# Patient Record
Sex: MALE | Race: BLACK OR AFRICAN AMERICAN | NOT HISPANIC OR LATINO | Employment: OTHER | URBAN - METROPOLITAN AREA
[De-identification: names, ages, dates, MRNs, and addresses within clinical notes are randomized per-mention and may not be internally consistent; named-entity substitution may affect disease eponyms.]

---

## 2023-03-13 LAB
ALBUMIN (G/DL) IN SER/PLAS: 4.1 G/DL (ref 3.4–5)
ANION GAP IN SER/PLAS: 10 MMOL/L (ref 10–20)
CALCIUM (MG/DL) IN SER/PLAS: 9.4 MG/DL (ref 8.6–10.6)
CARBON DIOXIDE, TOTAL (MMOL/L) IN SER/PLAS: 28 MMOL/L (ref 21–32)
CHLORIDE (MMOL/L) IN SER/PLAS: 105 MMOL/L (ref 98–107)
CREATININE (MG/DL) IN SER/PLAS: 1.59 MG/DL (ref 0.5–1.3)
ERYTHROCYTE DISTRIBUTION WIDTH (RATIO) BY AUTOMATED COUNT: 13.4 % (ref 11.5–14.5)
ERYTHROCYTE MEAN CORPUSCULAR HEMOGLOBIN CONCENTRATION (G/DL) BY AUTOMATED: 34 G/DL (ref 32–36)
ERYTHROCYTE MEAN CORPUSCULAR VOLUME (FL) BY AUTOMATED COUNT: 90 FL (ref 80–100)
ERYTHROCYTES (10*6/UL) IN BLOOD BY AUTOMATED COUNT: 4.73 X10E12/L (ref 4.5–5.9)
GFR MALE: 50 ML/MIN/1.73M2
GLUCOSE (MG/DL) IN SER/PLAS: 86 MG/DL (ref 74–99)
HEMATOCRIT (%) IN BLOOD BY AUTOMATED COUNT: 42.7 % (ref 41–52)
HEMOGLOBIN (G/DL) IN BLOOD: 14.5 G/DL (ref 13.5–17.5)
LEUKOCYTES (10*3/UL) IN BLOOD BY AUTOMATED COUNT: 5.6 X10E9/L (ref 4.4–11.3)
MAGNESIUM (MG/DL) IN SER/PLAS: 1.78 MG/DL (ref 1.6–2.4)
NRBC (PER 100 WBCS) BY AUTOMATED COUNT: 0 /100 WBC (ref 0–0)
PHOSPHATE (MG/DL) IN SER/PLAS: 2.4 MG/DL (ref 2.5–4.9)
PLATELETS (10*3/UL) IN BLOOD AUTOMATED COUNT: 144 X10E9/L (ref 150–450)
POTASSIUM (MMOL/L) IN SER/PLAS: 3.3 MMOL/L (ref 3.5–5.3)
SODIUM (MMOL/L) IN SER/PLAS: 140 MMOL/L (ref 136–145)
TACROLIMUS (NG/ML) IN BLOOD: 6.1 NG/ML (ref 2–15)
UREA NITROGEN (MG/DL) IN SER/PLAS: 18 MG/DL (ref 6–23)

## 2023-05-12 ENCOUNTER — APPOINTMENT (OUTPATIENT)
Dept: LAB | Facility: LAB | Age: 58
End: 2023-05-12
Payer: COMMERCIAL

## 2023-05-12 LAB
ALBUMIN (G/DL) IN SER/PLAS: 3.9 G/DL (ref 3.4–5)
ANION GAP IN SER/PLAS: 11 MMOL/L (ref 10–20)
CALCIUM (MG/DL) IN SER/PLAS: 9.3 MG/DL (ref 8.6–10.6)
CARBON DIOXIDE, TOTAL (MMOL/L) IN SER/PLAS: 28 MMOL/L (ref 21–32)
CHLORIDE (MMOL/L) IN SER/PLAS: 107 MMOL/L (ref 98–107)
CREATININE (MG/DL) IN SER/PLAS: <0.2 MG/DL (ref 0.5–1.3)
ERYTHROCYTE DISTRIBUTION WIDTH (RATIO) BY AUTOMATED COUNT: 13.7 % (ref 11.5–14.5)
ERYTHROCYTE MEAN CORPUSCULAR HEMOGLOBIN CONCENTRATION (G/DL) BY AUTOMATED: 33.3 G/DL (ref 32–36)
ERYTHROCYTE MEAN CORPUSCULAR VOLUME (FL) BY AUTOMATED COUNT: 92 FL (ref 80–100)
ERYTHROCYTES (10*6/UL) IN BLOOD BY AUTOMATED COUNT: 4.45 X10E12/L (ref 4.5–5.9)
GFR MALE: >90 ML/MIN/1.73M2
GLUCOSE (MG/DL) IN SER/PLAS: <10 MG/DL (ref 74–99)
HEMATOCRIT (%) IN BLOOD BY AUTOMATED COUNT: 40.9 % (ref 41–52)
HEMOGLOBIN (G/DL) IN BLOOD: 13.6 G/DL (ref 13.5–17.5)
LEUKOCYTES (10*3/UL) IN BLOOD BY AUTOMATED COUNT: 5.6 X10E9/L (ref 4.4–11.3)
MAGNESIUM (MG/DL) IN SER/PLAS: 1.83 MG/DL (ref 1.6–2.4)
NRBC (PER 100 WBCS) BY AUTOMATED COUNT: 0 /100 WBC (ref 0–0)
PHOSPHATE (MG/DL) IN SER/PLAS: 2.1 MG/DL (ref 2.5–4.9)
PLATELETS (10*3/UL) IN BLOOD AUTOMATED COUNT: 121 X10E9/L (ref 150–450)
POTASSIUM (MMOL/L) IN SER/PLAS: 3.3 MMOL/L (ref 3.5–5.3)
SODIUM (MMOL/L) IN SER/PLAS: 143 MMOL/L (ref 136–145)
TACROLIMUS (NG/ML) IN BLOOD: 5.6 NG/ML (ref 2–15)
UREA NITROGEN (MG/DL) IN SER/PLAS: 19 MG/DL (ref 6–23)

## 2023-05-15 ENCOUNTER — APPOINTMENT (OUTPATIENT)
Dept: LAB | Facility: LAB | Age: 58
End: 2023-05-15
Payer: COMMERCIAL

## 2023-05-15 LAB
ALBUMIN (G/DL) IN SER/PLAS: 4 G/DL (ref 3.4–5)
ANION GAP IN SER/PLAS: 10 MMOL/L (ref 10–20)
CALCIUM (MG/DL) IN SER/PLAS: 9 MG/DL (ref 8.6–10.6)
CARBON DIOXIDE, TOTAL (MMOL/L) IN SER/PLAS: 27 MMOL/L (ref 21–32)
CHLORIDE (MMOL/L) IN SER/PLAS: 107 MMOL/L (ref 98–107)
CREATININE (MG/DL) IN SER/PLAS: 1.66 MG/DL (ref 0.5–1.3)
GFR MALE: 48 ML/MIN/1.73M2
GLUCOSE (MG/DL) IN SER/PLAS: 94 MG/DL (ref 74–99)
PHOSPHATE (MG/DL) IN SER/PLAS: 2.1 MG/DL (ref 2.5–4.9)
POTASSIUM (MMOL/L) IN SER/PLAS: 3.3 MMOL/L (ref 3.5–5.3)
SODIUM (MMOL/L) IN SER/PLAS: 141 MMOL/L (ref 136–145)
UREA NITROGEN (MG/DL) IN SER/PLAS: 18 MG/DL (ref 6–23)

## 2023-06-12 ENCOUNTER — APPOINTMENT (OUTPATIENT)
Dept: LAB | Facility: LAB | Age: 58
End: 2023-06-12
Payer: COMMERCIAL

## 2023-06-12 LAB
ALBUMIN (G/DL) IN SER/PLAS: 4.1 G/DL (ref 3.4–5)
ANION GAP IN SER/PLAS: 13 MMOL/L (ref 10–20)
CALCIUM (MG/DL) IN SER/PLAS: 9.4 MG/DL (ref 8.6–10.6)
CARBON DIOXIDE, TOTAL (MMOL/L) IN SER/PLAS: 24 MMOL/L (ref 21–32)
CHLORIDE (MMOL/L) IN SER/PLAS: 106 MMOL/L (ref 98–107)
CREATININE (MG/DL) IN SER/PLAS: 1.65 MG/DL (ref 0.5–1.3)
ERYTHROCYTE DISTRIBUTION WIDTH (RATIO) BY AUTOMATED COUNT: 13.5 % (ref 11.5–14.5)
ERYTHROCYTE MEAN CORPUSCULAR HEMOGLOBIN CONCENTRATION (G/DL) BY AUTOMATED: 32.9 G/DL (ref 32–36)
ERYTHROCYTE MEAN CORPUSCULAR VOLUME (FL) BY AUTOMATED COUNT: 92 FL (ref 80–100)
ERYTHROCYTES (10*6/UL) IN BLOOD BY AUTOMATED COUNT: 4.67 X10E12/L (ref 4.5–5.9)
GFR MALE: 48 ML/MIN/1.73M2
GLUCOSE (MG/DL) IN SER/PLAS: 115 MG/DL (ref 74–99)
HEMATOCRIT (%) IN BLOOD BY AUTOMATED COUNT: 43.1 % (ref 41–52)
HEMOGLOBIN (G/DL) IN BLOOD: 14.2 G/DL (ref 13.5–17.5)
LEUKOCYTES (10*3/UL) IN BLOOD BY AUTOMATED COUNT: 5.2 X10E9/L (ref 4.4–11.3)
MAGNESIUM (MG/DL) IN SER/PLAS: 2.08 MG/DL (ref 1.6–2.4)
NRBC (PER 100 WBCS) BY AUTOMATED COUNT: 0 /100 WBC (ref 0–0)
PHOSPHATE (MG/DL) IN SER/PLAS: 2.2 MG/DL (ref 2.5–4.9)
PLATELETS (10*3/UL) IN BLOOD AUTOMATED COUNT: 127 X10E9/L (ref 150–450)
POTASSIUM (MMOL/L) IN SER/PLAS: 3.5 MMOL/L (ref 3.5–5.3)
SODIUM (MMOL/L) IN SER/PLAS: 139 MMOL/L (ref 136–145)
TACROLIMUS (NG/ML) IN BLOOD: 6 NG/ML (ref 2–15)
UREA NITROGEN (MG/DL) IN SER/PLAS: 17 MG/DL (ref 6–23)

## 2023-07-11 ENCOUNTER — LAB (OUTPATIENT)
Dept: LAB | Facility: LAB | Age: 58
End: 2023-07-11
Payer: COMMERCIAL

## 2023-07-11 LAB
ALBUMIN (G/DL) IN SER/PLAS: 3.9 G/DL (ref 3.4–5)
ANION GAP IN SER/PLAS: 11 MMOL/L (ref 10–20)
CALCIUM (MG/DL) IN SER/PLAS: 9.3 MG/DL (ref 8.6–10.6)
CARBON DIOXIDE, TOTAL (MMOL/L) IN SER/PLAS: 28 MMOL/L (ref 21–32)
CHLORIDE (MMOL/L) IN SER/PLAS: 108 MMOL/L (ref 98–107)
CREATININE (MG/DL) IN SER/PLAS: 1.71 MG/DL (ref 0.5–1.3)
CREATININE (MG/DL) IN URINE: 70 MG/DL (ref 20–370)
ERYTHROCYTE DISTRIBUTION WIDTH (RATIO) BY AUTOMATED COUNT: 14.6 % (ref 11.5–14.5)
ERYTHROCYTE MEAN CORPUSCULAR HEMOGLOBIN CONCENTRATION (G/DL) BY AUTOMATED: 31.4 G/DL (ref 32–36)
ERYTHROCYTE MEAN CORPUSCULAR VOLUME (FL) BY AUTOMATED COUNT: 95 FL (ref 80–100)
ERYTHROCYTES (10*6/UL) IN BLOOD BY AUTOMATED COUNT: 4.56 X10E12/L (ref 4.5–5.9)
GFR MALE: 46 ML/MIN/1.73M2
GLUCOSE (MG/DL) IN SER/PLAS: 98 MG/DL (ref 74–99)
HEMATOCRIT (%) IN BLOOD BY AUTOMATED COUNT: 43.3 % (ref 41–52)
HEMOGLOBIN (G/DL) IN BLOOD: 13.6 G/DL (ref 13.5–17.5)
LEUKOCYTES (10*3/UL) IN BLOOD BY AUTOMATED COUNT: 6.1 X10E9/L (ref 4.4–11.3)
MAGNESIUM (MG/DL) IN SER/PLAS: 1.98 MG/DL (ref 1.6–2.4)
NRBC (PER 100 WBCS) BY AUTOMATED COUNT: 0 /100 WBC (ref 0–0)
PHOSPHATE (MG/DL) IN SER/PLAS: 2.8 MG/DL (ref 2.5–4.9)
PLATELETS (10*3/UL) IN BLOOD AUTOMATED COUNT: 139 X10E9/L (ref 150–450)
POTASSIUM (MMOL/L) IN SER/PLAS: 4.1 MMOL/L (ref 3.5–5.3)
PROTEIN (MG/DL) IN URINE: 30 MG/DL (ref 5–25)
PROTEIN/CREATININE (MG/MG) IN URINE: 0.43 MG/MG CREAT (ref 0–0.17)
SODIUM (MMOL/L) IN SER/PLAS: 143 MMOL/L (ref 136–145)
TACROLIMUS (NG/ML) IN BLOOD: 5.4 NG/ML (ref 2–15)
UREA NITROGEN (MG/DL) IN SER/PLAS: 18 MG/DL (ref 6–23)

## 2023-07-28 ENCOUNTER — LAB (OUTPATIENT)
Dept: LAB | Facility: LAB | Age: 58
End: 2023-07-28
Payer: COMMERCIAL

## 2023-07-28 LAB
ALBUMIN (G/DL) IN SER/PLAS: 4.2 G/DL (ref 3.4–5)
ANION GAP IN SER/PLAS: 15 MMOL/L (ref 10–20)
CALCIDIOL (25 OH VITAMIN D3) (NG/ML) IN SER/PLAS: 20 NG/ML
CALCIUM (MG/DL) IN SER/PLAS: 9.5 MG/DL (ref 8.6–10.6)
CARBON DIOXIDE, TOTAL (MMOL/L) IN SER/PLAS: 25 MMOL/L (ref 21–32)
CHLORIDE (MMOL/L) IN SER/PLAS: 105 MMOL/L (ref 98–107)
CREATININE (MG/DL) IN SER/PLAS: 1.77 MG/DL (ref 0.5–1.3)
ERYTHROCYTE DISTRIBUTION WIDTH (RATIO) BY AUTOMATED COUNT: 14.4 % (ref 11.5–14.5)
ERYTHROCYTE MEAN CORPUSCULAR HEMOGLOBIN CONCENTRATION (G/DL) BY AUTOMATED: 32.4 G/DL (ref 32–36)
ERYTHROCYTE MEAN CORPUSCULAR VOLUME (FL) BY AUTOMATED COUNT: 93 FL (ref 80–100)
ERYTHROCYTES (10*6/UL) IN BLOOD BY AUTOMATED COUNT: 4.74 X10E12/L (ref 4.5–5.9)
GFR MALE: 44 ML/MIN/1.73M2
GLUCOSE (MG/DL) IN SER/PLAS: 86 MG/DL (ref 74–99)
HEMATOCRIT (%) IN BLOOD BY AUTOMATED COUNT: 44.1 % (ref 41–52)
HEMOGLOBIN (G/DL) IN BLOOD: 14.3 G/DL (ref 13.5–17.5)
HLA CLASS I ANTIBODY SCREEN, FLOW CYTOMETRY: NORMAL
HLA CLASS II ANTIBODY SCREEN, FLOW CYTOMETRY: NORMAL
LEUKOCYTES (10*3/UL) IN BLOOD BY AUTOMATED COUNT: 5.7 X10E9/L (ref 4.4–11.3)
NRBC (PER 100 WBCS) BY AUTOMATED COUNT: 0 /100 WBC (ref 0–0)
PARATHYRIN INTACT (PG/ML) IN SER/PLAS: 120.6 PG/ML (ref 18.5–88)
PHOSPHATE (MG/DL) IN SER/PLAS: 2.5 MG/DL (ref 2.5–4.9)
PLATELETS (10*3/UL) IN BLOOD AUTOMATED COUNT: 145 X10E9/L (ref 150–450)
POTASSIUM (MMOL/L) IN SER/PLAS: 3.6 MMOL/L (ref 3.5–5.3)
SODIUM (MMOL/L) IN SER/PLAS: 141 MMOL/L (ref 136–145)
TACROLIMUS (NG/ML) IN BLOOD: 5.1 NG/ML (ref 2–15)
UREA NITROGEN (MG/DL) IN SER/PLAS: 15 MG/DL (ref 6–23)

## 2023-07-30 LAB
CREATININE (MG/DL) IN URINE: NORMAL
PROTEIN (MG/DL) IN URINE: NORMAL
PROTEIN/CREATININE (MG/MG) IN URINE: NORMAL

## 2023-08-11 ENCOUNTER — APPOINTMENT (OUTPATIENT)
Dept: LAB | Facility: LAB | Age: 58
End: 2023-08-11
Payer: COMMERCIAL

## 2023-08-11 LAB
ALBUMIN (G/DL) IN SER/PLAS: 4 G/DL (ref 3.4–5)
ANION GAP IN SER/PLAS: 12 MMOL/L (ref 10–20)
CALCIUM (MG/DL) IN SER/PLAS: 9 MG/DL (ref 8.6–10.6)
CARBON DIOXIDE, TOTAL (MMOL/L) IN SER/PLAS: 25 MMOL/L (ref 21–32)
CHLORIDE (MMOL/L) IN SER/PLAS: 106 MMOL/L (ref 98–107)
CREATININE (MG/DL) IN SER/PLAS: 1.85 MG/DL (ref 0.5–1.3)
ERYTHROCYTE DISTRIBUTION WIDTH (RATIO) BY AUTOMATED COUNT: 14 % (ref 11.5–14.5)
ERYTHROCYTE MEAN CORPUSCULAR HEMOGLOBIN CONCENTRATION (G/DL) BY AUTOMATED: 32.3 G/DL (ref 32–36)
ERYTHROCYTE MEAN CORPUSCULAR VOLUME (FL) BY AUTOMATED COUNT: 95 FL (ref 80–100)
ERYTHROCYTES (10*6/UL) IN BLOOD BY AUTOMATED COUNT: 4.4 X10E12/L (ref 4.5–5.9)
GFR MALE: 42 ML/MIN/1.73M2
GLUCOSE (MG/DL) IN SER/PLAS: 133 MG/DL (ref 74–99)
HEMATOCRIT (%) IN BLOOD BY AUTOMATED COUNT: 41.8 % (ref 41–52)
HEMOGLOBIN (G/DL) IN BLOOD: 13.5 G/DL (ref 13.5–17.5)
LEUKOCYTES (10*3/UL) IN BLOOD BY AUTOMATED COUNT: 5.9 X10E9/L (ref 4.4–11.3)
MAGNESIUM (MG/DL) IN SER/PLAS: 1.76 MG/DL (ref 1.6–2.4)
NRBC (PER 100 WBCS) BY AUTOMATED COUNT: 0 /100 WBC (ref 0–0)
PHOSPHATE (MG/DL) IN SER/PLAS: 2.1 MG/DL (ref 2.5–4.9)
PLATELETS (10*3/UL) IN BLOOD AUTOMATED COUNT: 133 X10E9/L (ref 150–450)
POTASSIUM (MMOL/L) IN SER/PLAS: 3.7 MMOL/L (ref 3.5–5.3)
SODIUM (MMOL/L) IN SER/PLAS: 139 MMOL/L (ref 136–145)
TACROLIMUS (NG/ML) IN BLOOD: 5.9 NG/ML (ref 2–15)
UREA NITROGEN (MG/DL) IN SER/PLAS: 21 MG/DL (ref 6–23)

## 2023-08-22 PROBLEM — D84.9 IMMUNOSUPPRESSION (MULTI): Status: ACTIVE | Noted: 2023-08-22

## 2023-08-22 PROBLEM — Z94.0 KIDNEY TRANSPLANT RECIPIENT (HHS-HCC): Status: ACTIVE | Noted: 2023-08-22

## 2023-08-22 PROBLEM — M62.82 RHABDOMYOLYSIS: Status: ACTIVE | Noted: 2023-08-22

## 2023-08-22 PROBLEM — E55.9 VITAMIN D DEFICIENCY: Status: ACTIVE | Noted: 2023-08-22

## 2023-08-22 PROBLEM — R16.0 LIVER MASS: Status: ACTIVE | Noted: 2023-08-22

## 2023-08-22 PROBLEM — H01.003 BLEPHARITIS OF BOTH EYES: Status: ACTIVE | Noted: 2018-01-30

## 2023-08-22 PROBLEM — D69.6 THROMBOCYTOPENIA, UNSPECIFIED (CMS-HCC): Status: ACTIVE | Noted: 2023-08-22

## 2023-08-22 PROBLEM — I25.10 ASCVD (ARTERIOSCLEROTIC CARDIOVASCULAR DISEASE): Status: ACTIVE | Noted: 2023-08-22

## 2023-08-22 PROBLEM — L90.9 FAT PAD ATROPHY OF FOOT: Status: ACTIVE | Noted: 2023-08-22

## 2023-08-22 PROBLEM — R79.89 ELEVATED SERUM CREATININE: Status: ACTIVE | Noted: 2023-08-22

## 2023-08-22 PROBLEM — A53.0 LATENT SYPHILIS WITH POSITIVE SEROLOGY: Status: ACTIVE | Noted: 2023-08-22

## 2023-08-22 PROBLEM — H01.006 BLEPHARITIS OF BOTH EYES: Status: ACTIVE | Noted: 2018-01-30

## 2023-08-22 PROBLEM — H00.14 CHALAZION OF LEFT UPPER EYELID: Status: ACTIVE | Noted: 2018-01-30

## 2023-08-22 RX ORDER — VALGANCICLOVIR 450 MG/1
1 TABLET, FILM COATED ORAL
COMMUNITY
Start: 2019-08-12 | End: 2023-10-12 | Stop reason: ALTCHOICE

## 2023-08-22 RX ORDER — VALSARTAN 320 MG/1
TABLET ORAL
COMMUNITY
Start: 2010-03-10 | End: 2023-10-12 | Stop reason: ALTCHOICE

## 2023-08-22 RX ORDER — FUROSEMIDE 20 MG/1
20 TABLET ORAL DAILY
COMMUNITY

## 2023-08-22 RX ORDER — CARVEDILOL 12.5 MG/1
12.5 TABLET ORAL 2 TIMES DAILY
COMMUNITY
Start: 2010-03-10 | End: 2023-10-12 | Stop reason: ALTCHOICE

## 2023-08-22 RX ORDER — CLONIDINE HYDROCHLORIDE 0.2 MG/1
0.2 TABLET ORAL 2 TIMES DAILY
COMMUNITY
End: 2023-10-12 | Stop reason: ALTCHOICE

## 2023-08-22 RX ORDER — TACROLIMUS 1 MG/1
3 CAPSULE ORAL EVERY 12 HOURS
COMMUNITY
Start: 2019-08-12 | End: 2023-08-22 | Stop reason: ENTERED-IN-ERROR

## 2023-08-22 RX ORDER — POLYETHYLENE GLYCOL 3350 17 G/17G
17 POWDER, FOR SOLUTION ORAL DAILY PRN
COMMUNITY
Start: 2019-08-12 | End: 2023-10-12 | Stop reason: ALTCHOICE

## 2023-08-22 RX ORDER — MYCOPHENOLATE MOFETIL 250 MG/1
2 CAPSULE ORAL EVERY 12 HOURS
COMMUNITY
Start: 2019-08-12 | End: 2023-10-12 | Stop reason: ALTCHOICE

## 2023-08-22 RX ORDER — PARICALCITOL 5 UG/ML
INJECTION, SOLUTION INTRAVENOUS 3 TIMES WEEKLY
COMMUNITY
Start: 2010-03-10 | End: 2023-10-12 | Stop reason: ALTCHOICE

## 2023-08-22 RX ORDER — SULFAMETHOXAZOLE AND TRIMETHOPRIM 400; 80 MG/1; MG/1
1 TABLET ORAL EVERY 24 HOURS
COMMUNITY
Start: 2019-08-12 | End: 2023-10-12 | Stop reason: ALTCHOICE

## 2023-08-22 RX ORDER — TACROLIMUS 1 MG/1
4 CAPSULE ORAL 2 TIMES DAILY
COMMUNITY
Start: 2019-08-12 | End: 2023-10-12 | Stop reason: ALTCHOICE

## 2023-08-22 RX ORDER — CLOTRIMAZOLE 10 MG/1
10 LOZENGE ORAL; TOPICAL
COMMUNITY
Start: 2019-08-12 | End: 2023-10-12 | Stop reason: ALTCHOICE

## 2023-08-22 RX ORDER — ERGOCALCIFEROL 1.25 MG/1
1 CAPSULE ORAL
COMMUNITY
Start: 2021-10-13 | End: 2023-10-12 | Stop reason: ALTCHOICE

## 2023-08-22 RX ORDER — CLONIDINE HYDROCHLORIDE 0.1 MG/1
0.1 TABLET ORAL 2 TIMES DAILY
COMMUNITY
Start: 2010-03-10

## 2023-08-22 RX ORDER — ACETAMINOPHEN 325 MG/1
325 TABLET ORAL EVERY 6 HOURS
COMMUNITY
Start: 2010-03-10 | End: 2023-10-12 | Stop reason: ALTCHOICE

## 2023-08-22 RX ORDER — CETIRIZINE HYDROCHLORIDE 10 MG/1
10 TABLET ORAL DAILY PRN
COMMUNITY

## 2023-08-22 RX ORDER — DOCUSATE SODIUM 100 MG/1
1 CAPSULE, LIQUID FILLED ORAL 2 TIMES DAILY
COMMUNITY
Start: 2019-08-12 | End: 2023-10-12 | Stop reason: ALTCHOICE

## 2023-08-22 RX ORDER — TAMSULOSIN HYDROCHLORIDE 0.4 MG/1
1 CAPSULE ORAL DAILY
COMMUNITY
Start: 2020-06-24 | End: 2023-10-12 | Stop reason: ALTCHOICE

## 2023-08-22 RX ORDER — PANTOPRAZOLE SODIUM 40 MG/1
1 TABLET, DELAYED RELEASE ORAL
COMMUNITY
Start: 2019-08-12 | End: 2023-10-12 | Stop reason: ALTCHOICE

## 2023-09-11 ENCOUNTER — LAB (OUTPATIENT)
Dept: LAB | Facility: LAB | Age: 58
End: 2023-09-11
Payer: COMMERCIAL

## 2023-09-11 LAB
ALBUMIN (G/DL) IN SER/PLAS: 4.1 G/DL (ref 3.4–5)
ANION GAP IN SER/PLAS: 12 MMOL/L (ref 10–20)
CALCIUM (MG/DL) IN SER/PLAS: 9.2 MG/DL (ref 8.6–10.6)
CARBON DIOXIDE, TOTAL (MMOL/L) IN SER/PLAS: 25 MMOL/L (ref 21–32)
CHLORIDE (MMOL/L) IN SER/PLAS: 107 MMOL/L (ref 98–107)
CREATININE (MG/DL) IN SER/PLAS: 1.61 MG/DL (ref 0.5–1.3)
CREATININE (MG/DL) IN URINE: 49.4 MG/DL (ref 20–370)
ERYTHROCYTE DISTRIBUTION WIDTH (RATIO) BY AUTOMATED COUNT: 13.9 % (ref 11.5–14.5)
ERYTHROCYTE MEAN CORPUSCULAR HEMOGLOBIN CONCENTRATION (G/DL) BY AUTOMATED: 33 G/DL (ref 32–36)
ERYTHROCYTE MEAN CORPUSCULAR VOLUME (FL) BY AUTOMATED COUNT: 93 FL (ref 80–100)
ERYTHROCYTES (10*6/UL) IN BLOOD BY AUTOMATED COUNT: 4.5 X10E12/L (ref 4.5–5.9)
GFR MALE: 49 ML/MIN/1.73M2
GLUCOSE (MG/DL) IN SER/PLAS: 104 MG/DL (ref 74–99)
HEMATOCRIT (%) IN BLOOD BY AUTOMATED COUNT: 41.8 % (ref 41–52)
HEMOGLOBIN (G/DL) IN BLOOD: 13.8 G/DL (ref 13.5–17.5)
LEUKOCYTES (10*3/UL) IN BLOOD BY AUTOMATED COUNT: 5.6 X10E9/L (ref 4.4–11.3)
NRBC (PER 100 WBCS) BY AUTOMATED COUNT: 0 /100 WBC (ref 0–0)
PHOSPHATE (MG/DL) IN SER/PLAS: 1.7 MG/DL (ref 2.5–4.9)
PLATELETS (10*3/UL) IN BLOOD AUTOMATED COUNT: 129 X10E9/L (ref 150–450)
POTASSIUM (MMOL/L) IN SER/PLAS: 3.4 MMOL/L (ref 3.5–5.3)
PROTEIN (MG/DL) IN URINE: 36 MG/DL (ref 5–25)
PROTEIN/CREATININE (MG/MG) IN URINE: 0.73 MG/MG CREAT (ref 0–0.17)
SODIUM (MMOL/L) IN SER/PLAS: 141 MMOL/L (ref 136–145)
TACROLIMUS (NG/ML) IN BLOOD: 7.8 NG/ML (ref 2–15)
UREA NITROGEN (MG/DL) IN SER/PLAS: 20 MG/DL (ref 6–23)

## 2023-09-21 ENCOUNTER — LAB (OUTPATIENT)
Dept: LAB | Facility: LAB | Age: 58
End: 2023-09-21
Payer: COMMERCIAL

## 2023-09-21 LAB
ALBUMIN (G/DL) IN SER/PLAS: 4 G/DL (ref 3.4–5)
ANION GAP IN SER/PLAS: 15 MMOL/L (ref 10–20)
CALCIUM (MG/DL) IN SER/PLAS: 9.6 MG/DL (ref 8.6–10.6)
CARBON DIOXIDE, TOTAL (MMOL/L) IN SER/PLAS: 23 MMOL/L (ref 21–32)
CHLORIDE (MMOL/L) IN SER/PLAS: 107 MMOL/L (ref 98–107)
CREATININE (MG/DL) IN SER/PLAS: 1.9 MG/DL (ref 0.5–1.3)
CREATININE (MG/DL) IN URINE: 88.3 MG/DL (ref 20–370)
ERYTHROCYTE DISTRIBUTION WIDTH (RATIO) BY AUTOMATED COUNT: 14.1 % (ref 11.5–14.5)
ERYTHROCYTE MEAN CORPUSCULAR HEMOGLOBIN CONCENTRATION (G/DL) BY AUTOMATED: 34 G/DL (ref 32–36)
ERYTHROCYTE MEAN CORPUSCULAR VOLUME (FL) BY AUTOMATED COUNT: 92 FL (ref 80–100)
ERYTHROCYTES (10*6/UL) IN BLOOD BY AUTOMATED COUNT: 4.44 X10E12/L (ref 4.5–5.9)
GFR MALE: 41 ML/MIN/1.73M2
GLUCOSE (MG/DL) IN SER/PLAS: 112 MG/DL (ref 74–99)
HEMATOCRIT (%) IN BLOOD BY AUTOMATED COUNT: 40.9 % (ref 41–52)
HEMOGLOBIN (G/DL) IN BLOOD: 13.9 G/DL (ref 13.5–17.5)
LEUKOCYTES (10*3/UL) IN BLOOD BY AUTOMATED COUNT: 6.1 X10E9/L (ref 4.4–11.3)
MAGNESIUM (MG/DL) IN SER/PLAS: 1.87 MG/DL (ref 1.6–2.4)
NRBC (PER 100 WBCS) BY AUTOMATED COUNT: 0 /100 WBC (ref 0–0)
PHOSPHATE (MG/DL) IN SER/PLAS: 2.1 MG/DL (ref 2.5–4.9)
PLATELETS (10*3/UL) IN BLOOD AUTOMATED COUNT: 129 X10E9/L (ref 150–450)
POTASSIUM (MMOL/L) IN SER/PLAS: 3.2 MMOL/L (ref 3.5–5.3)
PROTEIN (MG/DL) IN URINE: 51 MG/DL (ref 5–25)
PROTEIN/CREATININE (MG/MG) IN URINE: 0.58 MG/MG CREAT (ref 0–0.17)
SODIUM (MMOL/L) IN SER/PLAS: 142 MMOL/L (ref 136–145)
TACROLIMUS (NG/ML) IN BLOOD: 9.3 NG/ML (ref 2–15)
UREA NITROGEN (MG/DL) IN SER/PLAS: 15 MG/DL (ref 6–23)

## 2023-10-02 ENCOUNTER — OFFICE VISIT (OUTPATIENT)
Dept: OPHTHALMOLOGY | Facility: CLINIC | Age: 58
End: 2023-10-02
Payer: COMMERCIAL

## 2023-10-02 DIAGNOSIS — H52.4 PRESBYOPIA OF BOTH EYES: ICD-10-CM

## 2023-10-02 DIAGNOSIS — H52.13 MYOPIA OF BOTH EYES: Primary | ICD-10-CM

## 2023-10-02 PROCEDURE — 92004 COMPRE OPH EXAM NEW PT 1/>: CPT | Performed by: OPTOMETRIST

## 2023-10-02 PROCEDURE — 92015 DETERMINE REFRACTIVE STATE: CPT | Performed by: OPTOMETRIST

## 2023-10-02 ASSESSMENT — REFRACTION_MANIFEST
METHOD_AUTOREFRACTION: 1
OS_CYLINDER: -0.50
OD_AXIS: 070
OD_CYLINDER: -0.50
OD_SPHERE: -0.25
OS_SPHERE: -0.25
OD_ADD: +2.00
OD_SPHERE: -0.25
OS_AXIS: 090
OD_CYLINDER: -0.50
OD_AXIS: 065
OS_SPHERE: -0.25
OS_AXIS: 090
OS_ADD: +2.00
OS_CYLINDER: -0.50

## 2023-10-02 ASSESSMENT — CONF VISUAL FIELD
OS_INFERIOR_TEMPORAL_RESTRICTION: 0
OD_SUPERIOR_NASAL_RESTRICTION: 0
OD_INFERIOR_NASAL_RESTRICTION: 0
OD_NORMAL: 1
OS_SUPERIOR_TEMPORAL_RESTRICTION: 0
OD_INFERIOR_TEMPORAL_RESTRICTION: 0
OS_SUPERIOR_NASAL_RESTRICTION: 0
OS_INFERIOR_NASAL_RESTRICTION: 0
OD_SUPERIOR_TEMPORAL_RESTRICTION: 0
OS_NORMAL: 1

## 2023-10-02 ASSESSMENT — SLIT LAMP EXAM - LIDS
COMMENTS: NORMAL
COMMENTS: NORMAL

## 2023-10-02 ASSESSMENT — CUP TO DISC RATIO
OD_RATIO: 0.2
OS_RATIO: 0.2

## 2023-10-02 ASSESSMENT — VISUAL ACUITY
OS_SC: 20/20
OD_SC: 20/20
OS_SC+: -1
METHOD: SNELLEN - LINEAR

## 2023-10-02 ASSESSMENT — TONOMETRY
OS_IOP_MMHG: 18
OD_IOP_MMHG: 18
IOP_METHOD: GOLDMANN APPLANATION

## 2023-10-02 ASSESSMENT — EXTERNAL EXAM - LEFT EYE: OS_EXAM: NORMAL

## 2023-10-02 ASSESSMENT — EXTERNAL EXAM - RIGHT EYE: OD_EXAM: NORMAL

## 2023-10-02 NOTE — PROGRESS NOTES
A spectacle prescription was dispensed to be used as needed. 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

## 2023-10-10 ENCOUNTER — APPOINTMENT (OUTPATIENT)
Dept: TRANSPLANT | Facility: HOSPITAL | Age: 58
End: 2023-10-10
Payer: COMMERCIAL

## 2023-10-11 ENCOUNTER — LAB (OUTPATIENT)
Dept: LAB | Facility: LAB | Age: 58
End: 2023-10-11
Payer: COMMERCIAL

## 2023-10-11 DIAGNOSIS — Z94.0 KIDNEY TRANSPLANT STATUS (HHS-HCC): Primary | ICD-10-CM

## 2023-10-11 DIAGNOSIS — D84.9 IMMUNODEFICIENCY, UNSPECIFIED (MULTI): ICD-10-CM

## 2023-10-11 LAB
ALBUMIN SERPL BCP-MCNC: 3.9 G/DL (ref 3.4–5)
ANION GAP SERPL CALC-SCNC: 13 MMOL/L (ref 10–20)
BUN SERPL-MCNC: 18 MG/DL (ref 6–23)
CALCIUM SERPL-MCNC: 10.2 MG/DL (ref 8.6–10.6)
CHLORIDE SERPL-SCNC: 108 MMOL/L (ref 98–107)
CO2 SERPL-SCNC: 23 MMOL/L (ref 21–32)
CREAT SERPL-MCNC: 1.83 MG/DL (ref 0.5–1.3)
CREAT UR-MCNC: 128.5 MG/DL (ref 20–370)
ERYTHROCYTE [DISTWIDTH] IN BLOOD BY AUTOMATED COUNT: 14.5 % (ref 11.5–14.5)
GFR SERPL CREATININE-BSD FRML MDRD: 43 ML/MIN/1.73M*2
GLUCOSE SERPL-MCNC: 97 MG/DL (ref 74–99)
HCT VFR BLD AUTO: 42.8 % (ref 41–52)
HGB BLD-MCNC: 13.8 G/DL (ref 13.5–17.5)
MCH RBC QN AUTO: 30.1 PG (ref 26–34)
MCHC RBC AUTO-ENTMCNC: 32.2 G/DL (ref 32–36)
MCV RBC AUTO: 93 FL (ref 80–100)
NRBC BLD-RTO: 0 /100 WBCS (ref 0–0)
PHOSPHATE SERPL-MCNC: 2.5 MG/DL (ref 2.5–4.9)
PLATELET # BLD AUTO: 119 X10*3/UL (ref 150–450)
PMV BLD AUTO: 13 FL (ref 7.5–11.5)
POTASSIUM SERPL-SCNC: 3.6 MMOL/L (ref 3.5–5.3)
PROT UR-ACNC: 55 MG/DL (ref 5–25)
PROT/CREAT UR: 0.43 MG/MG CREAT (ref 0–0.17)
PTH-INTACT SERPL-MCNC: 204.4 PG/ML (ref 18.5–88)
RBC # BLD AUTO: 4.58 X10*6/UL (ref 4.5–5.9)
SODIUM SERPL-SCNC: 140 MMOL/L (ref 136–145)
TACROLIMUS BLD-MCNC: 4.4 NG/ML
WBC # BLD AUTO: 5.4 X10*3/UL (ref 4.4–11.3)

## 2023-10-11 PROCEDURE — 82570 ASSAY OF URINE CREATININE: CPT

## 2023-10-11 PROCEDURE — 85027 COMPLETE CBC AUTOMATED: CPT

## 2023-10-11 PROCEDURE — 84156 ASSAY OF PROTEIN URINE: CPT

## 2023-10-11 PROCEDURE — 80069 RENAL FUNCTION PANEL: CPT

## 2023-10-11 PROCEDURE — 80197 ASSAY OF TACROLIMUS: CPT

## 2023-10-11 PROCEDURE — 36415 COLL VENOUS BLD VENIPUNCTURE: CPT

## 2023-10-11 PROCEDURE — 83970 ASSAY OF PARATHORMONE: CPT

## 2023-10-12 ENCOUNTER — OFFICE VISIT (OUTPATIENT)
Dept: TRANSPLANT | Facility: HOSPITAL | Age: 58
End: 2023-10-12
Payer: COMMERCIAL

## 2023-10-12 VITALS
HEART RATE: 87 BPM | OXYGEN SATURATION: 100 % | TEMPERATURE: 97.5 F | WEIGHT: 166.4 LBS | BODY MASS INDEX: 24.93 KG/M2 | DIASTOLIC BLOOD PRESSURE: 107 MMHG | SYSTOLIC BLOOD PRESSURE: 138 MMHG

## 2023-10-12 DIAGNOSIS — Z94.0 IMMUNOSUPPRESSIVE MANAGEMENT ENCOUNTER FOLLOWING KIDNEY TRANSPLANT (HHS-HCC): ICD-10-CM

## 2023-10-12 DIAGNOSIS — Z94.0 KIDNEY REPLACED BY TRANSPLANT (HHS-HCC): ICD-10-CM

## 2023-10-12 DIAGNOSIS — Z79.899 IMMUNOSUPPRESSIVE MANAGEMENT ENCOUNTER FOLLOWING KIDNEY TRANSPLANT (HHS-HCC): ICD-10-CM

## 2023-10-12 DIAGNOSIS — I10 ESSENTIAL HYPERTENSION: ICD-10-CM

## 2023-10-12 DIAGNOSIS — Z94.0 KIDNEY REPLACED BY TRANSPLANT (HHS-HCC): Primary | ICD-10-CM

## 2023-10-12 PROCEDURE — 3080F DIAST BP >= 90 MM HG: CPT | Performed by: INTERNAL MEDICINE

## 2023-10-12 PROCEDURE — 99214 OFFICE O/P EST MOD 30 MIN: CPT | Performed by: INTERNAL MEDICINE

## 2023-10-12 PROCEDURE — 3075F SYST BP GE 130 - 139MM HG: CPT | Performed by: INTERNAL MEDICINE

## 2023-10-12 ASSESSMENT — PAIN SCALES - GENERAL: PAINLEVEL: 0-NO PAIN

## 2023-10-12 NOTE — PATIENT INSTRUCTIONS
-Hydrate  -Repeat lab in 2 weeks  -Don't forget to bring Allosure box when you go to the lab  - If lab in 2 weeks results are good, can go back to do labs every 3 months  -Follow up in a year

## 2023-10-17 NOTE — PROGRESS NOTES
TRANSPLANT NEPHROLOGY :   OUTPATIENT CLINIC NOTE        SERVICE DATE: 10/13/23    REASON FOR VISIT/CHIEF COMPLAINT:  S/P  TRANSPLANT SURGERY  IMMUNOSUPPRESSIVE MEDICATION MANAGEMENT  BLOOD PRESSURE MANAGEMENT    HPI:    Mr. Khoury is a 58 y.o. male with past medical history significant for end stage renal disease secondary to hypertensive nephrosclerosis. Received a  donor transplant on 8/10/19. Patient has a KDPI 40% and PRA 0%, no DGF, and was induced with Simulect.      Today, the patient presents for a post-kidney transplant follow up visit.    Patient is doing well overall. No new complaints. Denied chest pain, SOB, ARCE, Palpitation. Normal urination and bowel movement. Normal gait and no weakness of arms/legs. No cough, runny nose, sore throat, cold symptoms, or rash. No hearing loss. Normal vision.No problems with his sleep, mood and function. No recent infection, hospitalization, surgery or ER visits.      ROS:  Review of  14 systems was performed system by system. See HPI. Otherwise, the symptoms were negative.    Transplant info:  Primary Cause of Organ Disease: Hypertensive Nephrosclerosis.   Donor/Transplant Type: A  donor renal transplant on: 8/10/2019.   Transplant Course: standard simulect induction, No DGF, No CMV mismatch, No EBV mismatch, KDPI: 40% and PRA: 0.   Rejections: No episodes of rejection.   Infection: No episodes of infection., Other: Covid-2022.   Malignancies After Transplant: No episodes of post transplant malignancy.       PAST MEDICAL HISTORY:  Past Medical History:   Diagnosis Date    Personal history of other diseases of urinary system     History of chronic kidney disease    Personal history of other specified conditions 2022    History of urinary retention    Personal history of other specified conditions 2019    History of urinary retention        PAST SURGICAL HISTORY:  Past Surgical History:   Procedure Laterality Date    OTHER  SURGICAL HISTORY  06/06/2016    Myringotomy - With Eustachian Tube Inflation    OTHER SURGICAL HISTORY  06/06/2016    Arteriovenous Surgery Creation Of A-V Fistula    OTHER SURGICAL HISTORY  08/23/2019    Kidney transplantation        SOCIAL HISTORY:  Social History     Socioeconomic History    Marital status:      Spouse name: Not on file    Number of children: Not on file    Years of education: Not on file    Highest education level: Not on file   Occupational History    Not on file   Tobacco Use    Smoking status: Not on file    Smokeless tobacco: Not on file   Substance and Sexual Activity    Alcohol use: Not on file    Drug use: Not on file    Sexual activity: Not on file   Other Topics Concern    Not on file   Social History Narrative    Not on file     Social Determinants of Health     Financial Resource Strain: Not on file   Food Insecurity: Not on file   Transportation Needs: Not on file   Physical Activity: Not on file   Stress: Not on file   Social Connections: Not on file   Intimate Partner Violence: Not on file   Housing Stability: Not on file       FAMILY HISTORY:  Family History   Problem Relation Name Age of Onset    Hypertension Mother         MEDICATION LIST:  Current Outpatient Medications   Medication Instructions    cetirizine (ZYRTEC) 10 mg, oral, Daily PRN, Every morning as needed    cloNIDine (CATAPRES) 0.1 mg, oral    darbepoetin osmar (ARANESP) 100 mcg, intravenous, Every Friday    furosemide (LASIX) 20 mg, oral, Daily    mycophenolate (Cellcept) 250 mg capsule TAKE 3 CAPSULE TWICE DAILY    tacrolimus (Prograf) 1 mg capsule TAKE THREE (3) CAPSULES BY MOUTH EVERY 12 HOURS.    tamsulosin (Flomax) 0.4 mg 24 hr capsule TAKE ONE (1) CAPSULE BY MOUTH EVERYDAY AT BEDTIME.         PHYSICAL EXAM:    Visit Vitals  BP (!) 138/107   Pulse 87   Temp 36.4 °C (97.5 °F) (Temporal)   Wt 75.5 kg (166 lb 6.4 oz)   SpO2 100%   BMI 24.93 kg/m²   BSA 1.91 m²          Vital signs - reviewed. Acceptable BP  at this office visit.   General Appearance - NAD, Good speech, oriented and alert  HEENT - Supple. Not pale. No jaundice. No cervical lymphadenopathy. Pharynx and tonsils are not injected.  CVS - RRR. Normal S1/S2. No murmur, click , rub or gallop  Lungs- clear to auscultation bilaterally  Abdomen - soft , not tender, no guarding, no rigidity. No hepatosplenomegaly. Normal bowel sounds. No masses and ascites. S/P Kidney transplant .  Transplanted kidney is not tender.   Musculoskeletal /Extremities - no edema. Full ROM. No joint tenderness.   Neuro/Psych - appropriate mood and affect. Motor power V/V all extremities. CN I -XII were grossly intact.  Skin - No visible rash      LABS:    Lab Results   Component Value Date    WBC 5.4 10/11/2023    HGB 13.8 10/11/2023    HCT 42.8 10/11/2023     (L) 10/11/2023    CHOL 152 08/22/2019    TRIG 100 08/22/2019    HDL 40.7 08/22/2019    ALT 11 09/08/2022    AST 15 09/08/2022     10/11/2023    K 3.6 10/11/2023     (H) 10/11/2023    CREATININE 1.83 (H) 10/11/2023    BUN 18 10/11/2023    CO2 23 10/11/2023    PSA 0.55 02/13/2023    INR 1.1 03/20/2020    HGBA1C 5.4 02/13/2023     par    ASSESSMENT AND PLAN:    Mr. Khoury is a 58 y.o. male  who is here for follow up s/p kidney transplant.    TRANSPLANT DATE: 8/10/2019 (Kidney)      1. ESRD S/P kidney transplant   - Creatinine last check was :  Lab Results   Component Value Date    CREATININE 1.83 (H) 10/11/2023     Serum creatinine: 1.83 mg/dL (H) 10/11/23 1053  Estimated creatinine clearance: 43.8 mL/min (A)    - Renal allograft function : Cr has increased since July 2023.  -Random urine protein/creatinine ratio is 0.43  -Ensure adequate hydration  - Avoid nephrotoxic medications, NSAIDs, and IV contrast.    2. Immunosuppression  -Tacrolimus level last check was 4.4  -Continue current immunosuppression regimen.    3. Electrolytes  Lab Results   Component Value Date    GLUCOSE 97 10/11/2023    CALCIUM 10.2  10/11/2023     10/11/2023    K 3.6 10/11/2023    CO2 23 10/11/2023     (H) 10/11/2023    BUN 18 10/11/2023    CREATININE 1.83 (H) 10/11/2023     -Acceptable from last lab drawn    4. Hypertension  Blood Pressures         10/12/2023  1148 10/12/2023  1219          BP: 160/113 138/107            -Home  BP had been acceptable. Would hold off on increasing BP meds.  -Encourage to monitor home BP  -Continue current anti hypertensive medication    5. Bone Mineral Disease/Osteoporosis  -check VIT D, PTH with next lab  - Consider DEXA every 2-3 years , defer to PCP    6.Anemia  Lab Results   Component Value Date    WBC 5.4 10/11/2023    HGB 13.8 10/11/2023    HCT 42.8 10/11/2023    MCV 93 10/11/2023     (L) 10/11/2023     -asymptomatic  - Continue to monitor  -check iron studies and ferritin. Will consider KEAGAN as needed.  - No indications for blood transfusion     7.Health maintenance and vaccination  - Flu shot during flu season annually  - Cancer screening is up to date per the patient    Lab : Routine transplant lab ( CBC, RFP, and anti-rejection trough level ) every 3 months  Additional labs:  VIT D, PTH with next lab  Viral screening PCR, Allosure and UPC per protocol.    Additional Plan :  -Hydrate  -Repeat lab in 2 weeks  -Allosure with next lab  - If lab in 2 weeks results are good, can go back to do labs every 3 months    RTC 12 months    My Can    Transplant Nephrology

## 2023-10-31 ENCOUNTER — SPECIALTY PHARMACY (OUTPATIENT)
Dept: PHARMACY | Facility: CLINIC | Age: 58
End: 2023-10-31

## 2023-10-31 ENCOUNTER — PHARMACY VISIT (OUTPATIENT)
Dept: PHARMACY | Facility: CLINIC | Age: 58
End: 2023-10-31
Payer: MEDICARE

## 2023-10-31 DIAGNOSIS — Z94.0 KIDNEY REPLACED BY TRANSPLANT (HHS-HCC): ICD-10-CM

## 2023-10-31 PROCEDURE — RXMED WILLOW AMBULATORY MEDICATION CHARGE

## 2023-11-01 RX ORDER — TACROLIMUS 0.5 MG/1
1 CAPSULE ORAL 2 TIMES DAILY
Qty: 120 CAPSULE | Refills: 11 | Status: SHIPPED | OUTPATIENT
Start: 2023-11-01 | End: 2024-04-10 | Stop reason: SDUPTHER

## 2023-11-01 RX ORDER — TACROLIMUS 1 MG/1
CAPSULE ORAL
Qty: 180 CAPSULE | Refills: 11 | Status: SHIPPED | OUTPATIENT
Start: 2023-11-01 | End: 2024-04-10 | Stop reason: SDUPTHER

## 2023-11-01 RX ORDER — MYCOPHENOLATE MOFETIL 250 MG/1
CAPSULE ORAL
Qty: 180 CAPSULE | Refills: 11 | Status: SHIPPED | OUTPATIENT
Start: 2023-11-01 | End: 2024-10-31

## 2023-11-01 NOTE — TELEPHONE ENCOUNTER
Discussed with patient to confirm which medications and doses pt is currently taking, Refill orders sent for approval to Dr. Can

## 2023-11-07 ENCOUNTER — PHARMACY VISIT (OUTPATIENT)
Dept: PHARMACY | Facility: CLINIC | Age: 58
End: 2023-11-07
Payer: MEDICARE

## 2023-11-07 PROCEDURE — RXMED WILLOW AMBULATORY MEDICATION CHARGE

## 2023-11-08 ENCOUNTER — SPECIALTY PHARMACY (OUTPATIENT)
Dept: PHARMACY | Facility: CLINIC | Age: 58
End: 2023-11-08

## 2023-12-06 ENCOUNTER — LAB (OUTPATIENT)
Dept: LAB | Facility: LAB | Age: 58
End: 2023-12-06
Payer: COMMERCIAL

## 2023-12-06 DIAGNOSIS — Z76.82 KIDNEY TRANSPLANT CANDIDATE: ICD-10-CM

## 2023-12-06 DIAGNOSIS — Z94.0 KIDNEY REPLACED BY TRANSPLANT (HHS-HCC): ICD-10-CM

## 2023-12-06 LAB
ALBUMIN SERPL BCP-MCNC: 3.9 G/DL (ref 3.4–5)
ANION GAP SERPL CALC-SCNC: 14 MMOL/L (ref 10–20)
BUN SERPL-MCNC: 18 MG/DL (ref 6–23)
CALCIUM SERPL-MCNC: 9.4 MG/DL (ref 8.6–10.6)
CHLORIDE SERPL-SCNC: 106 MMOL/L (ref 98–107)
CO2 SERPL-SCNC: 24 MMOL/L (ref 21–32)
CREAT SERPL-MCNC: 1.51 MG/DL (ref 0.5–1.3)
CREAT UR-MCNC: 74.7 MG/DL (ref 20–370)
ERYTHROCYTE [DISTWIDTH] IN BLOOD BY AUTOMATED COUNT: 14.3 % (ref 11.5–14.5)
GFR SERPL CREATININE-BSD FRML MDRD: 54 ML/MIN/1.73M*2
GLUCOSE SERPL-MCNC: 92 MG/DL (ref 74–99)
HCT VFR BLD AUTO: 45 % (ref 41–52)
HGB BLD-MCNC: 14.7 G/DL (ref 13.5–17.5)
MAGNESIUM SERPL-MCNC: 1.8 MG/DL (ref 1.6–2.4)
MCH RBC QN AUTO: 30.4 PG (ref 26–34)
MCHC RBC AUTO-ENTMCNC: 32.7 G/DL (ref 32–36)
MCV RBC AUTO: 93 FL (ref 80–100)
NRBC BLD-RTO: 0 /100 WBCS (ref 0–0)
PHOSPHATE SERPL-MCNC: 2.3 MG/DL (ref 2.5–4.9)
PLATELET # BLD AUTO: 144 X10*3/UL (ref 150–450)
POTASSIUM SERPL-SCNC: 3.2 MMOL/L (ref 3.5–5.3)
PROT UR-ACNC: 51 MG/DL (ref 5–25)
PROT/CREAT UR: 0.68 MG/MG CREAT (ref 0–0.17)
RBC # BLD AUTO: 4.84 X10*6/UL (ref 4.5–5.9)
SODIUM SERPL-SCNC: 141 MMOL/L (ref 136–145)
TACROLIMUS BLD-MCNC: 6.3 NG/ML
WBC # BLD AUTO: 5.6 X10*3/UL (ref 4.4–11.3)

## 2023-12-06 PROCEDURE — 84156 ASSAY OF PROTEIN URINE: CPT

## 2023-12-06 PROCEDURE — 36415 COLL VENOUS BLD VENIPUNCTURE: CPT

## 2023-12-06 PROCEDURE — 80069 RENAL FUNCTION PANEL: CPT

## 2023-12-06 PROCEDURE — 80197 ASSAY OF TACROLIMUS: CPT

## 2023-12-06 PROCEDURE — 82570 ASSAY OF URINE CREATININE: CPT

## 2023-12-06 PROCEDURE — 85027 COMPLETE CBC AUTOMATED: CPT

## 2023-12-06 PROCEDURE — 83735 ASSAY OF MAGNESIUM: CPT

## 2023-12-09 LAB
ALLOSURE SCORE - KIDNEY: 0.52 %
CAREDX_ORDER_ID: NORMAL
CENTER_ORDER_ID: NORMAL
CLIENT SPECIMEN ID - ALLOSURE: NORMAL
DONOR RELATION - ALLOSURE: NORMAL
NOTES - ALLOSURE: NORMAL
RELATIVE CHANGE VALUE - KIDNEY: 0 %
TEST COMMENTS - ALLOSURE: NORMAL
TIME POST TX - ALLOSURE: NORMAL
TRANSPLANTED ORGAN - ALLOSURE: NORMAL
TX DATE - ALLOSURE/ALLOMAP: NORMAL
WP_ORDER_ID: NORMAL

## 2023-12-20 ENCOUNTER — SPECIALTY PHARMACY (OUTPATIENT)
Dept: PHARMACY | Facility: CLINIC | Age: 58
End: 2023-12-20

## 2023-12-20 PROCEDURE — RXMED WILLOW AMBULATORY MEDICATION CHARGE

## 2023-12-21 ENCOUNTER — PHARMACY VISIT (OUTPATIENT)
Dept: PHARMACY | Facility: CLINIC | Age: 58
End: 2023-12-21
Payer: MEDICARE

## 2024-01-18 PROCEDURE — RXMED WILLOW AMBULATORY MEDICATION CHARGE

## 2024-01-19 ENCOUNTER — PHARMACY VISIT (OUTPATIENT)
Dept: PHARMACY | Facility: CLINIC | Age: 59
End: 2024-01-19
Payer: COMMERCIAL

## 2024-01-22 ENCOUNTER — LAB (OUTPATIENT)
Dept: LAB | Facility: LAB | Age: 59
End: 2024-01-22
Payer: COMMERCIAL

## 2024-01-22 DIAGNOSIS — Z94.0 KIDNEY REPLACED BY TRANSPLANT (HHS-HCC): ICD-10-CM

## 2024-01-22 LAB
ALBUMIN SERPL BCP-MCNC: 4 G/DL (ref 3.4–5)
ANION GAP SERPL CALC-SCNC: 15 MMOL/L (ref 10–20)
BUN SERPL-MCNC: 14 MG/DL (ref 6–23)
CALCIUM SERPL-MCNC: 9 MG/DL (ref 8.6–10.6)
CHLORIDE SERPL-SCNC: 108 MMOL/L (ref 98–107)
CO2 SERPL-SCNC: 20 MMOL/L (ref 21–32)
CREAT SERPL-MCNC: 1.72 MG/DL (ref 0.5–1.3)
CREAT UR-MCNC: 112.9 MG/DL (ref 20–370)
EGFRCR SERPLBLD CKD-EPI 2021: 46 ML/MIN/1.73M*2
ERYTHROCYTE [DISTWIDTH] IN BLOOD BY AUTOMATED COUNT: 14 % (ref 11.5–14.5)
GLUCOSE SERPL-MCNC: 117 MG/DL (ref 74–99)
HCT VFR BLD AUTO: 42.6 % (ref 41–52)
HGB BLD-MCNC: 14 G/DL (ref 13.5–17.5)
MAGNESIUM SERPL-MCNC: 1.77 MG/DL (ref 1.6–2.4)
MCH RBC QN AUTO: 29.8 PG (ref 26–34)
MCHC RBC AUTO-ENTMCNC: 32.9 G/DL (ref 32–36)
MCV RBC AUTO: 91 FL (ref 80–100)
NRBC BLD-RTO: 0 /100 WBCS (ref 0–0)
PHOSPHATE SERPL-MCNC: 1.9 MG/DL (ref 2.5–4.9)
PLATELET # BLD AUTO: 152 X10*3/UL (ref 150–450)
POTASSIUM SERPL-SCNC: 3.4 MMOL/L (ref 3.5–5.3)
PROT UR-ACNC: 87 MG/DL (ref 5–25)
PROT/CREAT UR: 0.77 MG/MG CREAT (ref 0–0.17)
RBC # BLD AUTO: 4.7 X10*6/UL (ref 4.5–5.9)
SODIUM SERPL-SCNC: 140 MMOL/L (ref 136–145)
TACROLIMUS BLD-MCNC: 5.6 NG/ML
WBC # BLD AUTO: 5.5 X10*3/UL (ref 4.4–11.3)

## 2024-01-22 PROCEDURE — 82570 ASSAY OF URINE CREATININE: CPT

## 2024-01-22 PROCEDURE — 83735 ASSAY OF MAGNESIUM: CPT

## 2024-01-22 PROCEDURE — 84156 ASSAY OF PROTEIN URINE: CPT

## 2024-01-22 PROCEDURE — 85027 COMPLETE CBC AUTOMATED: CPT

## 2024-01-22 PROCEDURE — 80069 RENAL FUNCTION PANEL: CPT

## 2024-01-22 PROCEDURE — 36415 COLL VENOUS BLD VENIPUNCTURE: CPT

## 2024-01-22 PROCEDURE — 80197 ASSAY OF TACROLIMUS: CPT

## 2024-02-09 PROCEDURE — RXMED WILLOW AMBULATORY MEDICATION CHARGE

## 2024-02-27 ENCOUNTER — SPECIALTY PHARMACY (OUTPATIENT)
Dept: PHARMACY | Facility: CLINIC | Age: 59
End: 2024-02-27

## 2024-02-27 PROCEDURE — RXMED WILLOW AMBULATORY MEDICATION CHARGE

## 2024-02-28 ENCOUNTER — PHARMACY VISIT (OUTPATIENT)
Dept: PHARMACY | Facility: CLINIC | Age: 59
End: 2024-02-28
Payer: COMMERCIAL

## 2024-03-20 ENCOUNTER — TELEPHONE (OUTPATIENT)
Dept: TRANSPLANT | Facility: HOSPITAL | Age: 59
End: 2024-03-20

## 2024-03-20 ENCOUNTER — LAB (OUTPATIENT)
Dept: LAB | Facility: LAB | Age: 59
End: 2024-03-20
Payer: COMMERCIAL

## 2024-03-20 DIAGNOSIS — Z94.0 KIDNEY REPLACED BY TRANSPLANT (HHS-HCC): ICD-10-CM

## 2024-03-20 LAB
ALBUMIN SERPL BCP-MCNC: 4.5 G/DL (ref 3.4–5)
ANION GAP SERPL CALC-SCNC: 15 MMOL/L (ref 10–20)
BUN SERPL-MCNC: 19 MG/DL (ref 6–23)
CALCIUM SERPL-MCNC: 9.5 MG/DL (ref 8.6–10.6)
CHLORIDE SERPL-SCNC: 104 MMOL/L (ref 98–107)
CO2 SERPL-SCNC: 24 MMOL/L (ref 21–32)
CREAT SERPL-MCNC: 1.98 MG/DL (ref 0.5–1.3)
CREAT UR-MCNC: <1 MG/DL (ref 20–370)
EGFRCR SERPLBLD CKD-EPI 2021: 39 ML/MIN/1.73M*2
ERYTHROCYTE [DISTWIDTH] IN BLOOD BY AUTOMATED COUNT: 13.9 % (ref 11.5–14.5)
GLUCOSE SERPL-MCNC: 85 MG/DL (ref 74–99)
HCT VFR BLD AUTO: 43.3 % (ref 41–52)
HGB BLD-MCNC: 14.8 G/DL (ref 13.5–17.5)
MAGNESIUM SERPL-MCNC: 1.74 MG/DL (ref 1.6–2.4)
MCH RBC QN AUTO: 30.4 PG (ref 26–34)
MCHC RBC AUTO-ENTMCNC: 34.2 G/DL (ref 32–36)
MCV RBC AUTO: 89 FL (ref 80–100)
NRBC BLD-RTO: 0 /100 WBCS (ref 0–0)
PHOSPHATE SERPL-MCNC: 1.7 MG/DL (ref 2.5–4.9)
PLATELET # BLD AUTO: 132 X10*3/UL (ref 150–450)
POTASSIUM SERPL-SCNC: 3.3 MMOL/L (ref 3.5–5.3)
PROT UR-ACNC: 91 MG/DL (ref 5–25)
PROT/CREAT UR: ABNORMAL MG/G{CREAT}
RBC # BLD AUTO: 4.87 X10*6/UL (ref 4.5–5.9)
SODIUM SERPL-SCNC: 140 MMOL/L (ref 136–145)
TACROLIMUS BLD-MCNC: 6 NG/ML
WBC # BLD AUTO: 6.3 X10*3/UL (ref 4.4–11.3)

## 2024-03-20 PROCEDURE — 36415 COLL VENOUS BLD VENIPUNCTURE: CPT

## 2024-03-20 PROCEDURE — 82570 ASSAY OF URINE CREATININE: CPT

## 2024-03-20 PROCEDURE — 84156 ASSAY OF PROTEIN URINE: CPT

## 2024-03-20 PROCEDURE — 85027 COMPLETE CBC AUTOMATED: CPT

## 2024-03-20 PROCEDURE — 83735 ASSAY OF MAGNESIUM: CPT

## 2024-03-20 PROCEDURE — 80197 ASSAY OF TACROLIMUS: CPT

## 2024-03-20 PROCEDURE — 80069 RENAL FUNCTION PANEL: CPT

## 2024-03-20 RX ORDER — POTASSIUM CHLORIDE 20 MEQ/1
20 TABLET, EXTENDED RELEASE ORAL DAILY
Qty: 30 TABLET | Refills: 0 | Status: SHIPPED | OUTPATIENT
Start: 2024-03-20 | End: 2024-04-10 | Stop reason: ALTCHOICE

## 2024-03-20 NOTE — TELEPHONE ENCOUNTER
Reviewed labs with Dr. Moses Starthumphrey pt on K 20 meq and phos neutral 1 tab BID both sent to pt pharmacy. Called pt he is aware   Tac is pending

## 2024-04-04 ENCOUNTER — SPECIALTY PHARMACY (OUTPATIENT)
Dept: PHARMACY | Facility: CLINIC | Age: 59
End: 2024-04-04

## 2024-04-04 PROCEDURE — RXMED WILLOW AMBULATORY MEDICATION CHARGE

## 2024-04-08 ENCOUNTER — PHARMACY VISIT (OUTPATIENT)
Dept: PHARMACY | Facility: CLINIC | Age: 59
End: 2024-04-08
Payer: COMMERCIAL

## 2024-04-10 ENCOUNTER — OFFICE VISIT (OUTPATIENT)
Dept: TRANSPLANT | Facility: HOSPITAL | Age: 59
End: 2024-04-10
Payer: COMMERCIAL

## 2024-04-10 ENCOUNTER — LAB (OUTPATIENT)
Dept: LAB | Facility: LAB | Age: 59
End: 2024-04-10
Payer: COMMERCIAL

## 2024-04-10 VITALS
SYSTOLIC BLOOD PRESSURE: 173 MMHG | TEMPERATURE: 98.4 F | OXYGEN SATURATION: 100 % | DIASTOLIC BLOOD PRESSURE: 108 MMHG | HEIGHT: 69 IN | BODY MASS INDEX: 26.11 KG/M2 | WEIGHT: 176.3 LBS | HEART RATE: 81 BPM | RESPIRATION RATE: 18 BRPM

## 2024-04-10 DIAGNOSIS — Z94.0 IMMUNOSUPPRESSIVE MANAGEMENT ENCOUNTER FOLLOWING KIDNEY TRANSPLANT (HHS-HCC): Primary | ICD-10-CM

## 2024-04-10 DIAGNOSIS — I15.1 HYPERTENSION SECONDARY TO OTHER RENAL DISORDERS: ICD-10-CM

## 2024-04-10 DIAGNOSIS — Z94.0 KIDNEY REPLACED BY TRANSPLANT (HHS-HCC): ICD-10-CM

## 2024-04-10 DIAGNOSIS — Z79.899 IMMUNOSUPPRESSIVE MANAGEMENT ENCOUNTER FOLLOWING KIDNEY TRANSPLANT (HHS-HCC): Primary | ICD-10-CM

## 2024-04-10 LAB
ALBUMIN SERPL BCP-MCNC: 4 G/DL (ref 3.4–5)
ANION GAP SERPL CALC-SCNC: 14 MMOL/L (ref 10–20)
BUN SERPL-MCNC: 14 MG/DL (ref 6–23)
CALCIUM SERPL-MCNC: 8.6 MG/DL (ref 8.6–10.6)
CHLORIDE SERPL-SCNC: 108 MMOL/L (ref 98–107)
CO2 SERPL-SCNC: 22 MMOL/L (ref 21–32)
CREAT SERPL-MCNC: 1.81 MG/DL (ref 0.5–1.3)
CREAT UR-MCNC: <1 MG/DL (ref 20–370)
EGFRCR SERPLBLD CKD-EPI 2021: 43 ML/MIN/1.73M*2
ERYTHROCYTE [DISTWIDTH] IN BLOOD BY AUTOMATED COUNT: 14 % (ref 11.5–14.5)
GLUCOSE SERPL-MCNC: 135 MG/DL (ref 74–99)
HCT VFR BLD AUTO: 37.8 % (ref 41–52)
HGB BLD-MCNC: 12.8 G/DL (ref 13.5–17.5)
MAGNESIUM SERPL-MCNC: 1.58 MG/DL (ref 1.6–2.4)
MCH RBC QN AUTO: 30 PG (ref 26–34)
MCHC RBC AUTO-ENTMCNC: 33.9 G/DL (ref 32–36)
MCV RBC AUTO: 89 FL (ref 80–100)
NRBC BLD-RTO: 0 /100 WBCS (ref 0–0)
PHOSPHATE SERPL-MCNC: 2.3 MG/DL (ref 2.5–4.9)
PLATELET # BLD AUTO: 129 X10*3/UL (ref 150–450)
POTASSIUM SERPL-SCNC: 3.6 MMOL/L (ref 3.5–5.3)
PROT UR-ACNC: <4 MG/DL (ref 5–25)
PROT/CREAT UR: ABNORMAL MG/G{CREAT}
RBC # BLD AUTO: 4.26 X10*6/UL (ref 4.5–5.9)
SODIUM SERPL-SCNC: 140 MMOL/L (ref 136–145)
TACROLIMUS BLD-MCNC: 6.3 NG/ML
WBC # BLD AUTO: 5.3 X10*3/UL (ref 4.4–11.3)

## 2024-04-10 PROCEDURE — 99214 OFFICE O/P EST MOD 30 MIN: CPT

## 2024-04-10 PROCEDURE — 84156 ASSAY OF PROTEIN URINE: CPT

## 2024-04-10 PROCEDURE — 80069 RENAL FUNCTION PANEL: CPT

## 2024-04-10 PROCEDURE — 83735 ASSAY OF MAGNESIUM: CPT

## 2024-04-10 PROCEDURE — 3080F DIAST BP >= 90 MM HG: CPT | Performed by: HOSPITALIST

## 2024-04-10 PROCEDURE — 85027 COMPLETE CBC AUTOMATED: CPT

## 2024-04-10 PROCEDURE — 36415 COLL VENOUS BLD VENIPUNCTURE: CPT

## 2024-04-10 PROCEDURE — 3077F SYST BP >= 140 MM HG: CPT | Performed by: HOSPITALIST

## 2024-04-10 PROCEDURE — 80197 ASSAY OF TACROLIMUS: CPT

## 2024-04-10 PROCEDURE — 82570 ASSAY OF URINE CREATININE: CPT

## 2024-04-10 RX ORDER — TACROLIMUS 1 MG/1
2 CAPSULE ORAL 2 TIMES DAILY
Qty: 120 CAPSULE | Refills: 11 | Status: SHIPPED | OUTPATIENT
Start: 2024-04-10 | End: 2025-04-10

## 2024-04-10 RX ORDER — LOSARTAN POTASSIUM 25 MG/1
25 TABLET ORAL 2 TIMES DAILY
Qty: 60 TABLET | Refills: 11 | Status: SHIPPED | OUTPATIENT
Start: 2024-04-10 | End: 2025-04-10

## 2024-04-10 RX ORDER — TACROLIMUS 0.5 MG/1
0.5 CAPSULE ORAL 2 TIMES DAILY
Qty: 60 CAPSULE | Refills: 11 | Status: SHIPPED | OUTPATIENT
Start: 2024-04-10 | End: 2025-04-10

## 2024-04-10 ASSESSMENT — ENCOUNTER SYMPTOMS
FREQUENCY: 0
HEADACHES: 0
PALPITATIONS: 0
BACK PAIN: 0
VOMITING: 0
CHEST TIGHTNESS: 0
COUGH: 0
DIARRHEA: 0
NERVOUS/ANXIOUS: 0
SHORTNESS OF BREATH: 0
CONFUSION: 0
NAUSEA: 0
HEMATURIA: 0
ABDOMINAL DISTENTION: 0

## 2024-04-10 NOTE — PROGRESS NOTES
"Davidson Khoury   60 y/o male with a history of a ESRD secondary to hypertension s/p DDKT on 8/10/2019.  Patient was induced by Simulect, no DGF no CMV mismatch no EBV mismatch, KDPI of kidney is 40% PRA 0%.  No episodes of rejection.    Interim history: Blood pressures are still running high discussed with him about starting losartan and side effects and follow-up closely.        Review of Systems   Respiratory:  Negative for cough, chest tightness and shortness of breath.    Cardiovascular:  Negative for chest pain, palpitations and leg swelling.   Gastrointestinal:  Negative for abdominal distention, diarrhea, nausea and vomiting.   Genitourinary:  Negative for frequency, hematuria and urgency.   Musculoskeletal:  Negative for back pain.   Neurological:  Negative for headaches.   Psychiatric/Behavioral:  Negative for confusion. The patient is not nervous/anxious.         Objective:  Visit Vitals  BP (!) 173/108 (BP Location: Right arm, Patient Position: Sitting, BP Cuff Size: Large adult)   Pulse 81   Temp 36.9 °C (98.4 °F) (Temporal)   Resp 18   Ht 1.753 m (5' 9\")   Wt 80 kg (176 lb 4.8 oz)   SpO2 100%   BMI 26.03 kg/m²   BSA 1.97 m²      Physical Exam  HENT:      Head: Normocephalic and atraumatic.      Nose: Nose normal.      Mouth/Throat:      Mouth: Mucous membranes are moist.   Eyes:      Extraocular Movements: Extraocular movements intact.      Pupils: Pupils are equal, round, and reactive to light.   Cardiovascular:      Rate and Rhythm: Normal rate.      Pulses: Normal pulses.      Heart sounds: Normal heart sounds.   Pulmonary:      Effort: Pulmonary effort is normal.   Abdominal:      Palpations: Abdomen is soft.      Tenderness: There is no abdominal tenderness. There is no guarding.   Musculoskeletal:         General: Normal range of motion.      Cervical back: Normal range of motion.   Skin:     General: Skin is warm.   Neurological:      General: No focal deficit present.      Mental Status: Mental " status is at baseline.   Psychiatric:         Mood and Affect: Mood normal.            Current Outpatient Medications:     furosemide (Lasix) 20 mg tablet, Take 1 tablet (20 mg) by mouth once daily., Disp: , Rfl:     mycophenolate (Cellcept) 250 mg capsule, Take three (3) capsules by mouth twice daily, Disp: 180 capsule, Rfl: 11    tamsulosin (Flomax) 0.4 mg 24 hr capsule, TAKE ONE (1) CAPSULE BY MOUTH EVERYDAY AT BEDTIME., Disp: 90 capsule, Rfl: 3    cetirizine (ZyrTEC) 10 mg tablet, Take 1 tablet (10 mg) by mouth once daily as needed for allergies or rhinitis. Every morning as needed, Disp: , Rfl:     cloNIDine (Catapres) 0.1 mg tablet, Take 1 tablet (0.1 mg) by mouth 2 times a day., Disp: , Rfl:     losartan (Cozaar) 25 mg tablet, Take 1 tablet (25 mg) by mouth 2 times a day., Disp: 60 tablet, Rfl: 11    tacrolimus (Prograf) 0.5 mg capsule, Take 1 capsule (0.5 mg) by mouth 2 times a day., Disp: 60 capsule, Rfl: 11    tacrolimus (Prograf) 1 mg capsule, Take 2 capsules (2 mg) by mouth 2 times a day., Disp: 120 capsule, Rfl: 11     [unfilled]     No images are attached to the encounter.     Assessment and Plan :Davidson Khoury 59 y.o. with a history of a ESRD secondary to hypertension s/p DDKT on 8/10/2019.  Patient was induced by Simulect, no DGF no CMV mismatch no EBV mismatch, KDPI of kidney is 40% PRA 0%.  No episodes of rejection.    Interim history: Blood pressures are still running high discussed with him about starting losartan and side effects and follow-up closely.    Allograft function: Mildly impaired in the range of 1.8-1.5 baseline currently around 1.98, mild hypokalemia for which she is taking potassium supplements as we are starting losartan DC potassium supplements.  Last UPC up trended to 0.77 ARB will help.  AlloSure 0.52 as of 12/6/2023.  Will continue monitoring    Immunosuppression: Continue with the mycophenolate 3 capsules twice daily, tacrolimus 2.5 twice daily recent  tacrolimus levels are 6 aim levels of 5-8.    Hemodynamics: Blood pressures not well-controlled currently on clonidine 2 times a day, Lasix 20 daily tamsulosin 0.4 mg at bedtime.  Will start losartan 25 twice daily.    No anemia or leukopenia noticed on recent labs.    Bone mineral disease: Calcium and phosphorus levels are optimal recent vitamin D levels are around 20 recommended over-the-counter vitamin D supplements    General health care: Recommended age-appropriate screening, COVID shots and dermatology visits annually.    Labs in 2 weeks and follow-up in a month      Zac Palomares MD    Notes created by Nevaeh -Please excuse the Typos .

## 2024-05-06 ENCOUNTER — LAB (OUTPATIENT)
Dept: LAB | Facility: LAB | Age: 59
End: 2024-05-06
Payer: COMMERCIAL

## 2024-05-06 ENCOUNTER — SPECIALTY PHARMACY (OUTPATIENT)
Dept: PHARMACY | Facility: CLINIC | Age: 59
End: 2024-05-06

## 2024-05-06 DIAGNOSIS — Z94.0 KIDNEY REPLACED BY TRANSPLANT (HHS-HCC): ICD-10-CM

## 2024-05-06 LAB
ALBUMIN SERPL BCP-MCNC: 4.2 G/DL (ref 3.4–5)
ANION GAP SERPL CALC-SCNC: 14 MMOL/L (ref 10–20)
BUN SERPL-MCNC: 18 MG/DL (ref 6–23)
CALCIUM SERPL-MCNC: 9 MG/DL (ref 8.6–10.6)
CHLORIDE SERPL-SCNC: 108 MMOL/L (ref 98–107)
CO2 SERPL-SCNC: 23 MMOL/L (ref 21–32)
CREAT SERPL-MCNC: 1.91 MG/DL (ref 0.5–1.3)
CREAT UR-MCNC: 138.3 MG/DL (ref 20–370)
EGFRCR SERPLBLD CKD-EPI 2021: 40 ML/MIN/1.73M*2
ERYTHROCYTE [DISTWIDTH] IN BLOOD BY AUTOMATED COUNT: 14.6 % (ref 11.5–14.5)
GLUCOSE SERPL-MCNC: 103 MG/DL (ref 74–99)
HCT VFR BLD AUTO: 44.8 % (ref 41–52)
HGB BLD-MCNC: 14.4 G/DL (ref 13.5–17.5)
MAGNESIUM SERPL-MCNC: 2.05 MG/DL (ref 1.6–2.4)
MCH RBC QN AUTO: 30.3 PG (ref 26–34)
MCHC RBC AUTO-ENTMCNC: 32.1 G/DL (ref 32–36)
MCV RBC AUTO: 94 FL (ref 80–100)
NRBC BLD-RTO: 0 /100 WBCS (ref 0–0)
PHOSPHATE SERPL-MCNC: 2.2 MG/DL (ref 2.5–4.9)
PLATELET # BLD AUTO: 150 X10*3/UL (ref 150–450)
POTASSIUM SERPL-SCNC: 3.2 MMOL/L (ref 3.5–5.3)
PROT UR-ACNC: 65 MG/DL (ref 5–25)
PROT/CREAT UR: 0.47 MG/MG CREAT (ref 0–0.17)
RBC # BLD AUTO: 4.75 X10*6/UL (ref 4.5–5.9)
SODIUM SERPL-SCNC: 142 MMOL/L (ref 136–145)
TACROLIMUS BLD-MCNC: 5.5 NG/ML
WBC # BLD AUTO: 5.9 X10*3/UL (ref 4.4–11.3)

## 2024-05-06 PROCEDURE — 83735 ASSAY OF MAGNESIUM: CPT

## 2024-05-06 PROCEDURE — 84156 ASSAY OF PROTEIN URINE: CPT

## 2024-05-06 PROCEDURE — RXMED WILLOW AMBULATORY MEDICATION CHARGE

## 2024-05-06 PROCEDURE — 82570 ASSAY OF URINE CREATININE: CPT

## 2024-05-06 PROCEDURE — 36415 COLL VENOUS BLD VENIPUNCTURE: CPT

## 2024-05-06 PROCEDURE — 80069 RENAL FUNCTION PANEL: CPT

## 2024-05-06 PROCEDURE — 80197 ASSAY OF TACROLIMUS: CPT

## 2024-05-06 PROCEDURE — 85027 COMPLETE CBC AUTOMATED: CPT

## 2024-05-07 ENCOUNTER — PHARMACY VISIT (OUTPATIENT)
Dept: PHARMACY | Facility: CLINIC | Age: 59
End: 2024-05-07
Payer: COMMERCIAL

## 2024-05-08 ENCOUNTER — TELEPHONE (OUTPATIENT)
Dept: TRANSPLANT | Facility: HOSPITAL | Age: 59
End: 2024-05-08
Payer: COMMERCIAL

## 2024-05-08 DIAGNOSIS — Z94.0 KIDNEY REPLACED BY TRANSPLANT (HHS-HCC): ICD-10-CM

## 2024-05-08 DIAGNOSIS — E87.6 HYPOKALEMIA: ICD-10-CM

## 2024-05-08 RX ORDER — POTASSIUM CHLORIDE 20 MEQ/1
20 TABLET, EXTENDED RELEASE ORAL DAILY
Qty: 30 TABLET | Refills: 11 | Status: SHIPPED | OUTPATIENT
Start: 2024-05-08 | End: 2025-05-08

## 2024-05-08 NOTE — TELEPHONE ENCOUNTER
Labs reviewed with Dr. Can  K: 3.2 (3.6,3.3,3.4) on lasix 20 daily  PLAN:  kcl 20 daily labs 2 weeks  Called and discussed plan with patient.  No questions at this time.

## 2024-05-09 ENCOUNTER — OFFICE VISIT (OUTPATIENT)
Dept: TRANSPLANT | Facility: HOSPITAL | Age: 59
End: 2024-05-09
Payer: COMMERCIAL

## 2024-05-09 VITALS
TEMPERATURE: 97.9 F | WEIGHT: 175 LBS | SYSTOLIC BLOOD PRESSURE: 130 MMHG | DIASTOLIC BLOOD PRESSURE: 96 MMHG | OXYGEN SATURATION: 100 % | BODY MASS INDEX: 25.84 KG/M2 | HEART RATE: 69 BPM

## 2024-05-09 DIAGNOSIS — Z92.25 PERSONAL HISTORY OF IMMUNOSUPRESSION THERAPY: Primary | ICD-10-CM

## 2024-05-09 DIAGNOSIS — Z94.0 KIDNEY REPLACED BY TRANSPLANT (HHS-HCC): ICD-10-CM

## 2024-05-09 DIAGNOSIS — Z51.81 THERAPEUTIC DRUG MONITORING: ICD-10-CM

## 2024-05-09 PROCEDURE — 3080F DIAST BP >= 90 MM HG: CPT | Performed by: HOSPITALIST

## 2024-05-09 PROCEDURE — 99214 OFFICE O/P EST MOD 30 MIN: CPT

## 2024-05-09 PROCEDURE — 3075F SYST BP GE 130 - 139MM HG: CPT | Performed by: HOSPITALIST

## 2024-05-09 ASSESSMENT — ENCOUNTER SYMPTOMS
CHEST TIGHTNESS: 0
PALPITATIONS: 0
BACK PAIN: 0
NAUSEA: 0
HEMATURIA: 0
VOMITING: 0
CONFUSION: 0
SHORTNESS OF BREATH: 0
DIARRHEA: 0
FREQUENCY: 0
COUGH: 0
ABDOMINAL DISTENTION: 0
HEADACHES: 0
NERVOUS/ANXIOUS: 0

## 2024-05-09 ASSESSMENT — PAIN SCALES - GENERAL: PAINLEVEL: 0-NO PAIN

## 2024-05-09 NOTE — PROGRESS NOTES
Davidson Khoury   59 y.o.  with a history of a ESRD secondary to hypertension s/p DDKT on 8/10/2019.  Patient was induced by Simulect, no DGF no CMV mismatch no EBV mismatch, KDPI of kidney is 40% PRA 0%.  No episodes of rejection.    Interim history: Pressures are better controlled after starting losartan.  Denied any other complaints on today's visit.      Review of Systems   Respiratory:  Negative for cough, chest tightness and shortness of breath.    Cardiovascular:  Negative for chest pain, palpitations and leg swelling.   Gastrointestinal:  Negative for abdominal distention, diarrhea, nausea and vomiting.   Genitourinary:  Negative for frequency, hematuria and urgency.   Musculoskeletal:  Negative for back pain.   Neurological:  Negative for headaches.   Psychiatric/Behavioral:  Negative for confusion. The patient is not nervous/anxious.         Objective:  Visit Vitals  BP (!) 130/96   Pulse 69   Temp 36.6 °C (97.9 °F) (Temporal)   Wt 79.4 kg (175 lb)   SpO2 100%   BMI 25.84 kg/m²   BSA 1.97 m²      Physical Exam  HENT:      Head: Normocephalic and atraumatic.      Nose: Nose normal.      Mouth/Throat:      Mouth: Mucous membranes are moist.   Eyes:      Extraocular Movements: Extraocular movements intact.      Pupils: Pupils are equal, round, and reactive to light.   Cardiovascular:      Rate and Rhythm: Normal rate.      Pulses: Normal pulses.      Heart sounds: Normal heart sounds.   Pulmonary:      Effort: Pulmonary effort is normal.   Abdominal:      Palpations: Abdomen is soft.      Tenderness: There is no abdominal tenderness. There is no guarding.   Musculoskeletal:         General: Normal range of motion.      Cervical back: Normal range of motion.   Skin:     General: Skin is warm.   Neurological:      General: No focal deficit present.      Mental Status: Mental status is at baseline.   Psychiatric:         Mood and Affect: Mood normal.            Current Outpatient Medications:     cetirizine  (ZyrTEC) 10 mg tablet, Take 1 tablet (10 mg) by mouth once daily as needed for allergies or rhinitis. Every morning as needed, Disp: , Rfl:     cloNIDine (Catapres) 0.1 mg tablet, Take 1 tablet (0.1 mg) by mouth 2 times a day., Disp: , Rfl:     furosemide (Lasix) 20 mg tablet, Take 1 tablet (20 mg) by mouth once daily., Disp: , Rfl:     losartan (Cozaar) 25 mg tablet, Take 1 tablet (25 mg) by mouth 2 times a day., Disp: 60 tablet, Rfl: 11    mycophenolate (Cellcept) 250 mg capsule, Take three (3) capsules by mouth twice daily, Disp: 180 capsule, Rfl: 11    potassium chloride CR (Klor-Con M20) 20 mEq ER tablet, Take 1 tablet (20 mEq) by mouth once daily. Do not crush or chew., Disp: 30 tablet, Rfl: 11    tacrolimus (Prograf) 0.5 mg capsule, Take 1 capsule (0.5 mg) by mouth 2 times a day., Disp: 60 capsule, Rfl: 11    tacrolimus (Prograf) 1 mg capsule, Take 2 capsules (2 mg) by mouth 2 times a day., Disp: 120 capsule, Rfl: 11    tamsulosin (Flomax) 0.4 mg 24 hr capsule, TAKE ONE (1) CAPSULE BY MOUTH EVERYDAY AT BEDTIME., Disp: 90 capsule, Rfl: 3     [unfilled]     No images are attached to the encounter.     Assessment and Plan :Davidson Khoury 59 y.o.  with a history of a ESRD secondary to hypertension s/p DDKT on 8/10/2019.  Patient was induced by Simulect, no DGF no CMV mismatch no EBV mismatch, KDPI of kidney is 40% PRA 0%.  No episodes of rejection.    Interim history: Pressures are better controlled after starting losartan.  Denied any other complaints on today's visit.    Allograft function: Stable creatinine in the range of 1.8-1.5 recently around the 1.9,, mild hypokalemia on supplementation.  Will continue to monitor and check AlloSure as well as a DSA.  Last AlloSure is from December it is 0.52.  -Will also check infectious workup and if AlloSure went up probably proceed to biopsy    Immunosuppression: Recent tacrolimus levels of 5.5 aim levels are 5-8 continue with the current dose of  tacrolimus 2.5 twice daily, mycophenolate 3 capsules twice daily.    Hemodynamics: Blood pressures are well-controlled continue with clonidine, Lasix, losartan    No anemia or leukopenia noticed.    Bone mineral disease will check vitamin D and PTH levels    General health care; recommended age-appropriate screening, COVID shots and annual dermatology visits.    Labs in 2 weeks and follow-up in a month       Zac Palomares MD    Notes created by Nevaeh -Please excuse the Typos .

## 2024-05-24 DIAGNOSIS — Z94.0 KIDNEY REPLACED BY TRANSPLANT (HHS-HCC): ICD-10-CM

## 2024-05-25 DIAGNOSIS — R39.89 SUSPECTED UTI: ICD-10-CM

## 2024-05-25 DIAGNOSIS — Z94.0 KIDNEY REPLACED BY TRANSPLANT (HHS-HCC): ICD-10-CM

## 2024-05-29 ENCOUNTER — LAB (OUTPATIENT)
Dept: LAB | Facility: LAB | Age: 59
End: 2024-05-29
Payer: COMMERCIAL

## 2024-05-29 DIAGNOSIS — Z94.0 KIDNEY REPLACED BY TRANSPLANT (HHS-HCC): ICD-10-CM

## 2024-05-29 LAB
ALBUMIN SERPL BCP-MCNC: 4.1 G/DL (ref 3.4–5)
ANION GAP SERPL CALC-SCNC: 14 MMOL/L (ref 10–20)
BUN SERPL-MCNC: 19 MG/DL (ref 6–23)
CALCIUM SERPL-MCNC: 9.3 MG/DL (ref 8.6–10.6)
CHLORIDE SERPL-SCNC: 109 MMOL/L (ref 98–107)
CO2 SERPL-SCNC: 22 MMOL/L (ref 21–32)
CREAT SERPL-MCNC: 2.22 MG/DL (ref 0.5–1.3)
CREAT UR-MCNC: 104.3 MG/DL (ref 20–370)
EGFRCR SERPLBLD CKD-EPI 2021: 34 ML/MIN/1.73M*2
ERYTHROCYTE [DISTWIDTH] IN BLOOD BY AUTOMATED COUNT: 14.1 % (ref 11.5–14.5)
GLUCOSE SERPL-MCNC: 83 MG/DL (ref 74–99)
HCT VFR BLD AUTO: 42.3 % (ref 41–52)
HGB BLD-MCNC: 13.3 G/DL (ref 13.5–17.5)
MAGNESIUM SERPL-MCNC: 2.27 MG/DL (ref 1.6–2.4)
MCH RBC QN AUTO: 29.3 PG (ref 26–34)
MCHC RBC AUTO-ENTMCNC: 31.4 G/DL (ref 32–36)
MCV RBC AUTO: 93 FL (ref 80–100)
NRBC BLD-RTO: 0 /100 WBCS (ref 0–0)
PHOSPHATE SERPL-MCNC: 2.4 MG/DL (ref 2.5–4.9)
PLATELET # BLD AUTO: 152 X10*3/UL (ref 150–450)
POTASSIUM SERPL-SCNC: 4 MMOL/L (ref 3.5–5.3)
PROT UR-ACNC: 26 MG/DL (ref 5–25)
PROT/CREAT UR: 0.25 MG/MG CREAT (ref 0–0.17)
RBC # BLD AUTO: 4.54 X10*6/UL (ref 4.5–5.9)
SODIUM SERPL-SCNC: 141 MMOL/L (ref 136–145)
TACROLIMUS BLD-MCNC: 5.4 NG/ML
WBC # BLD AUTO: 5.4 X10*3/UL (ref 4.4–11.3)

## 2024-05-29 PROCEDURE — 87799 DETECT AGENT NOS DNA QUANT: CPT

## 2024-05-29 PROCEDURE — 80069 RENAL FUNCTION PANEL: CPT

## 2024-05-29 PROCEDURE — 36415 COLL VENOUS BLD VENIPUNCTURE: CPT

## 2024-05-29 PROCEDURE — 82570 ASSAY OF URINE CREATININE: CPT

## 2024-05-29 PROCEDURE — 83735 ASSAY OF MAGNESIUM: CPT

## 2024-05-29 PROCEDURE — 86832 HLA CLASS I HIGH DEFIN QUAL: CPT

## 2024-05-29 PROCEDURE — 80197 ASSAY OF TACROLIMUS: CPT

## 2024-05-29 PROCEDURE — 86833 HLA CLASS II HIGH DEFIN QUAL: CPT

## 2024-05-29 PROCEDURE — 85027 COMPLETE CBC AUTOMATED: CPT

## 2024-05-29 PROCEDURE — 84156 ASSAY OF PROTEIN URINE: CPT

## 2024-05-30 LAB
BKV DNA SERPL NAA+PROBE-LOG#: NORMAL {LOG_COPIES}/ML
CMV DNA SERPL NAA+PROBE-LOG IU: NORMAL {LOG_IU}/ML
EBV DNA SPEC NAA+PROBE-LOG#: NORMAL {LOG_COPIES}/ML
HLA RESULTS: NORMAL
HLA-A+B+C AB NFR SER: NORMAL %
HLA-DP+DQ+DR AB NFR SER: NORMAL %
LABORATORY COMMENT REPORT: NOT DETECTED

## 2024-05-31 NOTE — TELEPHONE ENCOUNTER
Labs reviewed with Dr. Bonnie Carrasco 2.22 (1.91, 1.81, 1.98)  Tac 5.4, viral neg, DSA neg  PLAN:  UA, repeat labs, hydrate  US Kidney  Called patient, no answer, LM for him to repeat labs and hydrate, also we will contact him regarding scheduling kidney US.  Also told to call office if any questions.

## 2024-06-01 LAB
ALLOSURE SCORE - KIDNEY: 0.46 %
CAREDX_ORDER_ID: NORMAL
CENTER_ORDER_ID: NORMAL
CLIENT SPECIMEN ID - ALLOSURE: NORMAL
DONOR RELATION - ALLOSURE: NORMAL
NOTES - ALLOSURE: NORMAL
RELATIVE CHANGE VALUE - KIDNEY: -12 %
TEST COMMENTS - ALLOSURE: NORMAL
TIME POST TX - ALLOSURE: NORMAL
TRANSPLANTED ORGAN - ALLOSURE: NORMAL
TX DATE - ALLOSURE/ALLOMAP: NORMAL
WP_ORDER_ID: NORMAL

## 2024-06-05 ENCOUNTER — HOSPITAL ENCOUNTER (OUTPATIENT)
Dept: RADIOLOGY | Facility: HOSPITAL | Age: 59
Discharge: HOME | End: 2024-06-05
Payer: COMMERCIAL

## 2024-06-05 DIAGNOSIS — Z94.0 KIDNEY REPLACED BY TRANSPLANT (HHS-HCC): ICD-10-CM

## 2024-06-05 PROCEDURE — 76776 US EXAM K TRANSPL W/DOPPLER: CPT

## 2024-06-05 PROCEDURE — 76776 US EXAM K TRANSPL W/DOPPLER: CPT | Performed by: RADIOLOGY

## 2024-06-10 ENCOUNTER — SPECIALTY PHARMACY (OUTPATIENT)
Dept: PHARMACY | Facility: CLINIC | Age: 59
End: 2024-06-10

## 2024-06-10 PROCEDURE — RXMED WILLOW AMBULATORY MEDICATION CHARGE

## 2024-06-11 ENCOUNTER — PHARMACY VISIT (OUTPATIENT)
Dept: PHARMACY | Facility: CLINIC | Age: 59
End: 2024-06-11
Payer: COMMERCIAL

## 2024-06-13 RX ORDER — TACROLIMUS 1 MG/1
2 CAPSULE ORAL 2 TIMES DAILY
Qty: 360 CAPSULE | Refills: 4 | Status: SHIPPED | OUTPATIENT
Start: 2024-06-13 | End: 2025-06-13

## 2024-06-13 RX ORDER — TACROLIMUS 0.5 MG/1
0.5 CAPSULE ORAL 2 TIMES DAILY
Qty: 180 CAPSULE | Refills: 4 | Status: SHIPPED | OUTPATIENT
Start: 2024-06-13 | End: 2025-06-13

## 2024-06-14 DIAGNOSIS — I10 ESSENTIAL (PRIMARY) HYPERTENSION: ICD-10-CM

## 2024-06-18 ENCOUNTER — OFFICE VISIT (OUTPATIENT)
Dept: TRANSPLANT | Facility: HOSPITAL | Age: 59
End: 2024-06-18
Payer: COMMERCIAL

## 2024-06-18 VITALS
BODY MASS INDEX: 25.77 KG/M2 | WEIGHT: 174.5 LBS | SYSTOLIC BLOOD PRESSURE: 130 MMHG | TEMPERATURE: 97.5 F | OXYGEN SATURATION: 100 % | DIASTOLIC BLOOD PRESSURE: 91 MMHG | HEART RATE: 73 BPM

## 2024-06-18 DIAGNOSIS — I15.1 HYPERTENSION SECONDARY TO OTHER RENAL DISORDERS: ICD-10-CM

## 2024-06-18 DIAGNOSIS — Z48.298 AFTERCARE FOLLOWING ORGAN TRANSPLANT: ICD-10-CM

## 2024-06-18 DIAGNOSIS — N18.31 STAGE 3A CHRONIC KIDNEY DISEASE (MULTI): ICD-10-CM

## 2024-06-18 DIAGNOSIS — Z94.0 KIDNEY REPLACED BY TRANSPLANT (HHS-HCC): Primary | ICD-10-CM

## 2024-06-18 PROCEDURE — 99215 OFFICE O/P EST HI 40 MIN: CPT | Performed by: INTERNAL MEDICINE

## 2024-06-18 PROCEDURE — 3080F DIAST BP >= 90 MM HG: CPT | Performed by: INTERNAL MEDICINE

## 2024-06-18 PROCEDURE — 3075F SYST BP GE 130 - 139MM HG: CPT | Performed by: INTERNAL MEDICINE

## 2024-06-18 RX ORDER — FUROSEMIDE 20 MG/1
20 TABLET ORAL DAILY
Qty: 30 TABLET | Refills: 11 | Status: CANCELLED | OUTPATIENT
Start: 2024-06-18 | End: 2025-06-18

## 2024-06-18 ASSESSMENT — PAIN SCALES - GENERAL: PAINLEVEL: 0-NO PAIN

## 2024-06-18 NOTE — PATIENT INSTRUCTIONS
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 Moses repeat DUS in 3 m if still present will order MRA  Repeat labs tomorrow with US and culture pt was complaining about possible UTI  Lasix 20 mg prn   Labs monthly  RTC in 3-4 m

## 2024-06-18 NOTE — PROGRESS NOTES
TRANSPLANT NEPHROLOGY :   OUTPATIENT CLINIC NOTE      SERVICE DATE : 06/18/2024     REASON FOR VISIT/CHIEF COMPLAINT:  S/P  TRANSPLANT SURGERY  IMMUNOSUPPRESSIVE MEDICATION MANAGEMENT  BLOOD PRESSURE MANAGEMENT    HPI:    Davidson Avilaggins   59 y.o.  with a history of a ESRD secondary to hypertension s/p DDKT on 8/10/2019.  Patient was induced by Simulect, no DGF no CMV mismatch no EBV mismatch, KDPI of kidney is 40% PRA 0%.  No episodes of rejection.     Last seen 6 weeks ago. Here for follow up.     Baseline serum Cr 1.7-1.8 since Jan '24. Recent Cr elevated to 2.2.   DSA neg, Allosure stable.  Rpt labs pending. Has been hydrating well now.   Home Bps stable, usually 130/70s per pt.    Denied chest pain, SOB, ARCE, Palpitation. Normal urination and bowel movement. Normal gait and no weakness of arms/legs. No cough, runny nose, sore throat, cold symptoms, or rash. No hearing loss. Normal vision.No problems with his sleep, mood and function. No recent infection, hospitalization, surgery or ER visits.      ROS:  Review of  14 systems was performed system by system. See HPI. Otherwise, the symptoms were negative.    PAST MEDICAL HISTORY:  Past Medical History:   Diagnosis Date    Personal history of other diseases of urinary system     History of chronic kidney disease    Personal history of other specified conditions 01/12/2022    History of urinary retention    Personal history of other specified conditions 08/22/2019    History of urinary retention        PAST SURGICAL HISTORY:  Past Surgical History:   Procedure Laterality Date    OTHER SURGICAL HISTORY  06/06/2016    Myringotomy - With Eustachian Tube Inflation    OTHER SURGICAL HISTORY  06/06/2016    Arteriovenous Surgery Creation Of A-V Fistula    OTHER SURGICAL HISTORY  08/23/2019    Kidney transplantation        SOCIAL HISTORY:  Social History     Socioeconomic History    Marital status:      Spouse name: Not on file    Number of children: Not on  file    Years of education: Not on file    Highest education level: Not on file   Occupational History    Not on file   Tobacco Use    Smoking status: Not on file    Smokeless tobacco: Not on file   Substance and Sexual Activity    Alcohol use: Not on file    Drug use: Not on file    Sexual activity: Not on file   Other Topics Concern    Not on file   Social History Narrative    Not on file     Social Determinants of Health     Financial Resource Strain: Not on file   Food Insecurity: Not on file   Transportation Needs: Not on file   Physical Activity: Not on file   Stress: Not on file   Social Connections: Not on file   Intimate Partner Violence: Not on file   Housing Stability: Not on file       FAMILY HISTORY:  Family History   Problem Relation Name Age of Onset    Hypertension Mother         MEDICATION LIST:  Current Outpatient Medications   Medication Instructions    cetirizine (ZYRTEC) 10 mg, oral, Daily PRN, Every morning as needed    cloNIDine (CATAPRES) 0.1 mg, oral, 2 times daily    furosemide (LASIX) 20 mg, oral, Daily    losartan (COZAAR) 25 mg, oral, 2 times daily    mycophenolate (Cellcept) 250 mg capsule Take three (3) capsules by mouth twice daily    potassium chloride CR (Klor-Con M20) 20 mEq ER tablet 20 mEq, oral, Daily, Do not crush or chew.    tacrolimus (PROGRAF) 0.5 mg, oral, 2 times daily    tacrolimus (PROGRAF) 2 mg, oral, 2 times daily    tamsulosin (Flomax) 0.4 mg 24 hr capsule TAKE ONE (1) CAPSULE BY MOUTH EVERYDAY AT BEDTIME.       ALLERGY  Allergies   Allergen Reactions    Amoxicillin Other and Nausea Only     vomiting    Ace Inhibitors Angioedema       PHYSICAL EXAM:    Visit Vitals  BP (!) 147/109   Pulse 82   Temp 36.4 °C (97.5 °F) (Temporal)   Wt 79.2 kg (174 lb 8 oz)   SpO2 100%   BMI 25.77 kg/m²   BSA 1.96 m²          General Appearance - NAD, A&Ox3   HEENT - Supple. Not pale. No jaundice. No JVD or LAD  CVS - RRR. Normal S1/S2. No murmur, rub or gallop  Lungs- clear to  auscultation bilaterally  Abdomen - soft , NT, ND, no guarding. No hepatosplenomegaly. No allograft tenderness  Musculoskeletal /Extremities - no edema. Full ROM. No joint tenderness.   Neuro/Psych - appropriate mood and affect. Motor power V/V all extremities. CN I -XII were grossly intact.  Skin - No visible rash      LABS:    Lab Results   Component Value Date    CREATININE 2.22 (H) 05/29/2024    BUN 19 05/29/2024     05/29/2024    K 4.0 05/29/2024     (H) 05/29/2024    CO2 22 05/29/2024     Lab Results   Component Value Date    .4 (H) 10/11/2023    CALCIUM 9.3 05/29/2024    PHOS 2.4 (L) 05/29/2024     Lab Results   Component Value Date    WBC 5.4 05/29/2024    HGB 13.3 (L) 05/29/2024    HCT 42.3 05/29/2024    MCV 93 05/29/2024     05/29/2024     Lab Results   Component Value Date    IRON 43 08/20/2019    TIBC 192 (L) 08/20/2019    FERRITIN 679 (H) 08/20/2019     Lab Results   Component Value Date    TACROLIMUS 5.4 05/29/2024    CMVPCRIU NOT DETECTED 11/29/2019    BKVIRPCRQN Not Detected 06/15/2020    EBVDNAPCR Not Detected 05/29/2024     Lab Results   Component Value Date    CMVDNAPCR Not Detected 05/29/2024    BKVDNAPCR Not Detected 05/29/2024    EBVDNAPCR Not Detected 05/29/2024         ASSESSMENT AND PLAN:    Mr. Khoury is a 59 y.o. male  who is here for follow up s/p kidney transplant.    TRANSPLANT DATE: 8/10/2019 (Kidney)      1. ESRD S/P kidney transplant   - Creatinine last check was 2.22, elevated from baseline 1.7-1.8.   - pt to hydrate well and rpt labs this week.   - Renal allograft function is impaired.  -Random urine protein/creatinine ratio is 250 mg/g. On losartan 25 mg bid.  -Allosure last check was 0.46% 5/29/24, stable  -Ensure adequate hydration  - Avoid nephrotoxic medications, NSAIDs, and IV contrast.    2. Immunosuppression  -Tacrolimus level last check was 5.4, acceptable. Cont current dose tac.   -Continue  mg q12, not on Pred  - EBV, CMV, BK PCR neg  5/29/24    3. Electrolytes  -Acceptable from last lab drawn    4. Hypertension  - recent home BPs acceptable, office BP elevated. No hypervolemia on exam  -cont to monitor home BP, call if trends concerning  -Continue current anti hypertensive medication    5. Bone Mineral Disease/Osteoporosis  - Ca, phos acceptable.  10/2023. Recheck.  - Consider DEXA every 2-3 years , defer to PCP    6.Anemia/Leukopenia:  - Hb ~13, acceptable.   - WBC stable    7.Health maintenance and vaccination  - Flu shot during flu season annually  - Cancer screening is up to date per the patient    Lab : Routine transplant lab ( CBC, RFP, and anti-rejection trough level ) every 1 months  Additional labs:  VIT D, PTH Q3 months  Viral screening PCR, Allosure and UPC per protocol.    Additional Plan :  - further plan based on rpt labs    RTC 3 month(s)

## 2024-06-19 RX ORDER — FUROSEMIDE 20 MG/1
20 TABLET ORAL DAILY PRN
Qty: 90 TABLET | Refills: 3 | Status: SHIPPED | OUTPATIENT
Start: 2024-06-19

## 2024-06-24 ENCOUNTER — LAB (OUTPATIENT)
Dept: LAB | Facility: LAB | Age: 59
End: 2024-06-24
Payer: COMMERCIAL

## 2024-06-24 DIAGNOSIS — N18.31 STAGE 3A CHRONIC KIDNEY DISEASE (MULTI): ICD-10-CM

## 2024-06-24 DIAGNOSIS — Z94.0 KIDNEY REPLACED BY TRANSPLANT (HHS-HCC): ICD-10-CM

## 2024-06-24 DIAGNOSIS — R39.89 SUSPECTED UTI: ICD-10-CM

## 2024-06-24 LAB
25(OH)D3 SERPL-MCNC: 22 NG/ML (ref 30–100)
ALBUMIN SERPL BCP-MCNC: 3.9 G/DL (ref 3.4–5)
ANION GAP SERPL CALC-SCNC: 13 MMOL/L (ref 10–20)
APPEARANCE UR: CLEAR
BILIRUB UR STRIP.AUTO-MCNC: NEGATIVE MG/DL
BUN SERPL-MCNC: 16 MG/DL (ref 6–23)
CALCIUM SERPL-MCNC: 9 MG/DL (ref 8.6–10.6)
CHLORIDE SERPL-SCNC: 108 MMOL/L (ref 98–107)
CO2 SERPL-SCNC: 24 MMOL/L (ref 21–32)
COLOR UR: NORMAL
CREAT SERPL-MCNC: 2.2 MG/DL (ref 0.5–1.3)
CREAT UR-MCNC: 101.8 MG/DL (ref 20–370)
EGFRCR SERPLBLD CKD-EPI 2021: 34 ML/MIN/1.73M*2
ERYTHROCYTE [DISTWIDTH] IN BLOOD BY AUTOMATED COUNT: 14.3 % (ref 11.5–14.5)
GLUCOSE SERPL-MCNC: 87 MG/DL (ref 74–99)
GLUCOSE UR STRIP.AUTO-MCNC: NORMAL MG/DL
HCT VFR BLD AUTO: 42.7 % (ref 41–52)
HGB BLD-MCNC: 13.9 G/DL (ref 13.5–17.5)
HOLD SPECIMEN: NORMAL
KETONES UR STRIP.AUTO-MCNC: NEGATIVE MG/DL
LEUKOCYTE ESTERASE UR QL STRIP.AUTO: NEGATIVE
MAGNESIUM SERPL-MCNC: 1.91 MG/DL (ref 1.6–2.4)
MCH RBC QN AUTO: 30.2 PG (ref 26–34)
MCHC RBC AUTO-ENTMCNC: 32.6 G/DL (ref 32–36)
MCV RBC AUTO: 93 FL (ref 80–100)
NITRITE UR QL STRIP.AUTO: NEGATIVE
NRBC BLD-RTO: 0 /100 WBCS (ref 0–0)
PH UR STRIP.AUTO: 7 [PH]
PHOSPHATE SERPL-MCNC: 2 MG/DL (ref 2.5–4.9)
PLATELET # BLD AUTO: 140 X10*3/UL (ref 150–450)
POTASSIUM SERPL-SCNC: 4 MMOL/L (ref 3.5–5.3)
PROT UR STRIP.AUTO-MCNC: NORMAL MG/DL
PROT UR-ACNC: 32 MG/DL (ref 5–25)
PROT/CREAT UR: 0.31 MG/MG CREAT (ref 0–0.17)
PTH-INTACT SERPL-MCNC: 174.4 PG/ML (ref 18.5–88)
RBC # BLD AUTO: 4.6 X10*6/UL (ref 4.5–5.9)
RBC # UR STRIP.AUTO: NEGATIVE /UL
RBC #/AREA URNS AUTO: NORMAL /HPF
SODIUM SERPL-SCNC: 141 MMOL/L (ref 136–145)
SP GR UR STRIP.AUTO: 1.01
TACROLIMUS BLD-MCNC: 6.1 NG/ML
UROBILINOGEN UR STRIP.AUTO-MCNC: NORMAL MG/DL
WBC # BLD AUTO: 5.1 X10*3/UL (ref 4.4–11.3)
WBC #/AREA URNS AUTO: NORMAL /HPF

## 2024-06-24 PROCEDURE — 80197 ASSAY OF TACROLIMUS: CPT

## 2024-06-24 PROCEDURE — 83735 ASSAY OF MAGNESIUM: CPT

## 2024-06-24 PROCEDURE — 85027 COMPLETE CBC AUTOMATED: CPT

## 2024-06-24 PROCEDURE — 80069 RENAL FUNCTION PANEL: CPT

## 2024-06-24 PROCEDURE — 83970 ASSAY OF PARATHORMONE: CPT

## 2024-06-24 PROCEDURE — 36415 COLL VENOUS BLD VENIPUNCTURE: CPT

## 2024-06-24 PROCEDURE — 82306 VITAMIN D 25 HYDROXY: CPT

## 2024-06-24 PROCEDURE — 81001 URINALYSIS AUTO W/SCOPE: CPT

## 2024-06-24 PROCEDURE — 84156 ASSAY OF PROTEIN URINE: CPT

## 2024-06-24 PROCEDURE — 82570 ASSAY OF URINE CREATININE: CPT

## 2024-06-29 DIAGNOSIS — Z94.0 KIDNEY REPLACED BY TRANSPLANT (HHS-HCC): Primary | ICD-10-CM

## 2024-07-08 RX ORDER — CLONIDINE HYDROCHLORIDE 0.2 MG/1
0.2 TABLET ORAL 2 TIMES DAILY
Qty: 180 TABLET | Refills: 3 | Status: SHIPPED | OUTPATIENT
Start: 2024-07-08

## 2024-07-15 ENCOUNTER — SPECIALTY PHARMACY (OUTPATIENT)
Dept: PHARMACY | Facility: CLINIC | Age: 59
End: 2024-07-15

## 2024-07-15 PROCEDURE — RXMED WILLOW AMBULATORY MEDICATION CHARGE

## 2024-07-16 ENCOUNTER — PHARMACY VISIT (OUTPATIENT)
Dept: PHARMACY | Facility: CLINIC | Age: 59
End: 2024-07-16
Payer: COMMERCIAL

## 2024-07-29 ENCOUNTER — APPOINTMENT (OUTPATIENT)
Dept: PRIMARY CARE | Facility: CLINIC | Age: 59
End: 2024-07-29
Payer: COMMERCIAL

## 2024-07-29 VITALS
OXYGEN SATURATION: 100 % | DIASTOLIC BLOOD PRESSURE: 87 MMHG | HEART RATE: 87 BPM | SYSTOLIC BLOOD PRESSURE: 133 MMHG | BODY MASS INDEX: 25.77 KG/M2 | HEIGHT: 69 IN | WEIGHT: 174 LBS | TEMPERATURE: 97 F

## 2024-07-29 DIAGNOSIS — Z00.00 HEALTHCARE MAINTENANCE: Primary | ICD-10-CM

## 2024-07-29 DIAGNOSIS — Z86.010 HX OF COLONIC POLYPS: ICD-10-CM

## 2024-07-29 PROCEDURE — G0439 PPPS, SUBSEQ VISIT: HCPCS

## 2024-07-29 PROCEDURE — 90750 HZV VACC RECOMBINANT IM: CPT

## 2024-07-29 PROCEDURE — 90471 IMMUNIZATION ADMIN: CPT

## 2024-07-29 PROCEDURE — 3079F DIAST BP 80-89 MM HG: CPT

## 2024-07-29 PROCEDURE — 3008F BODY MASS INDEX DOCD: CPT

## 2024-07-29 PROCEDURE — 90677 PCV20 VACCINE IM: CPT

## 2024-07-29 PROCEDURE — G0009 ADMIN PNEUMOCOCCAL VACCINE: HCPCS

## 2024-07-29 PROCEDURE — 1036F TOBACCO NON-USER: CPT

## 2024-07-29 PROCEDURE — 3075F SYST BP GE 130 - 139MM HG: CPT

## 2024-07-29 SDOH — ECONOMIC STABILITY: FOOD INSECURITY: WITHIN THE PAST 12 MONTHS, THE FOOD YOU BOUGHT JUST DIDN'T LAST AND YOU DIDN'T HAVE MONEY TO GET MORE.: NEVER TRUE

## 2024-07-29 SDOH — ECONOMIC STABILITY: FOOD INSECURITY: WITHIN THE PAST 12 MONTHS, YOU WORRIED THAT YOUR FOOD WOULD RUN OUT BEFORE YOU GOT MONEY TO BUY MORE.: NEVER TRUE

## 2024-07-29 ASSESSMENT — PATIENT HEALTH QUESTIONNAIRE - PHQ9
2. FEELING DOWN, DEPRESSED OR HOPELESS: NOT AT ALL
SUM OF ALL RESPONSES TO PHQ9 QUESTIONS 1 & 2: 0
1. LITTLE INTEREST OR PLEASURE IN DOING THINGS: NOT AT ALL

## 2024-07-29 ASSESSMENT — PAIN SCALES - GENERAL: PAINLEVEL: 0-NO PAIN

## 2024-07-29 NOTE — PROGRESS NOTES
Subjective   Reason for Visit: Davidson Khoury is an 59 y.o. male here for a Medicare Wellness visit.     # Medicare Wellness Questionnaire  - Age: 59 y.o.  - Gender: male   - During the past 4 weeks, how much of the been bothered by emotional problems such as feeling anxious, depressed, irritable, sad, or downhearted and blue? Not at all  - During the past 4 weeks, history of physical and emotional health limited your social activities with family, friends, neighbors, or groups? Not at all  - During the past 4 weeks, how much bodily pain have you generally had? Mild pain  - During the past 4 weeks, was someone available to help you if you needed and wanted help?  (For example, if you felt very nervous, lonely, or blue; got sick and had to stay in bed; needed someone to talk to; needed help with daily chores; or needed help just taking care of yourself.) Yes, quite a bit  - During the past 4 weeks, what was the hardest physical activity you could do for at least 2 minutes? Light  - ADLS/IADLs:  --- Can you get to places out of walking distance without help?  (For example, can you travel alone on buses or taxis, or drive your own car?) Yes  --- Can you go shopping for groceries or clothes without someone's help? No, needs assistance  --- Can you prepare your own meals? Yes  --- Can you do your housework without help? No, needs help half, half  --- Because of any health problems, do you need the help of another person with your personal care needs such as eating, bathing, dressing, or getting around the house? Yes  --- Can you handle your own money without help? Yes  - During the past 4 weeks, how would you rate your health in general?Fair  - How have things been going for you during the past 4 weeks? Good and bad parts about equal  - Are you having difficulties driving your car? Not applicable, I do not use a car  - Do you always fasten your seatbelt when you are in a car? Yes, usually  - How often during the past 4  weeks have you been bothered by any of the following problems?  --- Falling or dizzy when standing up. Sometimes dizzy when goes up quicklyb but no falls  --- Sexual problems. Never  --- Trouble eating well. Never  --- Teeth or denture problems. Never  --- Problems using the telephone. Never  --- Tiredness or fatigue. Never  - Have you fallen 2 or more times in the past year? No  - Are you afraid of falling? No  - Are you a smoker? No  - During the past 4 weeks, how many drinks of wine, beer, or other alcoholic beverages did you have? No alcohol at all  - Do you exercise for about 20 minutes, 3 or more days a week? Yes, most of the time  - Have you been given any information to help you with the following:  --- Hazards in your house that might hurt you? N/A  --- Keeping track of your medications? Yes  - How often do you have trouble taking medicines the way you have been told to take them? I always take them as prescribed  - How confident are you that you can control and manage most of your health problems? Very confident  - What is your race?  (Check all that apply) Black or -American    MEN  - Prostate CA: last prior PSA 2023,deferred  Sexual History:  - Sexually active? Yes   - Gender of historical partner(s): female  - Number of partners in the past 6mo: 1  - Contraception: condoms  - Sexual Dysfunction? No  - Known STI exposure? No    FamHx:  Family History   Problem Relation Name Age of Onset    Hypertension Mother         PMH:  Patient Active Problem List   Diagnosis    Anemia in chronic kidney disease    ASCVD (arteriosclerotic cardiovascular disease)    Blepharitis of both eyes    Chalazion of left upper eyelid    ED (erectile dysfunction) of organic origin    Elevated serum creatinine    End stage renal disease (Multi)    Essential hypertension, benign    Fat pad atrophy of foot    Hyperpotassemia    Hypertensive retinopathy    Immunosuppression (Multi)    Kidney transplant recipient ([facility])     Latent syphilis with positive serology    Liver mass    Rhabdomyolysis    Thrombocytopenia, unspecified (CMS-HCC)    Unspecified hypertensive kidney disease with chronic kidney disease stage V or end stage renal disease(403.91) (Multi)    Vitamin D deficiency     Past Medical History:   Diagnosis Date    Personal history of other diseases of urinary system     History of chronic kidney disease    Personal history of other specified conditions 01/12/2022    History of urinary retention    Personal history of other specified conditions 08/22/2019    History of urinary retention       SurgHx:  Past Surgical History:   Procedure Laterality Date    OTHER SURGICAL HISTORY  06/06/2016    Myringotomy - With Eustachian Tube Inflation    OTHER SURGICAL HISTORY  06/06/2016    Arteriovenous Surgery Creation Of A-V Fistula    OTHER SURGICAL HISTORY  08/23/2019    Kidney transplantation       Meds:  Current Outpatient Medications   Medication Instructions    cetirizine (ZYRTEC) 10 mg, oral, Daily PRN, Every morning as needed    cloNIDine (CATAPRES) 0.1 mg, oral, 2 times daily    cloNIDine (CATAPRES) 0.2 mg, oral, 2 times daily    furosemide (LASIX) 20 mg, oral, Daily PRN    losartan (COZAAR) 25 mg, oral, 2 times daily    mycophenolate (Cellcept) 250 mg capsule Take three (3) capsules by mouth twice daily    potassium chloride CR (Klor-Con M20) 20 mEq ER tablet 20 mEq, oral, Daily, Do not crush or chew.    tacrolimus (PROGRAF) 0.5 mg, oral, 2 times daily    tacrolimus (PROGRAF) 2 mg, oral, 2 times daily    tamsulosin (Flomax) 0.4 mg 24 hr capsule TAKE ONE (1) CAPSULE BY MOUTH EVERYDAY AT BEDTIME.       Patient care team:  Patient Care Team:  Jennifer Roberts MD as PCP - General  Cornell Garcia Jr. RN as Transplant Coordinator (Transplant)     ROS:  12 system ROS completed, negative except as noted above.    Objective   Vitals:  /87 (BP Location: Left arm, Patient Position: Sitting, BP Cuff Size: Adult)   Pulse 87   Temp  "36.1 °C (97 °F)   Ht 1.753 m (5' 9\")   Wt 78.9 kg (174 lb)   SpO2 100%   BMI 25.70 kg/m²       Physical Exam  General: well-appearing, NAD  HEENT: NC/AT  Cardiac: rrr, no m/r/g  Lungs: normal work of breathing, CTAB  Abdomen: soft, non-tender, non-distended, BS present  Neuro: grossly intact  MSK: No peripheral edema, cyanosis, or pallor  Psych: no depression, anxiety      Social Determinants of Health     Tobacco Use: Medium Risk (7/29/2024)    Patient History     Smoking Tobacco Use: Former     Smokeless Tobacco Use: Never     Passive Exposure: Not on file   Alcohol Use: Not on file   Financial Resource Strain: Not on file   Food Insecurity: No Food Insecurity (7/29/2024)    Hunger Vital Sign     Worried About Running Out of Food in the Last Year: Never true     Ran Out of Food in the Last Year: Never true   Transportation Needs: Not on file   Physical Activity: Not on file   Stress: Not on file   Social Connections: Not on file   Intimate Partner Violence: Not on file   Depression: Not at risk (7/29/2024)    PHQ-2     PHQ-2 Score: 0   Housing Stability: Not on file   Utilities: Not on file   Digital Equity: Not on file   Health Literacy: Not on file        Assessment/Plan     59 y.o. male here for Medicare Annual Wellness Exam.    IMMUNIZATIONS  Immunization History   Administered Date(s) Administered    Hepatitis B vaccine, 19 yrs and under (RECOMBIVAX, ENGERIX) 01/12/2022    Influenza Whole 10/20/2015, 01/26/2023    Influenza, Unspecified 10/10/2008    Influenza, injectable, quadrivalent 10/20/2020, 01/12/2022    Pfizer Purple Cap SARS-CoV-2 03/22/2021, 04/12/2021    Pneumococcal conjugate vaccine, 20-valent (PREVNAR 20) 07/29/2024    Pneumococcal polysaccharide vaccine, 23-valent, age 2 years and older (PNEUMOVAX 23) 01/05/2021    Tdap vaccine, age 7 year and older (BOOSTRIX, ADACEL) 01/05/2021    Zoster vaccine, recombinant, adult (SHINGRIX) 07/29/2024       Ability to perform ADLs: with partial " assistance  Fall risk: low  Hearing impairment: absent  Home safety risk factors: Home Safety Risk Factors: None    - Flu vaccine: recommended annually,   - COVID vaccine: recommended completion of primary series and recommended boosters,   - Prevnar (PCV20): ordered  - Tdap: next due UTD  - Shingrix (50+): ordered  - Lifetime HIV, HepC: done  - Lipid Panel (35M,45F): ordered  - DM screening: ordered  - HTN screening: wnl today  - Food Insecurity screen: negative  - Depression: PHQ-2 negative  - Last Dental: recommended follow up every 6mo   - Last Eye exam: recommended follow up annually   - Colonoscopy (45-75): ordered  - PSA screening (55-69M): discussed importance of continued surveillance rather than single test, discussed r/b/a, with shared decision making patient to defer till 2025  - Lung CA screening (50-80, >20py): not indicated ~ 5ppy and stoopped 5 years afo      Problem List Items Addressed This Visit    None  Visit Diagnoses       Healthcare maintenance    -  Primary    Relevant Orders    Pneumococcal conjugate vaccine, 20-valent (PREVNAR 20) (Completed)    Zoster vaccine, Recombinant, Adult (SHINGRIX) (Completed)    Hepatitis B surface antigen    Hepatitis B surface antibody    Hemoglobin A1c    Lipid panel    Hx of colonic polyps        Relevant Orders    Colonoscopy Screening; Average Risk Patient            Patient seen and discussed with attending physician (cosigner listed on this note).    RTC in 6 months for f/up visit, or earlier as needed.    Patient seen and discussed with attending physician Dr. Ahumada    Plan preliminary until cosigned by attending physician.    Ashley Morley M.D.  Family Medicine  PGY-2

## 2024-08-01 NOTE — PROGRESS NOTES
I reviewed the resident/fellow's documentation and discussed the patient with the resident/fellow. I agree with the resident/fellow's medical decision making as documented in the note.    Alcides Ahumada MD

## 2024-08-12 ENCOUNTER — LAB (OUTPATIENT)
Dept: LAB | Facility: LAB | Age: 59
End: 2024-08-12
Payer: COMMERCIAL

## 2024-08-12 DIAGNOSIS — N18.31 STAGE 3A CHRONIC KIDNEY DISEASE (MULTI): ICD-10-CM

## 2024-08-12 DIAGNOSIS — Z94.0 KIDNEY REPLACED BY TRANSPLANT (HHS-HCC): ICD-10-CM

## 2024-08-12 DIAGNOSIS — Z00.00 HEALTHCARE MAINTENANCE: ICD-10-CM

## 2024-08-12 LAB
25(OH)D3 SERPL-MCNC: 12 NG/ML (ref 30–100)
ALBUMIN SERPL BCP-MCNC: 4 G/DL (ref 3.4–5)
ANION GAP SERPL CALC-SCNC: 15 MMOL/L (ref 10–20)
BUN SERPL-MCNC: 20 MG/DL (ref 6–23)
CALCIUM SERPL-MCNC: 8.9 MG/DL (ref 8.6–10.6)
CHLORIDE SERPL-SCNC: 106 MMOL/L (ref 98–107)
CHOLEST SERPL-MCNC: 194 MG/DL (ref 0–199)
CHOLESTEROL/HDL RATIO: 4.6
CO2 SERPL-SCNC: 22 MMOL/L (ref 21–32)
CREAT SERPL-MCNC: 1.98 MG/DL (ref 0.5–1.3)
CREAT UR-MCNC: 49 MG/DL (ref 20–370)
EGFRCR SERPLBLD CKD-EPI 2021: 38 ML/MIN/1.73M*2
ERYTHROCYTE [DISTWIDTH] IN BLOOD BY AUTOMATED COUNT: 14.1 % (ref 11.5–14.5)
EST. AVERAGE GLUCOSE BLD GHB EST-MCNC: 111 MG/DL
GLUCOSE SERPL-MCNC: 89 MG/DL (ref 74–99)
HBA1C MFR BLD: 5.5 %
HBV SURFACE AB SER-ACNC: 3.3 MIU/ML
HBV SURFACE AG SERPL QL IA: NONREACTIVE
HCT VFR BLD AUTO: 42.3 % (ref 41–52)
HDLC SERPL-MCNC: 42.5 MG/DL
HGB BLD-MCNC: 13.3 G/DL (ref 13.5–17.5)
LDLC SERPL CALC-MCNC: 133 MG/DL
MAGNESIUM SERPL-MCNC: 2.14 MG/DL (ref 1.6–2.4)
MCH RBC QN AUTO: 30.4 PG (ref 26–34)
MCHC RBC AUTO-ENTMCNC: 31.4 G/DL (ref 32–36)
MCV RBC AUTO: 97 FL (ref 80–100)
NON HDL CHOLESTEROL: 152 MG/DL (ref 0–149)
NRBC BLD-RTO: 0 /100 WBCS (ref 0–0)
PHOSPHATE SERPL-MCNC: 2.4 MG/DL (ref 2.5–4.9)
PLATELET # BLD AUTO: 147 X10*3/UL (ref 150–450)
POTASSIUM SERPL-SCNC: 3.5 MMOL/L (ref 3.5–5.3)
PROT UR-ACNC: 12 MG/DL (ref 5–25)
PROT/CREAT UR: 0.24 MG/MG CREAT (ref 0–0.17)
PTH-INTACT SERPL-MCNC: 141.5 PG/ML (ref 18.5–88)
RBC # BLD AUTO: 4.38 X10*6/UL (ref 4.5–5.9)
SODIUM SERPL-SCNC: 139 MMOL/L (ref 136–145)
TACROLIMUS BLD-MCNC: 6.2 NG/ML
TRIGL SERPL-MCNC: 93 MG/DL (ref 0–149)
VLDL: 19 MG/DL (ref 0–40)
WBC # BLD AUTO: 5.5 X10*3/UL (ref 4.4–11.3)

## 2024-08-12 PROCEDURE — 82306 VITAMIN D 25 HYDROXY: CPT

## 2024-08-12 PROCEDURE — 80069 RENAL FUNCTION PANEL: CPT

## 2024-08-12 PROCEDURE — 82570 ASSAY OF URINE CREATININE: CPT

## 2024-08-12 PROCEDURE — 80061 LIPID PANEL: CPT

## 2024-08-12 PROCEDURE — 85027 COMPLETE CBC AUTOMATED: CPT

## 2024-08-12 PROCEDURE — 83036 HEMOGLOBIN GLYCOSYLATED A1C: CPT

## 2024-08-12 PROCEDURE — 83735 ASSAY OF MAGNESIUM: CPT

## 2024-08-12 PROCEDURE — 80197 ASSAY OF TACROLIMUS: CPT

## 2024-08-12 PROCEDURE — 84156 ASSAY OF PROTEIN URINE: CPT

## 2024-08-12 PROCEDURE — 87340 HEPATITIS B SURFACE AG IA: CPT

## 2024-08-12 PROCEDURE — 83970 ASSAY OF PARATHORMONE: CPT

## 2024-08-12 PROCEDURE — 36415 COLL VENOUS BLD VENIPUNCTURE: CPT

## 2024-08-12 PROCEDURE — 86706 HEP B SURFACE ANTIBODY: CPT

## 2024-08-16 ENCOUNTER — TELEPHONE (OUTPATIENT)
Dept: TRANSPLANT | Facility: HOSPITAL | Age: 59
End: 2024-08-16
Payer: COMMERCIAL

## 2024-08-16 DIAGNOSIS — Z94.0 KIDNEY REPLACED BY TRANSPLANT (HHS-HCC): ICD-10-CM

## 2024-08-16 RX ORDER — ERGOCALCIFEROL 1.25 MG/1
50000 CAPSULE ORAL
Qty: 8 CAPSULE | Refills: 0 | Status: SHIPPED | OUTPATIENT
Start: 2024-08-18 | End: 2024-10-07

## 2024-08-16 NOTE — TELEPHONE ENCOUNTER
Reviewed labs with Dr. Frank:   Vit D 12   .5  Ca 8.9    Start weekly 50,000 once a week for 8 weeks.     Called to update patient, script sent to Dr. Farnk to sign.

## 2024-08-19 ENCOUNTER — SPECIALTY PHARMACY (OUTPATIENT)
Dept: PHARMACY | Facility: CLINIC | Age: 59
End: 2024-08-19

## 2024-08-19 PROCEDURE — RXMED WILLOW AMBULATORY MEDICATION CHARGE

## 2024-08-20 ENCOUNTER — PHARMACY VISIT (OUTPATIENT)
Dept: PHARMACY | Facility: CLINIC | Age: 59
End: 2024-08-20
Payer: COMMERCIAL

## 2024-09-23 ENCOUNTER — LAB (OUTPATIENT)
Dept: LAB | Facility: LAB | Age: 59
End: 2024-09-23
Payer: COMMERCIAL

## 2024-09-23 DIAGNOSIS — Z94.0 KIDNEY REPLACED BY TRANSPLANT (HHS-HCC): ICD-10-CM

## 2024-09-23 DIAGNOSIS — N18.31 STAGE 3A CHRONIC KIDNEY DISEASE (MULTI): ICD-10-CM

## 2024-09-23 LAB
25(OH)D3 SERPL-MCNC: 83 NG/ML (ref 30–100)
ALBUMIN SERPL BCP-MCNC: 4.2 G/DL (ref 3.4–5)
ANION GAP SERPL CALC-SCNC: 13 MMOL/L (ref 10–20)
BUN SERPL-MCNC: 18 MG/DL (ref 6–23)
CALCIUM SERPL-MCNC: 9.5 MG/DL (ref 8.6–10.6)
CHLORIDE SERPL-SCNC: 106 MMOL/L (ref 98–107)
CO2 SERPL-SCNC: 25 MMOL/L (ref 21–32)
CREAT SERPL-MCNC: 2.1 MG/DL (ref 0.5–1.3)
CREAT UR-MCNC: 54.7 MG/DL (ref 20–370)
EGFRCR SERPLBLD CKD-EPI 2021: 36 ML/MIN/1.73M*2
ERYTHROCYTE [DISTWIDTH] IN BLOOD BY AUTOMATED COUNT: 13.7 % (ref 11.5–14.5)
GLUCOSE SERPL-MCNC: 88 MG/DL (ref 74–99)
HCT VFR BLD AUTO: 44.2 % (ref 41–52)
HGB BLD-MCNC: 14 G/DL (ref 13.5–17.5)
MCH RBC QN AUTO: 29.7 PG (ref 26–34)
MCHC RBC AUTO-ENTMCNC: 31.7 G/DL (ref 32–36)
MCV RBC AUTO: 94 FL (ref 80–100)
NRBC BLD-RTO: 0 /100 WBCS (ref 0–0)
PHOSPHATE SERPL-MCNC: 2.3 MG/DL (ref 2.5–4.9)
PLATELET # BLD AUTO: 146 X10*3/UL (ref 150–450)
POTASSIUM SERPL-SCNC: 4.4 MMOL/L (ref 3.5–5.3)
PROT UR-ACNC: 15 MG/DL (ref 5–25)
PROT/CREAT UR: 0.27 MG/MG CREAT (ref 0–0.17)
PTH-INTACT SERPL-MCNC: 104 PG/ML (ref 18.5–88)
RBC # BLD AUTO: 4.71 X10*6/UL (ref 4.5–5.9)
SODIUM SERPL-SCNC: 140 MMOL/L (ref 136–145)
TACROLIMUS BLD-MCNC: 4.2 NG/ML
WBC # BLD AUTO: 5.7 X10*3/UL (ref 4.4–11.3)

## 2024-09-23 PROCEDURE — 83970 ASSAY OF PARATHORMONE: CPT

## 2024-09-23 PROCEDURE — 84156 ASSAY OF PROTEIN URINE: CPT

## 2024-09-23 PROCEDURE — 82306 VITAMIN D 25 HYDROXY: CPT

## 2024-09-23 PROCEDURE — 80069 RENAL FUNCTION PANEL: CPT

## 2024-09-23 PROCEDURE — 85027 COMPLETE CBC AUTOMATED: CPT

## 2024-09-23 PROCEDURE — 82570 ASSAY OF URINE CREATININE: CPT

## 2024-09-23 PROCEDURE — 80197 ASSAY OF TACROLIMUS: CPT

## 2024-09-23 PROCEDURE — 36415 COLL VENOUS BLD VENIPUNCTURE: CPT

## 2024-09-24 ENCOUNTER — SPECIALTY PHARMACY (OUTPATIENT)
Dept: PHARMACY | Facility: CLINIC | Age: 59
End: 2024-09-24

## 2024-09-24 DIAGNOSIS — Z94.0 KIDNEY REPLACED BY TRANSPLANT (HHS-HCC): Primary | ICD-10-CM

## 2024-09-24 PROCEDURE — RXMED WILLOW AMBULATORY MEDICATION CHARGE

## 2024-09-25 ENCOUNTER — PHARMACY VISIT (OUTPATIENT)
Dept: PHARMACY | Facility: CLINIC | Age: 59
End: 2024-09-25
Payer: COMMERCIAL

## 2024-09-25 DIAGNOSIS — Z94.0 KIDNEY REPLACED BY TRANSPLANT (HHS-HCC): ICD-10-CM

## 2024-09-25 DIAGNOSIS — N18.31 STAGE 3A CHRONIC KIDNEY DISEASE (MULTI): ICD-10-CM

## 2024-09-25 RX ORDER — TAMSULOSIN HYDROCHLORIDE 0.4 MG/1
CAPSULE ORAL
Qty: 90 CAPSULE | Refills: 3 | Status: SHIPPED | OUTPATIENT
Start: 2024-09-25 | End: 2025-09-25

## 2024-09-30 DIAGNOSIS — Z86.0101 HX OF ADENOMATOUS POLYP OF COLON: Primary | ICD-10-CM

## 2024-09-30 RX ORDER — POLYETHYLENE GLYCOL 3350, SODIUM SULFATE ANHYDROUS, SODIUM BICARBONATE, SODIUM CHLORIDE, POTASSIUM CHLORIDE 236; 22.74; 6.74; 5.86; 2.97 G/4L; G/4L; G/4L; G/4L; G/4L
4000 POWDER, FOR SOLUTION ORAL ONCE
Qty: 4000 ML | Refills: 0 | Status: SHIPPED | OUTPATIENT
Start: 2024-09-30 | End: 2024-09-30

## 2024-10-02 ENCOUNTER — APPOINTMENT (OUTPATIENT)
Dept: OPHTHALMOLOGY | Facility: CLINIC | Age: 59
End: 2024-10-02
Payer: COMMERCIAL

## 2024-10-07 DIAGNOSIS — Z94.0 KIDNEY REPLACED BY TRANSPLANT (HHS-HCC): ICD-10-CM

## 2024-10-15 ENCOUNTER — OFFICE VISIT (OUTPATIENT)
Dept: TRANSPLANT | Facility: HOSPITAL | Age: 59
End: 2024-10-15
Payer: COMMERCIAL

## 2024-10-15 VITALS
SYSTOLIC BLOOD PRESSURE: 123 MMHG | BODY MASS INDEX: 26.15 KG/M2 | HEART RATE: 75 BPM | WEIGHT: 177.1 LBS | TEMPERATURE: 97.3 F | DIASTOLIC BLOOD PRESSURE: 84 MMHG | OXYGEN SATURATION: 98 %

## 2024-10-15 DIAGNOSIS — Z79.899 ENCOUNTER FOR LONG-TERM (CURRENT) USE OF HIGH-RISK MEDICATION: ICD-10-CM

## 2024-10-15 DIAGNOSIS — Z48.298 AFTERCARE FOLLOWING ORGAN TRANSPLANT: ICD-10-CM

## 2024-10-15 DIAGNOSIS — Z94.0 KIDNEY REPLACED BY TRANSPLANT (HHS-HCC): Primary | ICD-10-CM

## 2024-10-15 DIAGNOSIS — I15.1 HYPERTENSION SECONDARY TO OTHER RENAL DISORDERS: ICD-10-CM

## 2024-10-15 PROCEDURE — 99215 OFFICE O/P EST HI 40 MIN: CPT | Performed by: INTERNAL MEDICINE

## 2024-10-15 ASSESSMENT — PAIN SCALES - GENERAL: PAINLEVEL: 0-NO PAIN

## 2024-10-15 NOTE — PROGRESS NOTES
TRANSPLANT NEPHROLOGY :   OUTPATIENT CLINIC NOTE      SERVICE DATE : 10/15/2024     REASON FOR VISIT/CHIEF COMPLAINT:  S/P  TRANSPLANT SURGERY  IMMUNOSUPPRESSIVE MEDICATION MANAGEMENT  BLOOD PRESSURE MANAGEMENT    HPI:    Mr. Khoury is a 59 y.o. male with past medical history significant for ESRD secondary to hypertension s/p DDKT on 8/10/2019.  Patient was induced by Simulect, no DGF no CMV mismatch no EBV mismatch, KDPI of kidney is 40% PRA 0%.  No episodes of rejection.     Last seen 3 months ago. Here for follow up.      Baseline serum Cr 1.7-1.8 since Jan '24. Recent Cr variable 1.9-2.2.   DSA neg, Allosure stable 6/2024.  Pt thinks he may be behind on fluids.   Home Bps stable, usually 130/70s per pt.  Denies any other complaints.    Denied chest pain, SOB, ARCE, Palpitation. Normal urination and bowel movement. Normal gait and no weakness of arms/legs. No cough, runny nose, sore throat, cold symptoms, or rash. No hearing loss. Normal vision.No problems with his sleep, mood and function. No recent infection, hospitalization, surgery or ER visits.      ROS:  Review of  14 systems was performed system by system. See HPI. Otherwise, the symptoms were negative.    PAST MEDICAL HISTORY:  Past Medical History:   Diagnosis Date    Personal history of other diseases of urinary system     History of chronic kidney disease    Personal history of other specified conditions 01/12/2022    History of urinary retention    Personal history of other specified conditions 08/22/2019    History of urinary retention        PAST SURGICAL HISTORY:  Past Surgical History:   Procedure Laterality Date    OTHER SURGICAL HISTORY  06/06/2016    Myringotomy - With Eustachian Tube Inflation    OTHER SURGICAL HISTORY  06/06/2016    Arteriovenous Surgery Creation Of A-V Fistula    OTHER SURGICAL HISTORY  08/23/2019    Kidney transplantation        SOCIAL HISTORY:  Social History     Socioeconomic History    Marital status:       Spouse name: Not on file    Number of children: Not on file    Years of education: Not on file    Highest education level: Not on file   Occupational History    Not on file   Tobacco Use    Smoking status: Former     Types: Cigarettes    Smokeless tobacco: Never   Substance and Sexual Activity    Alcohol use: Never    Drug use: Never    Sexual activity: Not on file   Other Topics Concern    Not on file   Social History Narrative    Not on file     Social Determinants of Health     Financial Resource Strain: Not on file   Food Insecurity: No Food Insecurity (7/29/2024)    Hunger Vital Sign     Worried About Running Out of Food in the Last Year: Never true     Ran Out of Food in the Last Year: Never true   Transportation Needs: Not on file   Physical Activity: Not on file   Stress: Not on file   Social Connections: Not on file   Intimate Partner Violence: Not on file   Housing Stability: Not on file       FAMILY HISTORY:  Family History   Problem Relation Name Age of Onset    Hypertension Mother         MEDICATION LIST:  Current Outpatient Medications   Medication Instructions    atorvastatin (LIPITOR) 20 mg, oral, Daily    cetirizine (ZYRTEC) 10 mg, oral, Daily PRN, Every morning as needed    cloNIDine (CATAPRES) 0.1 mg, oral, 2 times daily    cloNIDine (CATAPRES) 0.2 mg, oral, 2 times daily    furosemide (LASIX) 20 mg, oral, Daily PRN    losartan (COZAAR) 25 mg, oral, 2 times daily    mycophenolate (Cellcept) 250 mg capsule Take three (3) capsules by mouth twice daily    potassium chloride CR (Klor-Con M20) 20 mEq ER tablet 20 mEq, oral, Daily, Do not crush or chew.    tacrolimus (PROGRAF) 0.5 mg, oral, 2 times daily    tacrolimus (PROGRAF) 2 mg, oral, 2 times daily    tamsulosin (Flomax) 0.4 mg 24 hr capsule TAKE ONE (1) CAPSULE BY MOUTH EVERYDAY AT BEDTIME.       ALLERGY  Allergies   Allergen Reactions    Amoxicillin Other and Nausea Only     vomiting    Ace Inhibitors Angioedema       PHYSICAL EXAM:    Visit  Vitals  /84   Pulse 75   Temp 36.3 °C (97.3 °F) (Temporal)   Wt 80.3 kg (177 lb 1.6 oz)   SpO2 98%   BMI 26.15 kg/m²   Smoking Status Former   BSA 1.98 m²          General Appearance - NAD, A&Ox3   HEENT - Supple. Not pale. No jaundice. No JVD or LAD  CVS - RRR. Normal S1/S2. No murmur, rub or gallop  Lungs- clear to auscultation bilaterally  Abdomen - soft , NT, ND, no guarding. No hepatosplenomegaly. No allograft tenderness  Musculoskeletal /Extremities - no edema. Full ROM. No joint tenderness.   Neuro/Psych - appropriate mood and affect. Motor power V/V all extremities. CN I -XII were grossly intact.  Skin - No visible rash      LABS:    Lab Results   Component Value Date    CREATININE 2.10 (H) 09/23/2024    BUN 18 09/23/2024     09/23/2024    K 4.4 09/23/2024     09/23/2024    CO2 25 09/23/2024     Lab Results   Component Value Date    .0 (H) 09/23/2024    CALCIUM 9.5 09/23/2024    PHOS 2.3 (L) 09/23/2024     Lab Results   Component Value Date    WBC 5.7 09/23/2024    HGB 14.0 09/23/2024    HCT 44.2 09/23/2024    MCV 94 09/23/2024     (L) 09/23/2024     Lab Results   Component Value Date    IRON 43 08/20/2019    TIBC 192 (L) 08/20/2019    FERRITIN 679 (H) 08/20/2019     Lab Results   Component Value Date    TACROLIMUS 4.2 09/23/2024    CMVPCRIU NOT DETECTED 11/29/2019    BKVIRPCRQN Not Detected 06/15/2020    EBVDNAPCR Not Detected 05/29/2024     Lab Results   Component Value Date    CMVDNAPCR Not Detected 05/29/2024    BKVDNAPCR Not Detected 05/29/2024    EBVDNAPCR Not Detected 05/29/2024         ASSESSMENT AND PLAN:    Mr. Khoury is a 59 y.o. male  who is here for follow up s/p kidney transplant.    TRANSPLANT DATE: 8/10/2019 (Kidney)      1. ESRD S/P kidney transplant   - Creatinine last check was 2.1, elevated from baseline ~1.8 few months ago  - Renal allograft function is impaired, now CKD stage 3b   -Random urine protein/creatinine ratio is 270 mg/g, stable. On losartan 25  mg bid  -Allosure last check was 0.46% 5/2024. DSA neg  -Ensure adequate hydration. Pt to hydrate and rpt labs in a week or so.  - Avoid nephrotoxic medications, NSAIDs, and IV contrast.    2. Immunosuppression  -Tacrolimus level last check was 4.2, slightly below goal. Will wait for rpt level.   -Continue  mg q12, not on Pred (?reason)  - EBV, CMV, BK PCR neg    3. Electrolytes  -Acceptable from last lab drawn    4. Hypertension  -recent BPs acceptable. No hypervolemia on exam  -cont to monitor home BP, call if trends concerning  -Continue current anti hypertensive medication    5. Bone Mineral Disease/Osteoporosis  - Ca, phos acceptable  - Consider DEXA every 2-3 years , defer to PCP    6.Anemia/Leukopenia:  - Stable Hb ~14.  - WBC acceptable, 5.7k    7.Health maintenance and vaccination  - Flu shot during flu season annually  - Cancer screening is up to date per the patient    Lab : Routine transplant lab ( CBC, RFP, and anti-rejection trough level ) every 1 months  Additional labs:  VIT D, PTH Q3 months  Viral screening PCR, Allosure and UPC per protocol.    Additional Plan :  - hydrate and rpt labs     RTC 3 month(s)

## 2024-10-22 ENCOUNTER — PHARMACY VISIT (OUTPATIENT)
Dept: PHARMACY | Facility: CLINIC | Age: 59
End: 2024-10-22
Payer: COMMERCIAL

## 2024-10-22 ENCOUNTER — SPECIALTY PHARMACY (OUTPATIENT)
Dept: PHARMACY | Facility: CLINIC | Age: 59
End: 2024-10-22

## 2024-10-22 PROCEDURE — RXMED WILLOW AMBULATORY MEDICATION CHARGE

## 2024-11-05 ENCOUNTER — HOSPITAL ENCOUNTER (OUTPATIENT)
Dept: GASTROENTEROLOGY | Facility: HOSPITAL | Age: 59
Discharge: HOME | End: 2024-11-05
Payer: COMMERCIAL

## 2024-11-05 VITALS
BODY MASS INDEX: 24.44 KG/M2 | RESPIRATION RATE: 21 BRPM | WEIGHT: 165 LBS | OXYGEN SATURATION: 99 % | HEART RATE: 60 BPM | DIASTOLIC BLOOD PRESSURE: 96 MMHG | HEIGHT: 69 IN | TEMPERATURE: 97.7 F | SYSTOLIC BLOOD PRESSURE: 151 MMHG

## 2024-11-05 DIAGNOSIS — Z86.0100 HX OF COLONIC POLYPS: ICD-10-CM

## 2024-11-05 PROCEDURE — 2500000004 HC RX 250 GENERAL PHARMACY W/ HCPCS (ALT 636 FOR OP/ED): Mod: SE | Performed by: INTERNAL MEDICINE

## 2024-11-05 PROCEDURE — 99153 MOD SED SAME PHYS/QHP EA: CPT | Performed by: INTERNAL MEDICINE

## 2024-11-05 PROCEDURE — 3700000012 HC SEDATION LEVEL 5+ TIME - INITIAL 15 MINUTES 5/> YEARS

## 2024-11-05 PROCEDURE — 3700000013 HC SEDATION LEVEL 5+ TIME - EACH ADDITIONAL 15 MINUTES

## 2024-11-05 PROCEDURE — 45385 COLONOSCOPY W/LESION REMOVAL: CPT | Performed by: INTERNAL MEDICINE

## 2024-11-05 PROCEDURE — 2720000007 HC OR 272 NO HCPCS

## 2024-11-05 PROCEDURE — 7100000010 HC PHASE TWO TIME - EACH INCREMENTAL 1 MINUTE

## 2024-11-05 PROCEDURE — 99152 MOD SED SAME PHYS/QHP 5/>YRS: CPT | Performed by: INTERNAL MEDICINE

## 2024-11-05 PROCEDURE — 7100000009 HC PHASE TWO TIME - INITIAL BASE CHARGE

## 2024-11-05 RX ORDER — MIDAZOLAM HYDROCHLORIDE 1 MG/ML
INJECTION, SOLUTION INTRAMUSCULAR; INTRAVENOUS AS NEEDED
Status: COMPLETED | OUTPATIENT
Start: 2024-11-05 | End: 2024-11-05

## 2024-11-05 RX ORDER — FENTANYL CITRATE 50 UG/ML
INJECTION, SOLUTION INTRAMUSCULAR; INTRAVENOUS AS NEEDED
Status: COMPLETED | OUTPATIENT
Start: 2024-11-05 | End: 2024-11-05

## 2024-11-05 ASSESSMENT — PAIN - FUNCTIONAL ASSESSMENT
PAIN_FUNCTIONAL_ASSESSMENT: UNABLE TO SELF-REPORT
PAIN_FUNCTIONAL_ASSESSMENT: UNABLE TO SELF-REPORT
PAIN_FUNCTIONAL_ASSESSMENT: 0-10
PAIN_FUNCTIONAL_ASSESSMENT: UNABLE TO SELF-REPORT
PAIN_FUNCTIONAL_ASSESSMENT: 0-10
PAIN_FUNCTIONAL_ASSESSMENT: UNABLE TO SELF-REPORT
PAIN_FUNCTIONAL_ASSESSMENT: 0-10
PAIN_FUNCTIONAL_ASSESSMENT: UNABLE TO SELF-REPORT
PAIN_FUNCTIONAL_ASSESSMENT: UNABLE TO SELF-REPORT
PAIN_FUNCTIONAL_ASSESSMENT: 0-10
PAIN_FUNCTIONAL_ASSESSMENT: 0-10
PAIN_FUNCTIONAL_ASSESSMENT: UNABLE TO SELF-REPORT
PAIN_FUNCTIONAL_ASSESSMENT: UNABLE TO SELF-REPORT

## 2024-11-05 ASSESSMENT — PAIN SCALES - GENERAL
PAINLEVEL_OUTOF10: 0 - NO PAIN

## 2024-11-05 ASSESSMENT — COLUMBIA-SUICIDE SEVERITY RATING SCALE - C-SSRS
1. IN THE PAST MONTH, HAVE YOU WISHED YOU WERE DEAD OR WISHED YOU COULD GO TO SLEEP AND NOT WAKE UP?: NO
6. HAVE YOU EVER DONE ANYTHING, STARTED TO DO ANYTHING, OR PREPARED TO DO ANYTHING TO END YOUR LIFE?: NO
2. HAVE YOU ACTUALLY HAD ANY THOUGHTS OF KILLING YOURSELF?: NO

## 2024-11-05 NOTE — H&P
"History Of Present Illness  Davidson Khoury is a 59 y.o. male presenting with history of adenomatous colon polyp on colonoscopy 2019.     Past Medical History  Past Medical History:   Diagnosis Date    Personal history of other diseases of urinary system     History of chronic kidney disease    Personal history of other specified conditions 01/12/2022    History of urinary retention    Personal history of other specified conditions 08/22/2019    History of urinary retention     Surgical History  Past Surgical History:   Procedure Laterality Date    OTHER SURGICAL HISTORY  06/06/2016    Myringotomy - With Eustachian Tube Inflation    OTHER SURGICAL HISTORY  06/06/2016    Arteriovenous Surgery Creation Of A-V Fistula    OTHER SURGICAL HISTORY  08/23/2019    Kidney transplantation     Social History  He reports that he has quit smoking. His smoking use included cigarettes. He has never used smokeless tobacco. He reports that he does not drink alcohol and does not use drugs.    Family History  Family History   Problem Relation Name Age of Onset    Hypertension Mother          Allergies  Allergies   Allergen Reactions    Amoxicillin Other and Nausea Only     vomiting    Ace Inhibitors Angioedema     Review of Systems  Pre-sedation Evaluation:  ASA Classification - ASA 3 - Patient with moderate systemic disease with functional limitations  Mallampati Score - III (soft and hard palate and base of uvula visible)    Physical Exam     Last Recorded Vitals  Blood pressure (!) 162/110, pulse 64, temperature 36.5 °C (97.7 °F), resp. rate 18, height 1.753 m (5' 9\"), weight 74.8 kg (165 lb), SpO2 100%.         Colonoscopy with moderate sedation    Procedure Date: 3/26/2019 1:08 PM    Impression:            - One 3 mm polyp at the hepatic flexure, removed with  a cold snare. Resected and retrieved.  - One 5 mm polyp in the rectum, removed with a cold  snare. Resected and retrieved. Clip was placed.  Estimated Blood Loss:  Estimated " blood loss: none.  Recommendation:        - Patient has a contact number available for  emergencies. The signs and symptoms of potential  delayed complications were discussed with the patient.  Return to normal activities tomorrow. Written  discharge instructions were provided to the patient.  - Resume previous diet.  - Continue present medications.  - Await pathology results.  - Repeat colonoscopy in 5 years for surveillance.  - Return to referring physician as previously  scheduled.    Assessment/Plan   Surveillance colonoscopy with moderate sedation     PTA/Current Medications:  (Not in a hospital admission)    Current Outpatient Medications   Medication Sig Dispense Refill    atorvastatin (Lipitor) 20 mg tablet Take 1 tablet (20 mg) by mouth once daily. 90 tablet 0    cetirizine (ZyrTEC) 10 mg tablet Take 1 tablet (10 mg) by mouth once daily as needed for allergies or rhinitis. Every morning as needed      cloNIDine (Catapres) 0.1 mg tablet Take 1 tablet (0.1 mg) by mouth 2 times a day.      cloNIDine (Catapres) 0.2 mg tablet TAKE 1 TABLET TWICE A  tablet 3    furosemide (Lasix) 20 mg tablet Take 1 tablet (20 mg) by mouth once daily as needed (for swelling). 90 tablet 3    losartan (Cozaar) 25 mg tablet Take 1 tablet (25 mg) by mouth 2 times a day. 60 tablet 11    mycophenolate (Cellcept) 250 mg capsule Take three (3) capsules by mouth twice daily 180 capsule 11    potassium chloride CR (Klor-Con M20) 20 mEq ER tablet Take 1 tablet (20 mEq) by mouth once daily. Do not crush or chew. 30 tablet 11    tacrolimus (Prograf) 0.5 mg capsule TAKE 1 CAPSULE (0.5 MG) BY MOUTH 2 TIMES A DAY. 180 capsule 4    tacrolimus (Prograf) 1 mg capsule TAKE 2 CAPSULES (2 MG) BY MOUTH 2 TIMES A DAY. 360 capsule 4    tamsulosin (Flomax) 0.4 mg 24 hr capsule TAKE ONE (1) CAPSULE BY MOUTH EVERYDAY AT BEDTIME. 90 capsule 3     No current facility-administered medications for this encounter.     Kely Martinez MD

## 2024-11-05 NOTE — DISCHARGE INSTRUCTIONS
Patient Instructions after a Colonoscopy      The anesthetics, sedatives or narcotics which were given to you today will be acting in your body for the next 24 hours, so you might feel a little sleepy or groggy.  This feeling should slowly wear off. Carefully read and follow the instructions.     You received sedation today:  - Do not drive or operate any machinery or power tools of any kind.   - No alcoholic beverages today, not even beer or wine.  - Do not make any important decisions or sign any legal documents.  - No over the counter medications that contain alcohol or that may cause drowsiness.  - Do not make any important decisions or sign any legal documents.  - Make sure you have someone with you for first 24 hours.    While it is common to experience mild to moderate abdominal distention, gas, or belching after your procedure, if any of these symptoms occur following discharge from the GI Lab or within one week of having your procedure, call the [facility] to be advised whether a visit to your nearest Urgent Care or Emergency Department is indicated.  Take this paper with you if you go.     - If you develop an allergic reaction to the medications that were given during your procedure such as difficulty breathing, rash, hives, severe nausea, vomiting or lightheadedness.  - If you experience chest pain, shortness of breath, severe abdominal pain, fevers and chills.  -If you develop signs and symptoms of bleeding such as blood in your spit, if your stools turn black, tarry, or bloody  - If you have not urinated within 8 hours following your procedure.  - If your IV site becomes painful, red, inflamed, or looks infected.    If you received a biopsy/polypectomy/sphincterotomy the following instructions apply below:    __ Do not use Aspirin containing products or non-steroidal medications for one week following your procedure. (Examples of these types of medications are: Advil, Arthrotec, Aleve,  Coumadin, Ecotrin, Heparin, Ibuprofen, Indocin, Motrin, Naprosyn, Nuprin, Plavix, Vioxx, and Voltarin, or their generic forms.  This list is not all-inclusive.  Check with your physician or pharmacist before resuming medications.)   __ Eat a soft diet today.  Avoid foods that are poorly digested for the next 24 hours.  These foods would include: nuts, beans, lettuce, red meats, and fried foods. Start with liquids and advance your diet as tolerated, gradually work up to eating solids.   __ Do not have a Barium Study or Enema for one week.    Your physician recommends the additional following instructions:    -You have a contact number available for emergencies. The signs and symptoms of potential delayed complications were discussed with you. You may return to normal activities tomorrow.  -Resume your previous diet.  -Continue your present medications.   -We are waiting for your pathology results.  -Your physician has recommended a repeat colonoscopy (date to be determined after pending pathology results are reviewed) for surveillance based on pathology results.  -The findings and recommendations have been discussed with you.  -The findings and recommendations were discussed with your family.  - Please see Medication Reconciliation Form for new medication/medications prescribed.       If you experience any problems or have any questions following discharge from the GI Lab, please call:    Kely Martinez MD, MS  [phone]     Nurse Signature                                                                        Date___________________                                                                            Patient/Responsible Party Signature                                        Date___________________

## 2024-11-12 LAB
LABORATORY COMMENT REPORT: NORMAL
PATH REPORT.FINAL DX SPEC: NORMAL
PATH REPORT.GROSS SPEC: NORMAL
PATH REPORT.TOTAL CANCER: NORMAL

## 2024-11-18 DIAGNOSIS — Z94.0 KIDNEY REPLACED BY TRANSPLANT (HHS-HCC): ICD-10-CM

## 2024-11-18 PROCEDURE — RXMED WILLOW AMBULATORY MEDICATION CHARGE

## 2024-11-19 ENCOUNTER — SPECIALTY PHARMACY (OUTPATIENT)
Dept: PHARMACY | Facility: CLINIC | Age: 59
End: 2024-11-19

## 2024-11-21 ENCOUNTER — PHARMACY VISIT (OUTPATIENT)
Dept: PHARMACY | Facility: CLINIC | Age: 59
End: 2024-11-21
Payer: COMMERCIAL

## 2024-11-21 PROCEDURE — RXMED WILLOW AMBULATORY MEDICATION CHARGE

## 2024-11-21 RX ORDER — MYCOPHENOLATE MOFETIL 250 MG/1
CAPSULE ORAL
Qty: 180 CAPSULE | Refills: 11 | Status: SHIPPED | OUTPATIENT
Start: 2024-11-21 | End: 2025-11-21

## 2024-11-22 ENCOUNTER — SPECIALTY PHARMACY (OUTPATIENT)
Dept: PHARMACY | Facility: CLINIC | Age: 59
End: 2024-11-22

## 2024-11-23 ENCOUNTER — PHARMACY VISIT (OUTPATIENT)
Dept: PHARMACY | Facility: CLINIC | Age: 59
End: 2024-11-23
Payer: COMMERCIAL

## 2024-11-25 ENCOUNTER — LAB (OUTPATIENT)
Dept: LAB | Facility: LAB | Age: 59
End: 2024-11-25
Payer: COMMERCIAL

## 2024-11-25 DIAGNOSIS — Z94.0 KIDNEY REPLACED BY TRANSPLANT (HHS-HCC): ICD-10-CM

## 2024-11-25 DIAGNOSIS — E78.00 ELEVATED LDL CHOLESTEROL LEVEL: ICD-10-CM

## 2024-11-25 DIAGNOSIS — N18.31 STAGE 3A CHRONIC KIDNEY DISEASE (MULTI): ICD-10-CM

## 2024-11-25 LAB
25(OH)D3 SERPL-MCNC: 58 NG/ML (ref 30–100)
ALBUMIN SERPL BCP-MCNC: 4 G/DL (ref 3.4–5)
ALP SERPL-CCNC: 147 U/L (ref 33–120)
ALT SERPL W P-5'-P-CCNC: 13 U/L (ref 10–52)
ANION GAP SERPL CALC-SCNC: 13 MMOL/L (ref 10–20)
AST SERPL W P-5'-P-CCNC: 17 U/L (ref 9–39)
BILIRUB DIRECT SERPL-MCNC: 0.1 MG/DL (ref 0–0.3)
BILIRUB SERPL-MCNC: 0.6 MG/DL (ref 0–1.2)
BUN SERPL-MCNC: 19 MG/DL (ref 6–23)
CALCIUM SERPL-MCNC: 9.3 MG/DL (ref 8.6–10.6)
CHLORIDE SERPL-SCNC: 106 MMOL/L (ref 98–107)
CHOLEST SERPL-MCNC: 181 MG/DL (ref 0–199)
CHOLESTEROL/HDL RATIO: 4.7
CO2 SERPL-SCNC: 22 MMOL/L (ref 21–32)
CREAT SERPL-MCNC: 1.86 MG/DL (ref 0.5–1.3)
CREAT UR-MCNC: 67.6 MG/DL (ref 20–370)
EGFRCR SERPLBLD CKD-EPI 2021: 41 ML/MIN/1.73M*2
ERYTHROCYTE [DISTWIDTH] IN BLOOD BY AUTOMATED COUNT: 14.2 % (ref 11.5–14.5)
GLUCOSE SERPL-MCNC: 116 MG/DL (ref 74–99)
HCT VFR BLD AUTO: 45.7 % (ref 41–52)
HDLC SERPL-MCNC: 38.7 MG/DL
HGB BLD-MCNC: 14.7 G/DL (ref 13.5–17.5)
LDLC SERPL CALC-MCNC: 123 MG/DL
MAGNESIUM SERPL-MCNC: 1.88 MG/DL (ref 1.6–2.4)
MCH RBC QN AUTO: 30.3 PG (ref 26–34)
MCHC RBC AUTO-ENTMCNC: 32.2 G/DL (ref 32–36)
MCV RBC AUTO: 94 FL (ref 80–100)
NON HDL CHOLESTEROL: 142 MG/DL (ref 0–149)
NRBC BLD-RTO: 0 /100 WBCS (ref 0–0)
PHOSPHATE SERPL-MCNC: 2.1 MG/DL (ref 2.5–4.9)
PLATELET # BLD AUTO: 128 X10*3/UL (ref 150–450)
POTASSIUM SERPL-SCNC: 3.8 MMOL/L (ref 3.5–5.3)
PROT SERPL-MCNC: 6.7 G/DL (ref 6.4–8.2)
PROT UR-ACNC: 18 MG/DL (ref 5–25)
PROT/CREAT UR: 0.27 MG/MG CREAT (ref 0–0.17)
PTH-INTACT SERPL-MCNC: 84.2 PG/ML (ref 18.5–88)
RBC # BLD AUTO: 4.85 X10*6/UL (ref 4.5–5.9)
SODIUM SERPL-SCNC: 137 MMOL/L (ref 136–145)
TACROLIMUS BLD-MCNC: 6.1 NG/ML
TRIGL SERPL-MCNC: 98 MG/DL (ref 0–149)
VLDL: 20 MG/DL (ref 0–40)
WBC # BLD AUTO: 5.3 X10*3/UL (ref 4.4–11.3)

## 2024-11-25 PROCEDURE — 80061 LIPID PANEL: CPT

## 2024-11-25 PROCEDURE — 82570 ASSAY OF URINE CREATININE: CPT

## 2024-11-25 PROCEDURE — 82306 VITAMIN D 25 HYDROXY: CPT

## 2024-11-25 PROCEDURE — 85027 COMPLETE CBC AUTOMATED: CPT

## 2024-11-25 PROCEDURE — 83970 ASSAY OF PARATHORMONE: CPT

## 2024-11-25 PROCEDURE — 84156 ASSAY OF PROTEIN URINE: CPT

## 2024-11-25 PROCEDURE — 36415 COLL VENOUS BLD VENIPUNCTURE: CPT

## 2024-11-25 PROCEDURE — 83735 ASSAY OF MAGNESIUM: CPT

## 2024-11-25 PROCEDURE — 84100 ASSAY OF PHOSPHORUS: CPT

## 2024-11-25 PROCEDURE — 80053 COMPREHEN METABOLIC PANEL: CPT

## 2024-11-25 PROCEDURE — 82248 BILIRUBIN DIRECT: CPT

## 2024-11-25 PROCEDURE — 80197 ASSAY OF TACROLIMUS: CPT

## 2024-11-25 RX ORDER — ATORVASTATIN CALCIUM 20 MG/1
20 TABLET, FILM COATED ORAL DAILY
Qty: 90 TABLET | Refills: 0 | Status: SHIPPED | OUTPATIENT
Start: 2024-11-25

## 2024-11-27 ENCOUNTER — APPOINTMENT (OUTPATIENT)
Dept: PRIMARY CARE | Facility: CLINIC | Age: 59
End: 2024-11-27
Payer: COMMERCIAL

## 2024-11-27 VITALS
TEMPERATURE: 97 F | HEIGHT: 69 IN | HEART RATE: 80 BPM | SYSTOLIC BLOOD PRESSURE: 136 MMHG | DIASTOLIC BLOOD PRESSURE: 86 MMHG | WEIGHT: 178 LBS | BODY MASS INDEX: 26.36 KG/M2 | OXYGEN SATURATION: 99 %

## 2024-11-27 DIAGNOSIS — Z94.0 KIDNEY REPLACED BY TRANSPLANT (HHS-HCC): ICD-10-CM

## 2024-11-27 DIAGNOSIS — I10 ESSENTIAL (PRIMARY) HYPERTENSION: ICD-10-CM

## 2024-11-27 PROCEDURE — 99212 OFFICE O/P EST SF 10 MIN: CPT | Performed by: STUDENT IN AN ORGANIZED HEALTH CARE EDUCATION/TRAINING PROGRAM

## 2024-11-27 PROCEDURE — 3079F DIAST BP 80-89 MM HG: CPT | Performed by: STUDENT IN AN ORGANIZED HEALTH CARE EDUCATION/TRAINING PROGRAM

## 2024-11-27 PROCEDURE — 3075F SYST BP GE 130 - 139MM HG: CPT | Performed by: STUDENT IN AN ORGANIZED HEALTH CARE EDUCATION/TRAINING PROGRAM

## 2024-11-27 PROCEDURE — 3008F BODY MASS INDEX DOCD: CPT | Performed by: STUDENT IN AN ORGANIZED HEALTH CARE EDUCATION/TRAINING PROGRAM

## 2024-11-27 RX ORDER — POLYETHYLENE GLYCOL-3350 AND ELECTROLYTES 236; 6.74; 5.86; 2.97; 22.74 G/274.31G; G/274.31G; G/274.31G; G/274.31G; G/274.31G
POWDER, FOR SOLUTION ORAL
COMMUNITY
Start: 2024-09-30

## 2024-11-27 ASSESSMENT — ENCOUNTER SYMPTOMS
CONFUSION: 0
COUGH: 0
RHINORRHEA: 0
PHOTOPHOBIA: 0
HEMATURIA: 0
AGITATION: 0
ABDOMINAL PAIN: 0
FATIGUE: 0
SHORTNESS OF BREATH: 0
CONSTIPATION: 0
ACTIVITY CHANGE: 0
EYE DISCHARGE: 0
UNEXPECTED WEIGHT CHANGE: 0
NUMBNESS: 0
FEVER: 0
DYSURIA: 0
BACK PAIN: 0
HEADACHES: 0
DIAPHORESIS: 0
DIARRHEA: 0

## 2024-11-27 NOTE — PROGRESS NOTES
"  Subjective   Patient ID: Davidson Khoury is a 59 y.o. male who presents for Follow-up.     HPI     Health Maintenance         Go over lab results     Review of Systems   Constitutional:  Negative for activity change, diaphoresis, fatigue, fever and unexpected weight change.   HENT:  Negative for congestion, rhinorrhea and sneezing.    Eyes:  Negative for photophobia and discharge.   Respiratory:  Negative for cough and shortness of breath.    Cardiovascular:  Negative for chest pain.   Gastrointestinal:  Negative for abdominal pain, constipation and diarrhea.   Endocrine: Negative for cold intolerance and heat intolerance.   Genitourinary:  Negative for dysuria and hematuria.   Musculoskeletal:  Negative for back pain and gait problem.   Neurological:  Negative for numbness and headaches.   Psychiatric/Behavioral:  Negative for agitation and confusion.      Objective   /86 (BP Location: Right arm, Patient Position: Sitting)   Pulse 80   Temp 36.1 °C (97 °F) (Temporal)   Ht 1.753 m (5' 9\")   Wt 80.7 kg (178 lb)   SpO2 99%   BMI 26.29 kg/m²     Physical Exam  Constitutional:       Appearance: Normal appearance.   HENT:      Head: Normocephalic.   Eyes:      Extraocular Movements: Extraocular movements intact.   Cardiovascular:      Rate and Rhythm: Normal rate and regular rhythm.      Pulses: Normal pulses.      Heart sounds: Normal heart sounds.   Pulmonary:      Effort: Pulmonary effort is normal.      Breath sounds: Normal breath sounds.   Neurological:      Mental Status: He is alert and oriented to person, place, and time.   Psychiatric:         Mood and Affect: Mood normal.         Behavior: Behavior normal.     Assessment/Plan   Problem List Items Addressed This Visit    None  Visit Diagnoses         Codes    Kidney replaced by transplant ([facility])     Z94.0    Essential (primary) hypertension     I10               Note: This documentaion was entered into the electronic medical record using Dragon " medical dictation software, and was not necessarily edited thereafter. Please excuse any errors of spelling or grammar.

## 2024-12-19 PROCEDURE — RXMED WILLOW AMBULATORY MEDICATION CHARGE

## 2024-12-20 ENCOUNTER — PHARMACY VISIT (OUTPATIENT)
Dept: PHARMACY | Facility: CLINIC | Age: 59
End: 2024-12-20
Payer: COMMERCIAL

## 2025-01-15 ENCOUNTER — OFFICE VISIT (OUTPATIENT)
Facility: HOSPITAL | Age: 60
End: 2025-01-15
Payer: COMMERCIAL

## 2025-01-15 VITALS
BODY MASS INDEX: 26.46 KG/M2 | WEIGHT: 179.2 LBS | DIASTOLIC BLOOD PRESSURE: 89 MMHG | SYSTOLIC BLOOD PRESSURE: 142 MMHG | TEMPERATURE: 96.7 F | HEART RATE: 73 BPM | OXYGEN SATURATION: 98 %

## 2025-01-15 DIAGNOSIS — I10 ESSENTIAL HYPERTENSION: ICD-10-CM

## 2025-01-15 DIAGNOSIS — Z94.0 IMMUNOSUPPRESSIVE MANAGEMENT ENCOUNTER FOLLOWING KIDNEY TRANSPLANT: ICD-10-CM

## 2025-01-15 DIAGNOSIS — E87.6 HYPOKALEMIA: ICD-10-CM

## 2025-01-15 DIAGNOSIS — Z92.25 PERSONAL HISTORY OF IMMUNOSUPRESSION THERAPY: ICD-10-CM

## 2025-01-15 DIAGNOSIS — Z94.0 KIDNEY REPLACED BY TRANSPLANT (HHS-HCC): Primary | ICD-10-CM

## 2025-01-15 DIAGNOSIS — Z48.298 AFTERCARE FOLLOWING ORGAN TRANSPLANT: ICD-10-CM

## 2025-01-15 DIAGNOSIS — Z51.81 THERAPEUTIC DRUG MONITORING: ICD-10-CM

## 2025-01-15 DIAGNOSIS — Z79.899 IMMUNOSUPPRESSIVE MANAGEMENT ENCOUNTER FOLLOWING KIDNEY TRANSPLANT: ICD-10-CM

## 2025-01-15 PROCEDURE — 99215 OFFICE O/P EST HI 40 MIN: CPT

## 2025-01-15 PROCEDURE — G2211 COMPLEX E/M VISIT ADD ON: HCPCS

## 2025-01-15 PROCEDURE — 3079F DIAST BP 80-89 MM HG: CPT | Performed by: STUDENT IN AN ORGANIZED HEALTH CARE EDUCATION/TRAINING PROGRAM

## 2025-01-15 PROCEDURE — 3077F SYST BP >= 140 MM HG: CPT | Performed by: STUDENT IN AN ORGANIZED HEALTH CARE EDUCATION/TRAINING PROGRAM

## 2025-01-15 ASSESSMENT — PAIN SCALES - GENERAL: PAINLEVEL_OUTOF10: 0-NO PAIN

## 2025-01-15 NOTE — PROGRESS NOTES
TRANSPLANT NEPHROLOGY :   OUTPATIENT CLINIC NOTE      SERVICE DATE : 01/15/2025    REASON FOR VISIT/CHIEF COMPLAINT:  S/P  TRANSPLANT SURGERY  IMMUNOSUPPRESSIVE MEDICATION MANAGEMENT  BLOOD PRESSURE MANAGEMENT    HPI:    Mr. Khoury is a 59 y.o. male with past medical history significant for ESRD secondary to hypertension s/p DDKT on 8/10/2019. Patient was induced by Simulect, no DGF no CMV mismatch no EBV mismatch, KDPI of kidney is 40% PRA 0%. No episodes of rejection.     5 y 5 m out  Home -130s/80s  He does not want to change his BP meds - happy with clonidine + losartan and Kcl supplement.    Got flu shot. Declined updated COVID shot.    Patient is doing well overall. No new complaints. Denied chest pain, SOB, ARCE, Palpitation. Normal urination and bowel movement. Normal gait and no weakness of arms/legs. No cough, runny nose, sore throat, cold symptoms, or rash. No hearing loss. Normal vision.No problems with his sleep, mood and function. No recent infection, hospitalization, surgery or ER visits.      ROS:  Review of  14 systems was performed system by system. See HPI. Otherwise, the symptoms were negative.    PAST MEDICAL HISTORY:  Past Medical History:   Diagnosis Date    Personal history of other diseases of urinary system     History of chronic kidney disease    Personal history of other specified conditions 01/12/2022    History of urinary retention    Personal history of other specified conditions 08/22/2019    History of urinary retention        PAST SURGICAL HISTORY:  Past Surgical History:   Procedure Laterality Date    OTHER SURGICAL HISTORY  06/06/2016    Myringotomy - With Eustachian Tube Inflation    OTHER SURGICAL HISTORY  06/06/2016    Arteriovenous Surgery Creation Of A-V Fistula    OTHER SURGICAL HISTORY  08/23/2019    Kidney transplantation        SOCIAL HISTORY:  Social History     Socioeconomic History    Marital status:      Spouse name: Not on file    Number of  children: Not on file    Years of education: Not on file    Highest education level: Not on file   Occupational History    Not on file   Tobacco Use    Smoking status: Former     Types: Cigarettes    Smokeless tobacco: Never   Substance and Sexual Activity    Alcohol use: Never    Drug use: Never    Sexual activity: Not on file   Other Topics Concern    Not on file   Social History Narrative    Not on file     Social Drivers of Health     Financial Resource Strain: Not on file   Food Insecurity: No Food Insecurity (7/29/2024)    Hunger Vital Sign     Worried About Running Out of Food in the Last Year: Never true     Ran Out of Food in the Last Year: Never true   Transportation Needs: Not on file   Physical Activity: Not on file   Stress: Not on file   Social Connections: Not on file   Intimate Partner Violence: Not on file   Housing Stability: Not on file       FAMILY HISTORY:  Family History   Problem Relation Name Age of Onset    Hypertension Mother         MEDICATION LIST:  Current Outpatient Medications   Medication Instructions    atorvastatin (LIPITOR) 20 mg, oral, Daily    cetirizine (ZYRTEC) 10 mg, Daily PRN    cloNIDine (CATAPRES) 0.1 mg, 2 times daily    cloNIDine (CATAPRES) 0.2 mg, oral, 2 times daily    furosemide (LASIX) 20 mg, oral, Daily PRN    GaviLyte-G 236-22.74-6.74 -5.86 gram solution PLEASE SEE ATTACHED FOR DETAILED DIRECTIONS    losartan (COZAAR) 25 mg, oral, 2 times daily    mycophenolate (Cellcept) 250 mg capsule Take three (3) capsules by mouth twice daily    potassium chloride CR (Klor-Con M20) 20 mEq ER tablet 20 mEq, oral, Daily, Do not crush or chew.    tacrolimus (PROGRAF) 0.5 mg, oral, 2 times daily    tacrolimus (PROGRAF) 2 mg, oral, 2 times daily    tamsulosin (Flomax) 0.4 mg 24 hr capsule TAKE ONE (1) CAPSULE BY MOUTH EVERYDAY AT BEDTIME.       ALLERGY  Allergies   Allergen Reactions    Amoxicillin Other and Nausea Only     vomiting    Ace Inhibitors Angioedema       PHYSICAL  EXAM:    Visit Vitals  /89   Pulse 73   Temp 35.9 °C (96.7 °F) (Temporal)   Wt 81.3 kg (179 lb 3.2 oz)   SpO2 98%   BMI 26.46 kg/m²   Smoking Status Former   BSA 1.99 m²          Vital signs - reviewed. Acceptable BP at this office visit.   General Appearance - NAD, Good speech, oriented and alert  HEENT - Supple. Not pale. No jaundice.   CVS - RRR. Normal S1/S2. No murmur, click , rub or gallop  Lungs- clear to auscultation bilaterally  Abdomen - soft , not tender, no guarding, no rigidity. No hepatosplenomegaly. Normal bowel sounds. No masses and ascites. S/P Kidney transplant .  Transplanted kidney is not tender.   Musculoskeletal /Extremities - no edema. Full ROM. No joint tenderness.   Neuro/Psych - appropriate mood and affect. Motor power V/V all extremities. CN I -XII were grossly intact.  Skin - No visible rash      LABS:    Lab Results   Component Value Date    WBC 5.3 11/25/2024    HGB 14.7 11/25/2024    HCT 45.7 11/25/2024     (L) 11/25/2024    CHOL 181 11/25/2024    TRIG 98 11/25/2024    HDL 38.7 11/25/2024    ALT 13 11/25/2024    AST 17 11/25/2024     11/25/2024    K 3.8 11/25/2024     11/25/2024    CREATININE 1.86 (H) 11/25/2024    BUN 19 11/25/2024    CO2 22 11/25/2024    PSA 0.55 02/13/2023    INR 1.1 03/20/2020    HGBA1C 5.5 08/12/2024     ASSESSMENT AND PLAN:    Mr. Khoury is a 59 y.o. male  who is here for follow up s/p kidney transplant.    TRANSPLANT DATE: 8/10/2019 (Kidney)      1. ESRD S/P kidney transplant   - Creatinine last check was :  Lab Results   Component Value Date    CREATININE 1.86 (H) 11/25/2024       - Renal allograft function is good. New baseline Cr 1.8-2.  Kelvin Cr 1.4  Minimal proteinuria  -Ensure adequate hydration  - Avoid nephrotoxic medications, NSAIDs, and IV contrast.    2. Immunosuppression  -Tacrolimus level last check was 6.1 - goal 5-7  -Continue current immunosuppression regimen.  TAC 2.5 mg BID   mg BID  Steroid-free    3.  Electrolytes  Lab Results   Component Value Date    GLUCOSE 116 (H) 11/25/2024    CALCIUM 9.3 11/25/2024     11/25/2024    K 3.8 11/25/2024    CO2 22 11/25/2024     11/25/2024    BUN 19 11/25/2024    CREATININE 1.86 (H) 11/25/2024     -Acceptable from last lab drawn    4. Hypertension  Blood Pressures         1/15/2025  0929             BP: 142/89          -Home  BP had been acceptable  -Encourage to monitor home BP  -Continue current anti hypertensive medication  Clonidine 0.2 mg TID  Losartan 25 mg BID  Kcl 20 mEQ daily    CT A/P 2018: adrenal glands were read as 'unremarkable'    5. Bone Mineral Disease/Osteoporosis  Lab Results   Component Value Date    PTH 84.2 11/25/2024    CALCIUM 9.3 11/25/2024    PHOS 2.1 (L) 11/25/2024    VITD25 58 11/25/2024   Acceptable  Encourage high protein diet    6.Anemia  Lab Results   Component Value Date    WBC 5.3 11/25/2024    HGB 14.7 11/25/2024    HCT 45.7 11/25/2024    MCV 94 11/25/2024     (L) 11/25/2024     Lab Results   Component Value Date    IRON 43 08/20/2019    TIBC 192 (L) 08/20/2019    FERRITIN 679 (H) 08/20/2019   Mild thrombocytopenia - chronic. Monitor.    7.Health maintenance and vaccination  - Flu shot during flu season annually  - Cancer screening is up to date per the patient  - Annual or biannual skin check  - CVD risk reduction: Lipitor 20 mg/day    Lab : Routine transplant lab ( CBC, RFP, and anti-rejection trough level ) every 3 months  Additional labs:  VIT D, PTH with next lab  Viral screening PCR, Allosure and UPC per protocol.    Additional Plan :  Repeat Lab    RTC 6 month(s)    Latrice Guardado MD    Transplant Nephrology

## 2025-01-22 ENCOUNTER — SPECIALTY PHARMACY (OUTPATIENT)
Dept: PHARMACY | Facility: CLINIC | Age: 60
End: 2025-01-22

## 2025-01-22 PROCEDURE — RXMED WILLOW AMBULATORY MEDICATION CHARGE

## 2025-01-23 ENCOUNTER — LAB (OUTPATIENT)
Dept: LAB | Facility: LAB | Age: 60
End: 2025-01-23
Payer: COMMERCIAL

## 2025-01-23 DIAGNOSIS — E87.6 HYPOKALEMIA: ICD-10-CM

## 2025-01-23 DIAGNOSIS — Z94.0 KIDNEY REPLACED BY TRANSPLANT (HHS-HCC): ICD-10-CM

## 2025-01-23 DIAGNOSIS — I10 ESSENTIAL HYPERTENSION: ICD-10-CM

## 2025-01-23 LAB
ALBUMIN SERPL BCP-MCNC: 4 G/DL (ref 3.4–5)
ANION GAP SERPL CALC-SCNC: 14 MMOL/L (ref 10–20)
BUN SERPL-MCNC: 16 MG/DL (ref 6–23)
CALCIUM SERPL-MCNC: 9.2 MG/DL (ref 8.6–10.6)
CHLORIDE SERPL-SCNC: 109 MMOL/L (ref 98–107)
CO2 SERPL-SCNC: 22 MMOL/L (ref 21–32)
CREAT SERPL-MCNC: 1.93 MG/DL (ref 0.5–1.3)
CREAT UR-MCNC: 155.5 MG/DL (ref 20–370)
EGFRCR SERPLBLD CKD-EPI 2021: 40 ML/MIN/1.73M*2
ERYTHROCYTE [DISTWIDTH] IN BLOOD BY AUTOMATED COUNT: 14.2 % (ref 11.5–14.5)
GLUCOSE SERPL-MCNC: 94 MG/DL (ref 74–99)
HCT VFR BLD AUTO: 44.8 % (ref 41–52)
HGB BLD-MCNC: 14.5 G/DL (ref 13.5–17.5)
MAGNESIUM SERPL-MCNC: 2.18 MG/DL (ref 1.6–2.4)
MCH RBC QN AUTO: 30.7 PG (ref 26–34)
MCHC RBC AUTO-ENTMCNC: 32.4 G/DL (ref 32–36)
MCV RBC AUTO: 95 FL (ref 80–100)
NRBC BLD-RTO: 0 /100 WBCS (ref 0–0)
PHOSPHATE SERPL-MCNC: 2.6 MG/DL (ref 2.5–4.9)
PLATELET # BLD AUTO: 149 X10*3/UL (ref 150–450)
POTASSIUM SERPL-SCNC: 4.3 MMOL/L (ref 3.5–5.3)
PROT UR-ACNC: 37 MG/DL (ref 5–25)
PROT/CREAT UR: 0.24 MG/MG CREAT (ref 0–0.17)
RBC # BLD AUTO: 4.73 X10*6/UL (ref 4.5–5.9)
SODIUM SERPL-SCNC: 141 MMOL/L (ref 136–145)
TACROLIMUS BLD-MCNC: 5.4 NG/ML
WBC # BLD AUTO: 5.6 X10*3/UL (ref 4.4–11.3)

## 2025-01-23 PROCEDURE — 80197 ASSAY OF TACROLIMUS: CPT

## 2025-01-23 PROCEDURE — 80069 RENAL FUNCTION PANEL: CPT

## 2025-01-23 PROCEDURE — 85027 COMPLETE CBC AUTOMATED: CPT

## 2025-01-23 PROCEDURE — 84156 ASSAY OF PROTEIN URINE: CPT

## 2025-01-23 PROCEDURE — 36415 COLL VENOUS BLD VENIPUNCTURE: CPT

## 2025-01-23 PROCEDURE — 83735 ASSAY OF MAGNESIUM: CPT

## 2025-01-23 PROCEDURE — 82088 ASSAY OF ALDOSTERONE: CPT

## 2025-01-23 PROCEDURE — 82570 ASSAY OF URINE CREATININE: CPT

## 2025-01-25 DIAGNOSIS — I15.1 HYPERTENSION SECONDARY TO OTHER RENAL DISORDERS: ICD-10-CM

## 2025-01-25 DIAGNOSIS — Z94.0 KIDNEY REPLACED BY TRANSPLANT (HHS-HCC): ICD-10-CM

## 2025-01-27 ENCOUNTER — PHARMACY VISIT (OUTPATIENT)
Dept: PHARMACY | Facility: CLINIC | Age: 60
End: 2025-01-27
Payer: COMMERCIAL

## 2025-01-28 LAB
ALDOST/RENIN PLAS-RTO: 5.9 RATIO
ALDOSTERONE IN SERUM: 17 NG/DL
RENIN PLAS-CCNC: 2.9 NG/ML/HR

## 2025-02-17 RX ORDER — LOSARTAN POTASSIUM 25 MG/1
25 TABLET ORAL 2 TIMES DAILY
Qty: 180 TABLET | Refills: 3 | Status: SHIPPED | OUTPATIENT
Start: 2025-02-17

## 2025-02-18 ENCOUNTER — SPECIALTY PHARMACY (OUTPATIENT)
Dept: PHARMACY | Facility: CLINIC | Age: 60
End: 2025-02-18

## 2025-02-18 PROCEDURE — RXMED WILLOW AMBULATORY MEDICATION CHARGE

## 2025-02-19 ENCOUNTER — PHARMACY VISIT (OUTPATIENT)
Dept: PHARMACY | Facility: CLINIC | Age: 60
End: 2025-02-19
Payer: COMMERCIAL

## 2025-03-18 ENCOUNTER — SPECIALTY PHARMACY (OUTPATIENT)
Dept: PHARMACY | Facility: CLINIC | Age: 60
End: 2025-03-18

## 2025-03-18 PROCEDURE — RXMED WILLOW AMBULATORY MEDICATION CHARGE

## 2025-03-19 ENCOUNTER — APPOINTMENT (OUTPATIENT)
Dept: DERMATOLOGY | Facility: CLINIC | Age: 60
End: 2025-03-19
Payer: COMMERCIAL

## 2025-03-19 DIAGNOSIS — L82.1 SEBORRHEIC KERATOSIS: ICD-10-CM

## 2025-03-19 DIAGNOSIS — L57.9 SKIN CHANGES DUE TO CHRONIC EXPOSURE TO NONIONIZING RADIATION: ICD-10-CM

## 2025-03-19 DIAGNOSIS — D84.821 IMMUNOSUPPRESSION DUE TO DRUG THERAPY: Primary | ICD-10-CM

## 2025-03-19 DIAGNOSIS — Z79.899 IMMUNOSUPPRESSION DUE TO DRUG THERAPY: Primary | ICD-10-CM

## 2025-03-19 DIAGNOSIS — L72.0 EPIDERMAL CYST: ICD-10-CM

## 2025-03-19 DIAGNOSIS — D22.9 MELANOCYTIC NEVUS, UNSPECIFIED LOCATION: ICD-10-CM

## 2025-03-19 PROCEDURE — 99203 OFFICE O/P NEW LOW 30 MIN: CPT | Performed by: STUDENT IN AN ORGANIZED HEALTH CARE EDUCATION/TRAINING PROGRAM

## 2025-03-19 NOTE — PROGRESS NOTES
Subjective     Davidson Khoury is a 59 y.o. male who presents for the following: Skin Check (FBSE. No Hx of skin cancer. Hx of kidney transplant/immunosuppression. No concerns today.).     Review of Systems:  No other skin or systemic complaints other than what is documented elsewhere in the note.    The following portions of the chart were reviewed this encounter and updated as appropriate:         Skin Cancer History  No skin cancer on file.      Specialty Problems          Dermatology Problems    Fat pad atrophy of foot        Objective   Well appearing patient in no apparent distress; mood and affect are within normal limits.    A full examination was performed including scalp, head, eyes, ears, nose, lips, neck, chest, axillae, abdomen, back, buttocks, bilateral upper extremities, bilateral lower extremities, hands, feet, fingers, toes, fingernails, and toenails. All findings within normal limits unless otherwise noted below.    Assessment/Plan   1. Immunosuppression due to drug therapy    ESRD secondary to hypertension s/p DDKT on 8/10/2019  Current immunosuppression:   -tacrolimus 2.5 mg BID  - mg BID  Skin Cancer Risk Post-Transplant score:   5-year risk: 1.01%  10-year risk 2.33%    2. Melanocytic nevus, unspecified location  Benign to slightly atypical appearing brown pigmented macules and papules    Clinically benign appearing nevi, reassurance provided to the patient today. Discussed important of sun protection with sun protective clothing and/or broad spectrum sunscreen spf 30 or above.  ABCDEs of melanoma reviewed. Patient to f/u should they notice any new or changing pre-existing skin lesion.    3. Seborrheic keratosis  Stuck on verrucous, tan-brown papules and plaques.      The benign nature of these skin lesions were reviewed with the patient today and reassurance was provided. Patient advised to return for f/u if the lesions change in size, shape, color, become painful, tender, itch or  bleed.    4. Epidermal cyst  Scattered papules with central punctum    -Benign  -Reassurance provided      5. Skin changes due to chronic exposure to nonionizing radiation  Mottled pigmentation, telangiectasias and brown reticular macules in sun exposed areas on the face, extremities, trunk    The risk of chronic, cumulative sun damage and risk of development of skin cancer was reviewed with the patient today. Discussed important of sun protection with sun protective clothing and/or broad spectrum sunscreen spf 30 or above.  Warning signs of skin cancer were reviewed. Patient to contact office should they notice any new or changing pre-existing skin lesion.      RTC PRN    Patient seen and discussed with Dr. Myrick,   Elly Aguila MD MPH  PGY-2, Department of Dermatology    I saw and evaluated the patient. I personally obtained the key and critical portions of the history and physical exam or was physically present for key and critical portions performed by the resident. I reviewed the resident's documentation and discussed the patient with the resident. I agree with the resident's medical decision making as documented in the note.    Alejandra Myrick MD

## 2025-03-20 ENCOUNTER — PHARMACY VISIT (OUTPATIENT)
Dept: PHARMACY | Facility: CLINIC | Age: 60
End: 2025-03-20
Payer: COMMERCIAL

## 2025-03-25 DIAGNOSIS — E87.6 HYPOKALEMIA: ICD-10-CM

## 2025-03-25 DIAGNOSIS — Z94.0 KIDNEY REPLACED BY TRANSPLANT (HHS-HCC): ICD-10-CM

## 2025-03-27 RX ORDER — POTASSIUM CHLORIDE 20 MEQ/1
20 TABLET, EXTENDED RELEASE ORAL DAILY
Qty: 90 TABLET | Refills: 3 | Status: SHIPPED | OUTPATIENT
Start: 2025-03-27

## 2025-04-15 ENCOUNTER — SPECIALTY PHARMACY (OUTPATIENT)
Dept: PHARMACY | Facility: CLINIC | Age: 60
End: 2025-04-15

## 2025-04-15 PROCEDURE — RXMED WILLOW AMBULATORY MEDICATION CHARGE

## 2025-04-18 ENCOUNTER — PHARMACY VISIT (OUTPATIENT)
Dept: PHARMACY | Facility: CLINIC | Age: 60
End: 2025-04-18
Payer: COMMERCIAL

## 2025-05-12 ENCOUNTER — SPECIALTY PHARMACY (OUTPATIENT)
Dept: PHARMACY | Facility: CLINIC | Age: 60
End: 2025-05-12

## 2025-05-12 PROCEDURE — RXMED WILLOW AMBULATORY MEDICATION CHARGE

## 2025-05-15 ENCOUNTER — HOSPITAL ENCOUNTER (EMERGENCY)
Facility: HOSPITAL | Age: 60
Discharge: HOME | End: 2025-05-15
Attending: EMERGENCY MEDICINE
Payer: COMMERCIAL

## 2025-05-15 ENCOUNTER — APPOINTMENT (OUTPATIENT)
Dept: RADIOLOGY | Facility: HOSPITAL | Age: 60
End: 2025-05-15
Payer: COMMERCIAL

## 2025-05-15 ENCOUNTER — CLINICAL SUPPORT (OUTPATIENT)
Dept: EMERGENCY MEDICINE | Facility: HOSPITAL | Age: 60
End: 2025-05-15
Payer: COMMERCIAL

## 2025-05-15 VITALS
HEIGHT: 69 IN | WEIGHT: 179 LBS | BODY MASS INDEX: 26.51 KG/M2 | RESPIRATION RATE: 20 BRPM | HEART RATE: 60 BPM | SYSTOLIC BLOOD PRESSURE: 182 MMHG | TEMPERATURE: 97.1 F | DIASTOLIC BLOOD PRESSURE: 106 MMHG | OXYGEN SATURATION: 100 %

## 2025-05-15 DIAGNOSIS — R53.1 WEAKNESS: Primary | ICD-10-CM

## 2025-05-15 LAB
ALBUMIN SERPL BCP-MCNC: 3.9 G/DL (ref 3.4–5)
ALP SERPL-CCNC: 141 U/L (ref 33–120)
ALT SERPL W P-5'-P-CCNC: 11 U/L (ref 10–52)
ANION GAP SERPL CALC-SCNC: 14 MMOL/L (ref 10–20)
APPEARANCE UR: CLEAR
AST SERPL W P-5'-P-CCNC: 16 U/L (ref 9–39)
ATRIAL RATE: 95 BPM
BASOPHILS # BLD AUTO: 0.01 X10*3/UL (ref 0–0.1)
BASOPHILS NFR BLD AUTO: 0.2 %
BILIRUB SERPL-MCNC: 0.7 MG/DL (ref 0–1.2)
BILIRUB UR STRIP.AUTO-MCNC: NEGATIVE MG/DL
BUN SERPL-MCNC: 14 MG/DL (ref 6–23)
CALCIUM SERPL-MCNC: 9.2 MG/DL (ref 8.6–10.6)
CARDIAC TROPONIN I PNL SERPL HS: 17 NG/L (ref 0–53)
CHLORIDE SERPL-SCNC: 108 MMOL/L (ref 98–107)
CO2 SERPL-SCNC: 23 MMOL/L (ref 21–32)
COLOR UR: YELLOW
CREAT SERPL-MCNC: 1.88 MG/DL (ref 0.5–1.3)
EGFRCR SERPLBLD CKD-EPI 2021: 41 ML/MIN/1.73M*2
EOSINOPHIL # BLD AUTO: 0.01 X10*3/UL (ref 0–0.7)
EOSINOPHIL NFR BLD AUTO: 0.2 %
ERYTHROCYTE [DISTWIDTH] IN BLOOD BY AUTOMATED COUNT: 13.4 % (ref 11.5–14.5)
GLUCOSE SERPL-MCNC: 101 MG/DL (ref 74–99)
GLUCOSE UR STRIP.AUTO-MCNC: NORMAL MG/DL
HCT VFR BLD AUTO: 41.9 % (ref 41–52)
HGB BLD-MCNC: 14.3 G/DL (ref 13.5–17.5)
IMM GRANULOCYTES # BLD AUTO: 0.02 X10*3/UL (ref 0–0.7)
IMM GRANULOCYTES NFR BLD AUTO: 0.4 % (ref 0–0.9)
KETONES UR STRIP.AUTO-MCNC: NEGATIVE MG/DL
LEUKOCYTE ESTERASE UR QL STRIP.AUTO: NEGATIVE
LYMPHOCYTES # BLD AUTO: 1.15 X10*3/UL (ref 1.2–4.8)
LYMPHOCYTES NFR BLD AUTO: 20.8 %
MAGNESIUM SERPL-MCNC: 1.61 MG/DL (ref 1.6–2.4)
MCH RBC QN AUTO: 29.8 PG (ref 26–34)
MCHC RBC AUTO-ENTMCNC: 34.1 G/DL (ref 32–36)
MCV RBC AUTO: 87 FL (ref 80–100)
MONOCYTES # BLD AUTO: 0.49 X10*3/UL (ref 0.1–1)
MONOCYTES NFR BLD AUTO: 8.9 %
MUCOUS THREADS #/AREA URNS AUTO: NORMAL /LPF
NEUTROPHILS # BLD AUTO: 3.84 X10*3/UL (ref 1.2–7.7)
NEUTROPHILS NFR BLD AUTO: 69.5 %
NITRITE UR QL STRIP.AUTO: NEGATIVE
NRBC BLD-RTO: 0 /100 WBCS (ref 0–0)
P AXIS: 66 DEGREES
P OFFSET: 197 MS
P ONSET: 136 MS
PH UR STRIP.AUTO: 6.5 [PH]
PLATELET # BLD AUTO: 97 X10*3/UL (ref 150–450)
POTASSIUM SERPL-SCNC: 3.8 MMOL/L (ref 3.5–5.3)
PR INTERVAL: 168 MS
PROT SERPL-MCNC: 6.8 G/DL (ref 6.4–8.2)
PROT UR STRIP.AUTO-MCNC: ABNORMAL MG/DL
Q ONSET: 220 MS
QRS COUNT: 16 BEATS
QRS DURATION: 96 MS
QT INTERVAL: 374 MS
QTC CALCULATION(BAZETT): 469 MS
QTC FREDERICIA: 435 MS
R AXIS: 31 DEGREES
RBC # BLD AUTO: 4.8 X10*6/UL (ref 4.5–5.9)
RBC # UR STRIP.AUTO: NEGATIVE MG/DL
RBC #/AREA URNS AUTO: NORMAL /HPF
SODIUM SERPL-SCNC: 141 MMOL/L (ref 136–145)
SP GR UR STRIP.AUTO: 1.02
T AXIS: 25 DEGREES
T OFFSET: 407 MS
UROBILINOGEN UR STRIP.AUTO-MCNC: ABNORMAL MG/DL
VENTRICULAR RATE: 95 BPM
WBC # BLD AUTO: 5.5 X10*3/UL (ref 4.4–11.3)
WBC #/AREA URNS AUTO: NORMAL /HPF

## 2025-05-15 PROCEDURE — 93005 ELECTROCARDIOGRAM TRACING: CPT

## 2025-05-15 PROCEDURE — 84484 ASSAY OF TROPONIN QUANT: CPT

## 2025-05-15 PROCEDURE — 83735 ASSAY OF MAGNESIUM: CPT

## 2025-05-15 PROCEDURE — 99285 EMERGENCY DEPT VISIT HI MDM: CPT | Mod: 25 | Performed by: EMERGENCY MEDICINE

## 2025-05-15 PROCEDURE — 81001 URINALYSIS AUTO W/SCOPE: CPT

## 2025-05-15 PROCEDURE — 36415 COLL VENOUS BLD VENIPUNCTURE: CPT

## 2025-05-15 PROCEDURE — 99285 EMERGENCY DEPT VISIT HI MDM: CPT | Performed by: EMERGENCY MEDICINE

## 2025-05-15 PROCEDURE — 70450 CT HEAD/BRAIN W/O DYE: CPT

## 2025-05-15 PROCEDURE — 85025 COMPLETE CBC W/AUTO DIFF WBC: CPT | Performed by: EMERGENCY MEDICINE

## 2025-05-15 PROCEDURE — 84075 ASSAY ALKALINE PHOSPHATASE: CPT | Performed by: EMERGENCY MEDICINE

## 2025-05-15 PROCEDURE — 96360 HYDRATION IV INFUSION INIT: CPT

## 2025-05-15 PROCEDURE — 70450 CT HEAD/BRAIN W/O DYE: CPT | Performed by: SURGERY

## 2025-05-15 PROCEDURE — 2500000004 HC RX 250 GENERAL PHARMACY W/ HCPCS (ALT 636 FOR OP/ED): Mod: JZ

## 2025-05-15 PROCEDURE — 36415 COLL VENOUS BLD VENIPUNCTURE: CPT | Performed by: EMERGENCY MEDICINE

## 2025-05-15 RX ADMIN — SODIUM CHLORIDE 500 ML: 0.9 INJECTION, SOLUTION INTRAVENOUS at 20:16

## 2025-05-15 ASSESSMENT — PAIN - FUNCTIONAL ASSESSMENT: PAIN_FUNCTIONAL_ASSESSMENT: 0-10

## 2025-05-15 ASSESSMENT — LIFESTYLE VARIABLES
EVER FELT BAD OR GUILTY ABOUT YOUR DRINKING: NO
HAVE YOU EVER FELT YOU SHOULD CUT DOWN ON YOUR DRINKING: NO
EVER HAD A DRINK FIRST THING IN THE MORNING TO STEADY YOUR NERVES TO GET RID OF A HANGOVER: NO
TOTAL SCORE: 0
HAVE PEOPLE ANNOYED YOU BY CRITICIZING YOUR DRINKING: NO

## 2025-05-15 ASSESSMENT — COLUMBIA-SUICIDE SEVERITY RATING SCALE - C-SSRS
1. IN THE PAST MONTH, HAVE YOU WISHED YOU WERE DEAD OR WISHED YOU COULD GO TO SLEEP AND NOT WAKE UP?: NO
2. HAVE YOU ACTUALLY HAD ANY THOUGHTS OF KILLING YOURSELF?: NO
6. HAVE YOU EVER DONE ANYTHING, STARTED TO DO ANYTHING, OR PREPARED TO DO ANYTHING TO END YOUR LIFE?: NO

## 2025-05-15 ASSESSMENT — PAIN SCALES - GENERAL
PAINLEVEL_OUTOF10: 0 - NO PAIN
PAINLEVEL_OUTOF10: 0 - NO PAIN

## 2025-05-15 NOTE — ED TRIAGE NOTES
Pt presents to ED for concern of low blood pressure though is hypertensive on arrival. Pt reports HA (minor), dizziness, and blurred vision that started yesterday around 6pm. Pt is hypertensive and tachycardic in triage. EKG completed. Denies pain. Denies chest pain and SOB.   PMHx dialysis (kidney transplant 8 years ago) and anemia.

## 2025-05-16 ENCOUNTER — PHARMACY VISIT (OUTPATIENT)
Dept: PHARMACY | Facility: CLINIC | Age: 60
End: 2025-05-16
Payer: COMMERCIAL

## 2025-05-16 LAB — HOLD SPECIMEN: NORMAL

## 2025-05-16 NOTE — DISCHARGE INSTRUCTIONS
Please follow-up with your primary doctor.  Continue with your medications as currently prescribed.  Return to the emergency department for new or worsening symptoms or any other concerns.

## 2025-05-19 ENCOUNTER — APPOINTMENT (OUTPATIENT)
Dept: RADIOLOGY | Facility: HOSPITAL | Age: 60
DRG: 023 | End: 2025-05-19
Payer: COMMERCIAL

## 2025-05-19 ENCOUNTER — HOSPITAL ENCOUNTER (INPATIENT)
Facility: HOSPITAL | Age: 60
Discharge: STILL ADMITTED OR ADMINISTRATIVE | DRG: 023 | End: 2025-05-19
Attending: EMERGENCY MEDICINE | Admitting: PSYCHIATRY & NEUROLOGY
Payer: COMMERCIAL

## 2025-05-19 ENCOUNTER — CLINICAL SUPPORT (OUTPATIENT)
Dept: EMERGENCY MEDICINE | Facility: HOSPITAL | Age: 60
DRG: 023 | End: 2025-05-19
Payer: COMMERCIAL

## 2025-05-19 DIAGNOSIS — I63.411 ACUTE CEREBROVASCULAR ACCIDENT (CVA) DUE TO EMBOLISM OF RIGHT MIDDLE CEREBRAL ARTERY (MULTI): Primary | ICD-10-CM

## 2025-05-19 DIAGNOSIS — Z94.0 KIDNEY TRANSPLANT RECIPIENT (HHS-HCC): ICD-10-CM

## 2025-05-19 DIAGNOSIS — I63.511 ACUTE ISCHEMIC RIGHT MCA STROKE (MULTI): ICD-10-CM

## 2025-05-19 LAB
ABO GROUP (TYPE) IN BLOOD: NORMAL
ALBUMIN SERPL BCP-MCNC: 4.1 G/DL (ref 3.4–5)
ALP SERPL-CCNC: 128 U/L (ref 33–120)
ALT SERPL W P-5'-P-CCNC: 11 U/L (ref 10–52)
ANION GAP BLDV CALCULATED.4IONS-SCNC: 10 MMOL/L (ref 10–25)
ANION GAP SERPL CALC-SCNC: 13 MMOL/L (ref 10–20)
ANTIBODY SCREEN: NORMAL
AST SERPL W P-5'-P-CCNC: 14 U/L (ref 9–39)
BASE EXCESS BLDV CALC-SCNC: -1.4 MMOL/L (ref -2–3)
BASOPHILS # BLD AUTO: 0.01 X10*3/UL (ref 0–0.1)
BASOPHILS NFR BLD AUTO: 0.2 %
BILIRUB SERPL-MCNC: 0.8 MG/DL (ref 0–1.2)
BNP SERPL-MCNC: 118 PG/ML (ref 0–99)
BODY TEMPERATURE: 37 DEGREES CELSIUS
BUN SERPL-MCNC: 16 MG/DL (ref 6–23)
CA-I BLDV-SCNC: 1.24 MMOL/L (ref 1.1–1.33)
CALCIUM SERPL-MCNC: 9.6 MG/DL (ref 8.6–10.6)
CHLORIDE BLDV-SCNC: 105 MMOL/L (ref 98–107)
CHLORIDE SERPL-SCNC: 106 MMOL/L (ref 98–107)
CHOLEST SERPL-MCNC: 113 MG/DL (ref 0–199)
CHOLESTEROL/HDL RATIO: 3.3
CO2 SERPL-SCNC: 24 MMOL/L (ref 21–32)
CREAT SERPL-MCNC: 2.01 MG/DL (ref 0.5–1.3)
EGFRCR SERPLBLD CKD-EPI 2021: 38 ML/MIN/1.73M*2
EOSINOPHIL # BLD AUTO: 0.03 X10*3/UL (ref 0–0.7)
EOSINOPHIL NFR BLD AUTO: 0.5 %
ERYTHROCYTE [DISTWIDTH] IN BLOOD BY AUTOMATED COUNT: 13.9 % (ref 11.5–14.5)
GLUCOSE BLD MANUAL STRIP-MCNC: 100 MG/DL (ref 74–99)
GLUCOSE BLD MANUAL STRIP-MCNC: 90 MG/DL (ref 74–99)
GLUCOSE BLDV-MCNC: 122 MG/DL (ref 74–99)
GLUCOSE SERPL-MCNC: 118 MG/DL (ref 74–99)
HCO3 BLDV-SCNC: 24.8 MMOL/L (ref 22–26)
HCT VFR BLD AUTO: 43.2 % (ref 41–52)
HCT VFR BLD EST: 46 % (ref 41–52)
HDLC SERPL-MCNC: 33.8 MG/DL
HGB BLD-MCNC: 14.6 G/DL (ref 13.5–17.5)
HGB BLDV-MCNC: 15.2 G/DL (ref 13.5–17.5)
IMM GRANULOCYTES # BLD AUTO: 0.01 X10*3/UL (ref 0–0.7)
IMM GRANULOCYTES NFR BLD AUTO: 0.2 % (ref 0–0.9)
INHALED O2 CONCENTRATION: 21 %
INR PPP: 1.2 (ref 0.9–1.1)
LACTATE BLDV-SCNC: 1.6 MMOL/L (ref 0.4–2)
LDLC SERPL CALC-MCNC: 63 MG/DL
LYMPHOCYTES # BLD AUTO: 2.09 X10*3/UL (ref 1.2–4.8)
LYMPHOCYTES NFR BLD AUTO: 32.3 %
MAGNESIUM SERPL-MCNC: 1.65 MG/DL (ref 1.6–2.4)
MCH RBC QN AUTO: 30.1 PG (ref 26–34)
MCHC RBC AUTO-ENTMCNC: 33.8 G/DL (ref 32–36)
MCV RBC AUTO: 89 FL (ref 80–100)
MONOCYTES # BLD AUTO: 0.63 X10*3/UL (ref 0.1–1)
MONOCYTES NFR BLD AUTO: 9.7 %
NEUTROPHILS # BLD AUTO: 3.71 X10*3/UL (ref 1.2–7.7)
NEUTROPHILS NFR BLD AUTO: 57.1 %
NON HDL CHOLESTEROL: 79 MG/DL (ref 0–149)
NRBC BLD-RTO: 0 /100 WBCS (ref 0–0)
OXYHGB MFR BLDV: 79.6 % (ref 45–75)
PCO2 BLDV: 46 MM HG (ref 41–51)
PH BLDV: 7.34 PH (ref 7.33–7.43)
PHOSPHATE SERPL-MCNC: 2.1 MG/DL (ref 2.5–4.9)
PLATELET # BLD AUTO: 112 X10*3/UL (ref 150–450)
PO2 BLDV: 55 MM HG (ref 35–45)
POTASSIUM BLDV-SCNC: 3.8 MMOL/L (ref 3.5–5.3)
POTASSIUM SERPL-SCNC: 3.6 MMOL/L (ref 3.5–5.3)
PROT SERPL-MCNC: 7 G/DL (ref 6.4–8.2)
PROTHROMBIN TIME: 12.8 SECONDS (ref 9.8–12.4)
RBC # BLD AUTO: 4.85 X10*6/UL (ref 4.5–5.9)
RH FACTOR (ANTIGEN D): NORMAL
SAO2 % BLDV: 82 % (ref 45–75)
SODIUM BLDV-SCNC: 136 MMOL/L (ref 136–145)
SODIUM SERPL-SCNC: 139 MMOL/L (ref 136–145)
TACROLIMUS BLD-MCNC: 15.4 NG/ML
TRIGL SERPL-MCNC: 83 MG/DL (ref 0–149)
VLDL: 17 MG/DL (ref 0–40)
WBC # BLD AUTO: 6.5 X10*3/UL (ref 4.4–11.3)

## 2025-05-19 PROCEDURE — 2500000004 HC RX 250 GENERAL PHARMACY W/ HCPCS (ALT 636 FOR OP/ED): Mod: JZ | Performed by: EMERGENCY MEDICINE

## 2025-05-19 PROCEDURE — 70498 CT ANGIOGRAPHY NECK: CPT | Performed by: RADIOLOGY

## 2025-05-19 PROCEDURE — 2500000004 HC RX 250 GENERAL PHARMACY W/ HCPCS (ALT 636 FOR OP/ED): Mod: JZ

## 2025-05-19 PROCEDURE — 80197 ASSAY OF TACROLIMUS: CPT | Performed by: EMERGENCY MEDICINE

## 2025-05-19 PROCEDURE — 75710 ARTERY X-RAYS ARM/LEG: CPT | Performed by: NEUROLOGICAL SURGERY

## 2025-05-19 PROCEDURE — 70450 CT HEAD/BRAIN W/O DYE: CPT | Performed by: RADIOLOGY

## 2025-05-19 PROCEDURE — 85025 COMPLETE CBC W/AUTO DIFF WBC: CPT | Performed by: EMERGENCY MEDICINE

## 2025-05-19 PROCEDURE — 61645 PERQ ART M-THROMBECT &/NFS: CPT | Performed by: NEUROLOGICAL SURGERY

## 2025-05-19 PROCEDURE — 2500000002 HC RX 250 W HCPCS SELF ADMINISTERED DRUGS (ALT 637 FOR MEDICARE OP, ALT 636 FOR OP/ED)

## 2025-05-19 PROCEDURE — 2500000004 HC RX 250 GENERAL PHARMACY W/ HCPCS (ALT 636 FOR OP/ED): Performed by: NEUROLOGICAL SURGERY

## 2025-05-19 PROCEDURE — 96366 THER/PROPH/DIAG IV INF ADDON: CPT | Mod: 59

## 2025-05-19 PROCEDURE — C1760 CLOSURE DEV, VASC: HCPCS | Performed by: STUDENT IN AN ORGANIZED HEALTH CARE EDUCATION/TRAINING PROGRAM

## 2025-05-19 PROCEDURE — 84100 ASSAY OF PHOSPHORUS: CPT | Performed by: EMERGENCY MEDICINE

## 2025-05-19 PROCEDURE — 93010 ELECTROCARDIOGRAM REPORT: CPT | Performed by: EMERGENCY MEDICINE

## 2025-05-19 PROCEDURE — C1887 CATHETER, GUIDING: HCPCS | Performed by: STUDENT IN AN ORGANIZED HEALTH CARE EDUCATION/TRAINING PROGRAM

## 2025-05-19 PROCEDURE — 83735 ASSAY OF MAGNESIUM: CPT | Performed by: EMERGENCY MEDICINE

## 2025-05-19 PROCEDURE — 70498 CT ANGIOGRAPHY NECK: CPT

## 2025-05-19 PROCEDURE — 03CK3ZZ EXTIRPATION OF MATTER FROM RIGHT INTERNAL CAROTID ARTERY, PERCUTANEOUS APPROACH: ICD-10-PCS | Performed by: NEUROLOGICAL SURGERY

## 2025-05-19 PROCEDURE — 82947 ASSAY GLUCOSE BLOOD QUANT: CPT

## 2025-05-19 PROCEDURE — 93005 ELECTROCARDIOGRAM TRACING: CPT

## 2025-05-19 PROCEDURE — 2020000001 HC ICU ROOM DAILY

## 2025-05-19 PROCEDURE — 84132 ASSAY OF SERUM POTASSIUM: CPT | Performed by: EMERGENCY MEDICINE

## 2025-05-19 PROCEDURE — 99291 CRITICAL CARE FIRST HOUR: CPT | Performed by: EMERGENCY MEDICINE

## 2025-05-19 PROCEDURE — 36415 COLL VENOUS BLD VENIPUNCTURE: CPT | Performed by: EMERGENCY MEDICINE

## 2025-05-19 PROCEDURE — 0042T CT BRAIN ATTACK PERFUSION W IV CONTRAST: CPT

## 2025-05-19 PROCEDURE — 86901 BLOOD TYPING SEROLOGIC RH(D): CPT | Performed by: EMERGENCY MEDICINE

## 2025-05-19 PROCEDURE — 99291 CRITICAL CARE FIRST HOUR: CPT | Mod: 25 | Performed by: EMERGENCY MEDICINE

## 2025-05-19 PROCEDURE — 70450 CT HEAD/BRAIN W/O DYE: CPT

## 2025-05-19 PROCEDURE — 2720000007 HC OR 272 NO HCPCS: Performed by: STUDENT IN AN ORGANIZED HEALTH CARE EDUCATION/TRAINING PROGRAM

## 2025-05-19 PROCEDURE — 99152 MOD SED SAME PHYS/QHP 5/>YRS: CPT | Performed by: NEUROLOGICAL SURGERY

## 2025-05-19 PROCEDURE — 2780000003 HC OR 278 NO HCPCS: Performed by: STUDENT IN AN ORGANIZED HEALTH CARE EDUCATION/TRAINING PROGRAM

## 2025-05-19 PROCEDURE — C1769 GUIDE WIRE: HCPCS | Performed by: STUDENT IN AN ORGANIZED HEALTH CARE EDUCATION/TRAINING PROGRAM

## 2025-05-19 PROCEDURE — C1894 INTRO/SHEATH, NON-LASER: HCPCS | Performed by: STUDENT IN AN ORGANIZED HEALTH CARE EDUCATION/TRAINING PROGRAM

## 2025-05-19 PROCEDURE — 80061 LIPID PANEL: CPT

## 2025-05-19 PROCEDURE — 85610 PROTHROMBIN TIME: CPT | Performed by: EMERGENCY MEDICINE

## 2025-05-19 PROCEDURE — 2550000001 HC RX 255 CONTRASTS: Performed by: EMERGENCY MEDICINE

## 2025-05-19 PROCEDURE — B3161ZZ FLUOROSCOPY OF RIGHT INTERNAL CAROTID ARTERY USING LOW OSMOLAR CONTRAST: ICD-10-PCS | Performed by: NEUROLOGICAL SURGERY

## 2025-05-19 PROCEDURE — C1757 CATH, THROMBECTOMY/EMBOLECT: HCPCS | Performed by: STUDENT IN AN ORGANIZED HEALTH CARE EDUCATION/TRAINING PROGRAM

## 2025-05-19 PROCEDURE — 99223 1ST HOSP IP/OBS HIGH 75: CPT | Performed by: PSYCHIATRY & NEUROLOGY

## 2025-05-19 PROCEDURE — 76377 3D RENDER W/INTRP POSTPROCES: CPT | Performed by: NEUROLOGICAL SURGERY

## 2025-05-19 PROCEDURE — 70496 CT ANGIOGRAPHY HEAD: CPT | Performed by: RADIOLOGY

## 2025-05-19 PROCEDURE — C1725 CATH, TRANSLUMIN NON-LASER: HCPCS | Performed by: STUDENT IN AN ORGANIZED HEALTH CARE EDUCATION/TRAINING PROGRAM

## 2025-05-19 PROCEDURE — 96375 TX/PRO/DX INJ NEW DRUG ADDON: CPT | Mod: 59

## 2025-05-19 PROCEDURE — 96365 THER/PROPH/DIAG IV INF INIT: CPT | Mod: 59

## 2025-05-19 PROCEDURE — 0042T CT BRAIN ATTACK PERFUSION W IV CONTRAST: CPT | Performed by: RADIOLOGY

## 2025-05-19 PROCEDURE — 99223 1ST HOSP IP/OBS HIGH 75: CPT

## 2025-05-19 PROCEDURE — 36224 PLACE CATH CAROTD ART: CPT | Performed by: NEUROLOGICAL SURGERY

## 2025-05-19 PROCEDURE — 99291 CRITICAL CARE FIRST HOUR: CPT | Performed by: REGISTERED NURSE

## 2025-05-19 PROCEDURE — 80053 COMPREHEN METABOLIC PANEL: CPT | Performed by: EMERGENCY MEDICINE

## 2025-05-19 PROCEDURE — 83880 ASSAY OF NATRIURETIC PEPTIDE: CPT

## 2025-05-19 RX ORDER — NICARDIPINE HYDROCHLORIDE 0.2 MG/ML
0-15 INJECTION INTRAVENOUS CONTINUOUS
Status: DISCONTINUED | OUTPATIENT
Start: 2025-05-19 | End: 2025-05-19

## 2025-05-19 RX ORDER — ONDANSETRON HYDROCHLORIDE 2 MG/ML
INJECTION, SOLUTION INTRAVENOUS
Status: COMPLETED
Start: 2025-05-19 | End: 2025-05-19

## 2025-05-19 RX ORDER — HYDRALAZINE HYDROCHLORIDE 50 MG/1
25 TABLET, FILM COATED ORAL EVERY 6 HOURS PRN
Status: DISCONTINUED | OUTPATIENT
Start: 2025-05-21 | End: 2025-05-20

## 2025-05-19 RX ORDER — ATROPINE SULFATE 0.1 MG/ML
INJECTION INTRAVENOUS CODE/TRAUMA/SEDATION MEDICATION
Status: COMPLETED | OUTPATIENT
Start: 2025-05-19 | End: 2025-05-19

## 2025-05-19 RX ORDER — MYCOPHENOLATE MOFETIL 250 MG/1
750 CAPSULE ORAL 2 TIMES DAILY
Status: DISCONTINUED | OUTPATIENT
Start: 2025-05-19 | End: 2025-05-24

## 2025-05-19 RX ORDER — NICARDIPINE HYDROCHLORIDE 0.2 MG/ML
INJECTION INTRAVENOUS
Status: COMPLETED
Start: 2025-05-19 | End: 2025-05-19

## 2025-05-19 RX ORDER — ENOXAPARIN SODIUM 100 MG/ML
40 INJECTION SUBCUTANEOUS EVERY 24 HOURS
Status: DISCONTINUED | OUTPATIENT
Start: 2025-05-19 | End: 2025-06-04 | Stop reason: HOSPADM

## 2025-05-19 RX ORDER — ROSUVASTATIN CALCIUM 20 MG/1
20 TABLET, COATED ORAL NIGHTLY
Status: DISCONTINUED | OUTPATIENT
Start: 2025-05-19 | End: 2025-06-04 | Stop reason: HOSPADM

## 2025-05-19 RX ORDER — ASPIRIN 300 MG/1
300 SUPPOSITORY RECTAL DAILY
Status: DISCONTINUED | OUTPATIENT
Start: 2025-05-19 | End: 2025-06-04 | Stop reason: HOSPADM

## 2025-05-19 RX ORDER — POTASSIUM CHLORIDE 20 MEQ/1
20 TABLET, EXTENDED RELEASE ORAL DAILY
Status: DISCONTINUED | OUTPATIENT
Start: 2025-05-19 | End: 2025-05-20

## 2025-05-19 RX ORDER — HYDRALAZINE HYDROCHLORIDE 20 MG/ML
10 INJECTION INTRAMUSCULAR; INTRAVENOUS
Status: DISCONTINUED | OUTPATIENT
Start: 2025-05-19 | End: 2025-05-20

## 2025-05-19 RX ORDER — NAPROXEN SODIUM 220 MG/1
81 TABLET, FILM COATED ORAL DAILY
Status: DISCONTINUED | OUTPATIENT
Start: 2025-05-19 | End: 2025-06-04 | Stop reason: HOSPADM

## 2025-05-19 RX ORDER — ONDANSETRON HYDROCHLORIDE 2 MG/ML
4 INJECTION, SOLUTION INTRAVENOUS ONCE
Status: COMPLETED | OUTPATIENT
Start: 2025-05-19 | End: 2025-05-19

## 2025-05-19 RX ORDER — ASPIRIN 81 MG/1
81 TABLET ORAL DAILY
Status: DISCONTINUED | OUTPATIENT
Start: 2025-05-19 | End: 2025-06-04 | Stop reason: HOSPADM

## 2025-05-19 RX ORDER — SODIUM CHLORIDE 9 MG/ML
75 INJECTION, SOLUTION INTRAVENOUS CONTINUOUS
Status: DISCONTINUED | OUTPATIENT
Start: 2025-05-19 | End: 2025-05-20

## 2025-05-19 RX ORDER — FENTANYL CITRATE 50 UG/ML
INJECTION, SOLUTION INTRAMUSCULAR; INTRAVENOUS
Status: COMPLETED | OUTPATIENT
Start: 2025-05-19 | End: 2025-05-19

## 2025-05-19 RX ORDER — LABETALOL HYDROCHLORIDE 5 MG/ML
10 INJECTION, SOLUTION INTRAVENOUS EVERY 10 MIN PRN
Status: DISCONTINUED | OUTPATIENT
Start: 2025-05-19 | End: 2025-05-20

## 2025-05-19 RX ORDER — NICARDIPINE HYDROCHLORIDE 0.2 MG/ML
2.5-15 INJECTION INTRAVENOUS CONTINUOUS
Status: DISCONTINUED | OUTPATIENT
Start: 2025-05-19 | End: 2025-05-20

## 2025-05-19 RX ORDER — MIDAZOLAM HYDROCHLORIDE 1 MG/ML
INJECTION INTRAMUSCULAR; INTRAVENOUS
Status: COMPLETED | OUTPATIENT
Start: 2025-05-19 | End: 2025-05-19

## 2025-05-19 RX ORDER — POLYETHYLENE GLYCOL 3350 17 G/17G
17 POWDER, FOR SOLUTION ORAL DAILY
Status: DISCONTINUED | OUTPATIENT
Start: 2025-05-19 | End: 2025-06-01

## 2025-05-19 RX ADMIN — ONDANSETRON HYDROCHLORIDE 4 MG: 2 INJECTION, SOLUTION INTRAVENOUS at 11:44

## 2025-05-19 RX ADMIN — NICARDIPINE HYDROCHLORIDE 2.5 MG/HR: 0.2 INJECTION, SOLUTION INTRAVENOUS at 11:44

## 2025-05-19 RX ADMIN — IOHEXOL 100 ML: 350 INJECTION, SOLUTION INTRAVENOUS at 17:37

## 2025-05-19 RX ADMIN — SODIUM CHLORIDE 75 ML/HR: 0.9 INJECTION, SOLUTION INTRAVENOUS at 18:29

## 2025-05-19 RX ADMIN — ROSUVASTATIN CALCIUM 20 MG: 20 TABLET, FILM COATED ORAL at 21:28

## 2025-05-19 RX ADMIN — ATROPINE SULFATE 1 MG: 0.1 INJECTION, SOLUTION INTRAVENOUS at 11:35

## 2025-05-19 RX ADMIN — IOHEXOL 35 ML: 350 INJECTION, SOLUTION INTRAVENOUS at 16:32

## 2025-05-19 RX ADMIN — ONDANSETRON 4 MG: 2 INJECTION INTRAMUSCULAR; INTRAVENOUS at 11:44

## 2025-05-19 RX ADMIN — MYCOPHENOLATE MOFETIL 750 MG: 250 CAPSULE ORAL at 21:28

## 2025-05-19 RX ADMIN — NICARDIPINE HYDROCHLORIDE 2.5 MG/HR: 0.2 INJECTION, SOLUTION INTRAVENOUS at 14:26

## 2025-05-19 RX ADMIN — IOHEXOL 75 ML: 350 INJECTION, SOLUTION INTRAVENOUS at 16:20

## 2025-05-19 RX ADMIN — FENTANYL CITRATE 50 MCG: 50 INJECTION, SOLUTION INTRAMUSCULAR; INTRAVENOUS at 17:03

## 2025-05-19 RX ADMIN — MIDAZOLAM HYDROCHLORIDE 1 MG: 2 INJECTION, SOLUTION INTRAMUSCULAR; INTRAVENOUS at 17:04

## 2025-05-19 ASSESSMENT — PAIN SCALES - GENERAL
PAINLEVEL_OUTOF10: 0 - NO PAIN

## 2025-05-19 ASSESSMENT — COLUMBIA-SUICIDE SEVERITY RATING SCALE - C-SSRS
2. HAVE YOU ACTUALLY HAD ANY THOUGHTS OF KILLING YOURSELF?: NO
1. IN THE PAST MONTH, HAVE YOU WISHED YOU WERE DEAD OR WISHED YOU COULD GO TO SLEEP AND NOT WAKE UP?: NO
6. HAVE YOU EVER DONE ANYTHING, STARTED TO DO ANYTHING, OR PREPARED TO DO ANYTHING TO END YOUR LIFE?: NO

## 2025-05-19 ASSESSMENT — LIFESTYLE VARIABLES
EVER HAD A DRINK FIRST THING IN THE MORNING TO STEADY YOUR NERVES TO GET RID OF A HANGOVER: NO
HAVE YOU EVER FELT YOU SHOULD CUT DOWN ON YOUR DRINKING: NO
HAVE PEOPLE ANNOYED YOU BY CRITICIZING YOUR DRINKING: NO
TOTAL SCORE: 0
EVER FELT BAD OR GUILTY ABOUT YOUR DRINKING: NO

## 2025-05-19 ASSESSMENT — PAIN - FUNCTIONAL ASSESSMENT
PAIN_FUNCTIONAL_ASSESSMENT: 0-10

## 2025-05-19 NOTE — CONSULTS
"NEUROLOGY EMERGENCY DEPARTMENT CONSULT NOTE  Subjective   Consult Question: Evaluation for hypertensive emergency vs. PRES  History of Presenting Illness  Davidson Khoury is a 60 y.o. male with a history notable for renal transplant (currently on Tacrolimus) and hypertension presenting to the ED initially with a chief complaint of altered mental status, headache, and hypertension. Neurology was consulted for evaluation of hypertensive emergency vs PRES. Vital signs initially showed HR 39 and BP up to 241/133, so the patient was given atropine and nicardipine, as well as Zofran while in the ED. Temperature was 96, with glucose 118, Cr 2.01, INR 1.2, and Alk Phos 128. Patient received regular BP measurements prior to the writer seeing the patient in the ED. On presentation to the writer, patient's BP had improved to 133/103. CT head was completed as well and was negative for any acute intracranial abnormalities at this time.     On presentation, the patient was now alert and oriented x4, stating that he had been confused and had a headache when he was initially brought in but that these symptoms had resolved. The patient was initially brought into the ED due to altered mental status, with the patient's wife present stating that he has been \"not acting like himself\" for the last 4 days (since 5/15), but that he began \"baby-talking\" today, which raised concern. She states that he appeared confused and showed some gait instability, so he was brought to the ED. The patient states that he \"feels better now.\" He stated that he is on Clonidine, Tacrolimus, and K+ while at home, and states that he has been compliant with his medications. He reports that his blood pressure has been going \"up and down\" while at home, and his wife states that his most recent reading at home was 147/95 on the home blood pressure cuff. The patient states that he experienced blurry vision when he presented on 5/15, but denies any blurry vision " "today. He also notes that he originally noted some numbness/tingling in his left hand this AM, as well as x1 episode of nausea and vomiting, but that these symptoms have also resolved. He denies any recent falls, dizziness, vertigo, syncope, fever, chills, chest pain, abdominal pain, or abnormal movements, and reports no other symptoms at this time.     ROS:  A comprehensive review-of-systems was obtained and negative unless noted above in the HPI.    Past Medical History  Medical History[1]  Surgical History  Surgical History[2]  Social History  Social History[3]  Patient smoked 2 cigarettes a day for 1.5 years and quit 1 year ago. Denies EtOH use or recreational drug use.   Allergies  Amoxicillin and Ace inhibitors    =========  Medications  Scheduled medications  Scheduled Medications[4]  Continuous medications  Continuous Medications[5]  PRN medications  PRN Medications[6]    =========    Objective   Vital Signs  BP (!) 153/99   Pulse 51   Temp 35.6 °C (96 °F)   Resp 10   Ht 1.753 m (5' 9\")   Wt 81.2 kg (179 lb)   SpO2 99%   BMI 26.43 kg/m²     Physical Exam  GENERAL: laying in bed comfortably; well-appearing and in NAD.  HEART: RRR; audible S1 and S2; no extra heart sounds, murmurs, gallops, rubs, or knocks.  PSYCHIATRIC/MSE: conversational; full euthymic affect; logical thought process; cognition intact.  NEUROLOGICAL:   Cognitive Status Exam:  Orientation: alert and oriented to person, place, time, and situation  Language: expression, naming, repetition, and comprehension intact             Information/Knowledge: can correctly state current & past events  Concentration: can remain focused during interview    Cranial Nerves:  II: pupils-PERRL bilaterally      VF-full   III, IV, VI: eyes aligned in primary position w/o fixation instability, EOM full-range with smooth pursuits and no gaze-evoked nystagmus, intact vergence  V: intact to LT; strong mandibular opening  VII: intact facial strength; nasolabial " "folds symmetric; mild left-sided facial droop noted.  VIII: intact hearing to conversation  IX and X: clear speech; no dysarthria; symmetric palatal rise  XI: SCM and trapezius have 5/5 strength  XII: midline tongue position at rest and intact movement, bulk, and strength    Motor:   Bulk: Normal    Tone: Normal  Tremor: Absent  Pronator Drift: Absent     Strength:  Neck Flex 5  Neck Ext  5      R L  Deltoid  5 5  Biceps  5 5  Triceps  5 5    5 5  Quadriceps 5 5  Hamstrings 5 5  TA  5 5  Gastroc 5 5    Reflexes:    R L  Biceps  2+ 2+  Triceps  2+ 2+  Brachioradialis 2+ 2+  Patellar  2+ 2+  Achilles 2+ 2+                                 Toes: flexor plantar bilaterally; negative Babinski  Vasquez's: absent  Ankle Clonus: absent    Sensory:   intact and symmetric to light touch, temperature    Coordination:   Finger-to-Nose: no intention or action tremor; mild left-sided ataxia with overshoot and mildly slowed movements.  Heel-to-Shin: no ataxia; no intention or action tremor; normokinetic    Rapid Movements:  Finger Tapping: rapid; no dysdiadochokinesia  Foot Tapping: rapid      Standardized Scales: none indicated. NIHSS as below.      NIH Stroke Scale  1A. Level of Consciousness: Alert, Keenly Responsive (MD aware)  1B. Ask Month and Age: Both Questions Right  1C. Blink Eyes & Squeeze Hands: Performs 1 Task  2. Best Gaze: Normal  3. Visual: No Visual Loss  4. Facial Palsy: Minor Paralysis  5A. Motor - Left Arm: No Drift  5B. Motor - Right Arm: No Drift  6A. Motor - Left Leg: Drift  6B. Motor - Right Leg: No Drift  7. Limb Ataxia: Absent  8. Sensory Loss: Normal  9. Best Language: No Aphasia  10. Dysarthria: Normal  11. Extinction and Inattention: No Abnormality  NIH Stroke Scale: 3    Recent/Relevant Labs:        No results found for: \"BNP\"      Recent/Relevant Imaging:  No MRI head results found for the past 14 days  CT head wo IV contrast  Result Date: 5/19/2025  Interpreted By:  Blayne Peguero, STUDY: CT HEAD " WO IV CONTRAST;  5/19/2025 11:53 am   INDICATION: Signs/Symptoms:headache, syncope.     COMPARISON: CT head dated 05/15/2025.   ACCESSION NUMBER(S): [id number]   ORDERING CLINICIAN: YVONNE CASSIDY   TECHNIQUE: Noncontrast axial CT scan of head was performed with coronal and sagittal reformats provided.   FINDINGS: Parenchyma: There is no acute intracranial hemorrhage. There is no mass effect or midline shift. Similar appearance of likely remote cortical infarcts within the right parietal and occipital lobes with beam hardening artifact on previous examination partially obscuring evaluation. There is additional patchy white matter hypodensity throughout the bilateral cerebral hemispheres likely representing chronic small vessel ischemic disease. Unchanged remote appearing bilateral basal ganglia infarcts to include involvement of the left caudate head, right caudate head and anterior lentiform nuclei, and left thalamus. No interval CT apparent acute infarct.   CSF Spaces: Unchanged.   Extra-Axial Fluid: There is no extraaxial fluid collection.   Calvarium: The calvarium is unremarkable.   Paranasal sinuses: Osteoma within the right frontoethmoidal region. Paranasal sinuses are otherwise clear.   Mastoids: Under pneumatized bilaterally. Small amount of dependent fluid.   Orbits: Remote right lamina papyracea fracture.   Soft tissues: Unremarkable.       No acute intracranial hemorrhage or mass effect.   Similar appearance of suspected remote infarcts within the cortex of the right parietal and occipital lobes with beam hardening artifact on previous examination obscuring evaluation. Consider MRI for further evaluation based on clinical concern. There are also unchanged remote appearing bilateral basal ganglia lacunar infarcts and moderate chronic small vessel ischemic change.   MACRO: None   Signed by: Blayne Peguero 5/19/2025 12:10 PM Dictation workstation:   [id number]    CT head wo IV contrast  Result Date:  5/15/2025  Interpreted By:  Jered Lara, STUDY: CT HEAD WO IV CONTRAST;  5/15/2025 8:10 pm   INDICATION: Signs/Symptoms:dizziness, hypotension, left arm numbness.     COMPARISON: None.   ACCESSION NUMBER(S): [id number]   ORDERING CLINICIAN: ROMERO JOHN   TECHNIQUE: Noncontrast axial CT scan of head was performed. Angled reformats in brain and bone windows were generated. The images were reviewed in bone, brain, blood and soft tissue windows.   FINDINGS: Evaluation is limited by artifact from the cranial edge of the table.   CSF Spaces: The ventricles, sulci and basal cisterns are within normal limits. There is no extraaxial fluid collection.   Parenchyma: Moderate volume loss.  There is periventricular and subcortical white matter hypoattenuation, most in keeping with chronic microvascular ischemic change. Chronic lacunar infarcts in the anterior aspect of the right corpus striatum, left caudate head, left thalamus. The grey-white differentiation is intact. There is no mass effect or midline shift.  There is no intracranial hemorrhage.   Calvarium: The calvarium is unremarkable.   Paranasal sinuses and mastoids: Visualized paranasal sinuses and mastoids are clear. Chronic fracture deformity of the right diameter papyracea       No evidence of acute cortical infarct or intracranial hemorrhage.       MACRO: None   Signed by: Jered Lara 5/15/2025 8:36 PM Dictation workstation:   [id number]      Other Recent/Relevant Studies:  No echocardiogram results found for the past 14 days              =========  Assessment/Plan   Assessment:  Davidson Khoury is a 60 y.o. male with a history notable for renal transplant (currently on Tacrolimus) and hypertension presenting to the ED initially with a chief complaint of altered mental status, headache, and hypertension. Neurology was consulted for evaluation of hypertensive emergency vs PRES. initially mild left-sided facial droop and left upper extremity dysmetria noted  however upon rounding in the afternoon at approximately 330, patient was noted to be neglecting the left, with right gaze deviation, left upper and lower extremity drift, left HH.  Notified ED staff immediately regarding change in neurologic exam, stroke alert called with CTA identifying right MCA occlusion.     Impression:   #PRES, R/O  #Hypertensive emergency, R/O    Recommendations:  - Stroke alert called after change in neurologic exam noted by neurology team  - Further management of stroke by stroke team                  [1]   Past Medical History:  Diagnosis Date    Personal history of other diseases of urinary system     History of chronic kidney disease    Personal history of other specified conditions 01/12/2022    History of urinary retention    Personal history of other specified conditions 08/22/2019    History of urinary retention   [2]   Past Surgical History:  Procedure Laterality Date    OTHER SURGICAL HISTORY  06/06/2016    Myringotomy - With Eustachian Tube Inflation    OTHER SURGICAL HISTORY  06/06/2016    Arteriovenous Surgery Creation Of A-V Fistula    OTHER SURGICAL HISTORY  08/23/2019    Kidney transplantation   [3]   Social History  Tobacco Use    Smoking status: Former     Types: Cigarettes    Smokeless tobacco: Never   Substance Use Topics    Alcohol use: Never    Drug use: Never   [4] potassium phosphate (monobasic), 1,000 mg, oral, Once  [5] niCARdipine, 0-15 mg/hr, Last Rate: Stopped (05/19/25 1215)  [6]

## 2025-05-19 NOTE — ED PROVIDER NOTES
"History of Present Illness     History provided by: Patient and Family Member  Limitations to History: Altered Mental Status  External Records Reviewed with Brief Summary: Previous ED and hospital records for past medical history    HPI:  Davidson Khoury is a 60 y.o. male with past medical history significant for renal transplant 2020 [place]RIMAs hypertension who presented for evaluation as a critical patient with altered mental status emesis and bradycardia.  Patient had been not his normal self per wife for the last 5 days.  He had come to the ED on the  for general feelings of unwell left-sided hand numbness blurry vision and headache.  He was worked up at that time and discharged with concern for potential TIA.  He since then has had multiple falls been very unsteady on his feet and just not acting like his normal self.  Patient states that he does not feel right endorses nausea no pain no numbness tingling or weakness.    Physical Exam   Triage vitals:  T 35.6 °C (96 °F)  HR (!) 30  BP (!) 235/130  RR 11  O2 100 % None (Room air)    General: Awake, alert, in no acute distress  Eyes: Gaze conjugate.  No scleral icterus or injection PERRLA EOMI  HENT: Normo-cephalic, atraumatic. No stridor  CV: Bradycardic rate, regular rhythm. Radial pulses 2+ bilaterally  Resp: Breathing non-labored, speaking in full sentences.  Clear to auscultation bilaterally  GI: Soft, non-distended, non-tender. No rebound or guarding.  MSK/Extremities: No gross bony deformities. Moving all extremities  Skin: Warm. Appropriate color  Neuro: Alert. Oriented x 3 though globally confused. Face asymmetric with flattening of nasolabial fold. Speech is fluent.  Gross strength and sensation intact in b/l UE and LEs, no neglect.  VAN negative  Psych: Appropriate mood and affect      Medical Decision Making & ED Course   Medical Decision Makin y.o. male who presents for evaluation of encephalopathy emesis and unsteadiness on feet.  " Patient was initially seen as a critical as he collapsed in triage was found to bradycardic.  Initially his blood pressure was not being captured by the automatic cuff and manual was obtained.  Could only obtain blood pressure reading of approximately 100, thought to be systolic no diastolic noted.  With altered mental status bradycardia in the 30s and that low blood pressure atropine was given.  Patient's heart rate responded appropriately to the 60s and repeat blood pressure shows 230s over 110s.  Repeat manual confirms acute hypertension.  At this time we are worried that he has intracranial process including intracranial hemorrhage press metabolic encephalopathy tacrolimus toxicity infection in setting of immunocompromise state such as encephalitis/meningitis.  Afebrile here.  Given severe hypertension and encephalopathy patient was started on Cardene drip.  CT head obtained reveals no acute intracranial hemorrhage no acute ischemic changes.  Patient was Van negative and nonfocal on initial assessment, and without intracranial hemorrhage and lower suspicion for acute stroke given his symptoms had also begun approximately 5 days ago, though acutely worsening over the last day.  With this did order MRI and MRA with plan to obtain instead of CT angiogram for better assessment of any potential vascular pathology, as patient is renal transplant patient and will attempt to avoid contrast if possible.  Patient received lab workup showing mild bump in creatinine no acute leukocytosis or anemia mild thrombocytopenia again electrolyte derangements or metabolic buildup concerning for major contributor to patient's encephalopathy.  He still remains oriented x 3 but just is globally confused and having worsening time following commands.  With blood pressure control patient's mental status did seem to initially improved.  Was then called to bedside as patient was having more difficulty following commands.  He does not appear  significantly different than earlier still believe most likely related to encephalopathy.  Leading diagnosis is press and neurology was consulted.  On reevaluation by the neurology team patient now had a new more focal neuroexam with an NIH of 10.  Patient now has left-sided neglect though no acute sensory deficits.  With this CT head and CT angiogram were obtained showing an acute M2 occlusion.  Patient's last known well is greater than 4-1/2 hours and he is not a TNK candidate.  History last known well on discussion with wife was approximately 5 days ago though he had had an acute worsening since overnight last night into this morning.  Patient was signed out to oncoming ED care team pending disposition and intervention if appropriate by neurointerventional team.  Once acute CVA was noted Cardene was discontinued to allow for permissive hypertension in setting of CVA.  Family updated on plan of care.  ----  Scoring Tools Utilized:   At time of initial assessment 1135 Van negative, NIH 1 for mild facial droop on the left, not acute per family.  NIH 1240 NIH of 2 with facial droop, not following commands,   NIH 1615 10, BAT called        Social Determinants of Health which Significantly Impact Care: None identified     EKG Independent Interpretation: EKG interpreted by myself. Please see ED Course and [facility] for full interpretation.    Independent Result Review and Interpretation: Results were independently reviewed and interpreted by myself. Please see ED course and [facility] for full interpretation.    Chronic conditions affecting the patient's care: As documented in the MDM    The patient was discussed with the following consultants/services: neurology, neurostroke    Care Considerations: As per [facility]    ED Course:  ED Course as of 05/20/25 1624   Mon May 19, 2025   1343 ED Attending Documentation: This patient was seen by the resident physician.  I have seen and examined the patient, agree with the workup, evaluation,  management and diagnosis. I reviewed and edited the above documentation where necessary. I have looked at the EKG and agree with the interpretation. On my evaluation of the patient: 60-year-old male who presents critically ill after he has been home and altered for the last 4 days.  Seen here for hypertension recently workup was unremarkable and he was back to his baseline so discharged home.  The patient since then has had difficulty walking, has been confused for family.  Did slurring his words as well and got to the point this morning where they did not feel like he was safe to walk so brought him in.  In triage she became bradycardic and complained of a sudden headache was markedly hypertensive.  Vital signs initially were consistent with this, bradycardic to the 30s responsive to atropine but the patient was markedly hypertensive requiring nicardipine.  Head CT does not show anything abnormal but the patient continues to be altered even with blood pressure control, hypertensive encephalopathy versus posterior stroke are high in my differential.  Meningitis or encephalitis are also in the differential and based on the patient's MRI, may end up needing a a lumbar puncture.  My suspicion is low enough that I am not going to empirically cover him for meningitis at this time.    Marc Vaca MD  EM Attending Physician   [RG]   1555 ECG 12 lead  EKG sinus rhythm left axis deviation rate of 66 intervals within normal limits no acute ST segment ovation's or T wave inversions the patient does have more prominent T waves in anterolateral leads than previous. [SC]   1654 BLOOD GAS VENOUS FULL PANEL(!)  No acid-base derangement no elevation in lactate no hypo or hyperglycemia [SC]   1655 Protime-INR(!)  Mildly elevated INR [SC]   1655 CBC and Auto Differential(!)  No leukocytosis anemia mild thrombocytopenia [SC]   1655 Comprehensive metabolic panel(!)  Acute creatinine bumped to 2.01 from baseline 1.8, no hepatic  abnormalities, phosphorus of 2.1. [SC]   1655 Tacrolimus level(!)  Mildly elevated 15.4 tacrolimus level the patient did take this morning.  Not true trough [SC]      ED Course User Index  [RG] Marc Vaca MD  [SC] Shanae Bradley DO         Diagnoses as of 05/20/25 1624   Acute ischemic right MCA stroke (Multi)   Acute cerebrovascular accident (CVA) due to embolism of right middle cerebral artery (Multi)     Disposition   Signed out, TBAM    Procedures   Procedures    Patient seen and discussed with ED attending physician.    Shanae Bradley DO  Emergency Medicine     Shanae Bradley DO  Resident  05/20/25 1624

## 2025-05-19 NOTE — H&P
"History Of Present Illness  Davidson Khoury is a 60 y.o. male with a history notable for renal transplant (currently on Cellcept) and hypertension presenting to the ED initially with a chief complaint of altered mental status, headache, and hypertension. Initially evaluated by general neurology team who noted subtle left sided weakness that worsened on follow up exam. BAT activated for concern of stroke.    History primarily from chris at bedside (Margo Cali). He had recent presentation to ED 5/15 for blurry vision, left handed numbness, and headache the day prior that had since resolved except for mild headache. At time reported he had similar symptoms in the past in setting of low blood pressure. Deemed possible TIA and discharged home with plan for outpatient work up.    Since then, chris notes that he has been confused and having frequent falls. This morning she noted h seemed more confused and began \"baby-talking\" prompting her to bring him to ED for evaluation.    Initial vital signs notable for HR 39 and /133. In ED the patient was given atropine and nicardipine, as well as Zofran while in the ED. Temperature was 96, with glucose 118, Cr 2.01, INR 1.2, and Alk Phos 128. BP improved to normotensive ranges. General neurology was consulted for concern of PRES. At that time noted subtle left facial droop and ataxia.    Upon repeat exam by general neurology team, patient had worsening left sided weakness. BAT was called 3:42pm. LKN established as 4:00am when he was ambulating to the bathroom without any clear motor deficit per chris. /82. . CT Head without acute process. CTA with proximal R superior M2 occlusion. CTP with 11ml core infarct, 127ml penumbra, ratio 12.5. Patient deemed not a candidate for TNK as LKN >4.5 hours. Taken for mechanical thrombectomy, TICI 2b.    Last known well: 4am    Had stroke symptoms resolved at time of presentation: No    Past Medical History  Medical " History[1]  Surgical History  Surgical History[2]  Social History  Social History[3]  Allergies  Amoxicillin and Ace inhibitors  Home Medications  Prescriptions Prior to Admission[4]    Review of Systems  Neurological Exam  Physical Exam  GENERAL: Resting comfortably, no acute distress    MENTAL STATE: Orientation was normal to person, place, situation, date. Recent and remote memory was intact. Attention span and concentration were mildly impaired. Language testing was normal for comprehension, naming, repetition and expression. General fund of knowledge was intact.    CRANIAL NERVES:   CN 2 Left homonymous hemianopsia -> left inferior quadrantanopia  CN 3, 4, 6 Pupils round, 5 mm in diameter, equally reactive to light. Lids symmetric; no ptosis. Eyes aligned in primary position. No nystagmus or other fixation instability in primary position. EOMs full range. No gaze-evoked nystagmus. R gaze preference able to overcome volitionally.   CN 5 Facial sensation intact bilaterally to light touch  CN 7 Left upper motor neuron facial droop  CN 8 Hearing intact to conversation.  CN 9 Palate elevates symmetrically.   CN 11 Normal strength of shoulder shrug.  CN 12 Tongue midline, with normal bulk and strength; no fasciculations.     MOTOR: Muscle bulk was normal and tone was normal in both upper and lower extremities. No adventitious movements.                     R L  Delt           5 5  Bicep         5 5-  Tricep        5 5              5 5-    Hip Flex      5 5  Hip Add      5 5  Leg Ext      5 5  Leg Flex     5 5  DF             5 5  PF             5 5     REFLEXES:     R L  BR:  2+ 2+     Biceps:  2+ 2+   Triceps:  2+  2+      Knee:  2+  2+  Ankle:  1+ 1+    Babinski: L great toe extension to plantar stim, flexion on R. No clonus, frontal release signs or other pathologic reflexes present.     SENSORY: Sensory exam was normal. In both upper and lower extremities, sensation was intact to light touch.    COORDINATION:  LUE dysmetria on finger-nose-finger    GAIT: Deferred    NIHSS on Arrival:  1a  Level of consciousness: 1=not alert but arousable by minor stimulation to obey, answer or respond   1b. LOC questions:  0=Performs both tasks correctly   1c. LOC commands: 0=Performs both tasks correctly   2.  Best Gaze: 1=partial gaze palsy   3. Visual: 2=Complete hemianopia   4. Facial Palsy: 2=Partial paralysis (total or near total paralysis of the lower face)   5a. Motor Left Arm: 1=Drift, limb holds 90 (or 45) degrees but drifts down before full 10 seconds: does not hit bed   5b.  Motor Right Arm: 0=No drift, limb holds 90 (or 45) degrees for full 10 seconds   6a. Motor Left Le=Drift, limb holds 90 (or 45) degrees but drifts down before full 10 seconds: does not hit bed   6b  Motor Right Le=No drift, limb holds 90 (or 45) degrees for full 10 seconds   7. Limb Ataxia: 0=Absent   8.  Sensory: 0=Normal; no sensory loss   9. Best Language:  0=No aphasia, normal   10. Dysarthria: 1=Mild to moderate, patient slurs at least some words and at worst, can be understood with some difficulty   11. Extinction and Inattention: 1=Visual, tactile, auditory, spatial or personal inattention or extinction to bilateral simultaneous stimulation in one of the sensory modalities          Total:   10         NIHSS after Intervention:  1a  Level of consciousness: 0=alert; keenly responsive   1b. LOC questions:  0=Performs both tasks correctly   1c. LOC commands: 0=Performs both tasks correctly   2.  Best Gaze: 0=normal   3. Visual: 1=Partial hemianopia   4. Facial Palsy: 1=Minor paralysis (flattened nasolabial fold, asymmetric on smiling)   5a. Motor Left Arm: 1=Drift, limb holds 90 (or 45) degrees but drifts down before full 10 seconds: does not hit bed   5b.  Motor Right Arm: 0=No drift, limb holds 90 (or 45) degrees for full 10 seconds   6a. Motor Left Leg: *testing limited by recent vascular access 3=No effort against gravity, limb falls   6b   "Motor Right Le=No drift, limb holds 90 (or 45) degrees for full 10 seconds   7. Limb Ataxia: 1=Present in one limb   8.  Sensory: 0=Normal; no sensory loss   9. Best Language:  0=No aphasia, normal   10. Dysarthria: 1=Mild to moderate, patient slurs at least some words and at worst, can be understood with some difficulty   11. Extinction and Inattention: 1=Visual, tactile, auditory, spatial or personal inattention or extinction to bilateral simultaneous stimulation in one of the sensory modalities          Total:   9        Last Recorded Vitals  Blood pressure 126/86, pulse 70, temperature 35.6 °C (96 °F), resp. rate 14, height 1.753 m (5' 9\"), weight 81.2 kg (179 lb), SpO2 100%.    Relevant Results  Scheduled medications  Scheduled Medications[5]  Continuous medications  Continuous Medications[6]  PRN medications  PRN Medications[7]    Results for orders placed or performed during the hospital encounter of 25 (from the past 24 hours)   CBC and Auto Differential   Result Value Ref Range    WBC 6.5 4.4 - 11.3 x10*3/uL    nRBC 0.0 0.0 - 0.0 /100 WBCs    RBC 4.85 4.50 - 5.90 x10*6/uL    Hemoglobin 14.6 13.5 - 17.5 g/dL    Hematocrit 43.2 41.0 - 52.0 %    MCV 89 80 - 100 fL    MCH 30.1 26.0 - 34.0 pg    MCHC 33.8 32.0 - 36.0 g/dL    RDW 13.9 11.5 - 14.5 %    Platelets 112 (L) 150 - 450 x10*3/uL    Neutrophils % 57.1 40.0 - 80.0 %    Immature Granulocytes %, Automated 0.2 0.0 - 0.9 %    Lymphocytes % 32.3 13.0 - 44.0 %    Monocytes % 9.7 2.0 - 10.0 %    Eosinophils % 0.5 0.0 - 6.0 %    Basophils % 0.2 0.0 - 2.0 %    Neutrophils Absolute 3.71 1.20 - 7.70 x10*3/uL    Immature Granulocytes Absolute, Automated 0.01 0.00 - 0.70 x10*3/uL    Lymphocytes Absolute 2.09 1.20 - 4.80 x10*3/uL    Monocytes Absolute 0.63 0.10 - 1.00 x10*3/uL    Eosinophils Absolute 0.03 0.00 - 0.70 x10*3/uL    Basophils Absolute 0.01 0.00 - 0.10 x10*3/uL   Comprehensive metabolic panel   Result Value Ref Range    Glucose 118 (H) 74 - 99 " mg/dL    Sodium 139 136 - 145 mmol/L    Potassium 3.6 3.5 - 5.3 mmol/L    Chloride 106 98 - 107 mmol/L    Bicarbonate 24 21 - 32 mmol/L    Anion Gap 13 10 - 20 mmol/L    Urea Nitrogen 16 6 - 23 mg/dL    Creatinine 2.01 (H) 0.50 - 1.30 mg/dL    eGFR 38 (L) >60 mL/min/1.73m*2    Calcium 9.6 8.6 - 10.6 mg/dL    Albumin 4.1 3.4 - 5.0 g/dL    Alkaline Phosphatase 128 (H) 33 - 120 U/L    Total Protein 7.0 6.4 - 8.2 g/dL    AST 14 9 - 39 U/L    Bilirubin, Total 0.8 0.0 - 1.2 mg/dL    ALT 11 10 - 52 U/L   BLOOD GAS VENOUS FULL PANEL   Result Value Ref Range    POCT pH, Venous 7.34 7.33 - 7.43 pH    POCT pCO2, Venous 46 41 - 51 mm Hg    POCT pO2, Venous 55 (H) 35 - 45 mm Hg    POCT SO2, Venous 82 (H) 45 - 75 %    POCT Oxy Hemoglobin, Venous 79.6 (H) 45.0 - 75.0 %    POCT Hematocrit Calculated, Venous 46.0 41.0 - 52.0 %    POCT Sodium, Venous 136 136 - 145 mmol/L    POCT Potassium, Venous 3.8 3.5 - 5.3 mmol/L    POCT Chloride, Venous 105 98 - 107 mmol/L    POCT Ionized Calicum, Venous 1.24 1.10 - 1.33 mmol/L    POCT Glucose, Venous 122 (H) 74 - 99 mg/dL    POCT Lactate, Venous 1.6 0.4 - 2.0 mmol/L    POCT Base Excess, Venous -1.4 -2.0 - 3.0 mmol/L    POCT HCO3 Calculated, Venous 24.8 22.0 - 26.0 mmol/L    POCT Hemoglobin, Venous 15.2 13.5 - 17.5 g/dL    POCT Anion Gap, Venous 10.0 10.0 - 25.0 mmol/L    Patient Temperature 37.0 degrees Celsius    FiO2 21 %   Magnesium   Result Value Ref Range    Magnesium 1.65 1.60 - 2.40 mg/dL   Phosphorus   Result Value Ref Range    Phosphorus 2.1 (L) 2.5 - 4.9 mg/dL   Type and Screen   Result Value Ref Range    ABO TYPE A     Rh TYPE POS     ANTIBODY SCREEN NEG    Tacrolimus level   Result Value Ref Range    Tacrolimus  15.4 (H) <=15.0 ng/mL   Protime-INR   Result Value Ref Range    Protime 12.8 (H) 9.8 - 12.4 seconds    INR 1.2 (H) 0.9 - 1.1   Lipid Panel   Result Value Ref Range    Cholesterol 113 0 - 199 mg/dL    HDL-Cholesterol 33.8 mg/dL    Cholesterol/HDL Ratio 3.3     LDL Calculated  63 <=99 mg/dL    VLDL 17 0 - 40 mg/dL    Triglycerides 83 0 - 149 mg/dL    Non HDL Cholesterol 79 0 - 149 mg/dL   POCT GLUCOSE   Result Value Ref Range    POCT Glucose 100 (H) 74 - 99 mg/dL           NIH Stroke Scale  1A. Level of Consciousness: Arouses to Minor Stimulation (NIH Completed with stroke team)  1B. Ask Month and Age: Both Questions Right  1C. Blink Eyes & Squeeze Hands: Performs Both Tasks  2. Best Gaze: Partial Gaze Palsy  3. Visual: Complete Hemianopia  4. Facial Palsy: Partial Paralysis  5A. Motor - Left Arm: Drift  5B. Motor - Right Arm: No Drift  6A. Motor - Left Leg: Drift  6B. Motor - Right Leg: No Drift  7. Limb Ataxia: Absent  8. Sensory Loss: Normal  9. Best Language: No Aphasia  10. Dysarthria: Mild-to-Moderate Dysarthria  11. Extinction and Inattention: Visual, Tactile, Auditory, Spatial, or Personal Inattention  NIH Stroke Scale: 10    CT head results: CT brain attack head wo IV contrast  Result Date: 5/19/2025  1. No acute hemorrhage, but I suspect an acute nonhemorrhagic infarct in the right MCA vascular territory, superimposed on chronic. This is in the inferior division at the right temporal-parietal junction.. 2. Similar appearance of remote infarcts within the right parietal/occipital lobes as well as bilateral basal ganglia lacunar infarcts. 3. Other chronic findings as above.   I personally reviewed the images/study and I agree with the findings as stated by Narda Hawkins MD (resident).   MACRO: Brian Packer discussed the significance and urgency of this critical finding by telephone with Dr. Irvin on 5/19/2025 at 4:22 pm.  (**-RCF-**) Findings:  See findings.     Signed by: Brian Packer 5/19/2025 4:22 PM Dictation workstation:   [id number]    CT head wo IV contrast  Result Date: 5/19/2025  No acute intracranial hemorrhage or mass effect.   Similar appearance of suspected remote infarcts within the cortex of the right parietal and occipital lobes with beam hardening  "artifact on previous examination obscuring evaluation. Consider MRI for further evaluation based on clinical concern. There are also unchanged remote appearing bilateral basal ganglia lacunar infarcts and moderate chronic small vessel ischemic change.   MACRO: None   Signed by: Blayne Peguero 5/19/2025 12:10 PM Dictation workstation:   [id number]    CT head wo IV contrast  Result Date: 5/15/2025  No evidence of acute cortical infarct or intracranial hemorrhage.       MACRO: None   Signed by: Jered Lara 5/15/2025 8:36 PM Dictation workstation:   [id number]   CT brain angio imaging results:   1. CTA of Neck arteries demonstrates no significant flow-limiting   stenosis or dissection.   2. CTA of Intracranial arteries demonstrates occlusion of the left   middle cerebral artery, distal M1 segment, and occlusion of the   proximal inferior division M2 branch. This correlates well with the   head CT study.   3. Multifocal stenosis in the posterior circulation from   atherosclerosis, including 50% stenosis of basilar artery and   moderate stenosis of right PCA.     Personally reviewed, occlusion in R MCA          No results found for: \"BNP\"      Stroke Alert CT/MRI review: Was interpreted as it was being performed     IV Thrombolysis  IV Thrombolysis Given: No; Thrombolysis contraindication reason: Time from Last Known Well (or stroke onset) is >4.5 hours   Did the patient receive endovascular therapy: Yes Were there any delays: No       Assessment/Plan   Assessment & Plan  Acute cerebrovascular accident (CVA) due to embolism of right middle cerebral artery (Multi)      Davidson Khoury is a 60 y.o. male with a history notable for renal transplant (currently on Cellcept) and hypertension presenting to the ED initially with a chief complaint of altered mental status, headache, and hypertension. Initially evaluated by general neurology team who noted subtle left sided weakness that worsened on follow up exam. BAT activated " for concern of stroke. Found to have proximal R superior M2 occlusion. Not a candidate for TNK as LKN >4.5 hours. Taken for MT, TICI 2b. Will admit to [facility] for post-MT care and stroke work up.    Stroke Metrics:  EMS pre-notification?: No  Time of stroke team arrival: 3:47pm  Direct to CT?: No  Time of CTH read by stroke team: As performed  Time of TNK: n/a  Time stroke team called IR: 4:27pm  Time pt arrived to angio suite: 4:57pm  Time of groin puncture: 5:10pm  Time of recanalization: 5:31pm  Number of passes: 1  Device used: penumbra aspiration  Any delays in the process (if door to TNK >30 min): n/a    Stroke Classification:  Type: Ischemic stroke  Subtype/etiology: Suspected large artery disease  Vessels involved: R MCA  Neurological manifestations:  Pre-Intervention Deficits: NIHSS 10 *see above  Post-Intervention Deficits: NIHSS 9 *see above  Pre-stroke mRS: 0  Initial treatment: Angio  Anti-platelets or Anti-coagulation management: Aspirin  Vascular Risk Factors: HTN  Antiplatelet/antithrombotic plan for stroke prevention: Aspirin  VTE prophylaxis: Lovenox  Vascular Risk Factor modification goals:  Blood pressure goals: avoid hypotension SBP <100 that could worsen cerebral perfusion, Ischemic stroke post-thrombectomy SBP <180mmHg   Lipid Goals: education on healthy diet and statin therapy to maintain or achieve goal LDL-cholesterol < 70mg.  Glucose Goals: early treatment of hyperglycemia to goal glucose 140-180 mg/dl with long-term goal A1c < 7%   Smoking Cessation and Education  Assessment for Rehabilitation needs   Patient and family education on signs and symptoms of stroke, calling 911, healthy strategies for stroke prevention.      NEURO:  #R M2 occlusion  Assessment:  - Proximal R superior M2 occlusion on CTA, perfusion with 11ml core, 127ml penumbra  - s/p MT TICI 2b, no TNK as LKN >4.5hrs  - Contrast staining seen on XPERT post-MT  - Likely mechanism large artery disease given significant  intracranial atherosclerosis seen on CT  Plan:  - NSU  - Neuro Checks: Q1H  - Sedation: none  - Repeat CT Head 6 hours after MT  - MRI for stroke burden  - Stroke work up   - A1c, LDL   - TTE with bubble study   - EKG, troponin, BNP  - SBP goals post-thrombectomy SBP <180mmHg  - PT/OT/SLP    CARDIOVASCULAR:  #HTN  #Bradycardia  Assessment:  - Presented with /133  - ECHO: pending    Plan:  - Continue to monitor on telemetry  - BP goal: SBP < 180    --> PRN: Labetalol, Hydralazine, and Nicardipine   - Hold home clonidine and losartan    RESPIRATORY:  #No active issues  Assessment:  Plan:  - Continuous pulse oximetry   - O2 PRN to maintain SpO2 > 94%, wean as tolerated  - Incentive spirometry while awake    RENAL/:  #ESRD 2/2 HTN s/p DDKT 2019  Assessment:  - Baseline BUN/Cr: 22-25/1.8-2.0  Results from last 72 hours   Lab Units 05/19/25  1141   BUN mg/dL 16   CREATININE mg/dL 2.01*       Plan:  - Monitor with daily RFP  - Transplant surgery consulted, appreciate recs  - Continue home mycophenolate mofetil 750mg BID  - Tacrolimus level given patient reporting not taking  - IV fluids given contrast boluses with DDKT  - Void trial with bladder scan and straight catheterization per NSU protocol    FEN/GI:  #No active issues  Assessment:  -    -   Plan:  - Monitor and replace electrolytes per protocol  - IVF: NS @ 75 mL/hr  - Diet: NPO pending bedside swallow   - Bowel Regimen: Miralax QD    ENDOCRINE:  #No active issues  Assessment:  Results from last 7 days   Lab Units 05/19/25  1623 05/19/25  1141 05/15/25  1838   POCT GLUCOSE mg/dL 100*  --   --    GLUCOSE mg/dL  --  118* 101*      Plan:  - Accuchecks & ISS AC&HS     HEMATOLOGY:  #No active issues  Assessment:  - Baseline Hgb: 14  - Baseline Plts: 130-150  Results from last 7 days   Lab Units 05/19/25  1141 05/15/25  1838   HEMOGLOBIN g/dL 14.6 14.3   HEMATOCRIT % 43.2 41.9   PLATELETS AUTO x10*3/uL 112* 97*     Plan:  - Continue to monitor with daily CBC and  Coag panel    INFECTIOUS DISEASE:  #No active issues  Assessment:  Results from last 7 days   Lab Units 25  1141 05/15/25  1838   WBC AUTO x10*3/uL 6.5 5.5    - Temp (24hrs), Av.6 °C (96 °F), Min:35.6 °C (96 °F), Max:35.6 °C (96 °F)     - On immunosuppression for DDKT  Plan:  - Continue to monitor for s/sx of infection  - Pan culture for temperature > 38.4 C    MUSCULOSKELETAL:  - No acute issues    SKIN:  - No acute issues  - Turns and skin care per NSU protocol    ACCESS:  - pIV    PROPHYLAXIS:  - DVT Ppx: SCDs and SQ Enoxaparin    RESTRAINTS:  Not indicated/Patient does not meet criteria for restraints    Code Status: Full Code (confirmed on admission)  NOK: Margo Cali ([facility]) [phone]     Thomas Hardin MD  Neurology PGY-3    Patient to be staffed with stroke attending Dr. Phillips in the morning.                 [1]   Past Medical History:  Diagnosis Date    Personal history of other diseases of urinary system     History of chronic kidney disease    Personal history of other specified conditions 2022    History of urinary retention    Personal history of other specified conditions 2019    History of urinary retention   [2]   Past Surgical History:  Procedure Laterality Date    OTHER SURGICAL HISTORY  2016    Myringotomy - With Eustachian Tube Inflation    OTHER SURGICAL HISTORY  2016    Arteriovenous Surgery Creation Of A-V Fistula    OTHER SURGICAL HISTORY  2019    Kidney transplantation   [3]   Social History  Tobacco Use    Smoking status: Former     Types: Cigarettes    Smokeless tobacco: Never   Substance Use Topics    Alcohol use: Never    Drug use: Never   [4] (Not in a hospital admission)  [5] aspirin, 81 mg, oral, Daily   Or  aspirin, 81 mg, oral, Daily   Or  aspirin, 81 mg, nasogastric tube, Daily   Or  aspirin, 300 mg, rectal, Daily  enoxaparin, 40 mg, subcutaneous, q24h  mycophenolate, 750 mg, oral, BID  perflutren lipid microspheres, 0.5-10 mL  of dilution, intravenous, Once in imaging  perflutren protein A microsphere, 0.5 mL, intravenous, Once in imaging  polyethylene glycol, 17 g, oral, Daily  potassium chloride CR, 20 mEq, oral, Daily  potassium phosphate (monobasic), 1,000 mg, oral, Once  rosuvastatin, 20 mg, oral, Nightly  sulfur hexafluoride microsphr, 2 mL, intravenous, Once in imaging  [6] niCARdipine, 2.5-15 mg/hr  sodium chloride 0.9%, 75 mL/hr, Last Rate: 75 mL/hr (05/19/25 1829)  [7] PRN medications: hydrALAZINE **FOLLOWED BY** [START ON 5/21/2025] hydrALAZINE, labetaloL, oxygen

## 2025-05-19 NOTE — ED PROCEDURE NOTE
Procedure  Critical Care    Performed by: Marc Vaca MD  Authorized by: Marc Vaca MD    Critical care provider statement:     Critical care time (minutes):  35    Critical care time was exclusive of:  Separately billable procedures and treating other patients and teaching time    Critical care was necessary to treat or prevent imminent or life-threatening deterioration of the following conditions:  CNS failure or compromise    Critical care was time spent personally by me on the following activities:  Development of treatment plan with patient or surrogate, discussions with consultants, discussions with primary provider, evaluation of patient's response to treatment, examination of patient, ordering and review of laboratory studies, ordering and review of radiographic studies, re-evaluation of patient's condition, obtaining history from patient or surrogate and ordering and performing treatments and interventions    Care discussed with: admitting provider               Marc Vaca MD  05/19/25 1272

## 2025-05-19 NOTE — PROCEDURES
Pre-Procedure Verification and Time Out:  Procedure Location: procedure area  HUDDLE - Pre-procedure Verification:  completed  TIME OUT - Final Verification:  completed immediately prior to procedure start  DEBRIEF: completed    Complications:  None; patient tolerated the procedure well.     Disposition: ED  Condition: stable  Specimens Collected: No specimens collected    General Information:   Anesthesia/ sedation: Non-Anesthesia  Indication(s)/Pre - Procedure Diagnoses: stroke  Post-Procedure Diagnosis: same  Procedure Name: Diagnostic Cerebral Angiogram, mechanical thrombectomy  Procedure performed by: Dr. Keny Bernal  Assistant(s): Gary  Estimated Blood Loss (mL): 50  Specimen: no  Informed Consent: consent obtained and in chart     Last Known Normal:   Access: 8 Fr Sheath L CFA   Closure: Mynx  Vessels injected: R ICA, L CFA  Groin puncture time: 1710  Findings: R dominant M2 origin occlusion  Initial Thrombectomy Time: 1728  Additional Thrombectomy Times (Times for all device passes): n/a  Revascularization Time: 17:31  Mechanical Thrombectomy Device: penumbra aspiration  TICI Pre: 0  TICI Post: 2b  Post-procedure BP parameters: SBP <160  Full report to follow in PACS.

## 2025-05-19 NOTE — Clinical Note
Diagnostic cerebral angiogram and mechanical thrombectomy performed. Access via L femoral artery. Patient received 1mg versed, 50mcg fentanyl IVP for sedation during case. L femoral closure with 6/7Fr Mynx. Hemostasis achieved. Mynx deployment @1745 and 3 hour ambulation @2045. Neurovascular flowsheet started @1745 and handed off to Davidson ARGUELLES ED RN bed 18. During bedside handoff. L leg immobilizer applied. VSS throughout procedure.

## 2025-05-19 NOTE — PRE-PROCEDURE NOTE
Pre-Procedure H&P     Provider Assessment:  Diagnosis/Reason for Procedure: stroke  Procedure: Diagnostic Cerebral Angiogram, mechanical thrombectomy  Medications Reviewed:   yes   Prophylatic Antibiotics Needed:   no    Neuro status: A&Ox3, moving all extremities R>L  Mouth Opening OK: yes   Neck Flexibility OK: yes   Sedation Plan: moderate sedation   COVID-19 Risk Consent:  Surgeon has reviewed key risks related to the risk of husam COVID-19 and if they contract COVID-19 what the risks are.

## 2025-05-19 NOTE — PROGRESS NOTES
Pharmacy Medication History Review    Davidson Khoury is a 60 y.o. male admitted for Acute cerebrovascular accident (CVA) due to embolism of right middle cerebral artery (Multi). Pharmacy reviewed the patient's [id number] medications and allergies for accuracy.    Medications ADDED  N/A  Medications CHANGED  N/A  Medications REMOVED/NOT TAKING   Losartan 25 mg     The list below reflects the updated PTA list.   Prior to Admission Medications   Prescriptions Last Dose Informant   atorvastatin (Lipitor) 20 mg tablet  Self, Spouse/Significant Other, Other   Sig: Take 1 tablet (20 mg) by mouth once daily.   cetirizine (ZyrTEC) 10 mg tablet  Self, Spouse/Significant Other, Other   Sig: Take 1 tablet (10 mg) by mouth once daily as needed for allergies or rhinitis. Every morning as needed   cloNIDine (Catapres) 0.2 mg tablet 5/19/2025 Self, Spouse/Significant Other, Other   Sig: TAKE 1 TABLET TWICE A DAY   furosemide (Lasix) 20 mg tablet  Self, Spouse/Significant Other, Other   Sig: Take 1 tablet (20 mg) by mouth once daily as needed (for swelling).   losartan (Cozaar) 25 mg tablet Not Taking Self, Spouse/Significant Other, Other   Sig: TAKE 1 TABLET BY MOUTH TWICE A DAY   Patient not taking: Reported on 5/19/2025  Last filled 10/31/24 for 90 day supply    mycophenolate (Cellcept) 250 mg capsule 5/19/2025 Self, Spouse/Significant Other, Other   Sig: Take three (3) capsules by mouth twice daily   potassium chloride CR 20 mEq ER tablet 5/19/2025 Self, Spouse/Significant Other, Other   Sig: TAKE 1 TABLET (20 MEQ) BY MOUTH ONCE DAILY. DO NOT CRUSH OR CHEW.   tacrolimus (Prograf) 0.5 mg capsule 5/19/2025 Self, Spouse/Significant Other, Other   Sig: TAKE 1 CAPSULE (0.5 MG) BY MOUTH 2 TIMES A DAY.   tacrolimus (Prograf) 1 mg capsule 5/19/2025 Self, Spouse/Significant Other, Other   Sig: TAKE 2 CAPSULES (2 MG) BY MOUTH 2 TIMES A DAY.   tamsulosin (Flomax) 0.4 mg 24 hr capsule  Self, Spouse/Significant Other, Other   Sig:  TAKE ONE (1) CAPSULE BY MOUTH EVERYDAY AT BEDTIME.      Facility-Administered Medications: None       The list below reflects the updated allergy list. Please review each documented allergy for additional clarification and justification.  Allergies  Reviewed by Jeffrey Aleman IV, RN on 5/19/2025        Severity Reactions Comments    Amoxicillin Medium Other, Nausea Only vomiting    Ace Inhibitors Not Specified Angioedema             Patient accepts M2B at discharge. Pharmacy has been updated to [facility].    Sources  [facility]  Pharmacy dispense history  Patient Interview Moderate historian  John at bedside providing some information    Additional Comments  See highlighted sections in above table     Jacky Howe, PharmD  [facility]  [id number]  [facility], Room# 8808  [place] [id number]  Phone: [phone]  Please reach out via Secure Chat for questions, or if no response call [employer] or [employer]

## 2025-05-19 NOTE — ED TRIAGE NOTES
"Pt presents to the ED for complaints of issues with his blood pressure. Pt states he was just seen and released on the 15th of May for the same thing. Pt has been checking his pressures at home and states they have been up and down. Pt is also a kidney transplant patient that was done in 2020. Pt 's family member also states that the patient has been having a lot of coordination issues at home and has been feeling \"off\". Pt denies chest pain or SOB at this time. Pt states he has been feeling off since the 17th of May the day after he was seen here and released.   "

## 2025-05-20 ENCOUNTER — APPOINTMENT (OUTPATIENT)
Dept: RADIOLOGY | Facility: HOSPITAL | Age: 60
DRG: 023 | End: 2025-05-20
Payer: COMMERCIAL

## 2025-05-20 ENCOUNTER — APPOINTMENT (OUTPATIENT)
Dept: CARDIOLOGY | Facility: HOSPITAL | Age: 60
End: 2025-05-20
Payer: COMMERCIAL

## 2025-05-20 LAB
ALBUMIN SERPL BCP-MCNC: 3.8 G/DL (ref 3.4–5)
ANION GAP SERPL CALC-SCNC: 13 MMOL/L (ref 10–20)
ATRIAL RATE: 62 BPM
BASOPHILS # BLD MANUAL: 0 X10*3/UL (ref 0–0.1)
BASOPHILS NFR BLD MANUAL: 0 %
BLASTS # BLD MANUAL: 0 X10*3/UL
BLASTS NFR BLD MANUAL: 0 %
BUN SERPL-MCNC: 17 MG/DL (ref 6–23)
BURR CELLS BLD QL SMEAR: ABNORMAL
CA-I BLD-SCNC: 1.09 MMOL/L (ref 1.1–1.33)
CALCIUM SERPL-MCNC: 8.3 MG/DL (ref 8.6–10.6)
CHLORIDE SERPL-SCNC: 109 MMOL/L (ref 98–107)
CO2 SERPL-SCNC: 23 MMOL/L (ref 21–32)
CREAT SERPL-MCNC: 1.59 MG/DL (ref 0.5–1.3)
DACRYOCYTES BLD QL SMEAR: ABNORMAL
EGFRCR SERPLBLD CKD-EPI 2021: 50 ML/MIN/1.73M*2
EOSINOPHIL # BLD MANUAL: 0 X10*3/UL (ref 0–0.7)
EOSINOPHIL NFR BLD MANUAL: 0 %
ERYTHROCYTE [DISTWIDTH] IN BLOOD BY AUTOMATED COUNT: 14.5 % (ref 11.5–14.5)
EST. AVERAGE GLUCOSE BLD GHB EST-MCNC: 103 MG/DL
GLUCOSE BLD MANUAL STRIP-MCNC: 100 MG/DL (ref 74–99)
GLUCOSE BLD MANUAL STRIP-MCNC: 106 MG/DL (ref 74–99)
GLUCOSE BLD MANUAL STRIP-MCNC: 122 MG/DL (ref 74–99)
GLUCOSE SERPL-MCNC: 80 MG/DL (ref 74–99)
HBA1C MFR BLD: 5.2 % (ref ?–5.7)
HCT VFR BLD AUTO: 41.4 % (ref 41–52)
HGB BLD-MCNC: 13.2 G/DL (ref 13.5–17.5)
IMM GRANULOCYTES # BLD AUTO: 0.01 X10*3/UL (ref 0–0.7)
IMM GRANULOCYTES NFR BLD AUTO: 0.2 % (ref 0–0.9)
LYMPHOCYTES # BLD MANUAL: 0.21 X10*3/UL (ref 1.2–4.8)
LYMPHOCYTES NFR BLD MANUAL: 4.4 %
MAGNESIUM SERPL-MCNC: 1.44 MG/DL (ref 1.6–2.4)
MCH RBC QN AUTO: 30.1 PG (ref 26–34)
MCHC RBC AUTO-ENTMCNC: 31.9 G/DL (ref 32–36)
MCV RBC AUTO: 94 FL (ref 80–100)
METAMYELOCYTES # BLD MANUAL: 0 X10*3/UL
METAMYELOCYTES NFR BLD MANUAL: 0 %
MONOCYTES # BLD MANUAL: 0.08 X10*3/UL (ref 0.1–1)
MONOCYTES NFR BLD MANUAL: 1.8 %
MYELOCYTES # BLD MANUAL: 0 X10*3/UL
MYELOCYTES NFR BLD MANUAL: 0 %
NEUTROPHILS # BLD MANUAL: 4.24 X10*3/UL (ref 1.2–7.7)
NEUTS BAND # BLD MANUAL: 0.08 X10*3/UL (ref 0–0.7)
NEUTS BAND NFR BLD MANUAL: 1.8 %
NEUTS SEG # BLD MANUAL: 4.16 X10*3/UL (ref 1.2–7)
NEUTS SEG NFR BLD MANUAL: 88.5 %
NRBC BLD MANUAL-RTO: 0 % (ref 0–0)
NRBC BLD-RTO: 0 /100 WBCS (ref 0–0)
OVALOCYTES BLD QL SMEAR: ABNORMAL
P AXIS: 69 DEGREES
P OFFSET: 175 MS
P ONSET: 137 MS
PHOSPHATE SERPL-MCNC: 2.1 MG/DL (ref 2.5–4.9)
PLASMA CELLS # BLD MANUAL: 0 X10*3/UL
PLASMA CELLS NFR BLD MANUAL: 0 %
PLATELET # BLD AUTO: 83 X10*3/UL (ref 150–450)
POTASSIUM SERPL-SCNC: 3.5 MMOL/L (ref 3.5–5.3)
PR INTERVAL: 146 MS
PROMYELOCYTES # BLD MANUAL: 0 X10*3/UL
PROMYELOCYTES NFR BLD MANUAL: 0 %
Q ONSET: 210 MS
QRS COUNT: 10 BEATS
QRS DURATION: 98 MS
QT INTERVAL: 426 MS
QTC CALCULATION(BAZETT): 432 MS
QTC FREDERICIA: 430 MS
R AXIS: -19 DEGREES
RBC # BLD AUTO: 4.39 X10*6/UL (ref 4.5–5.9)
RBC MORPH BLD: ABNORMAL
SODIUM SERPL-SCNC: 141 MMOL/L (ref 136–145)
T AXIS: -1 DEGREES
T OFFSET: 423 MS
TACROLIMUS BLD-MCNC: 11.1 NG/ML
TOTAL CELLS COUNTED BLD: 113
VARIANT LYMPHS # BLD MANUAL: 0.16 X10*3/UL (ref 0–0.5)
VARIANT LYMPHS NFR BLD: 3.5 %
VENTRICULAR RATE: 62 BPM
WBC # BLD AUTO: 4.7 X10*3/UL (ref 4.4–11.3)

## 2025-05-20 PROCEDURE — 74018 RADEX ABDOMEN 1 VIEW: CPT | Performed by: RADIOLOGY

## 2025-05-20 PROCEDURE — 2500000004 HC RX 250 GENERAL PHARMACY W/ HCPCS (ALT 636 FOR OP/ED): Mod: JZ

## 2025-05-20 PROCEDURE — 85027 COMPLETE CBC AUTOMATED: CPT | Performed by: REGISTERED NURSE

## 2025-05-20 PROCEDURE — 36415 COLL VENOUS BLD VENIPUNCTURE: CPT | Performed by: REGISTERED NURSE

## 2025-05-20 PROCEDURE — 97161 PT EVAL LOW COMPLEX 20 MIN: CPT | Mod: GP

## 2025-05-20 PROCEDURE — 2500000004 HC RX 250 GENERAL PHARMACY W/ HCPCS (ALT 636 FOR OP/ED): Performed by: REGISTERED NURSE

## 2025-05-20 PROCEDURE — 83036 HEMOGLOBIN GLYCOSYLATED A1C: CPT

## 2025-05-20 PROCEDURE — 70551 MRI BRAIN STEM W/O DYE: CPT | Performed by: RADIOLOGY

## 2025-05-20 PROCEDURE — 70450 CT HEAD/BRAIN W/O DYE: CPT | Performed by: RADIOLOGY

## 2025-05-20 PROCEDURE — 2500000005 HC RX 250 GENERAL PHARMACY W/O HCPCS: Performed by: REGISTERED NURSE

## 2025-05-20 PROCEDURE — 2500000004 HC RX 250 GENERAL PHARMACY W/ HCPCS (ALT 636 FOR OP/ED)

## 2025-05-20 PROCEDURE — 83735 ASSAY OF MAGNESIUM: CPT | Performed by: REGISTERED NURSE

## 2025-05-20 PROCEDURE — 82947 ASSAY GLUCOSE BLOOD QUANT: CPT

## 2025-05-20 PROCEDURE — 74018 RADEX ABDOMEN 1 VIEW: CPT

## 2025-05-20 PROCEDURE — 2500000002 HC RX 250 W HCPCS SELF ADMINISTERED DRUGS (ALT 637 FOR MEDICARE OP, ALT 636 FOR OP/ED)

## 2025-05-20 PROCEDURE — 99291 CRITICAL CARE FIRST HOUR: CPT

## 2025-05-20 PROCEDURE — C8929 TTE W OR WO FOL WCON,DOPPLER: HCPCS

## 2025-05-20 PROCEDURE — 93306 TTE W/DOPPLER COMPLETE: CPT | Performed by: INTERNAL MEDICINE

## 2025-05-20 PROCEDURE — 70450 CT HEAD/BRAIN W/O DYE: CPT

## 2025-05-20 PROCEDURE — 36415 COLL VENOUS BLD VENIPUNCTURE: CPT

## 2025-05-20 PROCEDURE — 82330 ASSAY OF CALCIUM: CPT | Performed by: REGISTERED NURSE

## 2025-05-20 PROCEDURE — 92526 ORAL FUNCTION THERAPY: CPT | Mod: GN

## 2025-05-20 PROCEDURE — 2500000001 HC RX 250 WO HCPCS SELF ADMINISTERED DRUGS (ALT 637 FOR MEDICARE OP): Performed by: REGISTERED NURSE

## 2025-05-20 PROCEDURE — 80197 ASSAY OF TACROLIMUS: CPT

## 2025-05-20 PROCEDURE — 84100 ASSAY OF PHOSPHORUS: CPT | Performed by: REGISTERED NURSE

## 2025-05-20 PROCEDURE — 70551 MRI BRAIN STEM W/O DYE: CPT

## 2025-05-20 PROCEDURE — 2020000001 HC ICU ROOM DAILY

## 2025-05-20 PROCEDURE — 99222 1ST HOSP IP/OBS MODERATE 55: CPT | Performed by: INTERNAL MEDICINE

## 2025-05-20 PROCEDURE — 2500000001 HC RX 250 WO HCPCS SELF ADMINISTERED DRUGS (ALT 637 FOR MEDICARE OP)

## 2025-05-20 PROCEDURE — 92610 EVALUATE SWALLOWING FUNCTION: CPT | Mod: GN

## 2025-05-20 PROCEDURE — 85007 BL SMEAR W/DIFF WBC COUNT: CPT | Performed by: REGISTERED NURSE

## 2025-05-20 RX ORDER — LORAZEPAM 2 MG/ML
1 INJECTION INTRAMUSCULAR ONCE
Status: DISCONTINUED | OUTPATIENT
Start: 2025-05-20 | End: 2025-05-21

## 2025-05-20 RX ORDER — POTASSIUM CHLORIDE 1.5 G/1.58G
20 POWDER, FOR SOLUTION ORAL EVERY 6 HOURS PRN
Status: DISCONTINUED | OUTPATIENT
Start: 2025-05-20 | End: 2025-05-21

## 2025-05-20 RX ORDER — LABETALOL HYDROCHLORIDE 5 MG/ML
10 INJECTION, SOLUTION INTRAVENOUS EVERY 10 MIN PRN
Status: DISPENSED | OUTPATIENT
Start: 2025-05-20 | End: 2025-05-21

## 2025-05-20 RX ORDER — POTASSIUM CHLORIDE 14.9 MG/ML
20 INJECTION INTRAVENOUS ONCE
Status: DISCONTINUED | OUTPATIENT
Start: 2025-05-20 | End: 2025-05-20

## 2025-05-20 RX ORDER — HYDRALAZINE HYDROCHLORIDE 20 MG/ML
10 INJECTION INTRAMUSCULAR; INTRAVENOUS
Status: DISPENSED | OUTPATIENT
Start: 2025-05-20 | End: 2025-05-21

## 2025-05-20 RX ORDER — NICARDIPINE HYDROCHLORIDE 0.2 MG/ML
2.5-15 INJECTION INTRAVENOUS CONTINUOUS
Status: DISCONTINUED | OUTPATIENT
Start: 2025-05-20 | End: 2025-05-21

## 2025-05-20 RX ORDER — CALCIUM GLUCONATE 20 MG/ML
2 INJECTION, SOLUTION INTRAVENOUS EVERY 6 HOURS PRN
Status: DISCONTINUED | OUTPATIENT
Start: 2025-05-20 | End: 2025-05-21

## 2025-05-20 RX ORDER — ACETAMINOPHEN 325 MG/1
650 TABLET ORAL EVERY 6 HOURS PRN
Status: DISCONTINUED | OUTPATIENT
Start: 2025-05-20 | End: 2025-06-04 | Stop reason: HOSPADM

## 2025-05-20 RX ORDER — MAGNESIUM SULFATE HEPTAHYDRATE 40 MG/ML
2 INJECTION, SOLUTION INTRAVENOUS EVERY 6 HOURS PRN
Status: DISCONTINUED | OUTPATIENT
Start: 2025-05-20 | End: 2025-05-21

## 2025-05-20 RX ORDER — POTASSIUM CHLORIDE 20 MEQ/1
20 TABLET, EXTENDED RELEASE ORAL EVERY 6 HOURS PRN
Status: DISCONTINUED | OUTPATIENT
Start: 2025-05-20 | End: 2025-05-21

## 2025-05-20 RX ORDER — POTASSIUM CHLORIDE 20 MEQ/1
40 TABLET, EXTENDED RELEASE ORAL EVERY 6 HOURS PRN
Status: DISCONTINUED | OUTPATIENT
Start: 2025-05-20 | End: 2025-05-21

## 2025-05-20 RX ORDER — MAGNESIUM SULFATE HEPTAHYDRATE 40 MG/ML
4 INJECTION, SOLUTION INTRAVENOUS EVERY 6 HOURS PRN
Status: DISCONTINUED | OUTPATIENT
Start: 2025-05-20 | End: 2025-05-21

## 2025-05-20 RX ORDER — LIDOCAINE HYDROCHLORIDE 20 MG/ML
1 JELLY TOPICAL ONCE
Status: DISCONTINUED | OUTPATIENT
Start: 2025-05-20 | End: 2025-06-04 | Stop reason: HOSPADM

## 2025-05-20 RX ORDER — ONDANSETRON HYDROCHLORIDE 2 MG/ML
INJECTION, SOLUTION INTRAVENOUS
Status: DISPENSED
Start: 2025-05-20 | End: 2025-05-21

## 2025-05-20 RX ORDER — ONDANSETRON HYDROCHLORIDE 2 MG/ML
4 INJECTION, SOLUTION INTRAVENOUS ONCE
Status: DISCONTINUED | OUTPATIENT
Start: 2025-05-20 | End: 2025-06-04 | Stop reason: HOSPADM

## 2025-05-20 RX ORDER — ONDANSETRON 4 MG/1
4 TABLET, FILM COATED ORAL EVERY 8 HOURS PRN
Status: DISCONTINUED | OUTPATIENT
Start: 2025-05-20 | End: 2025-06-04 | Stop reason: HOSPADM

## 2025-05-20 RX ORDER — LABETALOL HYDROCHLORIDE 5 MG/ML
10 INJECTION, SOLUTION INTRAVENOUS EVERY 10 MIN PRN
Status: DISCONTINUED | OUTPATIENT
Start: 2025-05-20 | End: 2025-05-20

## 2025-05-20 RX ORDER — POTASSIUM CHLORIDE 1.5 G/1.58G
40 POWDER, FOR SOLUTION ORAL EVERY 6 HOURS PRN
Status: DISCONTINUED | OUTPATIENT
Start: 2025-05-20 | End: 2025-05-21

## 2025-05-20 RX ORDER — ONDANSETRON HYDROCHLORIDE 2 MG/ML
4 INJECTION, SOLUTION INTRAVENOUS EVERY 4 HOURS PRN
Status: DISCONTINUED | OUTPATIENT
Start: 2025-05-20 | End: 2025-06-04 | Stop reason: HOSPADM

## 2025-05-20 RX ORDER — CALCIUM GLUCONATE 20 MG/ML
1 INJECTION, SOLUTION INTRAVENOUS EVERY 6 HOURS PRN
Status: DISCONTINUED | OUTPATIENT
Start: 2025-05-20 | End: 2025-05-21

## 2025-05-20 RX ORDER — OXYCODONE HYDROCHLORIDE 5 MG/1
2.5 TABLET ORAL EVERY 6 HOURS PRN
Refills: 0 | Status: DISCONTINUED | OUTPATIENT
Start: 2025-05-20 | End: 2025-06-04 | Stop reason: HOSPADM

## 2025-05-20 RX ORDER — QUETIAPINE FUMARATE 25 MG/1
25 TABLET, FILM COATED ORAL ONCE
Status: DISCONTINUED | OUTPATIENT
Start: 2025-05-20 | End: 2025-05-21

## 2025-05-20 RX ORDER — OXYCODONE HYDROCHLORIDE 5 MG/1
5 TABLET ORAL EVERY 6 HOURS PRN
Refills: 0 | Status: DISCONTINUED | OUTPATIENT
Start: 2025-05-20 | End: 2025-06-04 | Stop reason: HOSPADM

## 2025-05-20 RX ORDER — ONDANSETRON HYDROCHLORIDE 2 MG/ML
8 INJECTION, SOLUTION INTRAVENOUS ONCE
Status: DISCONTINUED | OUTPATIENT
Start: 2025-05-20 | End: 2025-05-20

## 2025-05-20 RX ORDER — HYDRALAZINE HYDROCHLORIDE 25 MG/1
25 TABLET, FILM COATED ORAL EVERY 6 HOURS PRN
Status: DISCONTINUED | OUTPATIENT
Start: 2025-05-21 | End: 2025-06-04 | Stop reason: HOSPADM

## 2025-05-20 RX ADMIN — CALCIUM GLUCONATE 1 G: 20 INJECTION, SOLUTION INTRAVENOUS at 09:48

## 2025-05-20 RX ADMIN — HYDRALAZINE HYDROCHLORIDE 10 MG: 20 INJECTION INTRAMUSCULAR; INTRAVENOUS at 06:56

## 2025-05-20 RX ADMIN — ACETAMINOPHEN 650 MG: 325 TABLET ORAL at 22:03

## 2025-05-20 RX ADMIN — OXYCODONE 5 MG: 5 TABLET ORAL at 23:23

## 2025-05-20 RX ADMIN — ROSUVASTATIN CALCIUM 20 MG: 20 TABLET, FILM COATED ORAL at 22:00

## 2025-05-20 RX ADMIN — MYCOPHENOLATE MOFETIL 750 MG: 250 CAPSULE ORAL at 14:08

## 2025-05-20 RX ADMIN — SODIUM CHLORIDE 75 ML/HR: 0.9 INJECTION, SOLUTION INTRAVENOUS at 09:49

## 2025-05-20 RX ADMIN — MYCOPHENOLATE MOFETIL 750 MG: 250 CAPSULE ORAL at 22:00

## 2025-05-20 RX ADMIN — ASPIRIN 300 MG: 300 SUPPOSITORY RECTAL at 11:27

## 2025-05-20 RX ADMIN — POTASSIUM CHLORIDE 20 MEQ: 14.9 INJECTION, SOLUTION INTRAVENOUS at 11:24

## 2025-05-20 RX ADMIN — LABETALOL HYDROCHLORIDE 10 MG: 5 INJECTION, SOLUTION INTRAVENOUS at 23:55

## 2025-05-20 RX ADMIN — TACROLIMUS 2 MG: 5 CAPSULE, GELATIN COATED ORAL at 17:56

## 2025-05-20 RX ADMIN — POTASSIUM PHOSPHATE, MONOBASIC AND POTASSIUM PHOSPHATE, DIBASIC 15 MMOL: 224; 236 INJECTION, SOLUTION, CONCENTRATE INTRAVENOUS at 05:56

## 2025-05-20 RX ADMIN — ENOXAPARIN SODIUM 40 MG: 40 INJECTION, SOLUTION SUBCUTANEOUS at 17:55

## 2025-05-20 RX ADMIN — MAGNESIUM SULFATE HEPTAHYDRATE 4 G: 40 INJECTION, SOLUTION INTRAVENOUS at 05:56

## 2025-05-20 RX ADMIN — PERFLUTREN 10 ML OF DILUTION: 6.52 INJECTION, SUSPENSION INTRAVENOUS at 12:21

## 2025-05-20 RX ADMIN — ACETAMINOPHEN 650 MG: 325 TABLET ORAL at 06:50

## 2025-05-20 RX ADMIN — ONDANSETRON 4 MG: 2 INJECTION INTRAMUSCULAR; INTRAVENOUS at 21:49

## 2025-05-20 SDOH — SOCIAL STABILITY: SOCIAL INSECURITY: HAVE YOU HAD ANY THOUGHTS OF HARMING ANYONE ELSE?: NO

## 2025-05-20 SDOH — SOCIAL STABILITY: SOCIAL INSECURITY: ARE YOU OR HAVE YOU BEEN THREATENED OR ABUSED PHYSICALLY, EMOTIONALLY, OR SEXUALLY BY ANYONE?: NO

## 2025-05-20 SDOH — SOCIAL STABILITY: SOCIAL INSECURITY: ARE THERE ANY APPARENT SIGNS OF INJURIES/BEHAVIORS THAT COULD BE RELATED TO ABUSE/NEGLECT?: NO

## 2025-05-20 SDOH — SOCIAL STABILITY: SOCIAL INSECURITY: WERE YOU ABLE TO COMPLETE ALL THE BEHAVIORAL HEALTH SCREENINGS?: YES

## 2025-05-20 SDOH — SOCIAL STABILITY: SOCIAL INSECURITY: ABUSE: ADULT

## 2025-05-20 SDOH — SOCIAL STABILITY: SOCIAL INSECURITY: DOES ANYONE TRY TO KEEP YOU FROM HAVING/CONTACTING OTHER FRIENDS OR DOING THINGS OUTSIDE YOUR HOME?: NO

## 2025-05-20 SDOH — SOCIAL STABILITY: SOCIAL INSECURITY: DO YOU FEEL ANYONE HAS EXPLOITED OR TAKEN ADVANTAGE OF YOU FINANCIALLY OR OF YOUR PERSONAL PROPERTY?: NO

## 2025-05-20 SDOH — SOCIAL STABILITY: SOCIAL INSECURITY: HAVE YOU HAD THOUGHTS OF HARMING ANYONE ELSE?: NO

## 2025-05-20 SDOH — SOCIAL STABILITY: SOCIAL INSECURITY: HAS ANYONE EVER THREATENED TO HURT YOUR FAMILY OR YOUR PETS?: NO

## 2025-05-20 SDOH — SOCIAL STABILITY: SOCIAL INSECURITY: DO YOU FEEL UNSAFE GOING BACK TO THE PLACE WHERE YOU ARE LIVING?: NO

## 2025-05-20 ASSESSMENT — PAIN SCALES - GENERAL
PAINLEVEL_OUTOF10: 0 - NO PAIN

## 2025-05-20 ASSESSMENT — ACTIVITIES OF DAILY LIVING (ADL)
HEARING - LEFT EAR: FUNCTIONAL
LACK_OF_TRANSPORTATION: NO
HEARING - RIGHT EAR: FUNCTIONAL
GROOMING: INDEPENDENT
FEEDING YOURSELF: INDEPENDENT
PATIENT'S MEMORY ADEQUATE TO SAFELY COMPLETE DAILY ACTIVITIES?: YES
ADL_ASSISTANCE: INDEPENDENT
WALKS IN HOME: INDEPENDENT
DRESSING YOURSELF: INDEPENDENT
ADEQUATE_TO_COMPLETE_ADL: YES
LACK_OF_TRANSPORTATION: NO
TOILETING: INDEPENDENT
JUDGMENT_ADEQUATE_SAFELY_COMPLETE_DAILY_ACTIVITIES: YES
BATHING: INDEPENDENT

## 2025-05-20 ASSESSMENT — COGNITIVE AND FUNCTIONAL STATUS - GENERAL
TOILETING: A LITTLE
PATIENT BASELINE BEDBOUND: NO
WALKING IN HOSPITAL ROOM: TOTAL
DRESSING REGULAR UPPER BODY CLOTHING: A LITTLE
TURNING FROM BACK TO SIDE WHILE IN FLAT BAD: A LOT
STANDING UP FROM CHAIR USING ARMS: A LITTLE
TURNING FROM BACK TO SIDE WHILE IN FLAT BAD: A LITTLE
MOBILITY SCORE: 10
MOVING TO AND FROM BED TO CHAIR: TOTAL
WALKING IN HOSPITAL ROOM: A LITTLE
HELP NEEDED FOR BATHING: A LITTLE
CLIMB 3 TO 5 STEPS WITH RAILING: A LITTLE
EATING MEALS: A LITTLE
MOVING FROM LYING ON BACK TO SITTING ON SIDE OF FLAT BED WITH BEDRAILS: A LITTLE
CLIMB 3 TO 5 STEPS WITH RAILING: TOTAL
DRESSING REGULAR LOWER BODY CLOTHING: A LITTLE
MOVING TO AND FROM BED TO CHAIR: A LITTLE
MOVING FROM LYING ON BACK TO SITTING ON SIDE OF FLAT BED WITH BEDRAILS: A LITTLE
STANDING UP FROM CHAIR USING ARMS: A LOT
DAILY ACTIVITIY SCORE: 18
PERSONAL GROOMING: A LITTLE
MOBILITY SCORE: 18

## 2025-05-20 ASSESSMENT — PAIN - FUNCTIONAL ASSESSMENT
PAIN_FUNCTIONAL_ASSESSMENT: 0-10

## 2025-05-20 ASSESSMENT — PATIENT HEALTH QUESTIONNAIRE - PHQ9
1. LITTLE INTEREST OR PLEASURE IN DOING THINGS: NOT AT ALL
2. FEELING DOWN, DEPRESSED OR HOPELESS: NOT AT ALL
SUM OF ALL RESPONSES TO PHQ9 QUESTIONS 1 & 2: 0

## 2025-05-20 ASSESSMENT — PAIN DESCRIPTION - DESCRIPTORS: DESCRIPTORS: ACHING

## 2025-05-20 NOTE — CARE PLAN
The patient's goals for the shift include  Keep -180, Neuro Checks Q1 hr, Frequent turns.     The clinical goals for the shift include pt will remain hemodynamically stable

## 2025-05-20 NOTE — PROGRESS NOTES
Speech-Language Pathology      SLP Adult Inpatient Speech-Language Pathology Clinical Swallow Evaluation    Patient Name: Davidson Khoury  MRN: [id number]  Today's Date: 5/20/2025   Time Calculation  Start Time: 0915  Stop Time: 0946  Time Calculation (min): 31 min       Recommendations:  NPO  Oral Care Q4 while awake   Ice chips following oral care    Assessment:  #suspected dysphagia     Pt presents with clinical signs of dysphagia, likely Acute in nature r/t recent CVA. Pt w/ sigifcant left sided weakness across oral exam w/ generalized weakness/reduced coordination.  Pt presented with small, single ice chips. Oral phase is impaired detailed by bolus containment and difficulty coordinating oral phase of swallow and respirations with overt s/s of airway compromise. Victoria swallow protocol deferred 2/2 poor tolerance of ice chips and risk of aspiration. Further PO trials ceased due to concern for pharyngeal dysphagia/aspiration. Pt will likely require instrumental assessment of swallowing prior to initiation of PO diet.  Not yet appropriate given current severity of swallowing deficits.  Will continue to monitor to determine readiness.  Given suspected dysphagia and poor oral hygiene/oral health , dependence for feeding and oral care, limited or infrequent ambulation , compromised respiratory system , impaired cough function , and compromised immune system , pt is is not appropriate for oral diet prior to instrumental swallow study. Recommend Nothing by Mouth / NPO       Plan:  Inpatient/Swing Bed or Outpatient: Inpatient  Treatment/Interventions: Respiratory muscle strength training, Bolus trials, Assess diet tolerance, Diet recommendations, Complete MBSS  SLP Plan: Skilled SLP  SLP Frequency: 2x per week  Duration: 2 weeks  SLP Discharge Recommendations: Continue skilled SLP services at the next level of care  Diet Recommendations: Solid, Liquid  Solid Consistency: NPO  Liquid Consistency: Ice chips after oral care,  NPO  Discussed POC: Patient  Discussed Risks/Benefits: Yes, Patient  Patient/Caregiver Agreeable: Yes  SLP - OK to Discharge: Yes        Goals:  Encounter Problems       Encounter Problems (Active)       Swallowing       LTG - Patient will tolerate the least restrictive diet without overt difficulty by time of discharge.       Start:  05/20/25    Expected End:  06/10/25             The patient/caregiver/family will demonstrate adequate return of knowledge of all compensatory strategy/instruction w/ 100% acc. to effectively assist the patient in immediate safety with oral intake and swallowing.         Start:  05/20/25    Expected End:  06/10/25            STG  - Pt will demonstrate adequate oral motor skills and cognitive function to participate in a Modified Barium Swallow Study within 1 week of SLP recommendation to fully assess swallow function and determine further treatment needs.         Start:  05/20/25    Expected End:  05/27/25                       Subjective     Baseline Assessment:  Temperature Spikes Noted: No  Behavior/Cognition: Cooperative, Pleasant mood, Alert  Vision: Functional for self-feeding  Patient Positioning: Upright in Chair  Volitional Cough: Weak  Volitional Swallow: Within Functional Limits    General Visit Information:  Patient Class: Inpatient  Reason for Referral: concern for dysphagia/ aspiration  Prior to Session Communication: Bedside nurse  Reviewed Procedures and Risks: Yes  Date of Order: 05/20/25  BaseLine Diet: Regular Diet/Thin Liquids  Current Diet : NPO    Vital Signs:       History Of Present Illness  Davidson Khoury is a 60 y.o. male with PMH Renal transplant (on Cellcept), and HTN. Admitted 5/19/2025 with Acute cerebrovascular accident (CVA) due to embolism of right middle cerebral artery (Multi) after presenting with AMS, HA, and HTN  with /113 c/f PRES. It was then noted patient developed subtle L FD and ataxia that progressed to worsening L sided weakness. DINA   0400AM. 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 CTP with 11cc core infarct, 127cc penumbra, ratio 12.5. Patient OOW for TNK, s/p TICI 2b MT. XperCT c/f hemorrhage vs contrast staining.     Past Medical History  He has a past medical history of Personal history of other diseases of urinary system, Personal history of other specified conditions (2022), and Personal history of other specified conditions (2019).    Surgical History  He has a past surgical history that includes Other surgical history (2016); Other surgical history (2016); and Other surgical history (2019).     Social History  He reports that he has quit smoking. His smoking use included cigarettes. He has never used smokeless tobacco. He reports that he does not drink alcohol and does not use drugs.    Family History  Family History[1]    Allergies[2]  Objective     Dysphagia Risk Factors:  Predisposing: NA  Clinical signs of possible chronic dysphagia: NA  Precipitating: CVA    Oral/Motor Assessment:  Oral Hygiene: Education completed re: importance of oral care  Facial Symmetry: Left droop  Intelligibility: Intelligibility reduced  Intelligibility Ratin%-80%  Breath Support: Inadequate for speech    Clinical Observations:  Patient Positioning: Upright in Chair  Management of Oral Secretions: Adequate  Was The 3 oz Swallow Protocol Completed: Yes  Signs/Symptoms of Aspiration: Cough, Throat Clearing, Change in Vital Signs  Changes in VS Noted: Increased respiratory rate, Increased heart rate  Overt Signs or Symptoms of Aspiration: Thin (IDDSI Level 0) - Straw, Thin (IDDSI Level 0) - Spoon  Poor Management of Oral Secretions: No  Immediate Cough: Thin (IDDSI Level 0) - Straw  Immediate Throat Clear: Thin (IDDSI Level 0) - Straw  Decreased O2 Sats/Difficulty Breathing: Thin (IDDSI Level 0) - Straw  Clinical Observation Comment: Overt s/s of aspiration    Consistencies Trialed: Yes  Consistencies  Trialed: Thin (IDDSI Level 0) - Spoon, Thin (IDDSI Level 0) - Straw, Ice Chips    Oral phase:  -impaired bilabial seal; moderatre anterior spillage, mastication not assessed, mild oral residuals w/ limited trials     Pharyngeal Phase: evidence of airway compromise w/ oral intake    Tyrone Swallow Protocol: Not completed 2/2 risk of aspiration     Inpatient Education:  Adult Outpatient Education  Individual(s) Educated: Patient  Risk and Benefits Discussed with Patient/Caregiver/Other: yes  Patient/Caregiver Demonstrated Understanding: yes  Plan of Care Discussed and Agreed Upon: yes  Patient Response to Education: Patient/Caregiver Verbalized Understanding of Information, Patient/Caregiver Asked Appropriate Questions                 [1]   Family History  Problem Relation Name Age of Onset    Hypertension Mother     [2]   Allergies  Allergen Reactions    Amoxicillin Other and Nausea Only     vomiting    Ace Inhibitors Angioedema

## 2025-05-20 NOTE — PROGRESS NOTES
Occupational Therapy                 Therapy Communication Note    Patient Name: Davidson Khoury  MRN: [id number]  Department: [facility]  Room: 05/05-A  Today's Date: 5/20/2025     Discipline: Occupational Therapy    Missed Visit:       Missed Visit Reason: Missed Visit Reason:  (1538, multiple attempts to see patient this date, pt requesting therapy to return, at set time for therapy to return, pt off floor at MRI, will reattempt as available/appropriate)    Missed Time: Attempt    Comment:

## 2025-05-20 NOTE — CONSULTS
[facility]  DIGESTIVE [facility]  CLINICAL NUTRITION PROCEDURE NOTE       Small Bore Feeding Tube Placement  Patient with dysphagia 2/2 Acute cerebrovascular accident (CVA) due to embolism of right middle cerebral artery (Multi), requiring small bore feeding tube placement for medication and nutrition administration.  All labs and images examined for appropriateness of tube placement. Procedure explained to the patient and/or family.    Patient positioned and small bore tubing prepped per device protocol.  Tubing inserted into the R nare and using [employer] electromagnetic sensing device, was advanced per imaging guidance into gastric antrum and advanced post-pyloric. Tube is secured with bridle at 84 cm at the nares. KUB ordered to confirm placement.     Note: I observed feeding tube placement by Amy Lejeune.

## 2025-05-20 NOTE — PROGRESS NOTES
05/20/25 1252   Discharge Planning   Living Arrangements Spouse/significant other   Support Systems Spouse/significant other   Assistance Needed Pt was independent with ADL's at baseline.   Type of Residence Private residence   Number of Stairs to Enter Residence 3   Number of Stairs Within Residence 0   Home or Post Acute Services Post acute facilities (Rehab/SNF/etc)   Type of Post Acute Facility Services Rehab   Expected Discharge Disposition Rehab   Does the patient need discharge transport arranged? Yes   RoundTrip coordination needed? Yes   Has discharge transport been arranged? No   Financial Resource Strain   How hard is it for you to pay for the very basics like food, housing, medical care, and heating? Not hard   Housing Stability   In the last 12 months, was there a time when you were not able to pay the mortgage or rent on time? N   At any time in the past 12 months, were you homeless or living in a shelter (including now)? N   Transportation Needs   In the past 12 months, has lack of transportation kept you from medical appointments or from getting medications? no   In the past 12 months, has lack of transportation kept you from meetings, work, or from getting things needed for daily living? No   Patient Choice   Provider Choice list and CMS website (https://medicare.gov/care-compare#search) for post-acute Quality and Resource Measure Data were provided and reviewed with: Patient     Per chart review, Pt  is a 60 y.o. left handed male on day 1 of admission presenting with Acute cerebrovascular accident (CVA) due to embolism of right middle cerebral artery (Multi). S/p MT.     Social Work Discharge Planning note:    -Patient discussed during interdisciplinary rounds.   -Team members present: Fellow, PT and OT, and SW  -Plan per medical team: Pt is not medically ready for discharge. Likely will downgrade to [place] tomorrow. Further stroke work up needed.   -Payer: Leroy Sanchez.   -Status:  Inpatient  -Discharge disposition: Pt is currently with PT recommendations for High intensity. SW met with Pt to further discuss discharge planning. Pt was agreeable with acute rehab placement and was given a printed list of providers. Pt gave  [facility] as FOC (referral sent via [facility]). SW will continue to follow to assist with discharge planning.    -Support to Pt/family: Pt reported that he lives with his girlfriend, Steffanie. Pt reported that Steffanie does not work, so she would be able to provide up to 24 hour assistance in home if needed after he completes rehab. Pt reported no other issues or concerns at this time. SW will continue to follow for emotional/incidental support to Pt/family.    -Potential Barriers: none  -Anticipated Date of Discharge:  5/23/25    This LASHAUN Murillo, LSW    Office: [phone]  Secure chat via [employer]

## 2025-05-20 NOTE — PROGRESS NOTES
Physical Therapy    Physical Therapy Evaluation    Patient Name: Davidson Khoury  MRN: [id number]  Department: [facility]  Room: 05/05A  Today's Date: 5/20/2025   Time Calculation  Start Time: 0946  Stop Time: 1010  Time Calculation (min): 24 min    Assessment/Plan   PT Assessment  PT Assessment Results: Decreased strength, Decreased endurance, Decreased mobility, Impaired balance, Impaired tone, Decreased cognition, Decreased safety awareness, Impaired sensation  Rehab Prognosis: Good  Barriers to Discharge Home: Physical needs  Physical Needs: Stair navigation into home limited by function/safety, Ambulating household distances limited by function/safety, 24hr mobility assistance needed, High falls risk due to function or environment  Evaluation/Treatment Tolerance: Patient limited by fatigue  Medical Staff Made Aware: Yes  Strengths: Ability to acquire knowledge, Housing layout, Premorbid level of function, Support and attitude of living partners  Barriers to Participation: Access to adaptive/assistive products  End of Session Communication: Bedside nurse  Assessment Comment: Pt. is a 60 yom that presents with impairments including decreased functional strength, decreased activity tolerance/endurance, impaired balance, and increased difficulty with functional mobility compared to baseline level of function. Pt. will benefit from skilled PT intervention while inpatient to address the above deficits and maximize return to PLOF. Pt will benefit from HIGH intensity PT upon discharge.  End of Session Patient Position: Bed, 3 rail up, Alarm on    IP OR SWING BED PT PLAN  Inpatient or Swing Bed: Inpatient  PT Plan  Treatment/Interventions: Bed mobility, Transfer training, Stair training, Gait training, Balance training, Strengthening, Neuromuscular re-education, Endurance training, Therapeutic exercise, Range of motion, Therapeutic activity, Home exercise program, Postural re-education, Positioning  PT Plan: Ongoing PT  PT  Frequency: 5 times per week  PT Discharge Recommendations: High intensity level of continued care  Equipment Recommended upon Discharge:  (TBD)  PT Recommended Transfer Status: Assist x2  PT - OK to Discharge: Yes    Subjective     PT Visit Info:  PT Received On: 05/20/25  General Visit Information:  General  Reason for Referral: Pt presented to ED with AMS, HA, and HTN (/113). Pt developed subtle L FD and ataxia that progressed to worsening L sided weakness. LKW 5/19 0400AM. 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 Patient OOW for TNK, s/p TICI 2b MT.  Past Medical History Relevant to Rehab: Renal transplant (on Cellcept), HTN  Family/Caregiver Present: No (Pt.'s fiance on phone throughout session)  Prior to Session Communication: Bedside nurse  Patient Position Received: Bed, 4 rail up, Alarm off, caregiver present  Preferred Learning Style: auditory, visual, verbal  General Comment: Pt received supine in bed, appears irritable, stating he was hot and multiple medical providers in room this morning. However, pt was agreeable to participate in session    Home Living:  Home Living  Type of Home: House  Lives With: Significant other (fiance)  Home Adaptive Equipment: Cane  Home Layout: One level  Home Access: Stairs to enter with rails  Entrance Stairs-Rails: Both  Entrance Stairs-Number of Steps: 3  Bathroom Shower/Tub: Tub/shower unit  Bathroom Toilet: Standard  Bathroom Equipment: Grab bars in shower  Home Living Comments: Reports fiance is home during the day and able to assist as needed    Prior Level of Function:  Prior Function Per Pt/Caregiver Report  Level of [place]: Independent with ADLs and functional transfers, Independent with homemaking with ambulation  Receives Help From: Family  ADL Assistance: Independent  Homemaking Assistance: Independent  Ambulatory Assistance: Independent (community ambulator, no AD, denied falls)  Vocational: Unemployed  Leisure: Relaxing  Hand  Dominance: Right  Prior Function Comments: -drives, fiance provides transportation. Per fiance, pt was priorly active - bike riding and walking    Precautions:  Precautions  Hearing/Visual Limitations: hearing WFL  Medical Precautions: Fall precautions  Precautions Comment: SBP <180      Date/Time Vitals Session Patient Position Pulse Resp SpO2 BP MAP (mmHg)    05/20/25 0946 During PT  Sitting  62  14  100 %  187/88  119           Vital Signs Comment: Post: HR 73 bpm, SpO2 100%, RR 16, /80 (102)     Objective   Pain:  Pain Assessment  Pain Assessment: 0-10  0-10 (Numeric) Pain Score:  (unrated)  Pain Type: Acute pain  Pain Location: Head  Pain Descriptors: Aching    Cognition:  Cognition  Overall Cognitive Status: Within Functional Limits  Orientation Level: Oriented X4 (Oriented to month/year not date)  Impulsive: Moderately    General Assessments:  Activity Tolerance  Endurance: Tolerates 10 - 20 min exercise with multiple rests  Early Mobility/Exercise Safety Screen: Proceed with mobilization - No exclusion criteria met    Sensation  Light Touch: Severe deficits in the LUE, Severe deficits in the LLE  Sensation Comment: Pt unable to correctly identify light touch in L UE/LE, unable to distinguish L from R    Perception  Inattention/Neglect: Cues to attend to left side of body, Cues to maintain midline in sitting    Postural Control  Postural Control: Impaired  Trunk Control: demos L lateral lean EOB, unable to identify. With cues from therapist, pt overcorrects with LOB to R    Static Sitting Balance  Static Sitting-Balance Support: Feet supported, Right upper extremity supported  Static Sitting-Level of Assistance: Minimum assistance  Static Sitting-Comment/Number of Minutes: </= 5 minutes. Pt impulsively returning to supine, reporting dizziness    Functional Assessments:  Bed Mobility  Bed Mobility: Yes  Bed Mobility 1  Bed Mobility 1: Supine to sitting  Level of Assistance 1: Moderate assistance,  Moderate verbal cues  Bed Mobility Comments 1: HOB elevated, use of bed rail. Assist to manage LEs, cues for hip alignment and positioning on L UE as pt neglecting UE.  Bed Mobility 2  Bed Mobility  2: Sitting to supine  Level of Assistance 2: Minimum assistance, Minimal verbal cues  Bed Mobility 3  Bed Mobility 3: Scooting  Level of Assistance 3: Moderate assistance, Moderate verbal cues  Bed Mobility Comments 3: With HOB flat, and assist to position LEs, pt able to utilize LEs and R UE to assist with boosting. Assist required for realignment and positioning in bed.    Transfers  Transfer: No    Ambulation/Gait Training  Ambulation/Gait Training Performed: No    Stairs  Stairs: No    Extremity/Trunk Assessments:  RUE   RUE : Within Functional Limits  LUE   LUE: Exceptions to WFL  LUE AROM (degrees)  LUE AROM Comment: no volitional movement noted  LUE PROM (degrees)  LUE PROM Comment: appears WFL  LUE Strength  LUE Overall Strength: Deficits  L Shoulder Flexion: 0/5  L Shoulder ABduction: 0/5  L Elbow Flexion: 0/5  L Elbow Extension: 0/5  LUE Tone  LUE Tone: Flaccid  RLE   RLE : Exceptions to WFL  AROM RLE (degrees)  RLE AROM Comment: appears WFL  Strength RLE  RLE Overall Strength: Greater than or equal to 3/5 as evidenced by functional mobility (formal MMT limited d/t decreased upright tolerance.)  LLE   LLE : Exceptions to WFL  AROM LLE (degrees)  LLE AROM Comment: pt unable to wiggle toes, L hip flexion appears WFL  Strength LLE  LLE Overall Strength: Deficits, Greater than or equal to 3/5 as evidenced by functional mobility    Outcome Measures:  [facility] Basic Mobility  Turning from your back to your side while in a flat bed without using bedrails: A little  Moving from lying on your back to sitting on the side of a flat bed without using bedrails: A lot  Moving to and from bed to chair (including a wheelchair): Total  Standing up from a chair using your arms (e.g. wheelchair or bedside chair): A lot  To walk in  hospital room: Total  Climbing 3-5 steps with railing: Total  Basic Mobility - Total Score: 10    FSS-ICU  Ambulation: Unable to attempt due to weakness  Rolling: Minimal assistance (performs 75% or more of task)  Sitting: Minimal assistance (performs 75% or more of task)  Transfer Sit-to-Stand: Unable to perform  Transfer Supine-to-Sit: Moderate assistance (performs 50 - 74% of task)  Total Score: 11      Early Mobility/Exercise Safety Screen: Proceed with mobilization - No exclusion criteria met  ICU Mobility Scale: Sitting over edge of bed [3]  E = Exercise and Early Mobility  Early Mobility/Exercise Safety Screen: Proceed with mobilization - No exclusion criteria met  ICU Mobility Scale: Sitting over edge of bed    Encounter Problems       Encounter Problems (Active)       Balance       Patient to demo static standing with unilateral UE support, performing single UE task with SBA, no sway or LOB x 2 mins for functional carryover        Start:  05/20/25    Expected End:  06/03/25            Pt will score >/= 24/28 on Tinetti balance assessment to indicate low falls risk.         Start:  05/20/25    Expected End:  06/03/25               Mobility       STG - Patient will ambulate >/= 30 ft with CGA and LRAD       Start:  05/20/25    Expected End:  06/03/25            Pt. will tolerate >/= 10 minutes of OOB mobility without seated rest break and VSS to demo improved activity tolerance/endurance.        Start:  05/20/25    Expected End:  06/03/25            Patient will actively participate in ther-ex in order to improve strength and to assist with the completion of functional mobility tasks.        Start:  05/20/25    Expected End:  06/03/25               PT Transfers       STG - Patient will perform bed mobility IND       Start:  05/20/25    Expected End:  06/03/25            STG - Patient will transfer sit to and from stand with CGA and LRAD       Start:  05/20/25    Expected End:  06/03/25               Pain -  Adult              Education Documentation  Precautions, taught by Josselin Carrillo PT at 5/20/2025 11:29 AM.  Learner: Patient  Readiness: Acceptance  Method: Explanation, Demonstration  Response: Needs Reinforcement  Comment: postural awareness, positioning, importance of participation in PT    Body Mechanics, taught by Josselin Carrillo PT at 5/20/2025 11:29 AM.  Learner: Patient  Readiness: Acceptance  Method: Explanation, Demonstration  Response: Needs Reinforcement  Comment: postural awareness, positioning, importance of participation in PT    Mobility Training, taught by Josselin Carrillo PT at 5/20/2025 11:29 AM.  Learner: Patient  Readiness: Acceptance  Method: Explanation, Demonstration  Response: Needs Reinforcement  Comment: postural awareness, positioning, importance of participation in PT    Education Comments  No comments found.          05/20/25 at 11:31 AM - Josselin Carrillo PT

## 2025-05-20 NOTE — PROGRESS NOTES
[facility]  NEUROSCIENCE INTENSIVE CARE UNIT  DAILY PROGRESS NOTE       Patient Name: Davidson Khoury   MRN: [id number]     Admit Date: 2025     : 1965 AGE: 60 y.o. GENDER: male        Subjective    Davidson Khoury is a 52yo M with PMH Renal transplant (on Cellcept), and HTN. P/w AMS, HA, and HTN  with /113 c/f PRES. It was then noted patient developed subtle L FD and ataxia that progressed to worsening L sided weakness. LKW  0400AM. 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 CTP with 11cc core infarct, 127cc penumbra, ratio 12.5. Patient OOW for TNK, s/p TICI 2b MT. XperCT c/f hemorrhage vs contrast staining. Admitted to NSU for close neuro monitoring.        Objective   VITALS (24H):  Temp:  [35.6 °C (96 °F)-36.3 °C (97.3 °F)] 36.3 °C (97.3 °F)  Heart Rate:  [30-76] 45  Resp:  [4-35] 4  BP: (120-241)/() 162/97  INTAKE/OUTPUT:No intake or output data in the 24 hours ending 25  VENT SETTINGS:        PHYSICAL EXAM:  NEURO:  - Awake, AOx4, follows commands. L FD.   - EOS, PERRL 5mm-->3mm bilaterally, R gaze but can cross midline. EOMI, VFF  - R side 5/5, SILT   - LUE 4+/5, slower to respond   - LLE exam limited d/t angio site/leg brace. Able to wiggle toes   - L sided sensory deficit   - L groin c/d/I, no hematoma, +pulse   CV:  - RRR on telemetry, NSR  RESP:  - No signs of resp distress  - On RA  :  - Voids  GI:  - Abdomen NT/ND, soft  SKIN:  - Intact    MEDICATIONS:  Scheduled: PRN: Continuous:   Scheduled Medications[1] PRN Medications[2] Continuous Medications[3]     LAB RESULTS:  Results from last 72 hours   Lab Units 25  1141   GLUCOSE mg/dL 118*   SODIUM mmol/L 139   POTASSIUM mmol/L 3.6   CHLORIDE mmol/L 106   CO2 mmol/L 24   ANION GAP mmol/L 13   BUN mg/dL 16   CREATININE mg/dL 2.01*   EGFR mL/min/1.73m*2 38*   CALCIUM mg/dL 9.6   PHOSPHORUS mg/dL 2.1*   ALBUMIN g/dL 4.1   MAGNESIUM mg/dL 1.65      Results from last 72 hours   Lab  Units 05/19/25  1141   WBC AUTO x10*3/uL 6.5   NRBC AUTO /100 WBCs 0.0   RBC AUTO x10*6/uL 4.85   HEMOGLOBIN g/dL 14.6   HEMATOCRIT % 43.2   MCV fL 89   MCH pg 30.1   MCHC g/dL 33.8   RDW % 13.9   PLATELETS AUTO x10*3/uL 112*      Results from last 72 hours   Lab Units 05/19/25  1141   WBC AUTO x10*3/uL 6.5   NRBC AUTO /100 WBCs 0.0   RBC AUTO x10*6/uL 4.85   HEMOGLOBIN g/dL 14.6   HEMATOCRIT % 43.2   MCV fL 89   MCH pg 30.1   MCHC g/dL 33.8   RDW % 13.9   PLATELETS AUTO x10*3/uL 112*   NEUTROS PCT AUTO % 57.1   IG PCT AUTO % 0.2   LYMPHS PCT AUTO % 32.3   MONOS PCT AUTO % 9.7   EOS PCT AUTO % 0.5   BASOS PCT AUTO % 0.2   NEUTROS ABS x10*3/uL 3.71   IG AUTO x10*3/uL 0.01   LYMPHS ABS AUTO x10*3/uL 2.09   MONOS ABS AUTO x10*3/uL 0.63   EOS ABS AUTO x10*3/uL 0.03   BASOS ABS AUTO x10*3/uL 0.01      Results from last 72 hours   Lab Units 05/19/25  1141   PROTIME seconds 12.8*   INR  1.2*        IMAGING RESULTS:  CT brain attack perfusion w IV contrast   Final Result   1. Hypoperfusion in the right MCA territory, with a central ischemic   core of 11 mL and potential tissue at risk (potential penumbra) of   127 mL.   2. This correlates well with the abnormal CT angiogram study.   Findings discussed with Dr. Butt as detailed below. Informs me that   the patient has already been seen by the neuro interventional service.        MACRO:   Brian Packer discussed the significance and urgency of this   critical finding by telephone with  Dr. Butt on 5/19/2025 at 5:02   pm.  (**-RCF-**) Findings:  See findings.        Signed by: Brian Packer 5/19/2025 5:03 PM   Dictation workstation:   [id number]      CT brain attack angio head and neck W and WO IV contrast   Final Result   1. CTA of Neck arteries demonstrates no significant flow-limiting   stenosis or dissection.   2. CTA of Intracranial arteries demonstrates occlusion of the left   middle cerebral artery, distal M1 segment, and occlusion of the   proximal inferior  division M2 branch. This correlates well with the   head CT study.   3. Multifocal stenosis in the posterior circulation from   atherosclerosis, including 50% stenosis of basilar artery and   moderate stenosis of right PCA.        MACRO:   None        Signed by: Brian Packer 5/19/2025 4:31 PM   Dictation workstation:   [id number]      CT brain attack head wo IV contrast   Final Result   1. No acute hemorrhage, but I suspect an acute nonhemorrhagic infarct   in the right MCA vascular territory, superimposed on chronic. This is   in the inferior division at the right temporal-parietal junction..   2. Similar appearance of remote infarcts within the right   parietal/occipital lobes as well as bilateral basal ganglia lacunar   infarcts.   3. Other chronic findings as above.        I personally reviewed the images/study and I agree with the findings   as stated by Narda Hawkins MD (resident).        MACRO:   Brian Packer discussed the significance and urgency of this   critical finding by telephone with Dr. Irvin on 5/19/2025 at 4:22   pm.  (**-RCF-**) Findings:  See findings.             Signed by: Brian Packer 5/19/2025 4:22 PM   Dictation workstation:   [id number]      CT head wo IV contrast   Final Result   No acute intracranial hemorrhage or mass effect.        Similar appearance of suspected remote infarcts within the cortex of   the right parietal and occipital lobes with beam hardening artifact   on previous examination obscuring evaluation. Consider MRI for   further evaluation based on clinical concern. There are also   unchanged remote appearing bilateral basal ganglia lacunar infarcts   and moderate chronic small vessel ischemic change.        MACRO:   None        Signed by: Blayne Peguero 5/19/2025 12:10 PM   Dictation workstation:   [id number]             Assessment/Plan    60 y.o. male with PMH Renal transplant (on Cellcept), and HTN. Admitted 5/19/2025 with Acute cerebrovascular  accident (CVA) due to embolism of right middle cerebral artery (Multi) after presenting with AMS, HA, and HTN  with /113 c/f PRES. It was then noted patient developed subtle L FD and ataxia that progressed to worsening L sided weakness. LKW 5/19 0400AM. 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 CTP with 11cc core infarct, 127cc penumbra, ratio 12.5. Patient OOW for TNK, s/p TICI 2b MT. XperCT c/f hemorrhage vs contrast staining.    NEURO:  #C/f contrast staining vs. Hemorrhage   #Proximal R superior M2 occlusion s/p TICI 2b MT   Assessment:  - Neurologically: see above  - no TNK d/t OOW, s/p TICI 2b MT 5/19 @ 1700. XperCT with contrast staining vs hemorrhage   Plan:  - NSU  - Neuro Checks: Q1H, neurovascular checks per ICU protocol   - Pain: acetaminophen PRN  - Nausea: ondansetron  - rCTH for stability (5/19 2300)  - Holding ASA until stability CTH  - Crestor 20mg qHS  - Please obtain RFP, CBC, Coag, lipid panel, A1c, trop, BNP, EKG  - PT/OT/SLP    CARDIOVASCULAR:  #HTN   Assessment:  - SR on tele   - Trop 17  Plan:  - Continue to monitor on telemetry  - BP goal: SBP < 160    --> PRN: Labetalol, Hydralazine, and Nicardipine   - TTE in AM     RESPIRATORY:  Assessment:  - On RA  Plan:  - Continuous pulse oximetry   - O2 PRN to maintain SpO2 > 94%, wean as tolerated  - Incentive spirometry while awake    RENAL/:  #Renal transplant (on Cellcept)  Assessment:  - Baseline BUN/Cr: 15/1.80  Results from last 72 hours   Lab Units 05/19/25  1141   BUN mg/dL 16   CREATININE mg/dL 2.01*   Plan:  - Monitor with daily RFP  - Monitor UOP  - c/w home Cellcept 750mg BID    FEN/GI:  Assessment:  - Last BM: PTA  Plan:  - Monitor and replace electrolytes per protocol  - IVF: NS @ 75 mL/hr  - Diet: NPO   - Bowel Regimen: Miralax QD    ENDOCRINE:  Assessment:  Results from last 7 days   Lab Units 05/19/25  2013 05/19/25  1623 05/19/25  1141 05/15/25  1838   POCT GLUCOSE mg/dL 90 100*  --   --    GLUCOSE mg/dL  --    --  118* 101*   SODIUM mmol/L  --   --  139 141   - A1c : 5.5%  Plan:  - Accuchecks & ISS Q4H while NPO   - Hypoglycemia protocol     HEMATOLOGY:  #Hx of thrombocytopenia   Assessment:  Results from last 7 days   Lab Units 25  1141 05/15/25  1838   HEMOGLOBIN g/dL 14.6 14.3   HEMATOCRIT % 43.2 41.9   PLATELETS AUTO x10*3/uL 112* 97*   - Baseline for :   - Seen Hematology in : Hep B and C negative. No other workup was found  Plan:  - Continue to monitor with daily CBC and Coag panel    INFECTIOUS DISEASE:  Assessment:  Results from last 7 days   Lab Units 25  1141 05/15/25  1838   WBC AUTO x10*3/uL 6.5 5.5    - Temp (24hrs), Av.9 °C (96.7 °F), Min:35.6 °C (96 °F), Max:36.3 °C (97.3 °F)  Plan:  - Continue to monitor for s/sx of infection  - Pan culture for temperature > 38.4 C    MUSCULOSKELETAL:  - No acute issues    SKIN:  - No acute issues  - Turns and skin care per NSU protocol    ACCESS:  - PIVs   - LUE AV fistula     PROPHYLAXIS:  - DVT Ppx: SCDs and SQ Enoxaparin  - GI Ppx: not indicated    RESTRAINTS:  Not indicated/Patient does not meet criteria for restraints    ZULMA Patterson-CNP  Neuroscience Intensive Care       Total critical care time of 60 minutes, with > 50% of time spent in direct contact with patient/family for education, counseling and coordination of care.         [1] aspirin, 81 mg, oral, Daily   Or  aspirin, 81 mg, oral, Daily   Or  aspirin, 81 mg, nasogastric tube, Daily   Or  aspirin, 300 mg, rectal, Daily  enoxaparin, 40 mg, subcutaneous, q24h  mycophenolate, 750 mg, oral, BID  perflutren lipid microspheres, 0.5-10 mL of dilution, intravenous, Once in imaging  perflutren protein A microsphere, 0.5 mL, intravenous, Once in imaging  polyethylene glycol, 17 g, oral, Daily  potassium chloride CR, 20 mEq, oral, Daily  potassium phosphate (monobasic), 1,000 mg, oral, Once  rosuvastatin, 20 mg, oral, Nightly  sulfur hexafluoride microsphr, 2 mL,  intravenous, Once in imaging     [2] PRN medications: hydrALAZINE **FOLLOWED BY** [START ON 5/21/2025] hydrALAZINE, labetaloL, oxygen  [3] niCARdipine, 2.5-15 mg/hr, Last Rate: Stopped (05/19/25 1841)  sodium chloride 0.9%, 75 mL/hr, Last Rate: 75 mL/hr (05/19/25 1829)

## 2025-05-20 NOTE — PROGRESS NOTES
"Davidson Khoury is a 60 y.o. left handed male on day 1 of admission presenting with Acute cerebrovascular accident (CVA) due to embolism of right middle cerebral artery (Multi).    Subjective   NAEON, feeling ok denied having headaches, seems indifferent to his neurological disability has no new complaint.       Objective     Last Recorded Vitals  Blood pressure 156/76, pulse 62, temperature 36.8 °C (98.2 °F), temperature source Temporal, resp. rate 15, height 1.753 m (5' 9\"), weight 71.9 kg (158 lb 8.2 oz), SpO2 100%.    NIHSS     Physical Exam  Neurological Exam  GENERAL APPEARANCE:  No distress, alert, interactive and cooperative.     MENTAL STATE:   Orientation was normal to time, place and person. Recent and remote memory was intact.  Attention span and concentration were normal. Language testing was normal for comprehension, repetition, expression, and naming. The patient could correctly interpret a picture. General fund of knowledge was intact.     CRANIAL NERVES:   CN 2   Left HH  CN 3, 4, 6   Pupils round, 4 mm in diameter, equally reactive to light. Lids symmetric; no ptosis. EOMs normal alignment, full range with normal saccades, pursuit and convergence.   No nystagmus.   CN 5   Facial sensation intact bilaterally.   CN 7   Left UMN paralysis  CN 8   Hearing intact to finger rub.   CN 9   Palate elevates symmetrically.   CN 11   Normal strength of shoulder shrug and neck turning.   CN 12   Tongue midline, with normal bulk and strength; no fasciculations.     Motor/Sensory:   LUE 0/5, LLE 3/5. right at least antigravity, left hemisensory loss and neglect.    REFLEXES:   R          L  BR:  2 2  Biceps:  2 2  Triceps:  2 2  Knee:  2 2  Ankle:  2 2    Babinski: toes downgoing to plantar stimulation. No clonus or other pathologic reflexes present.     COORDINATION:      Right finger-nose-finger was intact without dysmetria or overshoot.     GAIT:   Deferred    Relevant Results    NIH Stroke Scale  1A. Level of " Consciousness: Alert, Keenly Responsive  1B. Ask Month and Age: Both Questions Right  1C. Blink Eyes & Squeeze Hands: Performs Both Tasks  2. Best Gaze: Partial Gaze Palsy  3. Visual: Partial Hemianopia  4. Facial Palsy: Partial Paralysis  5A. Motor - Left Arm: No Movement  5B. Motor - Right Arm: No Drift  6A. Motor - Left Leg: Some Effort Against Gravity  6B. Motor - Right Leg: No Drift  7. Limb Ataxia: Present in One Limb  8. Sensory Loss: Mild-to-Moderate Sensory Loss  9. Best Language: No Aphasia  10. Dysarthria: Mild-to-Moderate Dysarthria  11. Extinction and Inattention: Visual, Tactile, Auditory, Spatial, or Personal Inattention  NIH Stroke Scale: 14           [place] Coma Scale  Best Eye Response: To verbal stimuli  Best Verbal Response: Oriented  Best Motor Response: Follows commands  [place] Coma Scale Score: 14                    Assessment & Plan  Acute cerebrovascular accident (CVA) due to embolism of right middle cerebral artery (Multi)    Davidson Khoury is a 60 y.o. male with a history notable for renal transplant (currently on Cellcept) and hypertension presenting to the ED initially with a chief complaint of altered mental status, headache, and hypertension. Initially evaluated by general neurology team who noted subtle left sided weakness that worsened on follow up exam. BAT activated for concern of stroke. Found to have proximal R superior M2 occlusion. Not a candidate for TNK as LKN >4.5 hours. Taken for MT, TICI 2b. Exam notable for L HH, L UMN facial paralysis, LUE 0/5, LLE 3/5. right at least antigravity, left hemisensory loss and neglect.    Stroke Metrics:  EMS pre-notification?: No  Time of stroke team arrival: 3:47pm  Direct to CT?: No  Time of CTH read by stroke team: As performed  Time of TNK: n/a  Time stroke team called IR: 4:27pm  Time pt arrived to angio suite: 4:57pm  Time of groin puncture: 5:10pm  Time of recanalization: 5:31pm  Number of passes: 1  Device used: penumbra  aspiration  Any delays in the process (if door to TNK >30 min): n/a     Stroke Classification:  Type: Ischemic stroke  Subtype/etiology: Suspected large artery disease  Vessels involved: R MCA  Neurological manifestations:  Pre-Intervention Deficits: NIHSS 10 *see above  Post-Intervention Deficits: NIHSS 9 *see above  Pre-stroke mRS: 0  Initial treatment: Angio  Anti-platelets or Anti-coagulation management: Aspirin  Vascular Risk Factors: HTN  Antiplatelet/antithrombotic plan for stroke prevention: Aspirin  VTE prophylaxis: Lovenox  Vascular Risk Factor modification goals:  Blood pressure goals: avoid hypotension SBP <100 that could worsen cerebral perfusion, Ischemic stroke post-thrombectomy SBP <180mmHg   Lipid Goals: education on healthy diet and statin therapy to maintain or achieve goal LDL-cholesterol < 70mg.  Glucose Goals: early treatment of hyperglycemia to goal glucose 140-180 mg/dl with long-term goal A1c < 7%   Smoking Cessation and Education  Assessment for Rehabilitation needs   Patient and family education on signs and symptoms of stroke, calling 911, healthy strategies for stroke prevention.      Recommendations:  #R M2 occlusion   - MRI for stroke burden  - Stroke work up  - A1c              - TTE with bubble study  - SBP goals post-thrombectomy SBP <180mmHg   - PT/OT/SLP   -Rest of care per NSU team    Mary Harper MD

## 2025-05-20 NOTE — PROGRESS NOTES
[facility]  NEUROSCIENCE INTENSIVE CARE UNIT  DAILY PROGRESS NOTE       Patient Name: Davidson Khoury   MRN: [id number]     Admit Date: 2025     : 1965 AGE: 60 y.o. GENDER: male        Subjective    Davidson Khoury is a 52yo M with PMH Renal transplant (on Cellcept), and HTN. P/w AMS, HA, and HTN  with /113 c/f PRES. It was then noted patient developed subtle L FD and ataxia that progressed to worsening L sided weakness. LKW  0400AM. 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 CTP with 11cc core infarct, 127cc penumbra, ratio 12.5. Patient OOW for TNK, s/p TICI 2b MT. XperCT c/f hemorrhage vs contrast staining. Admitted to NSU for close neuro monitoring.     Interval Events:  The patient did begin to have hypertension this morning with systolics >160 but did not receive any PRN antihypertensives. No other acute overnight events.       Objective   VITALS (24H):  Temp:  [36 °C (96.8 °F)-36.8 °C (98.2 °F)] 36.8 °C (98.2 °F)  Heart Rate:  [42-83] 62  Resp:  [4-35] 16  BP: (120-187)/() 158/86  INTAKE/OUTPUT:  Intake/Output Summary (Last 24 hours) at 2025 1154  Last data filed at 2025 1141  Gross per 24 hour   Intake 1696.67 ml   Output 525 ml   Net 1171.67 ml     VENT SETTINGS:        PHYSICAL EXAM:  NEURO:  - Awake, AOx4, follows commands. L FD.   - EOS, PERRL 5mm-->3mm bilaterally, R gaze but can cross midline. Left Homonomous Hemianopsia  - R side 5/5, SILT   - LUE 0/5 strength, diminished sensation with neglect  - LLE exam 4/5 strength proximal flexion  - L groin c/d/I, no hematoma, +pulse   CV:  - RRR on telemetry, NSR  RESP:  - No signs of resp distress  - On RA  :  - Voids  GI:  - Abdomen NT/ND, soft  SKIN:  - Left groin site with bandage in place that is clean, dry and intact.   MSK:  - LUE AV fistula    MEDICATIONS:  Scheduled: PRN: Continuous:   Scheduled Medications[1] PRN Medications[2] Continuous Medications[3]     LAB RESULTS:  Results from  last 72 hours   Lab Units 05/20/25  0217 05/19/25  1141   GLUCOSE mg/dL 80 118*   SODIUM mmol/L 141 139   POTASSIUM mmol/L 3.5 3.6   CHLORIDE mmol/L 109* 106   CO2 mmol/L 23 24   ANION GAP mmol/L 13 13   BUN mg/dL 17 16   CREATININE mg/dL 1.59* 2.01*   EGFR mL/min/1.73m*2 50* 38*   CALCIUM mg/dL 8.3* 9.6   PHOSPHORUS mg/dL 2.1* 2.1*   ALBUMIN g/dL 3.8 4.1   MAGNESIUM mg/dL 1.44* 1.65   POCT CALCIUM IONIZED (MMOL/L) IN BLOOD mmol/L 1.09*  --       Results from last 72 hours   Lab Units 05/20/25  0217 05/19/25  1141   WBC AUTO x10*3/uL 4.7 6.5   NRBC AUTO /100 WBCs 0.0 0.0   RBC AUTO x10*6/uL 4.39* 4.85   HEMOGLOBIN g/dL 13.2* 14.6   HEMATOCRIT % 41.4 43.2   MCV fL 94 89   MCH pg 30.1 30.1   MCHC g/dL 31.9* 33.8   RDW % 14.5 13.9   PLATELETS AUTO x10*3/uL 83* 112*      Results from last 72 hours   Lab Units 05/20/25  0217 05/19/25  1141   WBC AUTO x10*3/uL 4.7 6.5   NRBC AUTO /100 WBCs 0.0 0.0   RBC AUTO x10*6/uL 4.39* 4.85   HEMOGLOBIN g/dL 13.2* 14.6   HEMATOCRIT % 41.4 43.2   MCV fL 94 89   MCH pg 30.1 30.1   MCHC g/dL 31.9* 33.8   RDW % 14.5 13.9   PLATELETS AUTO x10*3/uL 83* 112*   NEUTROS PCT AUTO %  --  57.1   IG PCT AUTO % 0.2 0.2   LYMPHO PCT MAN % 4.4  --    LYMPHS PCT AUTO %  --  32.3   MONO PCT MAN % 1.8  --    MONOS PCT AUTO %  --  9.7   EOSINO PCT MAN % 0.0  --    EOS PCT AUTO %  --  0.5   BASOS PCT MAN % 0.0  --    BASOS PCT AUTO %  --  0.2   NEUTROS ABS x10*3/uL  --  3.71   IG AUTO x10*3/uL 0.01 0.01   LYMPHS ABS AUTO x10*3/uL  --  2.09   MONOS ABS AUTO x10*3/uL  --  0.63   EOS ABS MAN x10*3/uL 0.00  --    EOS ABS AUTO x10*3/uL  --  0.03   BASOS ABS MAN x10*3/uL 0.00  --    BASOS ABS AUTO x10*3/uL  --  0.01      Results from last 72 hours   Lab Units 05/19/25  1141   PROTIME seconds 12.8*   INR  1.2*        IMAGING RESULTS:  CT brain attack perfusion w IV contrast   Final Result   1. Hypoperfusion in the right MCA territory, with a central ischemic   core of 11 mL and potential tissue at risk  (potential penumbra) of   127 mL.   2. This correlates well with the abnormal CT angiogram study.   Findings discussed with Dr. Butt as detailed below. Informs me that   the patient has already been seen by the neuro interventional service.        MACRO:   Brian Packer discussed the significance and urgency of this   critical finding by telephone with  Dr. Butt on 5/19/2025 at 5:02   pm.  (**-RCF-**) Findings:  See findings.        Signed by: Brian Packer 5/19/2025 5:03 PM   Dictation workstation:   [id number]      CT brain attack angio head and neck W and WO IV contrast   Final Result   1. CTA of Neck arteries demonstrates no significant flow-limiting   stenosis or dissection.   2. CTA of Intracranial arteries demonstrates occlusion of the left   middle cerebral artery, distal M1 segment, and occlusion of the   proximal inferior division M2 branch. This correlates well with the   head CT study.   3. Multifocal stenosis in the posterior circulation from   atherosclerosis, including 50% stenosis of basilar artery and   moderate stenosis of right PCA.        MACRO:   None        Signed by: Brian Packer 5/19/2025 4:31 PM   Dictation workstation:   [id number]      CT brain attack head wo IV contrast   Final Result   1. No acute hemorrhage, but I suspect an acute nonhemorrhagic infarct   in the right MCA vascular territory, superimposed on chronic. This is   in the inferior division at the right temporal-parietal junction..   2. Similar appearance of remote infarcts within the right   parietal/occipital lobes as well as bilateral basal ganglia lacunar   infarcts.   3. Other chronic findings as above.        I personally reviewed the images/study and I agree with the findings   as stated by Narda Hawkins MD (resident).        MACRO:   Brian Packer discussed the significance and urgency of this   critical finding by telephone with Dr. Irvin on 5/19/2025 at 4:22   pm.  (**-RCF-**) Findings:  See  findings.             Signed by: Brian Packer 5/19/2025 4:22 PM   Dictation workstation:   [id number]      CT head wo IV contrast   Final Result   No acute intracranial hemorrhage or mass effect.        Similar appearance of suspected remote infarcts within the cortex of   the right parietal and occipital lobes with beam hardening artifact   on previous examination obscuring evaluation. Consider MRI for   further evaluation based on clinical concern. There are also   unchanged remote appearing bilateral basal ganglia lacunar infarcts   and moderate chronic small vessel ischemic change.        MACRO:   None        Signed by: Blayne Peguero 5/19/2025 12:10 PM   Dictation workstation:   [id number]             Assessment/Plan    60 y.o. male with PMH Renal transplant (on Cellcept), and HTN. Admitted 5/19/2025 with Acute cerebrovascular accident (CVA) due to embolism of right middle cerebral artery (Multi) after presenting with AMS, HA, and HTN  with /113 c/f PRES. It was then noted patient developed subtle L FD and ataxia that progressed to worsening L sided weakness. LKW 5/19 0400AM. 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 CTP with 11cc core infarct, 127cc penumbra, ratio 12.5. Patient OOW for TNK, s/p TICI 2b MT. XperCT c/f hemorrhage vs contrast staining.    NEURO:  #C/f contrast staining vs. Hemorrhage   #Proximal R superior M2 occlusion s/p TICI 2b MT   Assessment:  - Neurologically: see above  - no TNK d/t OOW, s/p TICI 2b MT 5/19 @ 1700. XperCT with contrast staining vs hemorrhage   - 5/19: Repeat CT does show a small area concerning for punctate hemorrhage in the right M2 distribution, will follow up on final read  - 5/20: morning neuro exam with acute change showing LUE flacidity with diminished sensation and neglect. L HH with fatiguable right gaze preference. 4/5 strength proximal LLE-> repeat head CT  Plan:  - NSU  - Neuro Checks: Q1H, neurovascular checks per ICU protocol   -  Pain: acetaminophen PRN  - Nausea: ondansetron  - Restart ASA  - Crestor 20mg qHS  - Please obtain RFP, CBC, Coag, lipid panel, A1c, trop, BNP, EKG  - PT/OT/SLP  - Repeat head CTs, follow up reads  - MRI today    CARDIOVASCULAR:  #HTN   Assessment:  - SR on tele   - Trop 17 - 5/20 Systolics > 160 in AM and delayed antihypertensive administration-> worsening neuro exam-> repeat head CT  Plan:  - Continue to monitor on telemetry  - SBP goal 160-180 mmHg    --> PRN: Labetalol, Hydralazine, and Nicardipine   - Follow up TTE  - Continue Atorvastatin  - Restart ASA   - Holding home Clonidine and Lasix       RESPIRATORY:  Assessment:  - On RA  Plan:  - Continuous pulse oximetry   - O2 PRN to maintain SpO2 > 94%, wean as tolerated  - Incentive spirometry while awake    RENAL/:  #Renal transplant (on Cellcept and Tacrolimus)  Assessment:  - Baseline BUN/Cr: 15/1.80  - Elevated Tacrolimus level 15.4 ->11.1  Results from last 72 hours   Lab Units 05/20/25  0217 05/19/25  1141   BUN mg/dL 17 16   CREATININE mg/dL 1.59* 2.01*   Plan:  - Monitor with daily RFP  - Monitor UOP  - c/w home Cellcept 750mg BID  - Consult Nephro Transplant  - Holding home Tacro for now    FEN/GI:  Assessment:  - Last BM: PTA  - Failed swallow study  Plan:  - Monitor and replace electrolytes per protocol  - Keep euvolemic  - IVF: NS @ 75 mL/hr  - Diet: NPO   - Bowel Regimen: Miralax QD  - Place Dobhoff, follow-up KUB    ENDOCRINE:  Assessment:  Results from last 7 days   Lab Units 05/20/25  1107 05/20/25  0217 05/19/25  2013 05/19/25  1623 05/19/25  1141 05/15/25  1838   POCT GLUCOSE mg/dL 106*  --  90 100*  --   --    GLUCOSE mg/dL  --  80  --   --  118* 101*   SODIUM mmol/L  --  141  --   --  139 141   - A1c 2024: 5.5%  Plan:  - Accuchecks & ISS Q4H while NPO   - Hypoglycemia protocol     HEMATOLOGY:  #Hx of thrombocytopenia- Stable   Assessment:  Results from last 7 days   Lab Units 05/20/25  0217 05/19/25  1141 05/15/25  1838   HEMOGLOBIN g/dL  13.2* 14.6 14.3   HEMATOCRIT % 41.4 43.2 41.9   PLATELETS AUTO x10*3/uL 83* 112* 97*   - Baseline for :   - Seen Hematology in : Hep B and C negative. No other workup was found  Plan:  - Continue to monitor with daily CBC and Coag panel  - Goal platelets >50,000, Transfuse as indicated    INFECTIOUS DISEASE:  Assessment:  Results from last 7 days   Lab Units 25  0217 25  1141 05/15/25  1838   WBC AUTO x10*3/uL 4.7 6.5 5.5    - Temp (24hrs), Av.3 °C (97.4 °F), Min:36 °C (96.8 °F), Max:36.8 °C (98.2 °F)  Plan:  - Continue to monitor for s/sx of infection  - Pan culture for temperature > 38.4 C    MUSCULOSKELETAL:  - No acute issues    SKIN:  - No acute issues  - Turns and skin care per NSU protocol    ACCESS:  - PIVs   - LUE AV fistula     PROPHYLAXIS:  - DVT Ppx: SCDs and SQ Enoxaparin  - GI Ppx: not indicated    RESTRAINTS:  Not indicated/Patient does not meet criteria for restraints    Tera Mathew DO  Neuroscience Intensive Care       Attending Attestation:   I saw and evaluated the patient. I personally obtained the key and critical portions of the history and physical exam or was physically present for key and critical portions performed by the resident/fellow. I reviewed the resident/fellow's documentation and discussed the patient with the resident/fellow. I agree with the resident/fellow's medical decision making as documented in the note with the exception/addition of the following:    R MCA stroke, s/p MT, with R MCA severe intracranial stenosis.  MRI brain pending. On ASA.    Goal permissive HTN up to .  TTE pending.    Failed swallow eval.  Needs feeding tube.    H/o renal transplant, resume home cellcept and tacrolimus to target home levels.    H/o thrombocytopenia, monitoring.    Lovenox prophy    Statement of Acuity: I have reviewed and evaluated all relevant data of the last 24 hours, personally examined the patient and formulated the plan of care.  This  patient was critically ill and required continued critical care treatment.  Total critical care time: 35min.     Abel Gonzalez M.D.          [1] aspirin, 81 mg, oral, Daily   Or  aspirin, 81 mg, oral, Daily   Or  aspirin, 81 mg, nasogastric tube, Daily   Or  aspirin, 300 mg, rectal, Daily  enoxaparin, 40 mg, subcutaneous, q24h  mycophenolate, 750 mg, oral, BID  perflutren lipid microspheres, 0.5-10 mL of dilution, intravenous, Once in imaging  perflutren protein A microsphere, 0.5 mL, intravenous, Once in imaging  polyethylene glycol, 17 g, oral, Daily  potassium chloride, 20 mEq, intravenous, Once  rosuvastatin, 20 mg, oral, Nightly  sulfur hexafluoride microsphr, 2 mL, intravenous, Once in imaging     [2] PRN medications: acetaminophen, calcium gluconate, calcium gluconate, hydrALAZINE **FOLLOWED BY** [START ON 5/21/2025] hydrALAZINE, labetaloL, magnesium sulfate, magnesium sulfate, oxygen, potassium chloride CR **OR** potassium chloride, potassium chloride CR **OR** potassium chloride  [3] niCARdipine, 2.5-15 mg/hr, Last Rate: Stopped (05/19/25 2901)  sodium chloride 0.9%, 75 mL/hr, Last Rate: 75 mL/hr (05/20/25 1143)

## 2025-05-20 NOTE — CARE PLAN
Problem: General Stroke  Goal: Establish a mutual long term goal with patient by discharge  5/20/2025 0157 by Matilda Milner RN  Outcome: Progressing  5/20/2025 0157 by Matilda Milner RN  Outcome: Progressing  5/20/2025 0156 by Matilda Milner RN  Outcome: Progressing  Goal: Demonstrate improvement in neurological exam throughout the shift  5/20/2025 0157 by Matilda Milner RN  Outcome: Progressing  5/20/2025 0157 by Matilda Milner RN  Outcome: Progressing  5/20/2025 0156 by Matilda Milner RN  Outcome: Progressing  Goal: Maintain BP within ordered limits throughout shift  5/20/2025 0157 by Matilda Milner RN  Outcome: Progressing  5/20/2025 0157 by Matilda Milner RN  Outcome: Progressing  5/20/2025 0156 by Matilda Milner RN  Outcome: Progressing  Goal: Participate in treatment (ie., meds, therapy) throughout shift  5/20/2025 0157 by Matilda Milner RN  Outcome: Progressing  5/20/2025 0157 by Matilda Milner RN  Outcome: Progressing  5/20/2025 0156 by Matilda Milner RN  Outcome: Progressing  Goal: No symptoms of aspiration throughout shift  5/20/2025 0157 by Matilda Milner RN  Outcome: Progressing  5/20/2025 0157 by Matilda Milner RN  Outcome: Progressing  5/20/2025 0156 by Matilda Milner RN  Outcome: Progressing  Goal: No symptoms of hemorrhage throughout shift  5/20/2025 0157 by Matilda Milner RN  Outcome: Progressing  5/20/2025 0157 by Matilda Milner RN  Outcome: Progressing  5/20/2025 0156 by Matilda Milner RN  Outcome: Progressing  Goal: Tolerate enteral feeding throughout shift  5/20/2025 0157 by Matilda Milner RN  Outcome: Progressing  5/20/2025 0157 by Matilda Milner RN  Outcome: Progressing  Goal: Decreased nausea/vomiting throughout shift  5/20/2025 0157 by Matilda Milner RN  Outcome: Progressing  5/20/2025 0157 by Matilda Milner RN  Outcome: Progressing  Goal: Controlled blood glucose throughout shift  5/20/2025 0157 by Matilda Milner RN  Outcome: Progressing  5/20/2025 0157 by Matilda Milner RN  Outcome: Progressing  5/20/2025 0156  by Matilda Alf, RN  Outcome: Progressing  Goal: Out of bed three times today  5/20/2025 0157 by Matilda Milner RN  Outcome: Progressing  5/20/2025 0157 by Matilda Milner RN  Outcome: Progressing     Problem: ICU Stroke  Goal: Maintain ICP within ordered limits throughout shift  5/20/2025 0157 by Matilda Milner RN  Outcome: Progressing  5/20/2025 0157 by Matilda Milner RN  Outcome: Progressing  Goal: Tolerate EVD clamping trial throughout shift  5/20/2025 0157 by Matilda Milner RN  Outcome: Progressing  5/20/2025 0157 by Matilda Milner RN  Outcome: Progressing  Goal: Tolerate ventilator weaning trial during shift  5/20/2025 0157 by Matilda Milner RN  Outcome: Progressing  5/20/2025 0157 by Matilda Milner RN  Outcome: Progressing  Goal: Maintain patent airway throughout shift  5/20/2025 0157 by Matilda Milner RN  Outcome: Progressing  5/20/2025 0157 by Matilda Milner RN  Outcome: Progressing  5/20/2025 0156 by Matilda Milner RN  Outcome: Progressing  Goal: Achieve/maintain targeted sodium level throughout shift  5/20/2025 0157 by Matilda Milner RN  Outcome: Progressing  5/20/2025 0157 by Matilda Milner RN  Outcome: Progressing     Problem: Pain - Adult  Goal: Verbalizes/displays adequate comfort level or baseline comfort level  5/20/2025 0157 by Matilda Milner RN  Outcome: Progressing  5/20/2025 0157 by Matilda Milner RN  Outcome: Progressing  5/20/2025 0156 by Matilda Milner RN  Outcome: Progressing     Problem: Safety - Adult  Goal: Free from fall injury  5/20/2025 0157 by Matilda Milner RN  Outcome: Progressing  5/20/2025 0157 by Matilda Milner RN  Outcome: Progressing  5/20/2025 0156 by Matilda Milner RN  Outcome: Progressing     Problem: Discharge Planning  Goal: Discharge to home or other facility with appropriate resources  5/20/2025 0157 by Matilda Milner RN  Outcome: Progressing  5/20/2025 0157 by Matilda Milner RN  Outcome: Progressing  5/20/2025 0156 by Matilda Milner RN  Outcome: Progressing     Problem: Chronic Conditions and  Co-morbidities  Goal: Patient's chronic conditions and co-morbidity symptoms are monitored and maintained or improved  5/20/2025 0157 by Matilda Milner RN  Outcome: Progressing  5/20/2025 0157 by Matilda Milner RN  Outcome: Progressing  5/20/2025 0156 by Matilda Milner RN  Outcome: Progressing     Problem: Nutrition  Goal: Nutrient intake appropriate for maintaining nutritional needs  5/20/2025 0157 by Matilda Milner RN  Outcome: Progressing  5/20/2025 0157 by Matilda Milner RN  Outcome: Progressing  5/20/2025 0156 by Matilda Milner RN  Outcome: Progressing      The clinical goals for the shift include pt will remain hemodynamically stable

## 2025-05-21 LAB
ALBUMIN SERPL BCP-MCNC: 4.1 G/DL (ref 3.4–5)
ANION GAP SERPL CALC-SCNC: 16 MMOL/L (ref 10–20)
AORTIC VALVE MEAN GRADIENT: 8 MMHG
AORTIC VALVE PEAK VELOCITY: 1.72 M/S
AV PEAK GRADIENT: 12 MMHG
BASOPHILS # BLD AUTO: 0.02 X10*3/UL (ref 0–0.1)
BASOPHILS NFR BLD AUTO: 0.2 %
BODY SURFACE AREA: 1.88 M2
BUN SERPL-MCNC: 18 MG/DL (ref 6–23)
CA-I BLD-SCNC: 1.16 MMOL/L (ref 1.1–1.33)
CALCIUM SERPL-MCNC: 9.6 MG/DL (ref 8.6–10.6)
CHLORIDE SERPL-SCNC: 106 MMOL/L (ref 98–107)
CO2 SERPL-SCNC: 23 MMOL/L (ref 21–32)
CREAT SERPL-MCNC: 1.9 MG/DL (ref 0.5–1.3)
EGFRCR SERPLBLD CKD-EPI 2021: 40 ML/MIN/1.73M*2
EJECTION FRACTION APICAL 4 CHAMBER: 65.9
EJECTION FRACTION: 73 %
EOSINOPHIL # BLD AUTO: 0 X10*3/UL (ref 0–0.7)
EOSINOPHIL NFR BLD AUTO: 0 %
ERYTHROCYTE [DISTWIDTH] IN BLOOD BY AUTOMATED COUNT: 14.4 % (ref 11.5–14.5)
GLOBAL LONGITUDINAL STRAIN: -20.7 %
GLUCOSE BLD MANUAL STRIP-MCNC: 113 MG/DL (ref 74–99)
GLUCOSE BLD MANUAL STRIP-MCNC: 118 MG/DL (ref 74–99)
GLUCOSE BLD MANUAL STRIP-MCNC: 120 MG/DL (ref 74–99)
GLUCOSE SERPL-MCNC: 119 MG/DL (ref 74–99)
HCT VFR BLD AUTO: 43 % (ref 41–52)
HGB BLD-MCNC: 14.6 G/DL (ref 13.5–17.5)
IMM GRANULOCYTES # BLD AUTO: 0.06 X10*3/UL (ref 0–0.7)
IMM GRANULOCYTES NFR BLD AUTO: 0.5 % (ref 0–0.9)
LEFT ATRIUM VOLUME AREA LENGTH INDEX BSA: 32 ML/M2
LEFT VENTRICLE INTERNAL DIMENSION DIASTOLE: 5.1 CM (ref 3.5–6)
LEFT VENTRICULAR OUTFLOW TRACT DIAMETER: 2.3 CM
LYMPHOCYTES # BLD AUTO: 0.73 X10*3/UL (ref 1.2–4.8)
LYMPHOCYTES NFR BLD AUTO: 6.3 %
MAGNESIUM SERPL-MCNC: 1.99 MG/DL (ref 1.6–2.4)
MCH RBC QN AUTO: 30.4 PG (ref 26–34)
MCHC RBC AUTO-ENTMCNC: 34 G/DL (ref 32–36)
MCV RBC AUTO: 90 FL (ref 80–100)
MITRAL VALVE E/A RATIO: 1.03
MONOCYTES # BLD AUTO: 0.57 X10*3/UL (ref 0.1–1)
MONOCYTES NFR BLD AUTO: 5 %
NEUTROPHILS # BLD AUTO: 10.12 X10*3/UL (ref 1.2–7.7)
NEUTROPHILS NFR BLD AUTO: 88 %
NRBC BLD-RTO: 0 /100 WBCS (ref 0–0)
PHOSPHATE SERPL-MCNC: 2.5 MG/DL (ref 2.5–4.9)
PLATELET # BLD AUTO: 147 X10*3/UL (ref 150–450)
POTASSIUM SERPL-SCNC: 3.6 MMOL/L (ref 3.5–5.3)
RBC # BLD AUTO: 4.8 X10*6/UL (ref 4.5–5.9)
RIGHT VENTRICLE FREE WALL PEAK S': 18 CM/S
SODIUM SERPL-SCNC: 141 MMOL/L (ref 136–145)
TACROLIMUS BLD-MCNC: 6.8 NG/ML
TRICUSPID ANNULAR PLANE SYSTOLIC EXCURSION: 3.3 CM
WBC # BLD AUTO: 11.5 X10*3/UL (ref 4.4–11.3)

## 2025-05-21 PROCEDURE — 99222 1ST HOSP IP/OBS MODERATE 55: CPT | Performed by: PHYSICAL MEDICINE & REHABILITATION

## 2025-05-21 PROCEDURE — 2500000001 HC RX 250 WO HCPCS SELF ADMINISTERED DRUGS (ALT 637 FOR MEDICARE OP)

## 2025-05-21 PROCEDURE — 2500000004 HC RX 250 GENERAL PHARMACY W/ HCPCS (ALT 636 FOR OP/ED): Mod: JZ

## 2025-05-21 PROCEDURE — 99232 SBSQ HOSP IP/OBS MODERATE 35: CPT | Performed by: PSYCHIATRY & NEUROLOGY

## 2025-05-21 PROCEDURE — 2060000001 HC INTERMEDIATE ICU ROOM DAILY

## 2025-05-21 PROCEDURE — 2500000004 HC RX 250 GENERAL PHARMACY W/ HCPCS (ALT 636 FOR OP/ED)

## 2025-05-21 PROCEDURE — 97530 THERAPEUTIC ACTIVITIES: CPT | Mod: GO

## 2025-05-21 PROCEDURE — 2500000004 HC RX 250 GENERAL PHARMACY W/ HCPCS (ALT 636 FOR OP/ED): Performed by: REGISTERED NURSE

## 2025-05-21 PROCEDURE — 2500000001 HC RX 250 WO HCPCS SELF ADMINISTERED DRUGS (ALT 637 FOR MEDICARE OP): Performed by: REGISTERED NURSE

## 2025-05-21 PROCEDURE — 85025 COMPLETE CBC W/AUTO DIFF WBC: CPT | Performed by: REGISTERED NURSE

## 2025-05-21 PROCEDURE — 83735 ASSAY OF MAGNESIUM: CPT | Performed by: REGISTERED NURSE

## 2025-05-21 PROCEDURE — 2500000002 HC RX 250 W HCPCS SELF ADMINISTERED DRUGS (ALT 637 FOR MEDICARE OP, ALT 636 FOR OP/ED)

## 2025-05-21 PROCEDURE — 97166 OT EVAL MOD COMPLEX 45 MIN: CPT | Mod: GO

## 2025-05-21 PROCEDURE — XXE2X19 MEASUREMENT OF CARDIAC OUTPUT, COMPUTER-AIDED ASSESSMENT, NEW TECHNOLOGY GROUP 9: ICD-10-PCS | Performed by: INTERNAL MEDICINE

## 2025-05-21 PROCEDURE — 82330 ASSAY OF CALCIUM: CPT | Performed by: REGISTERED NURSE

## 2025-05-21 PROCEDURE — 99233 SBSQ HOSP IP/OBS HIGH 50: CPT | Performed by: INTERNAL MEDICINE

## 2025-05-21 PROCEDURE — 80069 RENAL FUNCTION PANEL: CPT | Performed by: REGISTERED NURSE

## 2025-05-21 PROCEDURE — 80197 ASSAY OF TACROLIMUS: CPT

## 2025-05-21 PROCEDURE — 36415 COLL VENOUS BLD VENIPUNCTURE: CPT | Performed by: REGISTERED NURSE

## 2025-05-21 PROCEDURE — 82947 ASSAY GLUCOSE BLOOD QUANT: CPT

## 2025-05-21 RX ORDER — LABETALOL HYDROCHLORIDE 5 MG/ML
10 INJECTION, SOLUTION INTRAVENOUS EVERY 10 MIN PRN
Status: DISCONTINUED | OUTPATIENT
Start: 2025-05-21 | End: 2025-05-22

## 2025-05-21 RX ADMIN — ONDANSETRON 4 MG: 2 INJECTION INTRAMUSCULAR; INTRAVENOUS at 17:45

## 2025-05-21 RX ADMIN — HYDRALAZINE HYDROCHLORIDE 10 MG: 20 INJECTION INTRAMUSCULAR; INTRAVENOUS at 16:52

## 2025-05-21 RX ADMIN — HYDRALAZINE HYDROCHLORIDE 25 MG: 50 TABLET ORAL at 17:46

## 2025-05-21 RX ADMIN — HYDRALAZINE HYDROCHLORIDE 10 MG: 20 INJECTION INTRAMUSCULAR; INTRAVENOUS at 00:32

## 2025-05-21 RX ADMIN — LABETALOL HYDROCHLORIDE 10 MG: 5 INJECTION, SOLUTION INTRAVENOUS at 16:24

## 2025-05-21 RX ADMIN — ACETAMINOPHEN 650 MG: 325 TABLET ORAL at 10:27

## 2025-05-21 RX ADMIN — POLYETHYLENE GLYCOL 3350 17 G: 17 POWDER, FOR SOLUTION ORAL at 08:49

## 2025-05-21 RX ADMIN — TACROLIMUS 2 MG: 5 CAPSULE, GELATIN COATED ORAL at 06:23

## 2025-05-21 RX ADMIN — OXYCODONE 5 MG: 5 TABLET ORAL at 05:28

## 2025-05-21 RX ADMIN — MAGNESIUM SULFATE HEPTAHYDRATE 2 G: 40 INJECTION, SOLUTION INTRAVENOUS at 05:22

## 2025-05-21 RX ADMIN — ACETAMINOPHEN 650 MG: 325 TABLET ORAL at 17:49

## 2025-05-21 RX ADMIN — POTASSIUM CHLORIDE 40 MEQ: 1.5 POWDER, FOR SOLUTION ORAL at 05:22

## 2025-05-21 RX ADMIN — ROSUVASTATIN CALCIUM 20 MG: 20 TABLET, FILM COATED ORAL at 20:08

## 2025-05-21 RX ADMIN — ENOXAPARIN SODIUM 40 MG: 40 INJECTION, SOLUTION SUBCUTANEOUS at 16:52

## 2025-05-21 RX ADMIN — OXYCODONE 5 MG: 5 TABLET ORAL at 23:13

## 2025-05-21 RX ADMIN — HYDRALAZINE HYDROCHLORIDE 10 MG: 20 INJECTION INTRAMUSCULAR; INTRAVENOUS at 08:16

## 2025-05-21 RX ADMIN — MYCOPHENOLATE MOFETIL 750 MG: 250 CAPSULE ORAL at 08:49

## 2025-05-21 RX ADMIN — ASPIRIN 81 MG: 81 TABLET, CHEWABLE ORAL at 08:49

## 2025-05-21 RX ADMIN — LABETALOL HYDROCHLORIDE 10 MG: 5 INJECTION, SOLUTION INTRAVENOUS at 23:12

## 2025-05-21 RX ADMIN — LABETALOL HYDROCHLORIDE 10 MG: 5 INJECTION, SOLUTION INTRAVENOUS at 05:04

## 2025-05-21 RX ADMIN — LABETALOL HYDROCHLORIDE 10 MG: 5 INJECTION, SOLUTION INTRAVENOUS at 16:05

## 2025-05-21 RX ADMIN — TACROLIMUS 2 MG: 5 CAPSULE, GELATIN COATED ORAL at 18:32

## 2025-05-21 RX ADMIN — ACETAMINOPHEN 650 MG: 325 TABLET ORAL at 05:28

## 2025-05-21 RX ADMIN — MYCOPHENOLATE MOFETIL 750 MG: 250 CAPSULE ORAL at 20:08

## 2025-05-21 ASSESSMENT — ACTIVITIES OF DAILY LIVING (ADL)
BATHING_ASSISTANCE: TOTAL
ADL_ASSISTANCE: INDEPENDENT

## 2025-05-21 ASSESSMENT — COGNITIVE AND FUNCTIONAL STATUS - GENERAL
TOILETING: TOTAL
DAILY ACTIVITIY SCORE: 6
PERSONAL GROOMING: TOTAL
DRESSING REGULAR LOWER BODY CLOTHING: TOTAL
DRESSING REGULAR UPPER BODY CLOTHING: TOTAL
EATING MEALS: TOTAL
HELP NEEDED FOR BATHING: TOTAL

## 2025-05-21 ASSESSMENT — PAIN SCALES - GENERAL
PAINLEVEL_OUTOF10: 3
PAINLEVEL_OUTOF10: 0 - NO PAIN
PAINLEVEL_OUTOF10: 3
PAINLEVEL_OUTOF10: 8
PAINLEVEL_OUTOF10: 0 - NO PAIN

## 2025-05-21 ASSESSMENT — PAIN - FUNCTIONAL ASSESSMENT
PAIN_FUNCTIONAL_ASSESSMENT: 0-10

## 2025-05-21 ASSESSMENT — PAIN DESCRIPTION - DESCRIPTORS
DESCRIPTORS: ACHING
DESCRIPTORS: ACHING

## 2025-05-21 NOTE — HOSPITAL COURSE
60 y.o. male with PMH Renal transplant (on Cellcept), and HTN. Admitted 5/19/2025 with Acute cerebrovascular accident (CVA) due to embolism of right middle cerebral artery (Multi) after presenting with AMS, HA, and HTN  with /113 c/f PRES. It was then noted patient developed subtle L FD and ataxia that progressed to worsening L sided weakness. LKW 5/19 0400AM. 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 CTP with 11cc core infarct, 127cc penumbra, ratio 12.5. Patient OOW for TNK, s/p TICI 2b MT. XperCT c/f hemorrhage vs contrast staining. MRI brain showed acute nonhemorrhagic right MCA territory infarction with associated vasogenic edema.

## 2025-05-21 NOTE — CARE PLAN
Problem: Skin  Goal: Decreased wound size/increased tissue granulation at next dressing change  Outcome: Progressing  Flowsheets (Taken 5/21/2025 0918)  Decreased wound size/increased tissue granulation at next dressing change: Promote sleep for wound healing  Goal: Participates in plan/prevention/treatment measures  Outcome: Progressing  Flowsheets (Taken 5/21/2025 0918)  Participates in plan/prevention/treatment measures: Elevate heels  Goal: Prevent/manage excess moisture  Outcome: Progressing  Flowsheets (Taken 5/21/2025 0918)  Prevent/manage excess moisture:   Moisturize dry skin   Cleanse incontinence/protect with barrier cream  Goal: Prevent/minimize sheer/friction injuries  Outcome: Progressing  Flowsheets (Taken 5/21/2025 0918)  Prevent/minimize sheer/friction injuries:   Turn/reposition every 2 hours/use positioning/transfer devices   HOB 30 degrees or less   Increase activity/out of bed for meals  Goal: Promote/optimize nutrition  Outcome: Progressing  Flowsheets (Taken 5/21/2025 0918)  Promote/optimize nutrition: Discuss with provider if NPO > 2 days  Goal: Promote skin healing  Outcome: Progressing  Flowsheets (Taken 5/21/2025 0918)  Promote skin healing:   Assess skin/pad under line(s)/device(s)   Protective dressings over bony prominences

## 2025-05-21 NOTE — PROGRESS NOTES
Occupational Therapy    Evaluation/Treatment    Patient Name: Davidson Khoury  MRN: [id number]  Department: [facility]  Room: 05/05-A  Today's Date: 05/21/25  Time Calculation  Start Time: 0937  Stop Time: 1006  Time Calculation (min): 29 min       Assessment:  OT Assessment: Pt presents decificts in ADLs, IADLs, functional mobility, bed mobility, activity tolerance, sensation, vision and UE strength. Pt is appropriate for HIGH skilled OT to return to highest PLOF safely.  Prognosis: Fair  Barriers to Discharge Home: Caregiver assistance, Cognition needs, Physical needs  Caregiver Assistance: Caregiver assistance needed per identified barriers - however, level of patient's required assistance exceeds assistance available at home  Cognition Needs: 24hr supervision for safety awareness needed, Cognition-related high falls risk, Insight of patient limited regarding functional ability/needs  Physical Needs: 24hr mobility assistance needed, 24hr ADL assistance needed, High falls risk due to function or environment  Evaluation/Treatment Tolerance: Patient limited by fatigue  Medical Staff Made Aware: Yes  End of Session Communication: Bedside nurse  End of Session Patient Position: Bed, 3 rail up, Alarm off, not on at start of session  OT Assessment Results: Decreased ADL status, Decreased upper extremity range of motion, Decreased safe judgment during ADL, Decreased upper extremity strength, Decreased cognition, Decreased functional mobility, Non-functional left upper extremity, Decreased IADLs  Prognosis: Fair  Evaluation/Treatment Tolerance: Patient limited by fatigue  Medical Staff Made Aware: Yes  Strengths: Housing layout, Support and attitude of living partners  Barriers to Participation: Ability to acquire knowledge, Attitude of self, Insight into problems  Plan:  Treatment Interventions: ADL retraining, UE strengthening/ROM, Compensatory technique education  OT Frequency: 4 times per week  OT Discharge  Recommendations: High intensity level of continued care  Equipment Recommended upon Discharge:  (TBD)  OT Recommended Transfer Status: Dependent  OT - OK to Discharge: Yes  Treatment Interventions: ADL retraining, UE strengthening/ROM, Compensatory technique education    Subjective   Current Problem:  1. Acute cerebrovascular accident (CVA) due to embolism of right middle cerebral artery (Multi)        2. Acute ischemic right MCA stroke (Multi)  Transthoracic Echo Complete    Transthoracic Echo Complete        OT Visit Info:  OT Received On: 05/21/25  General Visit Info:   General  Reason for Referral: P/w AMS, HA, HTN with notable subtle left sided weakness that worsened. Acute cerebrovascular accident due to embolism of R MCA. Found to have proximal R superior M2 occulsion, MT TICI 2b. Pre intervention NIHSS: 10, post intervention NIHSS: 9.  Past Medical History Relevant to Rehab: 60 y.o h/o notable for renal transplant (currently on Cellcept) and HTN  Family/Caregiver Present: No  Prior to Session Communication: Bedside nurse  Patient Position Received: Bed, 4 rail up, Alarm off, not on at start of session  Preferred Learning Style: auditory, visual, verbal  General Comment: Pt supine in bed upon arrival. Decreased arousal throughout. Agreeable to session,   Precautions:  Medical Precautions: Fall precautions  Precautions Comment: SBP <180     Date/Time Vitals Session Patient Position Pulse Resp SpO2 BP MAP (mmHg)    05/21/25 1400 --  --  71  17  99 %  177/90  116     05/21/25 1500 --  --  87  19  99 %  --  --                 Pain:  Pain Assessment  Pain Assessment: 0-10  0-10 (Numeric) Pain Score: 0 - No pain    Objective   Cognition:  Overall Cognitive Status: Impaired  Orientation Level: Oriented X4  Attention: Exceptions to WFL  Sustained Attention: Impaired (Unable to focus and attend to activity for longer durations.)  Safety/Judgement: Exceptions to WFL  Unable to Self-Monitor and Self-Correct  Consistently: Moderate  Other (Comment): Pt required Max VC to hold upright positioning EOB and not extend/push through RUE due to heavy left lateral lean.  Insight: Mild  Impulsive: Moderately (Pt required max verbal and tactile cues to not push through RUE while seated EOB to accommodate L lateral lean. Pt repeatedly would continue bearing weight following cues.)  Task Initiation: Delayed initiation  Processing Speed: Delayed           Home Living:  Type of Home: House  Lives With: Significant other  Home Adaptive Equipment: Cane  Home Layout: One level  Home Access: Stairs to enter with rails  Entrance Stairs-Rails: Both  Entrance Stairs-Number of Steps: 3  Bathroom Shower/Tub: Tub/shower unit  Bathroom Toilet: Standard  Bathroom Equipment: Grab bars in shower, Shower chair with back, Grab bars around toilet  Prior Function:  Level of [place]: Independent with ADLs and functional transfers, Independent with homemaking with ambulation  Receives Help From:  (Fiance)  ADL Assistance: Independent  Homemaking Assistance: Independent  Ambulatory Assistance: Independent  Vocational: Unemployed  Hand Dominance: Right  Prior Function Comments: Denies driving, fiance provides transportation.  IADL History:  Homemaking Responsibilities: Yes  Mode of Transportation: Other (Comment) (Fiance)  ADL:  Eating Assistance: Total (Anticipated)  Grooming Assistance: Total (Pt was unable to obtain wipe in R hand, requiring total assist to hold the wipe and guide RUE to face)  Grooming Deficit: Wash/dry face  Bathing Assistance: Total (Anticipated)  UE Dressing Assistance: Total (Anticipated)  LE Dressing Assistance: Total (When asked to don socks pt required assist to obtain sock in R hand, and move LUE into figure 4. Backwards chaining and Max verbal and tactile cues were done for task initation/completion.)  LE Dressing Deficit: Don/doff R sock, Don/doff L sock  Toileting Assistance with Device: Total      Activity  Tolerance:  Endurance: Tolerates 10 - 20 min exercise with multiple rests  Early Mobility/Exercise Safety Screen: Proceed with mobilization - No exclusion criteria met  Functional Standing Tolerance:     Bed Mobility/Transfers: Bed Mobility  Bed Mobility: Yes  Bed Mobility 1  Bed Mobility 1: Supine to sitting  Level of Assistance 1: Dependent (x2)  Bed Mobility 2  Bed Mobility  2: Sitting to supine  Level of Assistance 2: Dependent (x2)    Transfers  Transfer: No    Sitting Balance:  Static Sitting Balance  Static Sitting-Balance Support: Feet supported  Static Sitting-Level of Assistance: Dependent (x1)  Static Sitting-Comment/Number of Minutes: x15 minutes. Pt required max verbal and tacitle cues to maintain midline posture. Pt pushing through RUE, increasing L lateral lean while seated.       Therapy/Activity: Therapeutic Exercise  Therapeutic Exercise Performed: Yes  Therapeutic Exercise Activity 1: Tactile cueing at muscle belly to faciltate LUE movement, cueing for visual input    Therapeutic Activity  Therapeutic Activity Performed: Yes  Therapeutic Activity 1: Facilitated upright positioning with skilled vitals assessment  Therapeutic Activity 2: EOB x15 minutes Dep x1 and max verbal and tactile cues for maintaining upright posture due to L lateral lean       Vision:Vision - Complex Assessment  Head Position: Chin down  Tracking: Decreased smoothness of eye movement to L superior field, Decreased smoothness of eye movement to R superior field  Vision Comments: Inward deviation during tracking. During vertical tracking patient closed eyes, unable to track.  Sensation:  Light Touch: Severe deficits in the LUE, Severe deficits in the LLE  Sensation Comment: WD to noxious stimu LUE       Perception:  Inattention/Neglect: Cues to attend to left side of body, Cues to maintain midline in sitting  Coordination:  Movements are Fluid and Coordinated: No   Hand Function:  Hand Function  Gross Grasp: Impaired (L; R  [facility])  Extremities: RUE   RUE : Within Functional Limits  RUE Strength  RUE Overall Strength: Greater than or equal to 3/5 as evidenced by functional mobility  R Shoulder Flexion: 3+/5  R Shoulder Extension: 3+/5  R Elbow Flexion: 3+/5  R Elbow Extension: 3+/5  R Wrist Flexion: 3+/5  R Wrist Extension: 3+/5 and LUE   LUE: Exceptions to WFL  LUE PROM (degrees)  LUE PROM Comment: WFL  LUE Strength  LUE Overall Strength: Deficits  L Shoulder Flexion: 0/5  L Shoulder Extension: 0/5  L Elbow Flexion: 0/5  L Elbow Extension: 0/5  L Wrist Flexion: 0/5  L Wrist Extension: 0/5  LUE Tone  LUE Tone: Flaccid      Outcome Measures: [facility] Daily Activity  Putting on and taking off regular lower body clothing: Total  Bathing (including washing, rinsing, drying): Total  Putting on and taking off regular upper body clothing: Total  Toileting, which includes using toilet, bedpan or urinal: Total  Taking care of personal grooming such as brushing teeth: Total  Eating Meals: Total  Daily Activity - Total Score: 6        , Confusion Assessment Method-ICU (CAM-ICU)  Feature 1: Acute Onset or Fluctuating Course: Positive  Feature 2: Inattention: Negative  Feature 4: Disorganized Thinking: Negative  Overall CAM-ICU: Negative  , and Early Mobility/Exercise Safety Screen: Proceed with mobilization - No exclusion criteria met  ICU Mobility Scale: Sitting over edge of bed [3]    Education Documentation  Body Mechanics, taught by NANCY Novak at 5/21/2025 11:51 AM.  Learner: Patient  Readiness: Acceptance  Method: Explanation  Response: Needs Reinforcement  Comment: OT POC    Precautions, taught by NANCY Novak at 5/21/2025 11:51 AM.  Learner: Patient  Readiness: Acceptance  Method: Explanation  Response: Needs Reinforcement  Comment: OT POC    ADL Training, taught by NANCY Novak at 5/21/2025 11:51 AM.  Learner: Patient  Readiness: Acceptance  Method: Explanation  Response: Needs Reinforcement  Comment: OT POC    Education  Comments  No comments found.        OP EDUCATION:       Goals:  Encounter Problems       Encounter Problems (Active)       ADLs       Patient with complete upper body dressing with moderate assist level of assistance donning and doffing all UE clothes with PRN adaptive equipment and one handed techniques while edge of bed.  (Progressing)       Start:  05/21/25    Expected End:  06/11/25            Patient will feed self with minimal assist level of assistance and verbal cues and tactile cues using PRN adaptive equipment. (Progressing)       Start:  05/21/25    Expected End:  06/11/25            Patient will complete daily grooming tasks brushing teeth and washing face/hair with minimal assist level of assistance and PRN adaptive equipment while edge of bed. (Progressing)       Start:  05/21/25    Expected End:  06/11/25               BALANCE       Patient will maintain static standing balance during ADL task with minimal assist level of assistance in order to demonstrate decreased risk of falling and improved postural control. (Progressing)       Start:  05/21/25    Expected End:  06/11/25               EXERCISE/STRENGTHENING       Patient will independently perform self passive ROM on LUE to increase bimanual coordination. (Progressing)       Start:  05/21/25    Expected End:  06/11/25               TRANSFERS       Patient will perform bed mobility minimal assist level of assistance in order to improve safety and independence with mobility (Progressing)       Start:  05/21/25    Expected End:  06/11/25               Completion of this session, clinical decision making, and documentation performed under the supervision/direction of [place] DAWN/GREG

## 2025-05-21 NOTE — CONSULTS
"Nutrition Initial Assessment:   Nutrition Assessment    Reason for Assessment: Provider consult order (for NG placement - RDN will provide TF recs)    Patient is a 60 y.o. male presenting with AMS, HTN, and HA.   On initial exam, he had subtle left sided weakness that worsened on follow up exam. BAT activated for concern of stroke. Found to have proximal R superior M2 occlusion. Not a candidate for TNK as LKN >4.5 hours. Taken for MT, TICI 2b.     5/20: Failed SLP eval; Cortrak placed (waiting KUB read)    PMH: renal transplant (currently on Cellcept) and hypertension       Nutrition History:  Energy Intake: Fair 50-75 %  Food and Nutrient History: Pt reported \"good\" appetite PTA. Mentioned he would eat 2 meals a day with no snacks. Noted he liked tuna fish. Denied ONS. Other than this, unable to obtain further information as he would quickly fall back asleep       Anthropometrics:  Height: 175.3 cm (5' 9\")   Weight: 72.9 kg (160 lb 11.5 oz)   BMI (Calculated): 23.72  IBW/kg (Dietitian Calculated): 72.7 kg  Percent of IBW: 100 %    Weight History:   Wt Readings from Last 10 Encounters:   05/21/25 72.9 kg (160 lb 11.5 oz)   05/19/25 81.2 kg   05/15/25 81.2 kg (179 lb)   01/15/25 81.3 kg (179 lb 3.2 oz)   11/27/24 80.7 kg (178 lb)   11/05/24 74.8 kg (165 lb)   10/15/24 80.3 kg (177 lb 1.6 oz)   07/29/24 78.9 kg (174 lb)   06/18/24 79.2 kg (174 lb 8 oz)   05/09/24 79.4 kg (175 lb)   04/10/24 80 kg (176 lb 4.8 oz)     Weight Change %:  Weight History / % Weight Change: Reported UBW is 160# (72.7kg) - reported no wt loss PTA.  Significant Weight Loss: No    Nutrition Focused Physical Exam Findings:  Findings at least moderate   Subcutaneous Fat Loss:   Orbital Fat Pads: Mild-Moderate (slight dark circles and slight hollowing)  Buccal Fat Pads: Mild-Moderate (flat cheeks, minimal bounce)  Triceps: Mild-Moderate (less than ample fat tissue)  Ribs: Defer  Muscle Wasting:  Temporalis: Mild-Moderate (slight " depression)  Pectoralis (Clavicular Region): Mild-Moderate (some protrusion of clavicle)  Deltoid/Trapezius: Mild-Moderate (slight protrusion of acromion process)  Interosseous: Mild-Moderate (slightly depressed area between thumb and forefinger)  Trapezius/Infraspinatus/Supraspinatus (Scapular Region): Defer  Quadriceps: Mild-Moderate (mild depression on inner and outer thigh)  Gastrocnemius: Defer  Edema:  Edema: none    Nutrition Significant Labs:  CBC Trend:   Results from last 7 days   Lab Units 05/21/25  0305 05/20/25  0217 05/19/25  1141 05/15/25  1838   WBC AUTO x10*3/uL 11.5* 4.7 6.5 5.5   RBC AUTO x10*6/uL 4.80 4.39* 4.85 4.80   HEMOGLOBIN g/dL 14.6 13.2* 14.6 14.3   HEMATOCRIT % 43.0 41.4 43.2 41.9   MCV fL 90 94 89 87   PLATELETS AUTO x10*3/uL 147* 83* 112* 97*    , BMP Trend:   Results from last 7 days   Lab Units 05/21/25  0305 05/20/25  0217 05/19/25  1141 05/15/25  1838   GLUCOSE mg/dL 119* 80 118* 101*   CALCIUM mg/dL 9.6 8.3* 9.6 9.2   SODIUM mmol/L 141 141 139 141   POTASSIUM mmol/L 3.6 3.5 3.6 3.8   CO2 mmol/L 23 23 24 23   CHLORIDE mmol/L 106 109* 106 108*   BUN mg/dL 18 17 16 14   CREATININE mg/dL 1.90* 1.59* 2.01* 1.88*    , A1C:  Lab Results   Component Value Date    HGBA1C 5.2 05/20/2025   , BG POCT trend:   Results from last 7 days   Lab Units 05/21/25  0743 05/20/25  1949 05/20/25  1746 05/20/25  1107 05/19/25  2013   POCT GLUCOSE mg/dL 120* 100* 122* 106* 90    , Renal Lab Trend:   Results from last 7 days   Lab Units 05/21/25  0305 05/20/25  0217 05/19/25  1141 05/15/25  1857 05/15/25  1838   POTASSIUM mmol/L 3.6 3.5 3.6  --  3.8   PHOSPHORUS mg/dL 2.5 2.1* 2.1*   < >  --    SODIUM mmol/L 141 141 139  --  141   MAGNESIUM mg/dL 1.99 1.44* 1.65   < >  --    EGFR mL/min/1.73m*2 40* 50* 38*  --  41*   BUN mg/dL 18 17 16  --  14   CREATININE mg/dL 1.90* 1.59* 2.01*  --  1.88*    < > = values in this interval not displayed.      Scheduled medications  Scheduled Medications[1]        Dietary  Orders (From admission, onward)       Start     Ordered    05/21/25 1111  Enteral feeding with NPO Isosource 1.5; 10; No free water; 200; Water; Every 6 hours  Diet effective now        Question Answer Comment   Tube feeding formula: Isosource 1.5    Tube feeding continuous rate (mL/hr): 10    Free water restriction: No free water    Tube feeding flush (mL): 200    Flush type: Water    Flush frequency: Every 6 hours        05/21/25 1111                     Estimated Needs:   Total Energy Estimated Needs in 24 hours (kCal):  (1815-5021)  Method for Estimating Needs: 25kcal/kg per IBW  Total Protein Estimated Needs in 24 Hours (g): 90 g  Method for Estimating 24 Hour Protein Needs: 1.2g/kg  Total Fluid Estimated Needs in 24 Hours (mL):  (per team)        Nutrition Diagnosis   Malnutrition Diagnosis  Patient has Malnutrition Diagnosis: Yes  Diagnosis Status: New  Malnutrition Diagnosis: Moderate malnutrition related to chronic disease or condition  Related to: unknown etiology  As Evidenced by: NFPE with moderate muscle wasting and fat loss, intake meeting <75% of needs for >/= 1 mo            Nutrition Interventions/Recommendations        Nutrition Recommendations:  Individualized Nutrition Prescription Provided for enteral nutrition:   Start Isosource 1.5 @ 15ml/hr, increase by 10mls every 8-10hrs to reach goal of 55ml/hr.   Additional water flushes per team   TF provides ~1L free H2O.   Recommend 100mg thiamine daily    Nutrition Interventions/Goals:   Enteral Intake: Management of delivery rate of enteral nutrition  Goal: TF provides 1980kcals and 90g pro  Vitamin and Mineral Supplement Therapy: Thiamin supplement therapy  Goal: Prevent refeeding      Education Documentation  No documentation found.            Nutrition Monitoring and Evaluation   Food/Nutrient Related History Monitoring  Monitoring and Evaluation Plan: Enteral and parenteral nutrition intake determination  Enteral and Parenteral Nutrition Intake  Determination: Enteral nutrition intake - To meet > 75% estimated energy needs    Biochemical Data, Medical Tests and Procedures  Monitoring and Evaluation Plan: Electrolyte/renal panel  Electrolyte and Renal Panel: Electrolytes within normal limits    Goal Status: New goal(s) identified    Time Spent (min): 45 minutes              [1] aspirin, 81 mg, oral, Daily   Or  aspirin, 81 mg, oral, Daily   Or  aspirin, 81 mg, nasogastric tube, Daily   Or  aspirin, 300 mg, rectal, Daily  enoxaparin, 40 mg, subcutaneous, q24h  lidocaine, 1 Application, urethral, Once  mycophenolate, 750 mg, oral, BID  ondansetron, 4 mg, intravenous, Once  perflutren protein A microsphere, 0.5 mL, intravenous, Once in imaging  polyethylene glycol, 17 g, oral, Daily  rosuvastatin, 20 mg, oral, Nightly  sulfur hexafluoride microsphr, 2 mL, intravenous, Once in imaging  tacrolimus, 2 mg, oral, q12h [facility]ST

## 2025-05-21 NOTE — CONSULTS
Reason For Consult  S/p DDKT    History Of Present Illness  Davisdon Khoury is a 60 y.o. male presenting with ESRD secondary to hypertension s/p DDKT on 8/10/2019. Patient was induced by Simulect, no DGF no CMV mismatch no EBV mismatch, KDPI of kidney is 40% PRA 0%. No episodes of rejection.    Currently admitted to [facility] with altered mental status headache and hypertension.  Recent history of ER visit 5/15/2025 with blurry vision left-sided weakness, diagnosed as TIA and discharged home with instructions to follow-up as outpatient.    Initial /130. Imaging this admission including CTA with Rt MCA stroke. S/p cerebral angiogram and mechanical thrombectomy 5/19/25.    Recent Bps 170/100s. Currently on nicardipine drip.    Baseline Cr ~2. Most recent labs with Cr ~1.6 after hydration post-contrast exposure.     Home IS: Tac 2.5 mg q12,  mg bid. Not on pred.    Pt getting feeding tube placed this AM. Unable to obtain ROS.       Past Medical History  He has a past medical history of Personal history of other diseases of urinary system, Personal history of other specified conditions (01/12/2022), and Personal history of other specified conditions (08/22/2019).    Surgical History  He has a past surgical history that includes Other surgical history (06/06/2016); Other surgical history (06/06/2016); and Other surgical history (08/23/2019).     Social History  He reports that he has quit smoking. His smoking use included cigarettes. He has never used smokeless tobacco. He reports that he does not drink alcohol and does not use drugs.    Family History  Family History[1]     Allergies  Amoxicillin and Ace inhibitors    Scheduled medications  aspirin, 81 mg, oral, Daily   Or  aspirin, 81 mg, oral, Daily   Or  aspirin, 81 mg, nasogastric tube, Daily   Or  aspirin, 300 mg, rectal, Daily  enoxaparin, 40 mg, subcutaneous, q24h  lidocaine, 1 Application, urethral, Once  LORazepam, 1 mg, intravenous,  "Once  mycophenolate, 750 mg, oral, BID  ondansetron, , ,   ondansetron, 4 mg, intravenous, Once  perflutren protein A microsphere, 0.5 mL, intravenous, Once in imaging  polyethylene glycol, 17 g, oral, Daily  QUEtiapine, 25 mg, oral, Once  rosuvastatin, 20 mg, oral, Nightly  sulfur hexafluoride microsphr, 2 mL, intravenous, Once in imaging  tacrolimus, 2 mg, oral, q12h CHRIST      Continuous medications  niCARdipine, 2.5-15 mg/hr      PRN medications  PRN medications: acetaminophen, calcium gluconate, calcium gluconate, hydrALAZINE **FOLLOWED BY** hydrALAZINE, labetaloL, magnesium sulfate, magnesium sulfate, ondansetron, ondansetron **OR** ondansetron, oxyCODONE, oxyCODONE, oxygen, potassium chloride CR **OR** potassium chloride, potassium chloride CR **OR** potassium chloride         Last Recorded Vitals  Blood pressure (!) 170/105, pulse 88, temperature 37.1 °C (98.8 °F), temperature source Temporal, resp. rate 25, height 1.753 m (5' 9\"), weight 72.9 kg (160 lb 11.5 oz), SpO2 100%.    Sleepy, no distress, pleasant  MMM, no lesions  Lungs with equal air entry, no added sounds  Rrr, no m/r/g  Abd soft, nt, nd  No allograft tenderness  No edema b/l    Results for orders placed or performed during the hospital encounter of 05/19/25 (from the past 24 hours)   Tacrolimus level   Result Value Ref Range    Tacrolimus  11.1 <=15.0 ng/mL   CBC and Auto Differential   Result Value Ref Range    WBC 4.7 4.4 - 11.3 x10*3/uL    nRBC 0.0 0.0 - 0.0 /100 WBCs    RBC 4.39 (L) 4.50 - 5.90 x10*6/uL    Hemoglobin 13.2 (L) 13.5 - 17.5 g/dL    Hematocrit 41.4 41.0 - 52.0 %    MCV 94 80 - 100 fL    MCH 30.1 26.0 - 34.0 pg    MCHC 31.9 (L) 32.0 - 36.0 g/dL    RDW 14.5 11.5 - 14.5 %    Platelets 83 (L) 150 - 450 x10*3/uL    Immature Granulocytes %, Automated 0.2 0.0 - 0.9 %    Immature Granulocytes Absolute, Automated 0.01 0.00 - 0.70 x10*3/uL   Calcium, Ionized   Result Value Ref Range    POCT Calcium, Ionized 1.09 (L) 1.1 - 1.33 mmol/L "   Renal Function Panel   Result Value Ref Range    Glucose 80 74 - 99 mg/dL    Sodium 141 136 - 145 mmol/L    Potassium 3.5 3.5 - 5.3 mmol/L    Chloride 109 (H) 98 - 107 mmol/L    Bicarbonate 23 21 - 32 mmol/L    Anion Gap 13 10 - 20 mmol/L    Urea Nitrogen 17 6 - 23 mg/dL    Creatinine 1.59 (H) 0.50 - 1.30 mg/dL    eGFR 50 (L) >60 mL/min/1.73m*2    Calcium 8.3 (L) 8.6 - 10.6 mg/dL    Phosphorus 2.1 (L) 2.5 - 4.9 mg/dL    Albumin 3.8 3.4 - 5.0 g/dL   Magnesium   Result Value Ref Range    Magnesium 1.44 (L) 1.60 - 2.40 mg/dL   Manual Differential   Result Value Ref Range    Neutrophils %, Manual 88.5 40.0 - 80.0 %    Bands %, Manual 1.8 0.0 - 5.0 %    Lymphocytes %, Manual 4.4 13.0 - 44.0 %    Monocytes %, Manual 1.8 2.0 - 10.0 %    Eosinophils %, Manual 0.0 0.0 - 6.0 %    Basophils %, Manual 0.0 0.0 - 2.0 %    Atypical Lymphocytes %, Manual 3.5 0.0 - 2.0 %    Metamyelocytes %, Manual 0.0 0.0 - 0.0 %    Myelocytes %, Manual 0.0 0.0 - 0.0 %    Plasma Cells %, Manual 0.0 0.00 - 0.00 %    Promyelocytes %, Manual 0.0 0.0 - 0.0 %    Blasts %, Manual 0.0 0.0 - 0.0 %    Seg Neutrophils Absolute, Manual 4.16 1.20 - 7.00 x10*3/uL    Bands Absolute, Manual 0.08 0.00 - 0.70 x10*3/uL    Lymphocytes Absolute, Manual 0.21 (L) 1.20 - 4.80 x10*3/uL    Monocytes Absolute, Manual 0.08 (L) 0.10 - 1.00 x10*3/uL    Eosinophils Absolute, Manual 0.00 0.00 - 0.70 x10*3/uL    Basophils Absolute, Manual 0.00 0.00 - 0.10 x10*3/uL    Atypical Lymphs Absolute, Manual 0.16 0.00 - 0.50 x10*3/uL    Metamyelocytes Absolute, Manual 0.00 0.00 - 0.00 x10*3/uL    Myelocytes Absolute, Manual 0.00 0.00 - 0.00 x10*3/uL    Plasma Cells Absolute, Manual 0.00 0.00 - 0.00 x10*3/uL    Promyelocytes Absolute, Manual 0.00 0.00 - 0.00 x10*3/uL    Blasts Absolute, Manual 0.00 0.00 - 0.00 x10*3/uL    Total Cells Counted 113     Neutrophils Absolute, Manual 4.24 1.20 - 7.70 x10*3/uL    Manual nRBC per 100 Cells 0.0 0.0 - 0.0 %    RBC Morphology See Below      Ovalocytes Few     Teardrop Cells Few     Jennifer Cells Many    Hemoglobin A1C   Result Value Ref Range    Hemoglobin A1C 5.2 See comment %    Estimated Average Glucose 103 Not Established mg/dL   POCT GLUCOSE   Result Value Ref Range    POCT Glucose 106 (H) 74 - 99 mg/dL   Transthoracic Echo Complete   Result Value Ref Range    AV pk richardson 1.72 m/s    AV mn grad 8 mmHg    LVOT diam 2.30 cm    MV E/A ratio 1.03     LA vol index A/L 32.0 ml/m2    Tricuspid annular plane systolic excursion 3.3 cm    LV EF 73 %    RV free wall pk S' 18.00 cm/s    LV GLS -20.7 %    LVIDd 5.10 cm    AV pk grad 12 mmHg    LV A4C EF 65.9    POCT GLUCOSE   Result Value Ref Range    POCT Glucose 122 (H) 74 - 99 mg/dL   POCT GLUCOSE   Result Value Ref Range    POCT Glucose 100 (H) 74 - 99 mg/dL     CT head w/o contrast: 5/19/25  There is development of small acute infarct component within the  right frontal opercular region. There is also suspected subtle loss  of gray-white matter differentiation within the right frontal and  parietal lobes corresponding to regions of ischemia/infarct on recent  CTA perfusion examination. This could be better characterized with  MRI as clinically warranted. No acute intraparenchymal hematoma.  Subtle petechial hemorrhage versus contrast staining or potential  beam hardening artifact within the right parietal lobe. No  significant intracranial mass effect.    MRI head:  IMPRESSION:  1. Acute nonhemorrhagic right MCA territory infarct as above. There  is associated vasogenic edema and sulcal effacement. No midline  shift. No evidence of hemorrhagic transformation.  2. Old lacunar infarcts in left cerebellum, left thalamus and  bilateral basal ganglia. Moderate degree of chronic microvascular  ischemic disease in the cerebral white matter.  3. Evidence for old hemorrhage in the right basal ganglia, with  encephalomalacia. This could be sequela of an old hypertensive bleed,  or sequela of an old hemorrhagic  infarct.       Assessment/Plan     60 y.o. male presenting with ESRD secondary to hypertension s/p DDKT on 8/10/2019. Patient was induced by Simulect, no DGF no CMV mismatch no EBV mismatch, KDPI of kidney is 40% PRA 0%. No episodes of rejection.    Currently admitted with Rt MCA CVA s/p cerebral angiogram and mecahnical thrombectomy 5/19/25    Allograft function: s/p DDKT 8/10/2019  - baseline Cr ~2. Stable this admission  - s/p contrast exposure this admission. Maintain adequate hydration.   - metabolic indices acceptable. Nonoliguric, UOP ~1L. No hypervolemia on exam.  - Hb ~13, acceptable  - borderline low Ca, Phos. Monitor and replete lytes as indicated  - Bps 170/100s. On nicardipine drip. Home regimen: losartan 25 mg bid, clonidine.   - dose meds for CrCL. Avoid potential nephrotoxins.    Immunosuppression:  - resume tac 2mg q12. Monitor Fk trough daily (30-60 min prior to AM dose). Goal 5-8.  - cont  mg bid.    Rt MCA CVA: s/p mechanical thrombectomy 5/19/25  - management per neuro.    Will follow.        I spent 60 minutes in the professional and overall care of this patient.      Jayson Nicole MD         [1]   Family History  Problem Relation Name Age of Onset    Hypertension Mother

## 2025-05-21 NOTE — PROGRESS NOTES
05/21/25 1524   Discharge Planning   Home or Post Acute Services Post acute facilities (Rehab/SNF/etc)   Type of Post Acute Facility Services Rehab   Expected Discharge Disposition Rehab   Patient Choice   Provider Choice list and CMS website (https://medicare.gov/care-compare#search) for post-acute Quality and Resource Measure Data were provided and reviewed with: Patient     Social Work Discharge Planning note:    -Patient discussed during interdisciplinary rounds.   -Team members present: Fellow, PT and OT, and SW  -Plan per medical team: Pt is not medically ready for discharge. Pt is to downgrade to AINSLEY status today.  -Payer: Leroy Lopez  -Status: Inpatient  -Discharge disposition: Pt is currently with both PT and OT recommendations for High intensity.  Rehab Winter is reviewing and requested a PM&R consult.   -Support to Pt/family: Pt requested that SW add his significant other, Margo, to his contacts (added). SW called Margo and she confirmed that she is willing and able to provide 24 hour assist in their home after Pt completes the rehab placement.   -Potential Barriers: none  -Anticipated Date of Discharge: - 5/23/25    This LASHAUN Murillo, LSW    Office: [phone]  Secure chat via Haiku

## 2025-05-21 NOTE — CONSULTS
"PM&R Consult Note  Patient: Davidson Khoury   Age/sex: 60 y.o.   Medical Record #: [id number]     Referring physician: Mary Harper MD  Consulting physician: Phuc Chambers M.D.    Procedures performed on/related to this admission:  Mechanical thrombectomy    Chief complaint:   Impairments and acitivities limitations in ADLs, mobility, functional communication, and cognition secondary to stroke    HPI:   Davidson Khoury is a 60 y.o. year old male patient with  with long-standing severe hypertension leading to ESRD s/p  renal transplant in 2017.       On 5/15, he presented to the ED for blurry vision, L-hand numbness, and headache. Exact cause was not clear. CT head showed periventricular and subcortical small vessel disease and several old lacunar infarcts in R lentiform nucleus, L caudate, and L thalamus. Also had small hypodensity and encephalomalacia in R parieto-occipital lobe. Could not rule out TIA. Discharged home with plan to follow up with PCP. According to his fiancee, he had been \"not acting like himself\" since then.      Yesterday (5/19), he was worse and started \"baby-talking\" which prompted her to bring him again to the ED. Because his blood pressure was initially 241/133, neurology was consulted for hypertensive emergency vs PRES. Patient treated with nicardipine infusion. Repeat CT head unchanged from previous one on 5/15. On exam, he was alert and awake, language was normal. There was a mild L-sided facial droop and mild L-sided dysmetria. NIHSS score 3. However, when the team rounded on him in the afternoon, he had developed L-sided neglect, R gaze deviation, L homonomous hemianopia and L-sided drift. NIHSS score 10. BAT called at 3:42 pm. LKN was thought to be 4:00am when he was walking to the bathroom but even that is not clear because of his subtle symptoms of confusion. Repeat CT head looked unchanged from earlier one that day. CTA, however, showed distal L M1 and proximal L M2 occlusion " (as well as significant, intracranial atherosclerosis (including basilar artery stenosis). CTP showed small core (11ml) and large penumbra (127 ml). He was not a candidate for IV TNK because of being outside the time window but was taken for thrombectomy, which resulted in  TICI 2b reperfusion. Post-procedure CT scan showed some hyperdensity in R parieto-occipital region (contrast staining vs. Hemorrhagic transformation)     Interval History:     5/19 Post MT NIH was 9, LUE 1 and LLE 2-3.  5/20 NIH 15, LUE 4, LLE 2  5/221 NIH 15 LUE 4, LLE 2, with sustained clonus in LLE and hyperreflexia in LUE, which suggests upper motor neuron injury and progression of the sxs from onset on 5/19.     MRI Brain reviewed, showed DWI lesion in distribution of R M1, with patchy DWI/FLAIR mismatch areas in the affected R M1 distrubution. No GRE lesions c/f bleed in area of infarct, but evidence of possible old bleed in Rt BG suggestive of remote Rt BG infarct with HT.   TTE with nl EF, LA wnl, no PFO. LDL 63, A1c 5.2     [id number] stroke. Mechanism is not entirely clear. Does have significant intracranial atherosclerosis and perhaps he developed an in-situ thrombosis triggered by fluctuating blood pressure and impaired autoregulation. Workup underway.   Exam progression and MRI findings could suggest that pt continued to clinically progress after the MT for unclear reason. Distal embolization or chronic occlusion/subocclusion are possible etiology, although hard to prove.      Pt currently on ASA, will consider DAPT for 90 days per SAMPPRIS. .       PM&R was consulted for recommendations and for IRF appropriateness.    Present Status: unstable, in ICU with in  PO: NPO  Pain: none  Bowel: none recent  Bladder: ext cath  DVT prophylaxis with Lovenox 40qd.   Weight bearing status: full    Precautions: na    Medical History[1]     Surgical History[2]     Family History[3]     RX Allergies[4]     Scheduled Medications[5]     PRN  Medications[6]     Social History:  Tobacco/EtOh/Illicits:denies  Working History:retired   Social supports: lives with his girlfriend, Steffanie. Pt reported that Steffanie does not work, so she would be able to provide up to 24 hour assistance   Home situation: Lives in single family 1 story home, with 3 stairs to enter and no stairs to B/B    Functional History:  Home DME: has walker and cane from his dad's former stroke  Premorbid ADL's: independent   Premorbid Mobility: independent   Present:   Physical Therapy: 5/20 Supine to sittingLevel of Assistance 1: oderate assistance, Moderate verbal cuesBed Mobility Comments 1: HOB elevated, use of bed rail. Assist to manage LEs, cues for hip alignment and positioning on L UE as pt neglecting UE.  Bed Mobility 2Bed Mobility  2: Sitting to supineLevel of Assistance 2: Minimum assistance, Minimal verbal cuesBed Mobility 3  Bed Mobility 3: ScootingLevel of Assistance 3: Moderate assistance, Moderate verbal cuesBed Mobility Comments 3: With HOB flat, and assist to position LEs, pt able to utilize LEs and R UE to assist with boosting. Assist required for realignment and positioning in bed.     Occupational Therapy: missed visit 5/20 at MRI    Speech Therapy: none recent    Review of Systems   reviewed 10 point review, as patient able, somewhat limited by history nonpertinent except for HPI    Physical Exam   General: Pleasant, straightforward, WDWN individual.  Mental Status: Pleasant, direct, appropriate mood and affect  Resp: breathing is unlabored without audible wheeze  Vascular: Normal pedal and radial pulses, no cyanosis, no venous stasis changes    Lymph: No LAD, no lymphedema   Skin: No overlying skin change, ecchymosis, or erythema.      MSK:  Full rom x 4limbs  Neuro:  Alert, conversive - Left facial droop  RUE strength: 5/5 elbow flexors, 5/5 elbow extensors, 5/5 wrist extensors, 5/5 finger flexors, 5/5 finger abductors    LUE strength: Flaccid  0/5 except trace shoulder adduction/IR ?spasm RLE strength: 5/5 hip flexors, 5/5 knee extensors, 5/5 ankle dorsiflexors, 5/5 EHL, 5/5 ankle plantar flexors   LLE strength: 1/5 hip flexors, 0/5 knee extensors, 0/5 ankle dorsiflexors, 0/5 EHL, 0/5 ankle plantar flexors   Sensation - absent ?neglect Left UE/LE   Tone inc Giulia 1+ L Upper and 1 Lower  Reflexes (right/left):  Clonus: 0 POS - sustained  Babinski: 0 pos      Lab Results   Component Value Date    WBC 11.5 (H) 05/21/2025    HGB 14.6 05/21/2025    HCT 43.0 05/21/2025    MCV 90 05/21/2025     (L) 05/21/2025        Lab Results   Component Value Date    CREATININE 1.90 (H) 05/21/2025    BUN 18 05/21/2025     05/21/2025    K 3.6 05/21/2025     05/21/2025    CO2 23 05/21/2025        MRI brain 5/19 - 1. Acute nonhemorrhagic right MCA territory infarct as above. There is associated vasogenic edema and sulcal effacement. No midline shift. No evidence of hemorrhagic transformation. 2. Old lacunar infarcts in left cerebellum, left thalamus and bilateral basal ganglia. Moderate degree of chronic microvascular ischemic disease in the cerebral white matter.  3. Evidence for old hemorrhage in the right basal ganglia, with  encephalomalacia. This could be sequela of an old hypertensive bleed, or sequela of an old hemorrhagic infarct.    IMPRESSION:  Davidson Khoury is a 60 y.o. year old male patient with a history notable for renal transplant (currently on Cell[facility]) and hypertension presenting to the ED initially with a chief complaint of altered mental status, headache, and hypertension. Initially evaluated by general neurology team who noted subtle left sided weakness that worsened on follow up exam. BAT activated for concern of stroke. Found to have proximal R superior M2 occlusion. Not a candidate for TNK as LKN >4.5 hours. Taken for MT, TICI 2b. Exam notable for L HH, L UMN facial paralysis, LUE 0/5, LLE 3/5. right at least antigravity, left  hemisensory loss and neglect. .      PM&R was consulted for recommendations and for IRF appropriateness.    Recommendations:  # Impaired mobility and Impaired independence with ADLs and I/ADLs  - Continue PT and  OT   - Continue Speech  Therapy     # Neurogenic bowel/bladder  - Voiding via Ext cath  - Recommend  daily bowel program of Miralax BID and Docusate 100mg BID  no BM Since admit 5/19    # Immobility  Prevent secondary complications   - Skin protection: low air loss mattress, turn q 2 hours in bed, maintain bowel and bladder continence/containment   - Q shift PROM of upper and lower extremities to prevent contracture    # Pain Management  - NA     #Spasticity - possibly residual from prior hemorrhagic infarct (see on MRI) ? unmasked - but not bothersome.  May actually help preserve some muscle strength/tone but if bothersome or inhibiting movement consider baclofen    # Dispo   - Patient is early on in acute hospital course. Will need rehabilitation upon discharge, but at this time we are unable to formally recommend the level of rehab that is most appropriate.   May be too impaired and need lower level of care with goal to come to IRF after short SNF stay - We can facilitate vis f/u in PM&R Resident clinic (will arrange in future) We will continue to follow and adjust recommendations as her clinical condition changes     We will follow along with you.  Thank you for the consult.  Dr XAVI Musa  will be covering on Mondays and  Dr Fred Joy on Fridays. There is  resident on service this month.    Phuc Chambers M.D, FAAPMR, R-MSK  Chief, Division of PM&R  Board Certified in PM&R, Sports Medicine and Musculoskeletal Ultrasound  Available via Haik         [1]   Past Medical History:  Diagnosis Date    Personal history of other diseases of urinary system     History of chronic kidney disease    Personal history of other specified conditions 01/12/2022    History of urinary retention    Personal  history of other specified conditions 08/22/2019    History of urinary retention   [2]   Past Surgical History:  Procedure Laterality Date    OTHER SURGICAL HISTORY  06/06/2016    Myringotomy - With Eustachian Tube Inflation    OTHER SURGICAL HISTORY  06/06/2016    Arteriovenous Surgery Creation Of A-V Fistula    OTHER SURGICAL HISTORY  08/23/2019    Kidney transplantation   [3]   Family History  Problem Relation Name Age of Onset    Hypertension Mother     [4]   Allergies  Allergen Reactions    Amoxicillin Other and Nausea Only     vomiting    Ace Inhibitors Angioedema   [5] aspirin, 81 mg, oral, Daily   Or  aspirin, 81 mg, oral, Daily   Or  aspirin, 81 mg, nasogastric tube, Daily   Or  aspirin, 300 mg, rectal, Daily  enoxaparin, 40 mg, subcutaneous, q24h  lidocaine, 1 Application, urethral, Once  mycophenolate, 750 mg, oral, BID  ondansetron, 4 mg, intravenous, Once  perflutren protein A microsphere, 0.5 mL, intravenous, Once in imaging  polyethylene glycol, 17 g, oral, Daily  rosuvastatin, 20 mg, oral, Nightly  sulfur hexafluoride microsphr, 2 mL, intravenous, Once in imaging  tacrolimus, 2 mg, oral, q12h CHRIST  [6] PRN medications: acetaminophen, hydrALAZINE **FOLLOWED BY** hydrALAZINE, labetaloL, ondansetron **OR** ondansetron, oxyCODONE, oxyCODONE, oxygen

## 2025-05-21 NOTE — PROGRESS NOTES
Davidson Khoury is a 60 y.o. left handed male on day 2 of admission presenting with Acute cerebrovascular accident (CVA) due to embolism of right middle cerebral artery (Multi).    Subjective   NAEON, feeling ok denied having headaches, seems indifferent to his neurological disability has no new complaint.       Objective     Last Recorded Vitals  Heart Rate:  []   Temp:  [36.1 °C (97 °F)-37.2 °C (99 °F)]   Resp:  [12-25]   BP: (144-204)/()   Weight:  [72.9 kg (160 lb 11.5 oz)]   SpO2:  [97 %-100 %]       [facility] NIHSS:   NIH Stroke Scale:     Date/Time of Assessment: 5/21/2025 11:03 AM    1A. Level of Consciousness:  Alert (keenly responsive) (0)    1B. Ask Month and Age:  Both questions right (0)    1C. Blink Eyes & Squeeze Hands:  Performs both tasks (0)    2. Best Gaze:  Normal (0)    3. Visual:  Complete hemianopia (+2)    4. Facial Palsy:  Partial paralysis (+2)    5A. Motor - Left Arm:  No movement (+4)    5B. Motor - Right Arm:  No drift (0)    6A. Motor - Left Leg:  No effort against gravity (+3)    6B. Motor - Right Leg:  No drift (0)    7. Limb Ataxia:  No ataxia (0)    8. Sensory Loss:  Severe to total loss (+2)    9. Best Language:  Normal (no aphasia) (0)    10. Dysarthia:  Mild-moderate dysarthria (+1)    11. Extinction and Inattention:  Visual/tactile/auditory/spatial/personal inattention (+1)    NIH Stroke Scale:  15       Physical Exam  Neurological Exam  GENERAL APPEARANCE:  No distress, alert, interactive and cooperative.     MENTAL STATE:   Orientation was normal to time, place and person. Recent and remote memory was intact.  Attention span and concentration were normal. Language testing was normal for comprehension, repetition, expression, and naming. The patient could correctly interpret a picture. General fund of knowledge was intact.     CRANIAL NERVES:   CN 2   Left HH  CN 3, 4, 6   Pupils round, 4 mm in diameter, equally reactive to light. Lids symmetric; no ptosis. EOMs normal  alignment, full range with normal saccades, pursuit and convergence.   No nystagmus.   CN 5   Facial sensation intact bilaterally.   CN 7   Left UMN paralysis  CN 8   Hearing intact to finger rub.   CN 9   Palate elevates symmetrically.   CN 11   Normal strength of shoulder shrug and neck turning.   CN 12   Tongue midline, with normal bulk and strength; no fasciculations.     Motor/Sensory:   Spacticity on the left UE and LE, LUE 0/5, LLE 3/5. right at least antigravity, left hemisensory loss and neglect.    REFLEXES:   R          L  BR:  2 3  Biceps:  2 3  Triceps:  2 3  Knee:  2 3  Ankle:  2 3    Babinski: toes downgoing to plantar stimulation. No clonus or other pathologic reflexes present.     COORDINATION:      Right finger-nose-finger was intact without dysmetria or overshoot.     GAIT:   Deferred    Relevant Results    NIH Stroke Scale  1A. Level of Consciousness: Alert, Keenly Responsive  1B. Ask Month and Age: Both Questions Right  1C. Blink Eyes & Squeeze Hands: Performs Both Tasks  2. Best Gaze: Partial Gaze Palsy  3. Visual: Partial Hemianopia  4. Facial Palsy: Minor Paralysis  5A. Motor - Left Arm: No Effort Against Gravity  5B. Motor - Right Arm: No Drift  6A. Motor - Left Leg: Some Effort Against Gravity  6B. Motor - Right Leg: No Drift  7. Limb Ataxia: Present in One Limb  8. Sensory Loss: Mild-to-Moderate Sensory Loss  9. Best Language: Mild-to-Moderate Aphasia  10. Dysarthria: Mild-to-Moderate Dysarthria  11. Extinction and Inattention: Visual, Tactile, Auditory, Spatial, or Personal Inattention  NIH Stroke Scale: 13           [place] Coma Scale  Best Eye Response: To verbal stimuli  Best Verbal Response: Oriented  Best Motor Response: Follows commands  [place] Coma Scale Score: 14                    Assessment & Plan  Acute cerebrovascular accident (CVA) due to embolism of right middle cerebral artery (Multi)    Davidson Khoury is a 60 y.o. male with a history notable for renal transplant  (currently on Cellcept) and hypertension presenting to the ED initially with a chief complaint of altered mental status, headache, and hypertension. Initially evaluated by general neurology team who noted subtle left sided weakness that worsened on follow up exam. BAT activated for concern of stroke. Found to have proximal R superior M2 occlusion. Not a candidate for TNK as LKN >4.5 hours. Taken for MT, TICI 2b. Exam notable for L HH, L UMN facial paralysis, LUE 0/5, LLE 3/5. right at least antigravity, left hemisensory loss and neglect.    Stroke Metrics:  EMS pre-notification?: No  Time of stroke team arrival: 3:47pm  Direct to CT?: No  Time of CTH read by stroke team: As performed  Time of TNK: n/a  Time stroke team called IR: 4:27pm  Time pt arrived to angio suite: 4:57pm  Time of groin puncture: 5:10pm  Time of recanalization: 5:31pm  Number of passes: 1  Device used: penumbra aspiration  Any delays in the process (if door to TNK >30 min): n/a     Stroke Classification:  Type: Ischemic stroke  Subtype/etiology: Suspected large artery disease  Vessels involved: R MCA  Neurological manifestations:  Pre-Intervention Deficits: NIHSS 10 *see above  Post-Intervention Deficits: NIHSS 9 *see above  Pre-stroke mRS: 0  Initial treatment: Angio  Anti-platelets or Anti-coagulation management: Aspirin  Vascular Risk Factors: HTN  Antiplatelet/antithrombotic plan for stroke prevention: Aspirin  VTE prophylaxis: Lovenox  Vascular Risk Factor modification goals:  Blood pressure goals: avoid hypotension SBP <100 that could worsen cerebral perfusion, Ischemic stroke post-thrombectomy SBP <180mmHg   Lipid Goals: education on healthy diet and statin therapy to maintain or achieve goal LDL-cholesterol < 70mg.  Glucose Goals: early treatment of hyperglycemia to goal glucose 140-180 mg/dl with long-term goal A1c < 7%   Smoking Cessation and Education  Assessment for Rehabilitation needs   Patient and family education on signs and  symptoms of stroke, calling 911, healthy strategies for stroke prevention.      Plan:  Updates 05/21/25  -Continue ASA will Consider DAPT 90 days sampriss protocol for intracranial atherosclerosis  -Start trickle feed  -SLP follow recommended MBSS  -PT/OT    #Proximal R superior M2 occlusion s/p TICI 2b MT   #HTN   #Hx of thrombocytopenia- Stable   - MRI for stroke burden  - Stroke work up  - A1c: 5.2%, LDL 63              - TTE with bubble study: EF 70-75% LA normal in size, no PFO  - SBP goals post-thrombectomy SBP <180mmHg   - PT/OT/SLP   -Rest of care per NSU team    #Renal transplant (on Cellcept and Tacrolimus)   - Baseline BUN/Cr: 15/1.80   -Consulted nephrology recommended - resume tac 2mg q12. Monitor Fk trough daily (30-60 min prior to AM dose). Goal 5-8.  - cont  mg bid.    #Dysphagia  -SLP consulted recommended NPO will consider MBSS     Pain medications: PRN Tylenol  Fluids: Replete PRN  Electrolytes: Keep mg >2, phos >3  and K >4  Nutrition:  Enteral feeding with NPO Isosource 1.5; 10; 250; Water; Tap water; Every 6 hours   Antimicrobials: None  Antiplatelet: ASA  Anticoagulation: None  DVT PPX: Lovenox  GI ppx: none needed  Bowel care: Miralax  Catheter: External Catheter  Lines: PIV  Oxygen: Room Air    Disposition:   PT/OT high intensity    Code Status: Full Code (confirmed on admission)   NOK:  Primary Emergency Contact: Manuel Khoury, Home Phone: [phone]       Mary Harper MD  PGY-2 Neurology      ----------------------------------------------------------------------------      Complex patient. 60-year-old man with long-standing severe hypertension leading to ESRD s/p  renal transplant in 2017.       On 5/15, he presented to the ED for blurry vision, L-hand numbness, and headache. Exact cause was not clear. CT head showed periventricular and subcortical small vessel disease and several old lacunar infarcts in R lentiform nucleus, L caudate, and L thalamus. Also had small  "hypodensity and encephalomalacia in R parieto-occipital lobe. Could not rule out TIA. Discharged home with plan to follow up with PCP. According to his fiancee, he had been \"not acting like himself\" since then.      Yesterday (5/19), he was worse and started \"baby-talking\" which prompted her to bring him again to the ED. Because his blood pressure was initially 241/133, neurology was consulted for hypertensive emergency vs PRES. Patient treated with nicardipine infusion. Repeat CT head unchanged from previous one on 5/15. On exam, he was alert and awake, language was normal. There was a mild L-sided facial droop and mild L-sided dysmetria. NIHSS score 3. However, when the team rounded on him in the afternoon, he had developed L-sided neglect, R gaze deviation, L homonomous hemianopia and L-sided drift. NIHSS score 10. BAT called at 3:42 pm. LKN was thought to be 4:00am when he was walking to the bathroom but even that is not clear because of his subtle symptoms of confusion. Repeat CT head looked unchanged from earlier one that day. CTA, however, showed distal L M1 and proximal L M2 occlusion (as well as significant, intracranial atherosclerosis (including basilar artery stenosis). CTP showed small core (11ml) and large penumbra (127 ml). He was not a candidate for IV TNK because of being outside the time window but was taken for thrombectomy, which resulted in  TICI 2b reperfusion. Post-procedure CT scan showed some hyperdensity in R parieto-occipital region (contrast staining vs. Hemorrhagic transformation)     Interval History:    5/19 Post MT NIH was 9, LUE 1 and LLE 2-3.  5/20 NIH 15, LUE 4, LLE 2  5/221 NIH 15 LUE 4, LLE 2, with sustained clonus in LLE and hyperreflexia in LUE, which suggests upper motor neuron injury and progression of the sxs from onset on 5/19.    MRI Brain reviewed, showed DWI lesion in distribution of R M1, with patchy DWI/FLAIR mismatch areas in the affected R M1 distrubution. No GRE " lesions c/f bleed in area of infarct, but evidence of possible old bleed in Rt BG suggestive of remote Rt BG infarct with HT.   TTE with nl EF, LA wnl, no PFO. LDL 63, A1c 5.2    [id number] stroke. Mechanism is not entirely clear. Does have significant intracranial atherosclerosis and perhaps he developed an in-situ thrombosis triggered by fluctuating blood pressure and impaired autoregulation. Workup underway.   Exam progression and MRI findings could suggest that pt continued to clinically progress after the MT for unclear reason. Distal embolization or chronic occlusion/subocclusion are possible etiology, although hard to prove.     Pt currently on ASA, will consider DAPT for 90 days per GOVIND.   MBS today  PT/OT: Acute rehab  Following closely.    Michael Palm MD  PGY-5 Vascular Neurology Fellow

## 2025-05-21 NOTE — DISCHARGE INSTRUCTIONS
Dear Davidson Khoury,     You presented to [facility] on 5/19/2025 with symptoms of left-sided weakness. Upon your arrival, a comprehensive workup was conducted, including a CT scan and MRI of the brain, which confirmed an ischemic stroke due to a blockage in the right middle cerebral artery. Additional tests, such as blood work and an echocardiogram, were performed to assess potential underlying causes and risk factors.     You underwent a mechanical thrombectomy to restore blood flow, and initially, there was noticeable improvement in your symptoms. However your weakness progressed after the initial improvement.     Please come to the emergency department if you develop any new neurologic symptoms, including new numbness, tingling, weakness, abnormal sensations, visual loss, or change in bowel or bladder control.     Upon discharge, your medication regimen has been adjusted as follows:  Take all of your medications as prescribed.  Take Aspirin 81 mg daily indefinitely for stroke prevention.  Take Clopidogrel (Plavix) 75 mg daily for 90 days as an additional antiplatelet therapy then stop.  Take Atorvastatin 40 mg daily to manage cholesterol levels.  Take Lisinopril 10 mg daily to help control blood pressure.     Follow-Up Appointments:  [ ] Follow-up primary care provider.  [ ] Start low-fat Mediterranean diet.  [ ] Follow-up with neurology.     We wish you all the best,  [facility] Inpatient Stroke Team

## 2025-05-22 ENCOUNTER — APPOINTMENT (OUTPATIENT)
Dept: RADIOLOGY | Facility: HOSPITAL | Age: 60
End: 2025-05-22
Payer: COMMERCIAL

## 2025-05-22 LAB
ALBUMIN SERPL BCP-MCNC: 4.2 G/DL (ref 3.4–5)
ANION GAP SERPL CALC-SCNC: 15 MMOL/L (ref 10–20)
BASOPHILS # BLD AUTO: 0.01 X10*3/UL (ref 0–0.1)
BASOPHILS NFR BLD AUTO: 0.1 %
BUN SERPL-MCNC: 20 MG/DL (ref 6–23)
CA-I BLD-SCNC: 1.18 MMOL/L (ref 1.1–1.33)
CALCIUM SERPL-MCNC: 9.7 MG/DL (ref 8.6–10.6)
CHLORIDE SERPL-SCNC: 104 MMOL/L (ref 98–107)
CO2 SERPL-SCNC: 24 MMOL/L (ref 21–32)
CREAT SERPL-MCNC: 1.88 MG/DL (ref 0.5–1.3)
EGFRCR SERPLBLD CKD-EPI 2021: 41 ML/MIN/1.73M*2
EOSINOPHIL # BLD AUTO: 0.04 X10*3/UL (ref 0–0.7)
EOSINOPHIL NFR BLD AUTO: 0.4 %
ERYTHROCYTE [DISTWIDTH] IN BLOOD BY AUTOMATED COUNT: 14.8 % (ref 11.5–14.5)
GLUCOSE BLD MANUAL STRIP-MCNC: 128 MG/DL (ref 74–99)
GLUCOSE BLD MANUAL STRIP-MCNC: 133 MG/DL (ref 74–99)
GLUCOSE BLD MANUAL STRIP-MCNC: 138 MG/DL (ref 74–99)
GLUCOSE SERPL-MCNC: 127 MG/DL (ref 74–99)
HCT VFR BLD AUTO: 47.6 % (ref 41–52)
HGB BLD-MCNC: 15.5 G/DL (ref 13.5–17.5)
IMM GRANULOCYTES # BLD AUTO: 0.1 X10*3/UL (ref 0–0.7)
IMM GRANULOCYTES NFR BLD AUTO: 0.9 % (ref 0–0.9)
LYMPHOCYTES # BLD AUTO: 0.64 X10*3/UL (ref 1.2–4.8)
LYMPHOCYTES NFR BLD AUTO: 5.9 %
MAGNESIUM SERPL-MCNC: 1.97 MG/DL (ref 1.6–2.4)
MCH RBC QN AUTO: 29.9 PG (ref 26–34)
MCHC RBC AUTO-ENTMCNC: 32.6 G/DL (ref 32–36)
MCV RBC AUTO: 92 FL (ref 80–100)
MONOCYTES # BLD AUTO: 0.54 X10*3/UL (ref 0.1–1)
MONOCYTES NFR BLD AUTO: 4.9 %
NEUTROPHILS # BLD AUTO: 9.61 X10*3/UL (ref 1.2–7.7)
NEUTROPHILS NFR BLD AUTO: 87.8 %
NRBC BLD-RTO: 0 /100 WBCS (ref 0–0)
PHOSPHATE SERPL-MCNC: 2.5 MG/DL (ref 2.5–4.9)
PLATELET # BLD AUTO: 153 X10*3/UL (ref 150–450)
POTASSIUM SERPL-SCNC: 4 MMOL/L (ref 3.5–5.3)
RBC # BLD AUTO: 5.18 X10*6/UL (ref 4.5–5.9)
SODIUM SERPL-SCNC: 139 MMOL/L (ref 136–145)
TACROLIMUS BLD-MCNC: 10 NG/ML
WBC # BLD AUTO: 10.9 X10*3/UL (ref 4.4–11.3)

## 2025-05-22 PROCEDURE — 83735 ASSAY OF MAGNESIUM: CPT

## 2025-05-22 PROCEDURE — 2500000001 HC RX 250 WO HCPCS SELF ADMINISTERED DRUGS (ALT 637 FOR MEDICARE OP)

## 2025-05-22 PROCEDURE — 2500000005 HC RX 250 GENERAL PHARMACY W/O HCPCS: Performed by: PSYCHIATRY & NEUROLOGY

## 2025-05-22 PROCEDURE — 36415 COLL VENOUS BLD VENIPUNCTURE: CPT

## 2025-05-22 PROCEDURE — 2500000004 HC RX 250 GENERAL PHARMACY W/ HCPCS (ALT 636 FOR OP/ED): Mod: JZ | Performed by: REGISTERED NURSE

## 2025-05-22 PROCEDURE — 97530 THERAPEUTIC ACTIVITIES: CPT | Mod: GP

## 2025-05-22 PROCEDURE — 2500000004 HC RX 250 GENERAL PHARMACY W/ HCPCS (ALT 636 FOR OP/ED)

## 2025-05-22 PROCEDURE — 2500000001 HC RX 250 WO HCPCS SELF ADMINISTERED DRUGS (ALT 637 FOR MEDICARE OP): Performed by: REGISTERED NURSE

## 2025-05-22 PROCEDURE — 74230 X-RAY XM SWLNG FUNCJ C+: CPT

## 2025-05-22 PROCEDURE — 80197 ASSAY OF TACROLIMUS: CPT

## 2025-05-22 PROCEDURE — 74230 X-RAY XM SWLNG FUNCJ C+: CPT | Performed by: RADIOLOGY

## 2025-05-22 PROCEDURE — 99232 SBSQ HOSP IP/OBS MODERATE 35: CPT

## 2025-05-22 PROCEDURE — 82947 ASSAY GLUCOSE BLOOD QUANT: CPT

## 2025-05-22 PROCEDURE — 2500000004 HC RX 250 GENERAL PHARMACY W/ HCPCS (ALT 636 FOR OP/ED): Mod: JZ

## 2025-05-22 PROCEDURE — 97112 NEUROMUSCULAR REEDUCATION: CPT | Mod: GP

## 2025-05-22 PROCEDURE — 99233 SBSQ HOSP IP/OBS HIGH 50: CPT | Performed by: INTERNAL MEDICINE

## 2025-05-22 PROCEDURE — 85025 COMPLETE CBC W/AUTO DIFF WBC: CPT

## 2025-05-22 PROCEDURE — 2060000001 HC INTERMEDIATE ICU ROOM DAILY

## 2025-05-22 PROCEDURE — 80069 RENAL FUNCTION PANEL: CPT

## 2025-05-22 PROCEDURE — 92611 MOTION FLUOROSCOPY/SWALLOW: CPT | Mod: GN

## 2025-05-22 PROCEDURE — 82330 ASSAY OF CALCIUM: CPT

## 2025-05-22 PROCEDURE — 2500000002 HC RX 250 W HCPCS SELF ADMINISTERED DRUGS (ALT 637 FOR MEDICARE OP, ALT 636 FOR OP/ED)

## 2025-05-22 RX ORDER — CLOPIDOGREL BISULFATE 75 MG/1
75 TABLET ORAL DAILY
Status: DISCONTINUED | OUTPATIENT
Start: 2025-05-22 | End: 2025-06-04 | Stop reason: HOSPADM

## 2025-05-22 RX ORDER — NICARDIPINE HYDROCHLORIDE 0.2 MG/ML
2.5-15 INJECTION INTRAVENOUS CONTINUOUS
Status: DISCONTINUED | OUTPATIENT
Start: 2025-05-22 | End: 2025-05-22

## 2025-05-22 RX ORDER — LABETALOL HYDROCHLORIDE 5 MG/ML
20 INJECTION, SOLUTION INTRAVENOUS EVERY 10 MIN PRN
Status: ACTIVE | OUTPATIENT
Start: 2025-05-22 | End: 2025-05-23

## 2025-05-22 RX ORDER — LOSARTAN POTASSIUM 25 MG/1
25 TABLET ORAL 2 TIMES DAILY
Status: DISCONTINUED | OUTPATIENT
Start: 2025-05-22 | End: 2025-06-04 | Stop reason: HOSPADM

## 2025-05-22 RX ORDER — CLONIDINE HYDROCHLORIDE 0.1 MG/1
0.2 TABLET ORAL EVERY 12 HOURS SCHEDULED
Status: DISCONTINUED | OUTPATIENT
Start: 2025-05-22 | End: 2025-05-22

## 2025-05-22 RX ORDER — CLONIDINE HYDROCHLORIDE 0.2 MG/1
0.2 TABLET ORAL EVERY 12 HOURS SCHEDULED
Status: DISCONTINUED | OUTPATIENT
Start: 2025-05-22 | End: 2025-06-04 | Stop reason: HOSPADM

## 2025-05-22 RX ADMIN — NICARDIPINE HYDROCHLORIDE 2.5 MG/HR: 0.2 INJECTION, SOLUTION INTRAVENOUS at 05:52

## 2025-05-22 RX ADMIN — HYDRALAZINE HYDROCHLORIDE 25 MG: 50 TABLET ORAL at 00:14

## 2025-05-22 RX ADMIN — CLONIDINE HYDROCHLORIDE 0.2 MG: 0.2 TABLET ORAL at 21:23

## 2025-05-22 RX ADMIN — MYCOPHENOLATE MOFETIL 750 MG: 250 CAPSULE ORAL at 21:23

## 2025-05-22 RX ADMIN — LOSARTAN POTASSIUM 25 MG: 25 TABLET, FILM COATED ORAL at 12:52

## 2025-05-22 RX ADMIN — LABETALOL HYDROCHLORIDE 10 MG: 5 INJECTION, SOLUTION INTRAVENOUS at 05:03

## 2025-05-22 RX ADMIN — ACETAMINOPHEN 650 MG: 325 TABLET ORAL at 11:54

## 2025-05-22 RX ADMIN — LOSARTAN POTASSIUM 25 MG: 25 TABLET, FILM COATED ORAL at 21:23

## 2025-05-22 RX ADMIN — POLYETHYLENE GLYCOL 3350 17 G: 17 POWDER, FOR SOLUTION ORAL at 08:47

## 2025-05-22 RX ADMIN — CLONIDINE HYDROCHLORIDE 0.2 MG: 0.2 TABLET ORAL at 05:58

## 2025-05-22 RX ADMIN — HYDRALAZINE HYDROCHLORIDE 25 MG: 50 TABLET ORAL at 18:13

## 2025-05-22 RX ADMIN — TACROLIMUS 2 MG: 5 CAPSULE, GELATIN COATED ORAL at 18:30

## 2025-05-22 RX ADMIN — BARIUM SULFATE 40 ML: 400 SUSPENSION ORAL at 10:50

## 2025-05-22 RX ADMIN — HYDRALAZINE HYDROCHLORIDE 25 MG: 50 TABLET ORAL at 11:54

## 2025-05-22 RX ADMIN — TACROLIMUS 2 MG: 5 CAPSULE, GELATIN COATED ORAL at 05:58

## 2025-05-22 RX ADMIN — BARIUM SULFATE 20 ML: 400 PASTE ORAL at 10:49

## 2025-05-22 RX ADMIN — BARIUM SULFATE 100 ML: 400 SUSPENSION ORAL at 10:51

## 2025-05-22 RX ADMIN — MYCOPHENOLATE MOFETIL 750 MG: 250 CAPSULE ORAL at 08:47

## 2025-05-22 RX ADMIN — ROSUVASTATIN CALCIUM 20 MG: 20 TABLET, FILM COATED ORAL at 21:23

## 2025-05-22 RX ADMIN — ACETAMINOPHEN 650 MG: 325 TABLET ORAL at 21:38

## 2025-05-22 RX ADMIN — ACETAMINOPHEN 650 MG: 325 TABLET ORAL at 04:53

## 2025-05-22 RX ADMIN — LABETALOL HYDROCHLORIDE 10 MG: 5 INJECTION, SOLUTION INTRAVENOUS at 02:03

## 2025-05-22 RX ADMIN — CLOPIDOGREL BISULFATE 75 MG: 75 TABLET, FILM COATED ORAL at 11:54

## 2025-05-22 RX ADMIN — ENOXAPARIN SODIUM 40 MG: 40 INJECTION, SOLUTION SUBCUTANEOUS at 17:34

## 2025-05-22 RX ADMIN — ASPIRIN 81 MG: 81 TABLET, CHEWABLE ORAL at 08:47

## 2025-05-22 RX ADMIN — LABETALOL HYDROCHLORIDE 10 MG: 5 INJECTION, SOLUTION INTRAVENOUS at 04:14

## 2025-05-22 ASSESSMENT — PAIN - FUNCTIONAL ASSESSMENT
PAIN_FUNCTIONAL_ASSESSMENT: 0-10

## 2025-05-22 ASSESSMENT — PAIN SCALES - GENERAL
PAINLEVEL_OUTOF10: 0 - NO PAIN
PAINLEVEL_OUTOF10: 1
PAINLEVEL_OUTOF10: 0 - NO PAIN
PAINLEVEL_OUTOF10: 5 - MODERATE PAIN
PAINLEVEL_OUTOF10: 5 - MODERATE PAIN

## 2025-05-22 ASSESSMENT — COGNITIVE AND FUNCTIONAL STATUS - GENERAL
MOVING FROM LYING ON BACK TO SITTING ON SIDE OF FLAT BED WITH BEDRAILS: A LOT
MOBILITY SCORE: 8
WALKING IN HOSPITAL ROOM: TOTAL
MOVING TO AND FROM BED TO CHAIR: TOTAL
TURNING FROM BACK TO SIDE WHILE IN FLAT BAD: A LOT
STANDING UP FROM CHAIR USING ARMS: TOTAL
CLIMB 3 TO 5 STEPS WITH RAILING: TOTAL

## 2025-05-22 NOTE — PROGRESS NOTES
Social Work Discharge Planning note:    -Patient discussed during interdisciplinary rounds.   -Team members present: Fellow, PT and OT, and SW  -Plan per medical team: Pt is not medically ready for discharge. Pt has been re-upgraded to NSU status due to BP.   -Payer: [employer]  -Status: Inpatient  -Discharge disposition: PM&R is presently recommending Skilled level of care, but they will continue to follow to see if Pt is able to improve to the point where they can recommend acute rehab. SW met with Pt and Margo and informed them of the above. SW provided a printed list of Skilled providers and they will get back to us with their choices. SW will continue to follow to assist with discharge planning.    -Anticipated Date of Discharge:  5/27/25    This LASHAUN Murillo, LSW    Office: [phone]  Secure chat via [employer]

## 2025-05-22 NOTE — PROGRESS NOTES
Davidson Khoury is a 60 y.o. left handed male on day 3 of admission presenting with Acute cerebrovascular accident (CVA) due to embolism of right middle cerebral artery (Multi).    Subjective   Overnight: Difficult to control blood pressure was upgraded to NSU for cardene drip   feeling ok, no new complaint.       Objective     Last Recorded Vitals  Heart Rate:  []   Temp:  [35.9 °C (96.6 °F)-36.7 °C (98.1 °F)]   Resp:  [11-22]   BP: (104-202)/()   SpO2:  [97 %-100 %]       [facility] NIHSS:   NIH Stroke Scale:     Date/Time of Assessment: 5/21/2025 11:03 AM    1A. Level of Consciousness:  Alert (keenly responsive) (0)    1B. Ask Month and Age:  Both questions right (0)    1C. Blink Eyes & Squeeze Hands:  Performs both tasks (0)    2. Best Gaze:  Normal (0)    3. Visual:  Complete hemianopia (+2)    4. Facial Palsy:  Partial paralysis (+2)    5A. Motor - Left Arm:  No movement (+4)    5B. Motor - Right Arm:  No drift (0)    6A. Motor - Left Leg:  No effort against gravity (+3)    6B. Motor - Right Leg:  No drift (0)    7. Limb Ataxia:  No ataxia (0)    8. Sensory Loss:  Severe to total loss (+2)    9. Best Language:  Normal (no aphasia) (0)    10. Dysarthia:  Mild-moderate dysarthria (+1)    11. Extinction and Inattention:  Visual/tactile/auditory/spatial/personal inattention (+1)    NIH Stroke Scale:  15       Physical Exam  Neurological Exam  GENERAL APPEARANCE:  No distress, alert, interactive and cooperative.     MENTAL STATE:   Orientation was normal to time, place and person. Recent and remote memory was intact.  Attention span and concentration were normal. Language testing was normal for comprehension, repetition, expression, and naming. The patient could correctly interpret a picture. General fund of knowledge was intact.     CRANIAL NERVES:   CN 2   Left HH  CN 3, 4, 6   Pupils round, 4 mm in diameter, equally reactive to light. Lids symmetric; no ptosis. EOMs normal alignment, full range with normal  saccades, pursuit and convergence.   No nystagmus.   CN 5   Facial sensation intact bilaterally.   CN 7   Left UMN paralysis  CN 8   Hearing intact to finger rub.   CN 9   Palate elevates symmetrically.   CN 11   Normal strength of shoulder shrug and neck turning.   CN 12   Tongue midline, with normal bulk and strength; no fasciculations.     Motor/Sensory:   Spacticity on the left UE and LE, LUE 0/5, LLE 3/5. right at least antigravity, left hemisensory loss and neglect.    REFLEXES:   R          L  BR:  2 3  Biceps:  2 3  Triceps:  2 3  Knee:  2 3  Ankle:  2 3    Babinski: toes downgoing to plantar stimulation. No clonus or other pathologic reflexes present.     COORDINATION:    Right finger-nose-finger was intact without dysmetria or overshoot.     GAIT:   deferred    Relevant Results    NIH Stroke Scale  1A. Level of Consciousness: Alert, Keenly Responsive  1B. Ask Month and Age: Both Questions Right  1C. Blink Eyes & Squeeze Hands: Performs Both Tasks  2. Best Gaze: Partial Gaze Palsy  3. Visual: Partial Hemianopia  4. Facial Palsy: Minor Paralysis  5A. Motor - Left Arm: No Effort Against Gravity  5B. Motor - Right Arm: No Drift  6A. Motor - Left Leg: Some Effort Against Gravity  6B. Motor - Right Leg: No Drift  7. Limb Ataxia: Present in One Limb  8. Sensory Loss: Mild-to-Moderate Sensory Loss  9. Best Language: Mild-to-Moderate Aphasia  10. Dysarthria: Mild-to-Moderate Dysarthria  11. Extinction and Inattention: Visual, Tactile, Auditory, Spatial, or Personal Inattention  NIH Stroke Scale: 13           [place] Coma Scale  Best Eye Response: Spontaneous  Best Verbal Response: Oriented  Best Motor Response: Follows commands  [place] Coma Scale Score: 15                    Assessment & Plan  Acute cerebrovascular accident (CVA) due to embolism of right middle cerebral artery (Multi)    Davidson Khoury is a 60 y.o. male with a history notable for renal transplant (currently on Cellcept) and hypertension  presenting to the ED initially with a chief complaint of altered mental status, headache, and hypertension. Initially evaluated by general neurology team who noted subtle left sided weakness that worsened on follow up exam. BAT activated for concern of stroke. Found to have proximal R superior M2 occlusion. Not a candidate for TNK as LKN >4.5 hours. Taken for MT, TICI 2b. Exam notable for L HH, L UMN facial paralysis, LUE 0/5, LLE 3/5. right at least antigravity, left hemisensory loss and neglect.    Stroke Metrics:  EMS pre-notification?: No  Time of stroke team arrival: 3:47pm  Direct to CT?: No  Time of CTH read by stroke team: As performed  Time of TNK: n/a  Time stroke team called IR: 4:27pm  Time pt arrived to angio suite: 4:57pm  Time of groin puncture: 5:10pm  Time of recanalization: 5:31pm  Number of passes: 1  Device used: penumbra aspiration  Any delays in the process (if door to TNK >30 min): n/a     Stroke Classification:  Type: Ischemic stroke  Subtype/etiology: Suspected large artery disease  Vessels involved: R MCA  Neurological manifestations:  Pre-Intervention Deficits: NIHSS 10 *see above  Post-Intervention Deficits: NIHSS 9 *see above  Pre-stroke mRS: 0  Initial treatment: Angio  Anti-platelets or Anti-coagulation management: Aspirin  Vascular Risk Factors: HTN  Antiplatelet/antithrombotic plan for stroke prevention: Aspirin  VTE prophylaxis: Lovenox  Vascular Risk Factor modification goals:  Blood pressure goals: avoid hypotension SBP <100 that could worsen cerebral perfusion, Ischemic stroke post-thrombectomy SBP <180mmHg   Lipid Goals: education on healthy diet and statin therapy to maintain or achieve goal LDL-cholesterol < 70mg.  Glucose Goals: early treatment of hyperglycemia to goal glucose 140-180 mg/dl with long-term goal A1c < 7%   Smoking Cessation and Education  Assessment for Rehabilitation needs   Patient and family education on signs and symptoms of stroke, calling 911, healthy  strategies for stroke prevention.      Plan:  Updates 05/22/25  -Continue ASA will Consider DAPT 90 days sampriss protocol for intracranial atherosclerosis  -Start trickle feed  -SLP recommended MBSS  -PT/OT    #Proximal R superior M2 occlusion s/p TICI 2b MT   #HTN   #Hx of thrombocytopenia- Stable   - MRI for stroke burden  - Stroke work up  - A1c: 5.2%, LDL 63              - TTE with bubble study: EF 70-75% LA normal in size, no PFO  - SBP goals post-thrombectomy SBP <180mmHg   - PT/OT/SLP   -Rest of care per NSU team    #Renal transplant (on Cellcept and Tacrolimus)   - Baseline BUN/Cr: 15/1.80   -Consulted nephrology recommended - resume tac 2mg q12. Monitor Fk trough daily (30-60 min prior to AM dose). Goal 5-8.  - cont  mg bid.    #Dysphagia  -SLP consulted recommended NPO will consider MBSS     Pain medications: PRN Tylenol  Fluids: Replete PRN  Electrolytes: Keep mg >2, phos >3  and K >4  Nutrition:  Enteral feeding with NPO Isosource 1.5; 15; 250; Saline; Every 6 hours   Antimicrobials: None  Antiplatelet: ASA  Anticoagulation: None  DVT PPX: Lovenox  GI ppx: none needed  Bowel care: Miralax  Catheter: External Catheter  Lines: PIV  Oxygen: Room Air    Disposition:   PT/OT high intensity    Code Status: Full Code (confirmed on admission)   NOK:  Primary Emergency Contact: Margo Cali MD  PGY-2 Neurology    ----------------------------------------------------------------    60-year-old man with long-standing severe hypertension leading to ESRD s/p  renal transplant in 2017.  Now with [id number] stroke. Mechanism is not entirely clear. Possible  in-situ thrombosis in the setting of intracranial atherosclerosis      MRI brain shows patchy diffusion restriction  involving R temporal and parietal lobes, R insular cortex and R frontal operculum.     Exam today shows progression of LLE weakness, LLE has no effort against gravity today.  This most likely reflects slow  progression of sxs from the ischemic infarct.      Continue with aspirin for now. Will add Plavix for 90-day DAPT once enteral access secured.   MBS pending.  Acute rehab pending.     Michael Palm MD  PGY-5 Vascular Neurology Fellow

## 2025-05-22 NOTE — CARE PLAN
Problem: General Stroke  Goal: Establish a mutual long term goal with patient by discharge  Outcome: Progressing  Goal: Demonstrate improvement in neurological exam throughout the shift  Outcome: Progressing  Goal: Maintain BP within ordered limits throughout shift  Outcome: Progressing  Goal: Participate in treatment (ie., meds, therapy) throughout shift  Outcome: Progressing  Goal: No symptoms of aspiration throughout shift  Outcome: Progressing  Goal: No symptoms of hemorrhage throughout shift  Outcome: Progressing  Goal: Tolerate enteral feeding throughout shift  Outcome: Progressing  Goal: Decreased nausea/vomiting throughout shift  Outcome: Progressing  Goal: Out of bed three times today  Outcome: Progressing   The patient's goals for the shift include      The clinical goals for the shift include pt will maintain SBP<180 during shift

## 2025-05-22 NOTE — PROGRESS NOTES
Physical Therapy    Physical Therapy Treatment    Patient Name: Davidson Khoury  MRN: [id number]  Today's Date: 5/22/2025  Time Calculation  Start Time: 1425  Stop Time: 1449  Time Calculation (min): 24 min       Assessment/Plan   PT Assessment  Rehab Prognosis: Excellent  Barriers to Discharge Home: Physical needs, Caregiver assistance  Caregiver Assistance: Caregiver assistance needed per identified barriers - however, level of patient's required assistance exceeds assistance available at home  Physical Needs: High falls risk due to function or environment  Evaluation/Treatment Tolerance: Patient tolerated treatment well  End of Session Communication: Bedside nurse  Assessment Comment: Pt to benefit from ongoing PT services to address the above limitations and to facilitate timely return to prior level of function  End of Session Patient Position: Bed, 3 rail up, Alarm off, not on at start of session  PT Plan  Inpatient/Swing Bed or Outpatient: Inpatient  PT Plan  Treatment/Interventions: Bed mobility, Transfer training, Stair training, Gait training, Balance training, Strengthening, Neuromuscular re-education, Endurance training, Therapeutic exercise, Range of motion, Therapeutic activity, Home exercise program, Postural re-education, Positioning  PT Plan: Ongoing PT  PT Frequency: 5 times per week  PT Discharge Recommendations: High intensity level of continued care  Equipment Recommended upon Discharge:  (TBD)  PT Recommended Transfer Status: Assist x2  PT - OK to Discharge: Yes      General Visit Information:   PT  Visit  PT Received On: 05/22/25  Response to Previous Treatment: Patient with no complaints from previous session.  Reason for Referral: P/w AMS, HA, HTN with notable subtle left sided weakness that worsened. Acute cerebrovascular accident due to embolism of R MCA. Found to have proximal R superior M2 occulsion, MT TICI 2b. Pre intervention NIHSS: 10, post intervention NIHSS: 9.  Past Medical History  Relevant to Rehab: 60 y.o h/o notable for renal transplant (currently on Cellcept) and HTN  Co-Treatment:  (Rehab aide assisted with session)  Prior to Session Communication: Bedside nurse  Patient Position Received: Bed, 3 rail up, Alarm off, not on at start of session  General Comment: Pt pleasant and cooperative     Subjective   Precautions:  Precautions  Hearing/Visual Limitations: Hearing WFL. Able to gaze past midline to L but does not achieve terminal L gaze.  Medical Precautions: Fall precautions  Precautions Comment: SBP <180    Vital Signs:   Date/Time Vitals Session Patient Position Pulse Resp SpO2 BP MAP (mmHg)    05/22/25 1425 Pre PT  Sitting  105  15  100 %  157/105  120     05/22/25 1435 During PT  Sitting  101  17  98 %  149/103  116     05/22/25 1449 Post PT  Lying  79  17  97 %  144/99  112             Objective   Pain:  Pain Assessment  Pain Assessment: 0-10  0-10 (Numeric) Pain Score: 0 - No pain  Cognition:  Cognition  Overall Cognitive Status: Impaired  Arousal/Alertness: Appropriate responses to stimuli  Orientation Level: Oriented X4  Following Commands: Follows one step commands with repetition  Cognition Comments: Perseverates on blood pressure, worrying that it is too high to participate with PT. Reassurance provided.  Insight: Moderate  Impulsive: Moderately  Processing Speed: Delayed    Lines/Tubes/Drains:  NG/OG/Feeding Tube Right nostril (Active)   Number of days: 2       External Urinary Catheter Male (Active)   Number of days: 2       Continuous Medications/Drips:  Continuous Medications[1]    Oxygen: room air     Postural Control:   Postural Control  Postural Control: Impaired  Head Control: Difficulty maintaining midline due to visual perceptual deficits/impaired midline perception  Trunk Control: Pushes to L with RUE. Able to maintain midline with visual feedback from mirror, RUE support of footboard, and therapist assist.  Righting Reactions: Intact to R, cues to L  Protective  Responses: Intact to R, cues to L  Posture Comment: Rounded shoulder    Balance:   Static Sitting Balance  Static Sitting-Balance Support: Bilateral upper extremity supported, Feet supported  Static Sitting-Level of Assistance: Moderate assistance  Static Sitting-Comment/Number of Minutes: 15 min  Dynamic Sitting Balance  Dynamic Sitting-Balance Support: Bilateral upper extremity supported, Feet supported  Dynamic Sitting-Level of Assistance: Moderate assistance  Dynamic Sitting-Balance: Lateral lean    Static Standing Balance  Static Standing-Balance Support: Bilateral upper extremity supported  Static Standing-Level of Assistance: Maximum assistance (x2)  Static Standing-Comment/Number of Minutes: 10 sec    Extremity/Trunk Assessments:  Strength:       RUE   RUE : Within Functional Limits    LUE   LUE: Exceptions to WFL (PROM WFL, no movement on command, no WD to nox stim)    RLE   RLE : Exceptions to WFL  Strength RLE  R Hip Flexion: 5/5  R Knee Flexion: 5/5  R Knee Extension: 5/5  R Ankle Dorsiflexion: 5/5  R Ankle Plantar Flexion: 5/5    LLE   LLE : Exceptions to WFL (PROM WFL)  Strength LLE  L Hip Flexion: 1/5  L Knee Flexion: 1/5  L Knee Extension: 1/5  L Ankle Dorsiflexion: 1/5  L Ankle Plantar Flexion: 1/5    PT Treatments:            Balance/Neuromuscular Re-Education  Balance/Neuromuscular Re-Education Activity Performed: Yes  Balance/Neuromuscular Re-Education Activity 1: Static sitting at edge of bed: Cues for proper RUE placement and preventing pushing to L with RUE. Used visual feedback via mirror to enhance pt's ability to perceive deficits and self-correct. Able to improve from MOD A x1 to MIN A x1 with cues.    Bed Mobility  Bed Mobility: Yes  Bed Mobility 1  Bed Mobility 1: Supine to sitting  Level of Assistance 1: Maximum assistance (x1)  Bed Mobility Comments 1: Cues for proper hand placemet. HOB elevated.  Bed Mobility 2  Bed Mobility  2: Sitting to supine  Level of Assistance 2: Maximum  assistance (x1)  Bed Mobility Comments 2: Cues for proper hand placemet. HOB flat.  Bed Mobility 3  Bed Mobility 3: Scooting (Boosting)  Level of Assistance 3: Dependent, +2  Bed Mobility Comments 3: HOB flat, used taps    Ambulation/Gait Training  Ambulation/Gait Training Performed: No    Transfers  Transfer: Yes  Transfer 1  Transfer From 1: Bed to  Transfer to 1: Stand  Technique 1: Sit to stand, Stand to sit  Transfer Device 1: Gait belt  Transfer Level of Assistance 1: Maximum assistance, +2  Trials/Comments 1: Attempted with assist of 1, but pt unable to complete safely due to severe L trunk lean. L knee blocked. Cues for proper hand placement, controlling speed, and using trunk momentum to facilitate task.    Stairs  Stairs: No              Outcome Measures:  [facility] Basic Mobility  Turning from your back to your side while in a flat bed without using bedrails: A lot  Moving from lying on your back to sitting on the side of a flat bed without using bedrails: A lot  Moving to and from bed to chair (including a wheelchair): Total  Standing up from a chair using your arms (e.g. wheelchair or bedside chair): Total  To walk in hospital room: Total  Climbing 3-5 steps with railing: Total  Basic Mobility - Total Score: 8                   FSS-ICU  Ambulation: Unable to attempt due to weakness  Rolling: Moderate assistance (performs 50 - 74% of task)  Sitting: Moderate assistance (performs 50 - 74% of task)  Transfer Sit-to-Stand: Total assistance (performs 25% or requires another person)  Transfer Supine-to-Sit: Maximal assistance (performs 25% - 49% of task)  Total Score: 9    ICU Mobility Screen  Early Mobility/Exercise Safety Screen: Proceed with mobilization - No exclusion criteria met  ICU Mobility Scale: Standing                   Education:  Education Documentation  Precautions, taught by Iwona Manrique, PT at 5/22/2025  4:07 PM.  Learner: Patient  Readiness: Acceptance  Method: Explanation  Response:  Verbalizes Understanding, Needs Reinforcement    Body Mechanics, taught by Iwona Manrique, PT at 5/22/2025  4:07 PM.  Learner: Patient  Readiness: Acceptance  Method: Explanation  Response: Verbalizes Understanding, Needs Reinforcement    Mobility Training, taught by Iwona Manrique PT at 5/22/2025  4:07 PM.  Learner: Patient  Readiness: Acceptance  Method: Explanation  Response: Verbalizes Understanding, Needs Reinforcement    Education Comments  No comments found.             Encounter Problems       Encounter Problems (Active)       Balance       Patient to demo static standing with unilateral UE support, performing single UE task with SBA, no sway or LOB x 2 mins for functional carryover  (Progressing)       Start:  05/20/25    Expected End:  06/03/25            Pt will score >/= 24/28 on Tinetti balance assessment to indicate low falls risk.   (Progressing)       Start:  05/20/25    Expected End:  06/03/25               Mobility       STG - Patient will ambulate >/= 30 ft with CGA and LRAD (Progressing)       Start:  05/20/25    Expected End:  06/03/25            Pt. will tolerate >/= 10 minutes of OOB mobility without seated rest break and VSS to demo improved activity tolerance/endurance.  (Progressing)       Start:  05/20/25    Expected End:  06/03/25            Patient will actively participate in ther-ex in order to improve strength and to assist with the completion of functional mobility tasks.  (Progressing)       Start:  05/20/25    Expected End:  06/03/25               PT Transfers       STG - Patient will perform bed mobility IND (Progressing)       Start:  05/20/25    Expected End:  06/03/25            STG - Patient will transfer sit to and from stand with CGA and LRAD (Progressing)       Start:  05/20/25    Expected End:  06/03/25               Pain - Adult                05/22/25 at 4:08 PM   Iwona Manrique, PT   Rehab Office: [phone]                 [1]

## 2025-05-22 NOTE — PROCEDURES
Speech-Language Pathology    Inpatient Modified Barium Swallow Study    Patient Name: Davidson Khoury  MRN: [id number]  : 1965  Today's Date: 25  Time Calculation  Start Time: 947  Stop Time:   Time Calculation (min): 29 min       Modified Barium Swallow Study completed. Informed verbal consent obtained prior to completion of exam. The study was completed per protocol with various liquid barium consistencies, pudding, and solids. A 1.9 cm or .75 inch (outer diameter) ring was placed on the chin in the lateral and oblique views in order to complete objective measurements during swallowing. The anatomic structures and function of the oropharynx, larynx, hypopharynx and cervical esophagus were evaluated.  Veered from protocol given need to trial compensatory strategies and inability to tolerate positioning for AP view.     SLP: Mitzy Tran, SLP   Contact info: [employer] chat; phone: [phone]      Reason for Referral: Further assessment of oropharyngeal swallow and to guide diet recommendations   Patient Hx: Davidson Khoury is a 60 y.o. male with a history notable for renal transplant (currently on Cellcept) and hypertension presenting to the ED initially with a chief complaint of altered mental status, headache, and hypertension. Initially evaluated by general neurology team who noted subtle left sided weakness that worsened on follow up exam. BAT activated for concern of stroke. Found to have proximal R superior M2 occlusion. Not a candidate for TNK as LKN >4.5 hours. Taken for MT, TICI 2b. Exam notable for L HH, L UMN facial paralysis, LUE 0/5, LLE 3/5. right at least antigravity, left hemisensory loss and neglect.   Respiratory Status: Room air   Current diet: NPO following clinical swallow evaluation     Pain:  Pain Scale: 0-10  Rating: Denied pain       RECOMMENDATIONS:   -FULL LIQUID DIET; NECTAR/MILDLY THICK LIQUIDS  -Consider continued use of DHT to supplement nutrition and hydration  needs   Medications: Crushed via DHT as cleared by physician      Safe Swallow/Compensatory Strategies:  Upright positioning   Slow rate/small boluses   Alternate solids and liquids  Assistance w/ PO intake to assure adherence to safe swallow strategies/oral clearance     -Diligent oral care 2x/day to improve oral infection control       SLP PLAN:  Skilled SLP Services: Skilled SLP intervention for dysphagia is warranted.  SLP Frequency: 2x per week  Duration: 3 weeks     Discussed POC: Patient  Discussed Risks/Benefits: Yes  Patient/Caregiver Agreeable: Yes      Short term goals established 05/22/25:   - Patient will tolerate current diet without noted pulmonary compromise or evidence of pulmonary sequela as noted in patient chart and/or reported by patient/family.  - Pt will independently implement safe swallow strategies to aide in oral clearance as measured by SLP assessment in 90% of therapeutic trials with SLP.  - Patient will independently implement safe swallowing strategies to reduce risk of aspiration (head turn L) with use of compensatory strategies during 90% of therapeutic trials.  - Pt will complete effortful swallows 30 reps, 2 x a day for 7 days a week; to strengthen pharyngeal muscles for improve bolus clearance through the pharynx.    - Patient/family will indicate understanding of dysphagia education: risk for aspiration, recommendations, and POC with > 80% accuracy via teach back method.      Long term goals 05/22/25:   Patient will resume/maintain oral intake in order to promote optimal oropharyngeal swallow function and reduce risk for dehydration, malnutrition and weight loss.     Education Provided: SLP provided extensive education re: results and recommendations following MBSS using video screen for visual aid.   Discussed anatomy/physiology of swallow function, risk factors for dysphagia/aspiration PNA, diet modifications, and the use of compensatory swallow strategies to promote pt safety  upon PO intake.  Pt indicated understanding via teach back method with > 70% accuracy.        Mechanics of the Swallow Summary:  Oral Motor Assessment:  Oral Hygiene: WFL  Dentition: Present    Oral Motor: Impaired labial, lingual, buccal movement   Facial Symmetry: L asymmetry   Vocal Quality (Perceptually): Impaired     ORAL PHASE:  Lip Closure - Profuse escape through open lips w/ thin liquids via tsp; improved w/ other consistencies/delivery methods   Tongue Control During Bolus Hold - Posterior escape of less than half of the bolus   Bolus prep/mastication - Inefficient, disorganized mastication with chewable solids   Bolus transport/lingual motion - Repetitive/disorganized tongue motion (tongue pumping)   Oral residue - Majority of bolus remaining w/ chewable solids and puree solids at times; unable to follow SLP commands to clear, SLP retrieved via toothette     PHARYNGEAL PHASE:  Initiation of pharyngeal swallow - Collection of bolus at the level of the pyriform sinus   Soft palate elevation - No bolus between soft palate/pharyngeal wall   Laryngeal elevation - Partial superior movement of thyroid cartilage and/or partial approximation of arytenoids to epiglottic petiole   Anterior hyoid excursion - Partial anterior movement   Epiglottic movement - Complete inversion    Laryngeal vestibule closure - Incomplete - narrow column of air/contrast in laryngeal vestibule   Pharyngeal stripping wave - Present, however, diminished   Pharyngeal contraction (A/P view) - Not tested       Pharyngoesophageal segment opening - Complete distension and complete duration/no obstruction of flow of bolus   Tongue base retraction - No bolus between tongue base and posterior pharyngeal wall   Pharyngeal residue - Trace residue within or on the pharyngeal structures     ESOPHAGEAL PHASE:  Esophageal clearance - Complete clearance       SLP Impressions with Severity Rating:   Pt presents with moderate/severe oral dysphagia and  moderate pharyngeal dysphagia upon completion of modified barium swallow study this date. Swallowing physiology is detailed above. Oral phase severely impaired; poor bolus formation and transit.  Significant oral residue w/ chewable solids and occasionally w/ puree solids.  Unable to follow SLP commands to clear; SLP retrieved via toothette.  Intermittent trace/mild aspiration w/ thin liquids during the swallow.  Pt not sensate; did not clear w/ cued cough.  Chin tuck did not provide additional airway protection.  Head turn L improved swallow safety w/ no airway invasion observed.  No penetration or aspiration with any other consistency assessed.  Mild residue at the valleculae and piriforms with solids.  Cleared via subsequent swallows.     *Of note: The A-P bolus follow-through is not intended to be utilized as a diagnostic assessment of the esophagus, rather a tool to observe the biomechanical aspects of the swallow continuum, and to inform the need for further evaluation by medical specialists, as applicable.       OUTCOME MEASURES:  Functional Oral Intake Scale  Functional Oral Intake Scale: Level 4        total oral diet of a single consistency       Rosenbek's Penetration Aspiration Scale  Thin Liquids: 8. SILENT ASPIRATION - contrast passes glottis, visible residue, NO pt response]   Nectar Thick Liquids: 1. NO ASPIRATION & NO PENETRATION - no aspiration, contrast does not enter airway  Puree: 1. NO ASPIRATION & NO PENETRATION - no aspiration, contrast does not enter airway  Soft Solids: 1. NO ASPIRATION & NO PENETRATION - no aspiration, contrast does not enter airway      05/22/25 at 12:00 PM - Mitzy Tran, SLP

## 2025-05-23 LAB
ALBUMIN SERPL BCP-MCNC: 3.9 G/DL (ref 3.4–5)
ANION GAP SERPL CALC-SCNC: 13 MMOL/L (ref 10–20)
BASOPHILS # BLD AUTO: 0.01 X10*3/UL (ref 0–0.1)
BASOPHILS NFR BLD AUTO: 0.1 %
BUN SERPL-MCNC: 25 MG/DL (ref 6–23)
CA-I BLD-SCNC: 1.21 MMOL/L (ref 1.1–1.33)
CALCIUM SERPL-MCNC: 9.6 MG/DL (ref 8.6–10.6)
CHLORIDE SERPL-SCNC: 104 MMOL/L (ref 98–107)
CO2 SERPL-SCNC: 25 MMOL/L (ref 21–32)
CREAT SERPL-MCNC: 2.01 MG/DL (ref 0.5–1.3)
EGFRCR SERPLBLD CKD-EPI 2021: 38 ML/MIN/1.73M*2
EOSINOPHIL # BLD AUTO: 0.06 X10*3/UL (ref 0–0.7)
EOSINOPHIL NFR BLD AUTO: 0.7 %
ERYTHROCYTE [DISTWIDTH] IN BLOOD BY AUTOMATED COUNT: 14.7 % (ref 11.5–14.5)
GLUCOSE BLD MANUAL STRIP-MCNC: 110 MG/DL (ref 74–99)
GLUCOSE BLD MANUAL STRIP-MCNC: 111 MG/DL (ref 74–99)
GLUCOSE BLD MANUAL STRIP-MCNC: 145 MG/DL (ref 74–99)
GLUCOSE SERPL-MCNC: 133 MG/DL (ref 74–99)
HCT VFR BLD AUTO: 44.9 % (ref 41–52)
HGB BLD-MCNC: 15.2 G/DL (ref 13.5–17.5)
IMM GRANULOCYTES # BLD AUTO: 0.03 X10*3/UL (ref 0–0.7)
IMM GRANULOCYTES NFR BLD AUTO: 0.4 % (ref 0–0.9)
LYMPHOCYTES # BLD AUTO: 1.28 X10*3/UL (ref 1.2–4.8)
LYMPHOCYTES NFR BLD AUTO: 15.2 %
MAGNESIUM SERPL-MCNC: 2.02 MG/DL (ref 1.6–2.4)
MCH RBC QN AUTO: 29.4 PG (ref 26–34)
MCHC RBC AUTO-ENTMCNC: 33.9 G/DL (ref 32–36)
MCV RBC AUTO: 87 FL (ref 80–100)
MONOCYTES # BLD AUTO: 0.55 X10*3/UL (ref 0.1–1)
MONOCYTES NFR BLD AUTO: 6.5 %
NEUTROPHILS # BLD AUTO: 6.47 X10*3/UL (ref 1.2–7.7)
NEUTROPHILS NFR BLD AUTO: 77.1 %
NRBC BLD-RTO: 0 /100 WBCS (ref 0–0)
PHOSPHATE SERPL-MCNC: 2.6 MG/DL (ref 2.5–4.9)
PLATELET # BLD AUTO: 135 X10*3/UL (ref 150–450)
POTASSIUM SERPL-SCNC: 4.1 MMOL/L (ref 3.5–5.3)
RBC # BLD AUTO: 5.17 X10*6/UL (ref 4.5–5.9)
SODIUM SERPL-SCNC: 138 MMOL/L (ref 136–145)
TACROLIMUS BLD-MCNC: 5.6 NG/ML
WBC # BLD AUTO: 8.4 X10*3/UL (ref 4.4–11.3)

## 2025-05-23 PROCEDURE — 2500000001 HC RX 250 WO HCPCS SELF ADMINISTERED DRUGS (ALT 637 FOR MEDICARE OP)

## 2025-05-23 PROCEDURE — 1100000001 HC PRIVATE ROOM DAILY

## 2025-05-23 PROCEDURE — 2500000004 HC RX 250 GENERAL PHARMACY W/ HCPCS (ALT 636 FOR OP/ED)

## 2025-05-23 PROCEDURE — 99231 SBSQ HOSP IP/OBS SF/LOW 25: CPT

## 2025-05-23 PROCEDURE — 2500000001 HC RX 250 WO HCPCS SELF ADMINISTERED DRUGS (ALT 637 FOR MEDICARE OP): Performed by: REGISTERED NURSE

## 2025-05-23 PROCEDURE — 97112 NEUROMUSCULAR REEDUCATION: CPT | Mod: GO

## 2025-05-23 PROCEDURE — 97112 NEUROMUSCULAR REEDUCATION: CPT | Mod: GP

## 2025-05-23 PROCEDURE — 36415 COLL VENOUS BLD VENIPUNCTURE: CPT

## 2025-05-23 PROCEDURE — 99232 SBSQ HOSP IP/OBS MODERATE 35: CPT | Performed by: STUDENT IN AN ORGANIZED HEALTH CARE EDUCATION/TRAINING PROGRAM

## 2025-05-23 PROCEDURE — 99233 SBSQ HOSP IP/OBS HIGH 50: CPT | Performed by: INTERNAL MEDICINE

## 2025-05-23 PROCEDURE — 85025 COMPLETE CBC W/AUTO DIFF WBC: CPT

## 2025-05-23 PROCEDURE — 83735 ASSAY OF MAGNESIUM: CPT

## 2025-05-23 PROCEDURE — 80069 RENAL FUNCTION PANEL: CPT

## 2025-05-23 PROCEDURE — 82947 ASSAY GLUCOSE BLOOD QUANT: CPT

## 2025-05-23 PROCEDURE — 80197 ASSAY OF TACROLIMUS: CPT

## 2025-05-23 PROCEDURE — 97530 THERAPEUTIC ACTIVITIES: CPT | Mod: GO

## 2025-05-23 PROCEDURE — 82330 ASSAY OF CALCIUM: CPT

## 2025-05-23 PROCEDURE — 2500000002 HC RX 250 W HCPCS SELF ADMINISTERED DRUGS (ALT 637 FOR MEDICARE OP, ALT 636 FOR OP/ED)

## 2025-05-23 PROCEDURE — 97530 THERAPEUTIC ACTIVITIES: CPT | Mod: GP

## 2025-05-23 RX ORDER — MYCOPHENOLATE MOFETIL 250 MG/1
750 CAPSULE ORAL 2 TIMES DAILY
Status: CANCELLED
Start: 2025-05-23

## 2025-05-23 RX ORDER — ASPIRIN 81 MG/1
81 TABLET ORAL DAILY
Status: CANCELLED
Start: 2025-05-23

## 2025-05-23 RX ORDER — CLOPIDOGREL BISULFATE 75 MG/1
75 TABLET ORAL DAILY
Status: CANCELLED
Start: 2025-05-23

## 2025-05-23 RX ADMIN — MYCOPHENOLATE MOFETIL 750 MG: 250 CAPSULE ORAL at 21:04

## 2025-05-23 RX ADMIN — TACROLIMUS 2 MG: 5 CAPSULE, GELATIN COATED ORAL at 17:30

## 2025-05-23 RX ADMIN — LOSARTAN POTASSIUM 25 MG: 25 TABLET, FILM COATED ORAL at 21:04

## 2025-05-23 RX ADMIN — TACROLIMUS 2 MG: 5 CAPSULE, GELATIN COATED ORAL at 06:51

## 2025-05-23 RX ADMIN — MYCOPHENOLATE MOFETIL 750 MG: 250 CAPSULE ORAL at 09:12

## 2025-05-23 RX ADMIN — LOSARTAN POTASSIUM 25 MG: 25 TABLET, FILM COATED ORAL at 09:07

## 2025-05-23 RX ADMIN — CLONIDINE HYDROCHLORIDE 0.2 MG: 0.2 TABLET ORAL at 21:04

## 2025-05-23 RX ADMIN — ASPIRIN 81 MG: 81 TABLET, CHEWABLE ORAL at 09:07

## 2025-05-23 RX ADMIN — CLOPIDOGREL BISULFATE 75 MG: 75 TABLET, FILM COATED ORAL at 09:07

## 2025-05-23 RX ADMIN — ROSUVASTATIN CALCIUM 20 MG: 20 TABLET, FILM COATED ORAL at 21:04

## 2025-05-23 RX ADMIN — CLONIDINE HYDROCHLORIDE 0.2 MG: 0.2 TABLET ORAL at 09:07

## 2025-05-23 RX ADMIN — ENOXAPARIN SODIUM 40 MG: 40 INJECTION, SOLUTION SUBCUTANEOUS at 17:30

## 2025-05-23 ASSESSMENT — PAIN - FUNCTIONAL ASSESSMENT
PAIN_FUNCTIONAL_ASSESSMENT: 0-10

## 2025-05-23 ASSESSMENT — COGNITIVE AND FUNCTIONAL STATUS - GENERAL
HELP NEEDED FOR BATHING: A LOT
PERSONAL GROOMING: A LOT
STANDING UP FROM CHAIR USING ARMS: TOTAL
MOVING TO AND FROM BED TO CHAIR: TOTAL
MOBILITY SCORE: 8
CLIMB 3 TO 5 STEPS WITH RAILING: TOTAL
TURNING FROM BACK TO SIDE WHILE IN FLAT BAD: A LOT
DRESSING REGULAR LOWER BODY CLOTHING: TOTAL
DRESSING REGULAR UPPER BODY CLOTHING: TOTAL
TOILETING: TOTAL
EATING MEALS: A LITTLE
MOVING FROM LYING ON BACK TO SITTING ON SIDE OF FLAT BED WITH BEDRAILS: A LOT
WALKING IN HOSPITAL ROOM: TOTAL
DAILY ACTIVITIY SCORE: 10

## 2025-05-23 ASSESSMENT — PAIN SCALES - GENERAL
PAINLEVEL_OUTOF10: 0 - NO PAIN

## 2025-05-23 NOTE — CARE PLAN
The patient's goals for the shift include      The clinical goals for the shift include Pt will remain HDS stable during shift      Problem: Skin  Goal: Decreased wound size/increased tissue granulation at next dressing change  Outcome: Progressing  Flowsheets (Taken 5/23/2025 1826)  Decreased wound size/increased tissue granulation at next dressing change: Promote sleep for wound healing  Goal: Participates in plan/prevention/treatment measures  Flowsheets (Taken 5/23/2025 1826)  Participates in plan/prevention/treatment measures: Elevate heels  Goal: Prevent/manage excess moisture  Outcome: Progressing  Flowsheets (Taken 5/23/2025 1826)  Prevent/manage excess moisture: Follow provider orders for dressing changes  Goal: Prevent/minimize sheer/friction injuries  Outcome: Progressing  Flowsheets (Taken 5/23/2025 1826)  Prevent/minimize sheer/friction injuries:   HOB 30 degrees or less   Turn/reposition every 2 hours/use positioning/transfer devices

## 2025-05-23 NOTE — CARE PLAN
The patient's goals for the shift include      The clinical goals for the shift include Pt will remain HDS for duration of shift      Problem: General Stroke  Goal: Demonstrate improvement in neurological exam throughout the shift  Outcome: Progressing  Goal: Maintain BP within ordered limits throughout shift  Outcome: Progressing  Goal: Participate in treatment (ie., meds, therapy) throughout shift  Outcome: Progressing  Goal: No symptoms of aspiration throughout shift  Outcome: Progressing  Goal: No symptoms of hemorrhage throughout shift  Outcome: Progressing     Problem: Pain - Adult  Goal: Verbalizes/displays adequate comfort level or baseline comfort level  Outcome: Progressing     Problem: Safety - Adult  Goal: Free from fall injury  Outcome: Progressing     Problem: Discharge Planning  Goal: Discharge to home or other facility with appropriate resources  Outcome: Progressing     Problem: Skin  Goal: Prevent/minimize sheer/friction injuries  Outcome: Progressing  Flowsheets (Taken 5/22/2025 2109)  Prevent/minimize sheer/friction injuries:   Complete micro-shifts as needed if patient unable. Adjust patient position to relieve pressure points, not a full turn   Use pull sheet   HOB 30 degrees or less   Turn/reposition every 2 hours/use positioning/transfer devices

## 2025-05-23 NOTE — PROGRESS NOTES
Davidson Khoury is a 60 y.o. left handed male on day 4 of admission presenting with Acute cerebrovascular accident (CVA) due to embolism of right middle cerebral artery (Multi).    Subjective   New acute event overnight, feeling tired otherwise no new complaints.       Objective     Last Recorded Vitals  Heart Rate:  []   Temp:  [36.1 °C (97 °F)-36.8 °C (98.2 °F)]   Resp:  [11-22]   BP: (118-185)/()   SpO2:  [95 %-100 %]       [facility] NIHSS:   NIH Stroke Scale:     Date/Time of Assessment: 5/21/2025 11:03 AM    1A. Level of Consciousness:  Alert (keenly responsive) (0)    1B. Ask Month and Age:  Both questions right (0)    1C. Blink Eyes & Squeeze Hands:  Performs both tasks (0)    2. Best Gaze:  Normal (0)    3. Visual:  Complete hemianopia (+2)    4. Facial Palsy:  Partial paralysis (+2)    5A. Motor - Left Arm:  No movement (+4)    5B. Motor - Right Arm:  No drift (0)    6A. Motor - Left Leg:  No effort against gravity (+3)    6B. Motor - Right Leg:  No drift (0)    7. Limb Ataxia:  No ataxia (0)    8. Sensory Loss:  Severe to total loss (+2)    9. Best Language:  Normal (no aphasia) (0)    10. Dysarthia:  Mild-moderate dysarthria (+1)    11. Extinction and Inattention:  Visual/tactile/auditory/spatial/personal inattention (+1)    NIH Stroke Scale:  15       Physical Exam  Neurological Exam  GENERAL APPEARANCE:  No distress, alert, interactive and cooperative.     MENTAL STATE:   Orientation was normal to time, place and person. Recent and remote memory was intact.  Attention span and concentration were normal. Language testing was normal for comprehension, repetition, expression, and naming. The patient could correctly interpret a picture. General fund of knowledge was intact.     CRANIAL NERVES:   CN 2   Left HH  CN 3, 4, 6   Pupils round, 4 mm in diameter, equally reactive to light. Lids symmetric; no ptosis. EOMs normal alignment, full range with normal saccades, pursuit and convergence.   No  nystagmus.   CN 5   Facial sensation intact bilaterally.   CN 7   Left UMN paralysis  CN 8   Hearing intact to finger rub.   CN 9   Palate elevates symmetrically.   CN 11   Normal strength of shoulder shrug and neck turning.   CN 12   Tongue midline, with normal bulk and strength; no fasciculations.     Motor/Sensory:   Spacticity on the left UE and LE, LUE 0/5, LLE 3/5. right at least antigravity, left hemisensory loss and neglect.    REFLEXES:   R          L  BR:  2 3  Biceps:  2 3  Triceps:  2 3  Knee:  2 3  Ankle:  2 3    Babinski: toes downgoing to plantar stimulation. No clonus or other pathologic reflexes present.     COORDINATION:    Right finger-nose-finger was intact without dysmetria or overshoot.     GAIT:   Deferred    Cardiac: regular rate, normal s1, s2,  Chest: Equal air entry bilaterally  Abdomen soft lax non distended    Relevant Results    NIH Stroke Scale  1A. Level of Consciousness: Alert, Keenly Responsive  1B. Ask Month and Age: Both Questions Right  1C. Blink Eyes & Squeeze Hands: Performs Both Tasks  2. Best Gaze: Partial Gaze Palsy  3. Visual: Partial Hemianopia  4. Facial Palsy: Minor Paralysis  5A. Motor - Left Arm: No Effort Against Gravity  5B. Motor - Right Arm: No Drift  6A. Motor - Left Leg: Some Effort Against Gravity  6B. Motor - Right Leg: No Drift  7. Limb Ataxia: Present in One Limb  8. Sensory Loss: Mild-to-Moderate Sensory Loss  9. Best Language: Mild-to-Moderate Aphasia  10. Dysarthria: Mild-to-Moderate Dysarthria  11. Extinction and Inattention: Visual, Tactile, Auditory, Spatial, or Personal Inattention  NIH Stroke Scale: 13           [place] Coma Scale  Best Eye Response: Spontaneous  Best Verbal Response: Oriented  Best Motor Response: Follows commands  [place] Coma Scale Score: 15                    Assessment & Plan  Acute cerebrovascular accident (CVA) due to embolism of right middle cerebral artery (Multi)    Davidson Khoury is a 60 y.o. male with a history notable  for renal transplant (currently on Cellcept) and hypertension presenting to the ED initially with a chief complaint of altered mental status, headache, and hypertension. Initially evaluated by general neurology team who noted subtle left sided weakness that worsened on follow up exam. BAT activated for concern of stroke. Found to have proximal R superior M2 occlusion. Not a candidate for TNK as LKN >4.5 hours. Taken for MT, TICI 2b. Exam notable for L HH, L UMN facial paralysis, LUE 0/5, LLE 3/5. right at least antigravity, left hemisensory loss and neglect.    Stroke Metrics:  EMS pre-notification?: No  Time of stroke team arrival: 3:47pm  Direct to CT?: No  Time of CTH read by stroke team: As performed  Time of TNK: n/a  Time stroke team called IR: 4:27pm  Time pt arrived to angio suite: 4:57pm  Time of groin puncture: 5:10pm  Time of recanalization: 5:31pm  Number of passes: 1  Device used: penumbra aspiration  Any delays in the process (if door to TNK >30 min): n/a     Stroke Classification:  Type: Ischemic stroke  Subtype/etiology: Suspected large artery disease  Vessels involved: R MCA  Neurological manifestations:  Pre-Intervention Deficits: NIHSS 10 *see above  Post-Intervention Deficits: NIHSS 9 *see above  Pre-stroke mRS: 0  Initial treatment: Angio  Anti-platelets or Anti-coagulation management: Aspirin  Vascular Risk Factors: HTN  Antiplatelet/antithrombotic plan for stroke prevention: Aspirin  VTE prophylaxis: Lovenox  Vascular Risk Factor modification goals:  Blood pressure goals: avoid hypotension SBP <100 that could worsen cerebral perfusion, Ischemic stroke post-thrombectomy SBP <180mmHg   Lipid Goals: education on healthy diet and statin therapy to maintain or achieve goal LDL-cholesterol < 70mg.  Glucose Goals: early treatment of hyperglycemia to goal glucose 140-180 mg/dl with long-term goal A1c < 7%   Smoking Cessation and Education  Assessment for Rehabilitation needs   Patient and family  education on signs and symptoms of stroke, calling 911, healthy strategies for stroke prevention.      Plan:  Updates 05/23/25  -Continue DAPT 90 days sampriss protocol for intracranial atherosclerosis  -Cleared for liquid diet  -Follow up nutrition recs  -PT/OT    #Proximal R superior M2 occlusion s/p TICI 2b MT   #HTN   #Hx of thrombocytopenia- Stable   - MRI for stroke burden  - Stroke work up  - A1c: 5.2%, LDL 63              - TTE with bubble study: EF 70-75% LA normal in size, no PFO  - SBP goals post-thrombectomy SBP <180mmHg   - PT/OT/SLP   -Rest of care per NSU team    #Renal transplant (on Cellcept and Tacrolimus)   - Baseline BUN/Cr: 15/1.80   -Consulted nephrology recommended - resume tac 2mg q12. Monitor Fk trough daily (30-60 min prior to AM dose). Goal 5-8.  - cont  mg bid.    #Dysphagia  -SLP consulted recommended NPO will consider MBSS     Pain medications: PRN Tylenol  Fluids: Replete PRN  Electrolytes: Keep mg >2, phos >3  and K >4  Nutrition:  Adult diet Full Liquid; Mild thick 2; 1:1 Feeding   Antimicrobials: None  Antiplatelet: ASA  Anticoagulation: None  DVT PPX: Lovenox  GI ppx: none needed  Bowel care: Miralax  Catheter: External Catheter  Lines: PIV  Oxygen: Room Air    Disposition:   PT/OT high intensity    Code Status: Full Code (confirmed on admission)   NOK:  Primary Emergency Contact: Margo Cali MD  PGY-2 Neurology    ----------------------------------------------------------------

## 2025-05-23 NOTE — PROGRESS NOTES
Physical Therapy    Physical Therapy Treatment    Patient Name: Davidson Khoury  MRN: [id number]  Today's Date: 5/23/2025  Time Calculation  Start Time: 1251  Stop Time: 1334  Time Calculation (min): 43 min       Assessment/Plan   PT Assessment  Rehab Prognosis: Excellent  Barriers to Discharge Home: Physical needs, Caregiver assistance  Caregiver Assistance: Caregiver assistance needed per identified barriers - however, level of patient's required assistance exceeds assistance available at home  Physical Needs: High falls risk due to function or environment  Evaluation/Treatment Tolerance: Patient tolerated treatment well  End of Session Communication: Bedside nurse  End of Session Patient Position: Bed, 3 rail up, Alarm on  PT Plan  Inpatient/Swing Bed or Outpatient: Inpatient  PT Plan  Treatment/Interventions: Bed mobility, Transfer training, Stair training, Gait training, Balance training, Strengthening, Neuromuscular re-education, Endurance training, Therapeutic exercise, Range of motion, Therapeutic activity, Home exercise program, Postural re-education, Positioning  PT Plan: Ongoing PT  PT Frequency: 5 times per week  PT Discharge Recommendations: High intensity level of continued care  Equipment Recommended upon Discharge:  (TBD)  PT Recommended Transfer Status: Total assist  PT - OK to Discharge: Yes (When medically ready)      General Visit Information:   PT  Visit  PT Received On: 05/23/25  Response to Previous Treatment: Patient with no complaints from previous session.  Reason for Referral: P/w AMS, HA, HTN with notable subtle left sided weakness that worsened. Acute cerebrovascular accident due to embolism of R MCA. Found to have proximal R superior M2 occulsion, MT TICI 2b. Pre intervention NIHSS: 10, post intervention NIHSS: 9.  Past Medical History Relevant to Rehab: 60 y.o h/o notable for renal transplant (currently on Cellcept) and HTN  Prior to Session Communication: Bedside nurse  Patient Position  Received: Bed, 3 rail up, Alarm off, not on at start of session  General Comment: Pt pleasant and cooperative     Subjective   Precautions:  Precautions  Hearing/Visual Limitations: Hearing WFL. Able to gaze past midline to L but does not achieve terminal L gaze.  Medical Precautions: Fall precautions  Precautions Comment: SBP <180    Vital Signs:     05/23/25 1251 05/23/25 1301 05/23/25 1334   Vital Signs   Vitals Session Pre PT During PT Post PT   Heart Rate 78 85 79   Resp 17 22 19   SpO2  --  100 % 100 %   BP (!) 131/94 (!) 143/100 (!) 150/116   MAP (mmHg) 105 114 126   BP Method Automatic Automatic Automatic   Patient Position Lying Sitting Lying           Objective   Pain:  Pain Assessment  Pain Assessment: 0-10  0-10 (Numeric) Pain Score: 0 - No pain  Cognition:  Cognition  Overall Cognitive Status: Impaired  Arousal/Alertness: Appropriate responses to stimuli  Orientation Level: Oriented X4  Following Commands: Follows one step commands with repetition  Cognition Comments: Tangential in conversation, verbose and requiring frequent redirection to task.  Insight: Moderate  Impulsive: Moderately  Processing Speed: Delayed    Lines/Tubes/Drains:  NG/OG/Feeding Tube Right nostril (Active)   Number of days: 3       External Urinary Catheter Male (Active)   Number of days: 3       Continuous Medications/Drips:  Continuous Medications[1]    Oxygen: room air     Postural Control:   Postural Control  Postural Control: Impaired  Head Control: Needs cues to maintain midline  Trunk Control: Pushes to L with RUE. Able to maintain midline with visual feedback from mirror, RUE support of footboard, and therapist assist.  Righting Reactions: Intact to R, cues to L  Protective Responses: Intact to R, cues to L  Posture Comment: Rounded shoulders    Balance:   Static Sitting Balance  Static Sitting-Balance Support: Bilateral upper extremity supported, Feet supported  Static Sitting-Level of Assistance:  (Fluctuant performance.  MAX A x1 at times, CGA at others.)  Static Sitting-Comment/Number of Minutes: 25 min  Dynamic Sitting Balance  Dynamic Sitting-Balance Support: Bilateral upper extremity supported, Feet supported  Dynamic Sitting-Level of Assistance:  (MAX A x1 to MIN A x1)  Dynamic Sitting-Balance: Lateral lean    Static Standing Balance  Static Standing-Balance Support: Bilateral upper extremity supported  Static Standing-Level of Assistance: Maximum assistance (x2)  Static Standing-Comment/Number of Minutes: 3x10 sec    Extremity/Trunk Assessments:  Strength:       RUE   RUE : Within Functional Limits    LUE   LUE: Exceptions to WFL (PROM WFL, no movement on command, no WD to nox stim)    RLE   RLE : Exceptions to WFL  Strength RLE  R Hip Flexion: 5/5  R Knee Flexion: 5/5  R Knee Extension: 5/5  R Ankle Dorsiflexion: 5/5  R Ankle Plantar Flexion: 5/5    LLE   LLE : Exceptions to WFL (PROM WFL)  Strength LLE  L Hip Flexion: 1/5  L Knee Flexion: 1/5  L Knee Extension: 1/5  L Ankle Dorsiflexion: 1/5  L Ankle Plantar Flexion: 1/5    PT Treatments:  Therapeutic Exercise  Therapeutic Exercise Performed: Yes  Therapeutic Exercise Activity 1: LUE push/pull PROM with visual feedback of mirror 10x.         Balance/Neuromuscular Re-Education  Balance/Neuromuscular Re-Education Activity Performed: Yes  Balance/Neuromuscular Re-Education Activity 1: Static sitting at edge of bed: Cues for proper RUE placement and preventing pushing to L with RUE. Used visual feedback via mirror to enhance pt's ability to perceive deficits and self-correct. Able to improve from MAX A x1 to MIN A x1 with cues.  Balance/Neuromuscular Re-Education Activity 2: Reaching with RUE and passing cones across body from L to R and R to L. Used visual feedback of mirror to facilitate attention to L side    Bed Mobility  Bed Mobility: Yes  Bed Mobility 1  Bed Mobility 1: Supine to sitting, Sitting to supine  Level of Assistance 1: Maximum assistance  Bed Mobility Comments  1: Cues to  maximize pt participation in task and for proper UE support/task initiation  Bed Mobility 2  Bed Mobility  2: Scooting (boosting)  Level of Assistance 2: Dependent, +2  Bed Mobility Comments 2: HOB flat, used draw sheet.    Ambulation/Gait Training  Ambulation/Gait Training Performed: Yes  Ambulation/Gait Training 1  Surface 1: Level tile  Device 1:  (BUE arm in arm assist)  Gait Support Devices: Gait belt  Assistance 1: Maximum assistance (x2)  Quality of Gait 1: Diminished heel strike, Foot slap, Knee(s) buckle (Blocked L knee, therapist facilitated weight shifting and advancing LLE.)  Comments/Distance (ft) 1: 1 step to L.    Transfers  Transfer: Yes  Transfer 1  Transfer From 1: Bed to  Transfer to 1: Stand  Technique 1: Sit to stand, Stand to sit  Transfer Device 1: Gait belt  Transfer Level of Assistance 1: Maximum assistance, +2  Trials/Comments 1: Performed 3x total, cues for proper hand placement and controlling speed. Pt very distracted, needs frequent redirection to task.    Stairs  Stairs: No              Outcome Measures:  [facility] Basic Mobility  Turning from your back to your side while in a flat bed without using bedrails: A lot  Moving from lying on your back to sitting on the side of a flat bed without using bedrails: A lot  Moving to and from bed to chair (including a wheelchair): Total  Standing up from a chair using your arms (e.g. wheelchair or bedside chair): Total  To walk in hospital room: Total  Climbing 3-5 steps with railing: Total  Basic Mobility - Total Score: 8                   FSS-ICU  Ambulation: Unable to attempt due to weakness  Rolling: Moderate assistance (performs 50 - 74% of task)  Sitting: Maximal assistance (performs 25% - 49% of task)  Transfer Sit-to-Stand: Total assistance (performs 25% or requires another person)  Transfer Supine-to-Sit: Maximal assistance (performs 25% - 49% of task)  Total Score: 8    ICU Mobility Screen  Early Mobility/Exercise Safety Screen:  Proceed with mobilization - No exclusion criteria met  ICU Mobility Scale: Standing                   Education:  Education Documentation  Precautions, taught by Iwona Manrique, PT at 5/23/2025  3:20 PM.  Learner: Patient  Readiness: Acceptance  Method: Explanation  Response: Verbalizes Understanding, Needs Reinforcement    Body Mechanics, taught by Iwona Manrique, PT at 5/23/2025  3:20 PM.  Learner: Patient  Readiness: Acceptance  Method: Explanation  Response: Verbalizes Understanding, Needs Reinforcement    Mobility Training, taught by Iwona Manrique PT at 5/23/2025  3:20 PM.  Learner: Patient  Readiness: Acceptance  Method: Explanation  Response: Verbalizes Understanding, Needs Reinforcement    Education Comments  No comments found.             Encounter Problems       Encounter Problems (Active)       Balance       Patient to demo static standing with unilateral UE support, performing single UE task with SBA, no sway or LOB x 2 mins for functional carryover  (Progressing)       Start:  05/20/25    Expected End:  06/03/25            Pt will score >/= 24/28 on Tinetti balance assessment to indicate low falls risk.   (Progressing)       Start:  05/20/25    Expected End:  06/03/25               Mobility       STG - Patient will ambulate >/= 30 ft with CGA and LRAD (Progressing)       Start:  05/20/25    Expected End:  06/03/25            Pt. will tolerate >/= 10 minutes of OOB mobility without seated rest break and VSS to demo improved activity tolerance/endurance.  (Progressing)       Start:  05/20/25    Expected End:  06/03/25            Patient will actively participate in ther-ex in order to improve strength and to assist with the completion of functional mobility tasks.  (Progressing)       Start:  05/20/25    Expected End:  06/03/25               PT Transfers       STG - Patient will perform bed mobility IND (Progressing)       Start:  05/20/25    Expected End:  06/03/25            STG - Patient  will transfer sit to and from stand with CGA and LRAD (Progressing)       Start:  05/20/25    Expected End:  06/03/25               Pain - Adult                05/23/25 at 3:21 PM   Iwona Manrique PT   Rehab Office: [phone]                 [1]

## 2025-05-23 NOTE — PROGRESS NOTES
05/23/25 1717   Discharge Planning   Home or Post Acute Services Post acute facilities (Rehab/SNF/etc)   Type of Post Acute Facility Services Skilled nursing   Expected Discharge Disposition SNF   Patient Choice   Provider Choice list and CMS website (https://medicare.gov/care-compare#search) for post-acute Quality and Resource Measure Data were provided and reviewed with: Patient     Social Work Discharge Planning note:    -Patient discussed during interdisciplinary rounds.   -Team members present: Resident, TCC, and SW  -Plan per medical team: Pt is medically ready for discharge.   -Payer: [place] [employer]  -Status: Inpatient  -Discharge disposition: PM&R's final recommendation is SNF. Pt's FOC, [facility] is willing to accept. SW is still awaiting the provider cert to be signed on the [employer], which is needed in order for SW to complete the 7000 form. With Pt's insurance, precert cannot be started without the 7000 form, so once that is completed we will have the DSC start precert.   -Anticipated Date of Discharge:  5/24/25    This SW   LASHAUN Rojas, LSW    Office: [phone]  Secure chat via [employer]

## 2025-05-23 NOTE — CONSULTS
"PM&R Consult Note  Patient: Davidson Khoury   Age/sex: 60 y.o.   Medical Record #: [id number]     Referring physician: Mary Harper MD  Consulting physician: Phuc Chambers M.D.    Procedures performed on/related to this admission:  Mechanical thrombectomy    Chief complaint:   Impairments and acitivities limitations in ADLs, mobility, functional communication, and cognition secondary to stroke    HPI:   Davidson Khoury is a 60 y.o. year old male patient with  with long-standing severe hypertension leading to ESRD s/p  renal transplant in 2017.       On 5/15, he presented to the ED for blurry vision, L-hand numbness, and headache. Exact cause was not clear. CT head showed periventricular and subcortical small vessel disease and several old lacunar infarcts in R lentiform nucleus, L caudate, and L thalamus. Also had small hypodensity and encephalomalacia in R parieto-occipital lobe. Could not rule out TIA. Discharged home with plan to follow up with PCP. According to his fiancee, he had been \"not acting like himself\" since then.      Yesterday (5/19), he was worse and started \"baby-talking\" which prompted her to bring him again to the ED. Because his blood pressure was initially 241/133, neurology was consulted for hypertensive emergency vs PRES. Patient treated with nicardipine infusion. Repeat CT head unchanged from previous one on 5/15. On exam, he was alert and awake, language was normal. There was a mild L-sided facial droop and mild L-sided dysmetria. NIHSS score 3. However, when the team rounded on him in the afternoon, he had developed L-sided neglect, R gaze deviation, L homonomous hemianopia and L-sided drift. NIHSS score 10. BAT called at 3:42 pm. LKN was thought to be 4:00am when he was walking to the bathroom but even that is not clear because of his subtle symptoms of confusion. Repeat CT head looked unchanged from earlier one that day. CTA, however, showed distal L M1 and proximal L M2 occlusion " (as well as significant, intracranial atherosclerosis (including basilar artery stenosis). CTP showed small core (11ml) and large penumbra (127 ml). He was not a candidate for IV TNK because of being outside the time window but was taken for thrombectomy, which resulted in  TICI 2b reperfusion. Post-procedure CT scan showed some hyperdensity in R parieto-occipital region (contrast staining vs. Hemorrhagic transformation)     Interval History:     5/19 Post MT NIH was 9, LUE 1 and LLE 2-3.  5/20 NIH 15, LUE 4, LLE 2  5/21 NIH 15 LUE 4, LLE 2, with sustained clonus in LLE and hyperreflexia in LUE, which suggests upper motor neuron injury and progression of the sxs from onset on 5/19.     MRI Brain reviewed, showed DWI lesion in distribution of R M1, with patchy DWI/FLAIR mismatch areas in the affected R M1 distrubution. No GRE lesions c/f bleed in area of infarct, but evidence of possible old bleed in Rt BG suggestive of remote Rt BG infarct with HT.   TTE with nl EF, LA wnl, no PFO. LDL 63, A1c 5.2     S-LPJ-dohbtyylq stroke. Mechanism is not entirely clear. Does have significant intracranial atherosclerosis and perhaps he developed an in-situ thrombosis triggered by fluctuating blood pressure and impaired autoregulation. Workup underway.   Exam progression and MRI findings could suggest that pt continued to clinically progress after the MT for unclear reason. Distal embolization or chronic occlusion/subocclusion are possible etiology, although hard to prove.      Pt currently on ASA, will consider DAPT for 90 days per SAMWALDORIS. .       PM&R was consulted for recommendations and for IRF appropriateness.    Present Status: unstable, in ICU with in  PO: NPO w/TF via [place]  Pain: none  Bowel: none recent  Bladder: ext cath  DVT prophylaxis with Lovenox 40qd.   Weight bearing status: full    Precautions: na    Medical History[1]     Surgical History[2]     Family History[3]     RX Allergies[4]     Scheduled  Medications[5]     PRN Medications[6]     Social History:  Tobacco/EtOh/Illicits:denies  Working History:retired   Social supports: lives with his girlfriend, Steffanie. Pt reported that Steffanie does not work, so she would be able to provide up to 24 hour assistance   Home situation: Lives in single family 1 story home, with 3 stairs to enter and no stairs to B/B    Functional History:  Home DME: has walker and cane from his dad's former stroke  Premorbid ADL's: independent   Premorbid Mobility: independent   Present:   Physical Therapy: 5/20 Supine to sittingLevel of Assistance 1: oderate assistance, Moderate verbal cuesBed Mobility Comments 1: HOB elevated, use of bed rail. Assist to manage LEs, cues for hip alignment and positioning on L UE as pt neglecting UE.  Bed Mobility 2Bed Mobility  2: Sitting to supineLevel of Assistance 2: Minimum assistance, Minimal verbal cuesBed Mobility 3  Bed Mobility 3: ScootingLevel of Assistance 3: Moderate assistance, Moderate verbal cuesBed Mobility Comments 3: With HOB flat, and assist to position LEs, pt able to utilize LEs and R UE to assist with boosting. Assist required for realignment and positioning in bed.     Occupational Therapy: missed visit 5/20 at MRI    Speech Therapy: none recent    Physical Therapy (5/21): Bed mobility: supine to sit w/Max Assist, Scooting dependent, Sit to stand: Max Assist +2, Transfers: Max Assist +2, Gait: 1 step to left Max Assist (x2). [facility] 8  ?  ?  Occupational Therapy (5/21):   Eating Assistance: Total (Anticipated)  Grooming Assistance: Total (Pt was unable to obtain wipe in R hand, requiring total assist to hold the wipe and guide RUE to face)  Grooming Deficit: Wash/dry face  Bathing Assistance: Total (Anticipated)  UE Dressing Assistance: Total (Anticipated)  LE Dressing Assistance: Total (When asked to don socks pt required assist to obtain sock in R hand, and move LUE into figure 4. Backwards chaining and Max  verbal and tactile cues were done for task initation/completion.)  LE Dressing Deficit: Don/doff R sock, Don/doff L sock  Toileting Assistance with Device: Total  [facility] 65      Review of Systems   reviewed 10 point review, as patient able, somewhat limited by history nonpertinent except for HPI    Physical Exam   General: Pleasant, straightforward, WDWN individual.  Mental Status: Pleasant, direct, appropriate mood and affect  Resp: breathing is unlabored without audible wheeze  Vascular: Normal pedal and radial pulses, no cyanosis, no venous stasis changes    Lymph: No LAD, no lymphedema   Skin: No overlying skin change, ecchymosis, or erythema.      MSK:  Full rom x 4limbs  Neuro:  Alert, conversive - Left facial droop  RUE strength: 5/5 elbow flexors, 5/5 elbow extensors, 5/5 wrist extensors, 5/5 finger flexors, 5/5 finger abductors    LUE strength: Flaccid 0/5 except trace shoulder adduction/IR ?spasm RLE strength: 5/5 hip flexors, 5/5 knee extensors, 5/5 ankle dorsiflexors, 5/5 EHL, 5/5 ankle plantar flexors   LLE strength: 1/5 hip flexors, 0/5 knee extensors, 0/5 ankle dorsiflexors, 0/5 EHL, 0/5 ankle plantar flexors   Sensation - absent ?neglect Left UE/LE   Tone inc Giulia 1+ L Upper and 1 Lower  Reflexes (right/left):  Clonus: 0 POS - sustained  Babinski: 0 pos      Lab Results   Component Value Date    WBC 8.4 05/23/2025    HGB 15.2 05/23/2025    HCT 44.9 05/23/2025    MCV 87 05/23/2025     (L) 05/23/2025        Lab Results   Component Value Date    CREATININE 2.01 (H) 05/23/2025    BUN 25 (H) 05/23/2025     05/23/2025    K 4.1 05/23/2025     05/23/2025    CO2 25 05/23/2025        MRI brain 5/19 - 1. Acute nonhemorrhagic right MCA territory infarct as above. There is associated vasogenic edema and sulcal effacement. No midline shift. No evidence of hemorrhagic transformation. 2. Old lacunar infarcts in left cerebellum, left thalamus and bilateral basal ganglia. Moderate degree of  chronic microvascular ischemic disease in the cerebral white matter.  3. Evidence for old hemorrhage in the right basal ganglia, with  encephalomalacia. This could be sequela of an old hypertensive bleed, or sequela of an old hemorrhagic infarct.    IMPRESSION:  Davidson Khoury is a 60 y.o. year old male patient with a history notable for renal transplant (currently on Cellcept) and hypertension presenting to the ED initially with a chief complaint of altered mental status, headache, and hypertension. Initially evaluated by general neurology team who noted subtle left sided weakness that worsened on follow up exam. BAT activated for concern of stroke. Found to have proximal R superior M2 occlusion. Not a candidate for TNK as LKN >4.5 hours. Taken for MT, TICI 2b. Exam notable for L HH, L UMN facial paralysis, LUE 0/5, LLE 3/5. right at least antigravity, left hemisensory loss and neglect. .      PM&R was consulted for recommendations and for IRF appropriateness.    Recommendations:  # Impaired mobility and Impaired independence with ADLs and I/ADLs  - Continue PT and  OT   - Continue Speech  Therapy     # Neurogenic bowel/bladder  - Voiding via Ext cath  - Recommend  daily bowel program of Miralax BID and Docusate 100mg BID  no BM Since admit 5/19 consider dulcolax suppository    # Immobility  Prevent secondary complications   - Skin protection: low air loss mattress, turn q 2 hours in bed, maintain bowel and bladder continence/containment   - Q shift PROM of upper and lower extremities to prevent contracture    # Pain Management  - NA     #Spasticity - possibly residual from prior hemorrhagic infarct (see on MRI) ? unmasked - but not bothersome.  May actually help preserve some muscle strength/tone but if bothersome or inhibiting movement consider baclofen    # Dispo   - Patient is more appropriate for moderate intensity rehabilitation in a skilled setting to allow for further functional recovery with goal for IRF  after short SNF stay   - We can facilitate via f/u in PM&R Resident clinic (will arrange in future)   - [facility], PM&R can continue to see as needed and update recommendation as appropriate     Thank you for the consult.  Dr XAVI Musa  will be covering on Mondays and  Dr Phuc Chambers on Wednsdays. There is  resident on service this month.    Fred Joy MD, FAAPMR  Board Certified in PM&R, Brain Injury Medicine         [1]   Past Medical History:  Diagnosis Date    Personal history of other diseases of urinary system     History of chronic kidney disease    Personal history of other specified conditions 01/12/2022    History of urinary retention    Personal history of other specified conditions 08/22/2019    History of urinary retention   [2]   Past Surgical History:  Procedure Laterality Date    OTHER SURGICAL HISTORY  06/06/2016    Myringotomy - With Eustachian Tube Inflation    OTHER SURGICAL HISTORY  06/06/2016    Arteriovenous Surgery Creation Of A-V Fistula    OTHER SURGICAL HISTORY  08/23/2019    Kidney transplantation   [3]   Family History  Problem Relation Name Age of Onset    Hypertension Mother     [4]   Allergies  Allergen Reactions    Amoxicillin Other and Nausea Only     vomiting    Ace Inhibitors Angioedema   [5] aspirin, 81 mg, oral, Daily   Or  aspirin, 81 mg, oral, Daily   Or  aspirin, 81 mg, nasogastric tube, Daily   Or  aspirin, 300 mg, rectal, Daily  cloNIDine, 0.2 mg, oral, q12h CHRIST  clopidogrel, 75 mg, oral, Daily  enoxaparin, 40 mg, subcutaneous, q24h  lidocaine, 1 Application, urethral, Once  losartan, 25 mg, oral, BID  mycophenolate, 750 mg, oral, BID  ondansetron, 4 mg, intravenous, Once  perflutren protein A microsphere, 0.5 mL, intravenous, Once in imaging  polyethylene glycol, 17 g, oral, Daily  rosuvastatin, 20 mg, oral, Nightly  sulfur hexafluoride microsphr, 2 mL, intravenous, Once in imaging  tacrolimus, 2 mg, oral, q12h CHRIST     [6] PRN medications:  acetaminophen, [] hydrALAZINE **FOLLOWED BY** hydrALAZINE, ondansetron **OR** ondansetron, oxyCODONE, oxyCODONE, oxygen

## 2025-05-23 NOTE — CARE PLAN
The patient's goals for the shift include  Pt will continue to participate in care    The clinical goals for the shift include Pt will remain HDS stable during shift      Problem: General Stroke  Goal: Demonstrate improvement in neurological exam throughout the shift  Outcome: Progressing  Goal: Maintain BP within ordered limits throughout shift  Outcome: Progressing  Goal: Participate in treatment (ie., meds, therapy) throughout shift  Outcome: Progressing  Goal: Tolerate enteral feeding throughout shift  Outcome: Progressing  Goal: Decreased nausea/vomiting throughout shift  Outcome: Progressing     Problem: Skin  Goal: Decreased wound size/increased tissue granulation at next dressing change  Outcome: Progressing  Goal: Participates in plan/prevention/treatment measures  Outcome: Progressing  Goal: Prevent/manage excess moisture  Outcome: Progressing

## 2025-05-24 ENCOUNTER — APPOINTMENT (OUTPATIENT)
Dept: CARDIOLOGY | Facility: HOSPITAL | Age: 60
End: 2025-05-24
Payer: COMMERCIAL

## 2025-05-24 LAB
ACANTHOCYTES BLD QL SMEAR: ABNORMAL
ALBUMIN SERPL BCP-MCNC: 3.7 G/DL (ref 3.4–5)
ANION GAP SERPL CALC-SCNC: 11 MMOL/L (ref 10–20)
BASOPHILS # BLD MANUAL: 0 X10*3/UL (ref 0–0.1)
BASOPHILS NFR BLD MANUAL: 0 %
BLASTS # BLD MANUAL: 0 X10*3/UL
BLASTS NFR BLD MANUAL: 0 %
BUN SERPL-MCNC: 30 MG/DL (ref 6–23)
BURR CELLS BLD QL SMEAR: ABNORMAL
CA-I BLD-SCNC: 1.2 MMOL/L (ref 1.1–1.33)
CALCIUM SERPL-MCNC: 9.1 MG/DL (ref 8.6–10.6)
CHLORIDE SERPL-SCNC: 107 MMOL/L (ref 98–107)
CO2 SERPL-SCNC: 26 MMOL/L (ref 21–32)
CREAT SERPL-MCNC: 1.96 MG/DL (ref 0.5–1.3)
EGFRCR SERPLBLD CKD-EPI 2021: 39 ML/MIN/1.73M*2
EOSINOPHIL # BLD MANUAL: 0 X10*3/UL (ref 0–0.7)
EOSINOPHIL NFR BLD MANUAL: 0 %
ERYTHROCYTE [DISTWIDTH] IN BLOOD BY AUTOMATED COUNT: 14.7 % (ref 11.5–14.5)
GLUCOSE SERPL-MCNC: 101 MG/DL (ref 74–99)
HCT VFR BLD AUTO: 45.3 % (ref 41–52)
HGB BLD-MCNC: 14.7 G/DL (ref 13.5–17.5)
IMM GRANULOCYTES # BLD AUTO: 0.02 X10*3/UL (ref 0–0.7)
IMM GRANULOCYTES NFR BLD AUTO: 0.3 % (ref 0–0.9)
LYMPHOCYTES # BLD MANUAL: 1.15 X10*3/UL (ref 1.2–4.8)
LYMPHOCYTES NFR BLD MANUAL: 18.3 %
MAGNESIUM SERPL-MCNC: 2 MG/DL (ref 1.6–2.4)
MCH RBC QN AUTO: 30.2 PG (ref 26–34)
MCHC RBC AUTO-ENTMCNC: 32.5 G/DL (ref 32–36)
MCV RBC AUTO: 93 FL (ref 80–100)
METAMYELOCYTES # BLD MANUAL: 0 X10*3/UL
METAMYELOCYTES NFR BLD MANUAL: 0 %
MONOCYTES # BLD MANUAL: 0.43 X10*3/UL (ref 0.1–1)
MONOCYTES NFR BLD MANUAL: 6.9 %
MYELOCYTES # BLD MANUAL: 0 X10*3/UL
MYELOCYTES NFR BLD MANUAL: 0 %
NEUTROPHILS # BLD MANUAL: 4.71 X10*3/UL (ref 1.2–7.7)
NEUTS BAND # BLD MANUAL: 0 X10*3/UL (ref 0–0.7)
NEUTS BAND NFR BLD MANUAL: 0 %
NEUTS SEG # BLD MANUAL: 4.71 X10*3/UL (ref 1.2–7)
NEUTS SEG NFR BLD MANUAL: 74.8 %
NRBC BLD MANUAL-RTO: 0 % (ref 0–0)
NRBC BLD-RTO: 0 /100 WBCS (ref 0–0)
PHOSPHATE SERPL-MCNC: 2.3 MG/DL (ref 2.5–4.9)
PLASMA CELLS # BLD MANUAL: 0 X10*3/UL
PLASMA CELLS NFR BLD MANUAL: 0 %
PLATELET # BLD AUTO: 118 X10*3/UL (ref 150–450)
POTASSIUM SERPL-SCNC: 4.2 MMOL/L (ref 3.5–5.3)
PROMYELOCYTES # BLD MANUAL: 0 X10*3/UL
PROMYELOCYTES NFR BLD MANUAL: 0 %
RBC # BLD AUTO: 4.87 X10*6/UL (ref 4.5–5.9)
RBC MORPH BLD: ABNORMAL
SODIUM SERPL-SCNC: 140 MMOL/L (ref 136–145)
TACROLIMUS BLD-MCNC: 4.2 NG/ML
TOTAL CELLS COUNTED BLD: 115
VARIANT LYMPHS # BLD MANUAL: 0 X10*3/UL (ref 0–0.5)
VARIANT LYMPHS NFR BLD: 0 %
WBC # BLD AUTO: 6.3 X10*3/UL (ref 4.4–11.3)

## 2025-05-24 PROCEDURE — 2500000001 HC RX 250 WO HCPCS SELF ADMINISTERED DRUGS (ALT 637 FOR MEDICARE OP)

## 2025-05-24 PROCEDURE — 2500000001 HC RX 250 WO HCPCS SELF ADMINISTERED DRUGS (ALT 637 FOR MEDICARE OP): Performed by: REGISTERED NURSE

## 2025-05-24 PROCEDURE — 99233 SBSQ HOSP IP/OBS HIGH 50: CPT | Performed by: INTERNAL MEDICINE

## 2025-05-24 PROCEDURE — 99231 SBSQ HOSP IP/OBS SF/LOW 25: CPT

## 2025-05-24 PROCEDURE — 2500000002 HC RX 250 W HCPCS SELF ADMINISTERED DRUGS (ALT 637 FOR MEDICARE OP, ALT 636 FOR OP/ED)

## 2025-05-24 PROCEDURE — 85027 COMPLETE CBC AUTOMATED: CPT

## 2025-05-24 PROCEDURE — 93005 ELECTROCARDIOGRAM TRACING: CPT

## 2025-05-24 PROCEDURE — 2500000004 HC RX 250 GENERAL PHARMACY W/ HCPCS (ALT 636 FOR OP/ED)

## 2025-05-24 PROCEDURE — 85007 BL SMEAR W/DIFF WBC COUNT: CPT

## 2025-05-24 PROCEDURE — 1100000001 HC PRIVATE ROOM DAILY

## 2025-05-24 PROCEDURE — 80069 RENAL FUNCTION PANEL: CPT

## 2025-05-24 PROCEDURE — 80197 ASSAY OF TACROLIMUS: CPT

## 2025-05-24 PROCEDURE — 36415 COLL VENOUS BLD VENIPUNCTURE: CPT

## 2025-05-24 PROCEDURE — 2500000004 HC RX 250 GENERAL PHARMACY W/ HCPCS (ALT 636 FOR OP/ED): Mod: JZ

## 2025-05-24 PROCEDURE — 83735 ASSAY OF MAGNESIUM: CPT

## 2025-05-24 PROCEDURE — 82330 ASSAY OF CALCIUM: CPT

## 2025-05-24 RX ORDER — MYCOPHENOLATE MOFETIL 200 MG/ML
750 POWDER, FOR SUSPENSION ORAL 2 TIMES DAILY
Status: DISCONTINUED | OUTPATIENT
Start: 2025-05-24 | End: 2025-05-29

## 2025-05-24 RX ORDER — CLOPIDOGREL BISULFATE 75 MG/1
75 TABLET ORAL DAILY
Start: 2025-05-25

## 2025-05-24 RX ORDER — ASPIRIN 81 MG/1
81 TABLET ORAL DAILY
Start: 2025-05-25

## 2025-05-24 RX ADMIN — ENOXAPARIN SODIUM 40 MG: 40 INJECTION, SOLUTION SUBCUTANEOUS at 17:44

## 2025-05-24 RX ADMIN — CLONIDINE HYDROCHLORIDE 0.2 MG: 0.2 TABLET ORAL at 09:35

## 2025-05-24 RX ADMIN — ROSUVASTATIN CALCIUM 20 MG: 20 TABLET, FILM COATED ORAL at 21:08

## 2025-05-24 RX ADMIN — LOSARTAN POTASSIUM 25 MG: 25 TABLET, FILM COATED ORAL at 21:08

## 2025-05-24 RX ADMIN — CLOPIDOGREL BISULFATE 75 MG: 75 TABLET, FILM COATED ORAL at 09:35

## 2025-05-24 RX ADMIN — MYCOPHENOLATE MOFETIL 750 MG: 200 POWDER, FOR SUSPENSION ORAL at 11:33

## 2025-05-24 RX ADMIN — ASPIRIN 81 MG: 81 TABLET, CHEWABLE ORAL at 09:35

## 2025-05-24 RX ADMIN — TACROLIMUS 2 MG: 5 CAPSULE, GELATIN COATED ORAL at 17:44

## 2025-05-24 RX ADMIN — MYCOPHENOLATE MOFETIL 750 MG: 200 POWDER, FOR SUSPENSION ORAL at 21:08

## 2025-05-24 RX ADMIN — TACROLIMUS 2 MG: 5 CAPSULE, GELATIN COATED ORAL at 06:25

## 2025-05-24 RX ADMIN — LOSARTAN POTASSIUM 25 MG: 25 TABLET, FILM COATED ORAL at 09:35

## 2025-05-24 RX ADMIN — CLONIDINE HYDROCHLORIDE 0.2 MG: 0.2 TABLET ORAL at 21:09

## 2025-05-24 ASSESSMENT — PAIN SCALES - GENERAL
PAINLEVEL_OUTOF10: 0 - NO PAIN
PAINLEVEL_OUTOF10: 0 - NO PAIN

## 2025-05-24 ASSESSMENT — PAIN - FUNCTIONAL ASSESSMENT: PAIN_FUNCTIONAL_ASSESSMENT: 0-10

## 2025-05-24 NOTE — PROGRESS NOTES
05/24/25 1250   Discharge Planning   Expected Discharge Disposition SNF     Transitional Care Coordination Progress Note:  Per SW note from 5/23, pts FOC is [facility].   This nurse requested that DSC support team submit auth for facility.     Requested that DSC support team complete the 0800 form for pt.     Addendum 1255: DSC support team unable to submit for [place] at this time. This nurse requested that facility submit auth.     Amy Mcgowan RN, BSN  Transitional Care Coordinator  Office: [phone]  Secure chat via [employer]

## 2025-05-24 NOTE — PROGRESS NOTES
"Davidson Khoury is a 60 y.o. male on day 4 of admission presenting with Acute cerebrovascular accident (CVA) due to embolism of right middle cerebral artery (Multi).    No acute events overnight.  Off nicardipine drip. Losartan resumed.  Bps improved.  Likely transfer out of ICU today    Scheduled medications  aspirin, 81 mg, oral, Daily   Or  aspirin, 81 mg, oral, Daily   Or  aspirin, 81 mg, nasogastric tube, Daily   Or  aspirin, 300 mg, rectal, Daily  cloNIDine, 0.2 mg, oral, q12h CHRIST  clopidogrel, 75 mg, oral, Daily  enoxaparin, 40 mg, subcutaneous, q24h  lidocaine, 1 Application, urethral, Once  losartan, 25 mg, oral, BID  mycophenolate, 750 mg, oral, BID  ondansetron, 4 mg, intravenous, Once  perflutren protein A microsphere, 0.5 mL, intravenous, Once in imaging  polyethylene glycol, 17 g, oral, Daily  rosuvastatin, 20 mg, oral, Nightly  sulfur hexafluoride microsphr, 2 mL, intravenous, Once in imaging  tacrolimus, 2 mg, oral, q12h CHRIST      Continuous medications     PRN medications  PRN medications: acetaminophen, [] hydrALAZINE **FOLLOWED BY** hydrALAZINE, ondansetron **OR** ondansetron, oxyCODONE, oxyCODONE, oxygen    Last Recorded Vitals  Blood pressure (!) 161/95, pulse 71, temperature 36.3 °C (97.3 °F), temperature source Temporal, resp. rate 16, height 1.753 m (5' 9\"), weight 72.9 kg (160 lb 11.5 oz), SpO2 98%.  Intake/Output last 3 Shifts:  I/O last 3 completed shifts:  In: 3835 (52.6 mL/kg) [P.O.:1200; NG/GT:2635]  Out: 2600 (35.7 mL/kg) [Urine:2600 (1 mL/kg/hr)]  Weight: 72.9 kg     A&ox3, no distress, pleasant  MMM, no lesions  Lungs with equal air entry, no added sounds  Rrr, no m/r/g  Abd soft, nt, nd  No allograft tenderness  No edema b/l    Results for orders placed or performed during the hospital encounter of 05/19/25 (from the past 24 hours)   CBC and Auto Differential   Result Value Ref Range    WBC 8.4 4.4 - 11.3 x10*3/uL    nRBC 0.0 0.0 - 0.0 /100 WBCs    RBC 5.17 4.50 - 5.90 " x10*6/uL    Hemoglobin 15.2 13.5 - 17.5 g/dL    Hematocrit 44.9 41.0 - 52.0 %    MCV 87 80 - 100 fL    MCH 29.4 26.0 - 34.0 pg    MCHC 33.9 32.0 - 36.0 g/dL    RDW 14.7 (H) 11.5 - 14.5 %    Platelets 135 (L) 150 - 450 x10*3/uL    Neutrophils % 77.1 40.0 - 80.0 %    Immature Granulocytes %, Automated 0.4 0.0 - 0.9 %    Lymphocytes % 15.2 13.0 - 44.0 %    Monocytes % 6.5 2.0 - 10.0 %    Eosinophils % 0.7 0.0 - 6.0 %    Basophils % 0.1 0.0 - 2.0 %    Neutrophils Absolute 6.47 1.20 - 7.70 x10*3/uL    Immature Granulocytes Absolute, Automated 0.03 0.00 - 0.70 x10*3/uL    Lymphocytes Absolute 1.28 1.20 - 4.80 x10*3/uL    Monocytes Absolute 0.55 0.10 - 1.00 x10*3/uL    Eosinophils Absolute 0.06 0.00 - 0.70 x10*3/uL    Basophils Absolute 0.01 0.00 - 0.10 x10*3/uL   Calcium, Ionized   Result Value Ref Range    POCT Calcium, Ionized 1.21 1.1 - 1.33 mmol/L   Renal Function Panel   Result Value Ref Range    Glucose 133 (H) 74 - 99 mg/dL    Sodium 138 136 - 145 mmol/L    Potassium 4.1 3.5 - 5.3 mmol/L    Chloride 104 98 - 107 mmol/L    Bicarbonate 25 21 - 32 mmol/L    Anion Gap 13 10 - 20 mmol/L    Urea Nitrogen 25 (H) 6 - 23 mg/dL    Creatinine 2.01 (H) 0.50 - 1.30 mg/dL    eGFR 38 (L) >60 mL/min/1.73m*2    Calcium 9.6 8.6 - 10.6 mg/dL    Phosphorus 2.6 2.5 - 4.9 mg/dL    Albumin 3.9 3.4 - 5.0 g/dL   Magnesium   Result Value Ref Range    Magnesium 2.02 1.60 - 2.40 mg/dL   Tacrolimus level   Result Value Ref Range    Tacrolimus  5.6 <=15.0 ng/mL   POCT GLUCOSE   Result Value Ref Range    POCT Glucose 111 (H) 74 - 99 mg/dL   POCT GLUCOSE   Result Value Ref Range    POCT Glucose 145 (H) 74 - 99 mg/dL   POCT GLUCOSE   Result Value Ref Range    POCT Glucose 110 (H) 74 - 99 mg/dL       CT head w/o contrast: 5/19/25  There is development of small acute infarct component within the  right frontal opercular region. There is also suspected subtle loss  of gray-white matter differentiation within the right frontal and  parietal lobes  corresponding to regions of ischemia/infarct on recent  CTA perfusion examination. This could be better characterized with  MRI as clinically warranted. No acute intraparenchymal hematoma.  Subtle petechial hemorrhage versus contrast staining or potential  beam hardening artifact within the right parietal lobe. No  significant intracranial mass effect.     MRI head:  IMPRESSION:  1. Acute nonhemorrhagic right MCA territory infarct as above. There  is associated vasogenic edema and sulcal effacement. No midline  shift. No evidence of hemorrhagic transformation.  2. Old lacunar infarcts in left cerebellum, left thalamus and  bilateral basal ganglia. Moderate degree of chronic microvascular  ischemic disease in the cerebral white matter.  3. Evidence for old hemorrhage in the right basal ganglia, with  encephalomalacia. This could be sequela of an old hypertensive bleed,  or sequela of an old hemorrhagic infarct.        Assessment/Plan  60 y.o. male presenting with ESRD secondary to hypertension s/p DDKT on 8/10/2019. Patient was induced by Simulect, no DGF no CMV mismatch no EBV mismatch, KDPI of kidney is 40% PRA 0%. No episodes of rejection.     Currently admitted with Rt MCA occlusion CVA s/p cerebral angiogram and mecahnical thrombectomy 5/19/25     Allograft function: s/p DDKT 8/10/2019  - baseline Cr ~2. Stable this admission  - s/p contrast exposure this admission. Maintain adequate hydration.   - metabolic indices acceptable. Nonoliguric. No hypervolemia on exam.  - Hb ~13, acceptable  - Ca, Phos acceptable. Monitor and replete lytes as indicated  - Bps improved. Losartan 25 mg bid resumed. Monitor and titrate meds as indicated.  - dose meds for CrCL. Avoid potential nephrotoxins.     Immunosuppression:  - on tac 2mg q12 5/20 AM. Last Fk level 5.6, true trough. Cont current dose tac.   - Monitor Fk trough daily (30-60 min prior to AM dose). Goal 5-8.  - cont  mg bid.     Rt MCA CVA: s/p mechanical  thrombectomy 5/19/25  - management per neuro.     Will follow.      Jayson Nicole MD

## 2025-05-24 NOTE — CONSULTS
Nutrition Note:   Nutrition Assessment        Nutrition Intake Since Previous RDN Visit:  Food and Nutrient History: Pt passed MBSS on 5/22 for FLD and mild thick liquids. Team reached out to RDN via secure chat on 5/23 requesting updated recs. It was recommended that pt continue TF and Boost VHC be ordered daily in order for RDN to determine if he can take PO ONS and TF can be adjusted from there. Dietitian met with pt and RN this afternoon (5/24) - he had a few bites of pudding, no Boost VHC was delivered so far today. RN explained that pt does not like the thickened liquids.     Nutrition Significant Labs:  CBC Trend:   Results from last 7 days   Lab Units 05/24/25  0547 05/23/25  0035 05/22/25  0019 05/21/25  0305   WBC AUTO x10*3/uL 6.3 8.4 10.9 11.5*   RBC AUTO x10*6/uL 4.87 5.17 5.18 4.80   HEMOGLOBIN g/dL 14.7 15.2 15.5 14.6   HEMATOCRIT % 45.3 44.9 47.6 43.0   MCV fL 93 87 92 90   PLATELETS AUTO x10*3/uL 118* 135* 153 147*    , BMP Trend:   Results from last 7 days   Lab Units 05/24/25  0547 05/23/25  0035 05/22/25  0019 05/21/25  0305   GLUCOSE mg/dL 101* 133* 127* 119*   CALCIUM mg/dL 9.1 9.6 9.7 9.6   SODIUM mmol/L 140 138 139 141   POTASSIUM mmol/L 4.2 4.1 4.0 3.6   CO2 mmol/L 26 25 24 23   CHLORIDE mmol/L 107 104 104 106   BUN mg/dL 30* 25* 20 18   CREATININE mg/dL 1.96* 2.01* 1.88* 1.90*    , BG POCT trend:   Results from last 7 days   Lab Units 05/23/25  1557 05/23/25  1138 05/23/25  0752 05/22/25  1920 05/22/25  1131   POCT GLUCOSE mg/dL 110* 145* 111* 128* 133*    , Renal Lab Trend:   Results from last 7 days   Lab Units 05/24/25  0547 05/23/25  0035 05/22/25  0019 05/21/25  0305   POTASSIUM mmol/L 4.2 4.1 4.0 3.6   PHOSPHORUS mg/dL 2.3* 2.6 2.5 2.5   SODIUM mmol/L 140 138 139 141   MAGNESIUM mg/dL 2.00 2.02 1.97 1.99   EGFR mL/min/1.73m*2 39* 38* 41* 40*   BUN mg/dL 30* 25* 20 18   CREATININE mg/dL 1.96* 2.01* 1.88* 1.90*      Scheduled medications  Scheduled Medications[1]    Dietary Orders (From  admission, onward)       Start     Ordered    05/23/25 0841  Enteral feeding WITH diet order Diet type: Full Liquid; Fluid consistency: Mild thick 2; Tube feeding formula: Isosource 1.5  Continuous        Comments: 1. Start Isosource 1.5 @ 15ml/hr, increase by 10mls every 8-10hrs to reach goal of 55ml/hr.   2. Additional water flushes per team   a. TF provides ~1L free H2O.   3. Recommend 100mg thiamine daily  4.  Boost VHC   Question Answer Comment   Diet type Full Liquid    Fluid consistency Mild thick 2    Tube feeding formula: Isosource 1.5        05/23/25 0841                     Estimated Needs:   Total Energy Estimated Needs in 24 hours (kCal):  (7287-2452)  Method for Estimating Needs: 25kcal/kg per IBW  Total Protein Estimated Needs in 24 Hours (g): 90 g  Method for Estimating 24 Hour Protein Needs: 1.2g/kg  Total Fluid Estimated Needs in 24 Hours (mL):  (per team)              Nutrition Interventions/Recommendations   Nutrition prescription for oral nutrition, Nutrition prescription for enteral nutrition    Nutrition Recommendations:  Individualized Nutrition Prescription Provided for nocturnal feeds and ONS while on FLD:     Nocturnal feeds::   Isosource 1.5 @ 80ml/hr x 12hrs.   Oral nutrition support regimen:   Trial Boost VHC daily.     If pt demonstrates that he is able to consistently take one Boost VHC a day + other FLD, then can adjust TF and ONS as follows:   Isosource 1.5 @ 70ml/hr x 10hrs and increase Boost VHC to BID.       Nutrition Monitoring and Evaluation   Food/Nutrient Related History Monitoring  Monitoring and Evaluation Plan: Intake / amount of food  Intake / Amount of food: Consumes at least 50% or more of meals/snacks/supplements    Goal Status: New goal(s) identified    Time Spent (min): 30 minutes            [1] aspirin, 81 mg, oral, Daily   Or  aspirin, 81 mg, oral, Daily   Or  aspirin, 81 mg, nasogastric tube, Daily   Or  aspirin, 300 mg, rectal, Daily  cloNIDine, 0.2 mg, oral,  q12h CHRIST  clopidogrel, 75 mg, oral, Daily  enoxaparin, 40 mg, subcutaneous, q24h  lidocaine, 1 Application, urethral, Once  losartan, 25 mg, oral, BID  mycophenolate, 750 mg, nasogastric tube, BID  ondansetron, 4 mg, intravenous, Once  perflutren protein A microsphere, 0.5 mL, intravenous, Once in imaging  polyethylene glycol, 17 g, oral, Daily  rosuvastatin, 20 mg, oral, Nightly  sulfur hexafluoride microsphr, 2 mL, intravenous, Once in imaging  tacrolimus, 2 mg, oral, q12h CHRIST

## 2025-05-24 NOTE — PROGRESS NOTES
Davidson Khoury is a 60 y.o. left handed male on day 5 of admission presenting with Acute cerebrovascular accident (CVA) due to embolism of right middle cerebral artery (Multi).    Subjective   New acute event overnight, no new complaints.       Objective     Last Recorded Vitals  Heart Rate:  [67-85]   Temp:  [36.2 °C (97.2 °F)-37.2 °C (99 °F)]   Resp:  [15-22]   BP: (125-161)/()   SpO2:  [97 %-100 %]       [facility] NIHSS:   NIH Stroke Scale:     Date/Time of Assessment: 5/21/2025 11:03 AM    1A. Level of Consciousness:  Alert (keenly responsive) (0)    1B. Ask Month and Age:  Both questions right (0)    1C. Blink Eyes & Squeeze Hands:  Performs both tasks (0)    2. Best Gaze:  Normal (0)    3. Visual:  Complete hemianopia (+2)    4. Facial Palsy:  Partial paralysis (+2)    5A. Motor - Left Arm:  No movement (+4)    5B. Motor - Right Arm:  No drift (0)    6A. Motor - Left Leg:  No effort against gravity (+3)    6B. Motor - Right Leg:  No drift (0)    7. Limb Ataxia:  No ataxia (0)    8. Sensory Loss:  Severe to total loss (+2)    9. Best Language:  Normal (no aphasia) (0)    10. Dysarthia:  Mild-moderate dysarthria (+1)    11. Extinction and Inattention:  Visual/tactile/auditory/spatial/personal inattention (+1)    NIH Stroke Scale:  15       Physical Exam  Neurological Exam  GENERAL APPEARANCE:  No distress, alert, interactive and cooperative.     MENTAL STATE:   Orientation was normal to time, place and person. Recent and remote memory was intact.  Attention span and concentration were normal. Language testing was normal for comprehension, repetition, expression, and naming. The patient could correctly interpret a picture. General fund of knowledge was intact.     CRANIAL NERVES:   CN 2   Left HH  CN 3, 4, 6   Pupils round, 4 mm in diameter, equally reactive to light. Lids symmetric; no ptosis. EOMs normal alignment, full range with normal saccades, pursuit and convergence.   No nystagmus.   CN 5   Facial  sensation intact bilaterally.   CN 7   Left UMN paralysis  CN 8   Hearing intact to finger rub.   CN 9   Palate elevates symmetrically.   CN 11   Normal strength of shoulder shrug and neck turning.   CN 12   Tongue midline, with normal bulk and strength; no fasciculations.     Motor/Sensory:   Spacticity on the left UE and LE, LUE 0/5, LLE 3/5. right at least antigravity, left hemisensory loss and neglect.    REFLEXES:   R          L  BR:  2 3  Biceps:  2 3  Triceps:  2 3  Knee:  2 3  Ankle:  2 3    Babinski: toes downgoing to plantar stimulation. No clonus or other pathologic reflexes present.     COORDINATION:    Right finger-nose-finger was intact without dysmetria or overshoot.     GAIT:   Deferred    Cardiac: regular rate, normal s1, s2,  Chest: Equal air entry bilaterally  Abdomen soft lax non distended    Relevant Results    NIH Stroke Scale  1A. Level of Consciousness: Alert, Keenly Responsive  1B. Ask Month and Age: Both Questions Right  1C. Blink Eyes & Squeeze Hands: Performs 1 Task  2. Best Gaze: Partial Gaze Palsy  3. Visual: Partial Hemianopia  4. Facial Palsy: Partial Paralysis  5A. Motor - Left Arm: No Effort Against Gravity  5B. Motor - Right Arm: No Drift  6A. Motor - Left Leg: Some Effort Against Gravity  6B. Motor - Right Leg: No Drift  7. Limb Ataxia: Present in One Limb  8. Sensory Loss: Mild-to-Moderate Sensory Loss  9. Best Language: Mild-to-Moderate Aphasia  10. Dysarthria: Mild-to-Moderate Dysarthria  11. Extinction and Inattention: Visual, Tactile, Auditory, Spatial, or Personal Inattention  NIH Stroke Scale: 15           [place] Coma Scale  Best Eye Response: Spontaneous  Best Verbal Response: Oriented  Best Motor Response: Follows commands  [place] Coma Scale Score: 15                    Assessment & Plan  Acute cerebrovascular accident (CVA) due to embolism of right middle cerebral artery (Multi)    Davidson Khoury is a 60 y.o. male with a history notable for renal transplant (currently  on Cellcept) and hypertension presenting to the ED initially with a chief complaint of altered mental status, headache, and hypertension. Initially evaluated by general neurology team who noted subtle left sided weakness that worsened on follow up exam. BAT activated for concern of stroke. Found to have proximal R superior M2 occlusion. Not a candidate for TNK as LKN >4.5 hours. Taken for MT, TICI 2b. Exam notable for L HH, L UMN facial paralysis, LUE 0/5, LLE 3/5. right at least antigravity, left hemisensory loss and neglect.    Stroke Metrics:  EMS pre-notification?: No  Time of stroke team arrival: 3:47pm  Direct to CT?: No  Time of CTH read by stroke team: As performed  Time of TNK: n/a  Time stroke team called IR: 4:27pm  Time pt arrived to angio suite: 4:57pm  Time of groin puncture: 5:10pm  Time of recanalization: 5:31pm  Number of passes: 1  Device used: penumbra aspiration  Any delays in the process (if door to TNK >30 min): n/a     Stroke Classification:  Type: Ischemic stroke  Subtype/etiology: Suspected large artery disease  Vessels involved: R MCA  Neurological manifestations:  Pre-Intervention Deficits: NIHSS 10 *see above  Post-Intervention Deficits: NIHSS 9 *see above  Pre-stroke mRS: 0  Initial treatment: Angio  Anti-platelets or Anti-coagulation management: Aspirin  Vascular Risk Factors: HTN  Antiplatelet/antithrombotic plan for stroke prevention: Aspirin  VTE prophylaxis: Lovenox  Vascular Risk Factor modification goals:  Blood pressure goals: avoid hypotension SBP <100 that could worsen cerebral perfusion, Ischemic stroke post-thrombectomy SBP <180mmHg   Lipid Goals: education on healthy diet and statin therapy to maintain or achieve goal LDL-cholesterol < 70mg.  Glucose Goals: early treatment of hyperglycemia to goal glucose 140-180 mg/dl with long-term goal A1c < 7%   Smoking Cessation and Education  Assessment for Rehabilitation needs   Patient and family education on signs and symptoms of  stroke, calling 911, healthy strategies for stroke prevention.      Plan:  Updates 05/24/25  -Continue DAPT 90 days sampriss protocol for intracranial atherosclerosis  -Cleared for liquid diet  -Follow up nutrition recs  -Discharge on bridle Dobhoff  -PT/OT    #Proximal R superior M2 occlusion s/p TICI 2b MT   #HTN   #Hx of thrombocytopenia- Stable   - MRI for stroke burden  - Stroke work up  - A1c: 5.2%, LDL 63              - TTE with bubble study: EF 70-75% LA normal in size, no PFO  - SBP goals post-thrombectomy SBP <180mmHg   - PT/OT/SLP   -Rest of care per [facility] team    #Renal transplant (on Cellcept and Tacrolimus)   - Baseline BUN/Cr: 15/1.80   -Consulted nephrology recommended - resume tac 2mg q12. Monitor Fk trough daily (30-60 min prior to AM dose). Goal 5-8.  - cont  mg bid.    #Dysphagia  -SLP consulted recommended NPO will consider MBSS     Pain medications: PRN Tylenol  Fluids: Replete PRN  Electrolytes: Keep mg >2, phos >3  and K >4  Nutrition:  Enteral feeding WITH diet order Diet type: Full Liquid; Fluid consistency: Mild thick 2; Tube feeding formula: Isosource 1.5   Antimicrobials: None  Antiplatelet: ASA  Anticoagulation: None  DVT PPX: Lovenox  GI ppx: none needed  Bowel care: Miralax  Catheter: External Catheter  Lines: PIV  Oxygen: Room Air    Disposition:   PT/OT high intensity    Code Status: Full Code (confirmed on admission)   NOK:  Primary Emergency Contact: Margo Cali MD  PGY-2 Neurology    ----------------------------------------------------------------

## 2025-05-24 NOTE — PROGRESS NOTES
"Davidson Khoury is a 60 y.o. male on day 4 of admission presenting with Acute cerebrovascular accident (CVA) due to embolism of right middle cerebral artery (Multi).    No acute events overnight.  Intermittently on nicardipine drip.   Renal function stable, Cr ~1.9 (baseline)    Scheduled medications  aspirin, 81 mg, oral, Daily   Or  aspirin, 81 mg, oral, Daily   Or  aspirin, 81 mg, nasogastric tube, Daily   Or  aspirin, 300 mg, rectal, Daily  cloNIDine, 0.2 mg, oral, q12h CHRIST  clopidogrel, 75 mg, oral, Daily  enoxaparin, 40 mg, subcutaneous, q24h  lidocaine, 1 Application, urethral, Once  losartan, 25 mg, oral, BID  mycophenolate, 750 mg, oral, BID  ondansetron, 4 mg, intravenous, Once  perflutren protein A microsphere, 0.5 mL, intravenous, Once in imaging  polyethylene glycol, 17 g, oral, Daily  rosuvastatin, 20 mg, oral, Nightly  sulfur hexafluoride microsphr, 2 mL, intravenous, Once in imaging  tacrolimus, 2 mg, oral, q12h CHRIST      Continuous medications     PRN medications  PRN medications: acetaminophen, [] hydrALAZINE **FOLLOWED BY** hydrALAZINE, ondansetron **OR** ondansetron, oxyCODONE, oxyCODONE, oxygen    Last Recorded Vitals  Blood pressure (!) 161/95, pulse 71, temperature 36.3 °C (97.3 °F), temperature source Temporal, resp. rate 16, height 1.753 m (5' 9\"), weight 72.9 kg (160 lb 11.5 oz), SpO2 98%.  Intake/Output last 3 Shifts:  I/O last 3 completed shifts:  In: 3835 (52.6 mL/kg) [P.O.:1200; NG/GT:2635]  Out: 2600 (35.7 mL/kg) [Urine:2600 (1 mL/kg/hr)]  Weight: 72.9 kg     A&ox3, no distress, pleasant  MMM, no lesions  Lungs with equal air entry, no added sounds  Rrr, no m/r/g  Abd soft, nt, nd  No allograft tenderness  No edema b/l    Results for orders placed or performed during the hospital encounter of 05/19/25 (from the past 24 hours)   CBC and Auto Differential   Result Value Ref Range    WBC 8.4 4.4 - 11.3 x10*3/uL    nRBC 0.0 0.0 - 0.0 /100 WBCs    RBC 5.17 4.50 - 5.90 x10*6/uL    " Hemoglobin 15.2 13.5 - 17.5 g/dL    Hematocrit 44.9 41.0 - 52.0 %    MCV 87 80 - 100 fL    MCH 29.4 26.0 - 34.0 pg    MCHC 33.9 32.0 - 36.0 g/dL    RDW 14.7 (H) 11.5 - 14.5 %    Platelets 135 (L) 150 - 450 x10*3/uL    Neutrophils % 77.1 40.0 - 80.0 %    Immature Granulocytes %, Automated 0.4 0.0 - 0.9 %    Lymphocytes % 15.2 13.0 - 44.0 %    Monocytes % 6.5 2.0 - 10.0 %    Eosinophils % 0.7 0.0 - 6.0 %    Basophils % 0.1 0.0 - 2.0 %    Neutrophils Absolute 6.47 1.20 - 7.70 x10*3/uL    Immature Granulocytes Absolute, Automated 0.03 0.00 - 0.70 x10*3/uL    Lymphocytes Absolute 1.28 1.20 - 4.80 x10*3/uL    Monocytes Absolute 0.55 0.10 - 1.00 x10*3/uL    Eosinophils Absolute 0.06 0.00 - 0.70 x10*3/uL    Basophils Absolute 0.01 0.00 - 0.10 x10*3/uL   Calcium, Ionized   Result Value Ref Range    POCT Calcium, Ionized 1.21 1.1 - 1.33 mmol/L   Renal Function Panel   Result Value Ref Range    Glucose 133 (H) 74 - 99 mg/dL    Sodium 138 136 - 145 mmol/L    Potassium 4.1 3.5 - 5.3 mmol/L    Chloride 104 98 - 107 mmol/L    Bicarbonate 25 21 - 32 mmol/L    Anion Gap 13 10 - 20 mmol/L    Urea Nitrogen 25 (H) 6 - 23 mg/dL    Creatinine 2.01 (H) 0.50 - 1.30 mg/dL    eGFR 38 (L) >60 mL/min/1.73m*2    Calcium 9.6 8.6 - 10.6 mg/dL    Phosphorus 2.6 2.5 - 4.9 mg/dL    Albumin 3.9 3.4 - 5.0 g/dL   Magnesium   Result Value Ref Range    Magnesium 2.02 1.60 - 2.40 mg/dL   Tacrolimus level   Result Value Ref Range    Tacrolimus  5.6 <=15.0 ng/mL   POCT GLUCOSE   Result Value Ref Range    POCT Glucose 111 (H) 74 - 99 mg/dL   POCT GLUCOSE   Result Value Ref Range    POCT Glucose 145 (H) 74 - 99 mg/dL   POCT GLUCOSE   Result Value Ref Range    POCT Glucose 110 (H) 74 - 99 mg/dL       CT head w/o contrast: 5/19/25  There is development of small acute infarct component within the  right frontal opercular region. There is also suspected subtle loss  of gray-white matter differentiation within the right frontal and  parietal lobes corresponding to  regions of ischemia/infarct on recent  CTA perfusion examination. This could be better characterized with  MRI as clinically warranted. No acute intraparenchymal hematoma.  Subtle petechial hemorrhage versus contrast staining or potential  beam hardening artifact within the right parietal lobe. No  significant intracranial mass effect.     MRI head:  IMPRESSION:  1. Acute nonhemorrhagic right MCA territory infarct as above. There  is associated vasogenic edema and sulcal effacement. No midline  shift. No evidence of hemorrhagic transformation.  2. Old lacunar infarcts in left cerebellum, left thalamus and  bilateral basal ganglia. Moderate degree of chronic microvascular  ischemic disease in the cerebral white matter.  3. Evidence for old hemorrhage in the right basal ganglia, with  encephalomalacia. This could be sequela of an old hypertensive bleed,  or sequela of an old hemorrhagic infarct.        Assessment/Plan  60 y.o. male presenting with ESRD secondary to hypertension s/p DDKT on 8/10/2019. Patient was induced by Simulect, no DGF no CMV mismatch no EBV mismatch, KDPI of kidney is 40% PRA 0%. No episodes of rejection.     Currently admitted with Rt MCA occlusion CVA s/p cerebral angiogram and mecahnical thrombectomy 5/19/25     Allograft function: s/p DDKT 8/10/2019  - baseline Cr ~2. Stable this admission  - s/p contrast exposure this admission. Maintain adequate hydration.   - metabolic indices acceptable. Nonoliguric. No hypervolemia on exam.  - Hb ~13, acceptable  - Ca, Phos acceptable. Monitor and replete lytes as indicated  - Bps improving slowly. Consider adding back losartan. Monitor and titrate emds as indicated.  - dose meds for CrCL. Avoid potential nephrotoxins.     Immunosuppression:  - resumed tac 2mg q12 5/20 AM. Last Fk level 10, not a true trough.   - Monitor Fk trough daily (30-60 min prior to AM dose). Goal 5-8.  - cont  mg bid.     Rt MCA CVA: s/p mechanical thrombectomy 5/19/25  -  management per neuro.     Will follow.      Jayson Nicole MD

## 2025-05-25 VITALS
DIASTOLIC BLOOD PRESSURE: 88 MMHG | HEART RATE: 68 BPM | TEMPERATURE: 98.1 F | BODY MASS INDEX: 23.8 KG/M2 | OXYGEN SATURATION: 100 % | WEIGHT: 160.72 LBS | RESPIRATION RATE: 18 BRPM | HEIGHT: 69 IN | SYSTOLIC BLOOD PRESSURE: 157 MMHG

## 2025-05-25 LAB
ALBUMIN SERPL BCP-MCNC: 3.6 G/DL (ref 3.4–5)
ANION GAP SERPL CALC-SCNC: 10 MMOL/L (ref 10–20)
BASOPHILS # BLD AUTO: 0.01 X10*3/UL (ref 0–0.1)
BASOPHILS NFR BLD AUTO: 0.2 %
BUN SERPL-MCNC: 37 MG/DL (ref 6–23)
CA-I BLD-SCNC: 1.19 MMOL/L (ref 1.1–1.33)
CALCIUM SERPL-MCNC: 9 MG/DL (ref 8.6–10.6)
CHLORIDE SERPL-SCNC: 107 MMOL/L (ref 98–107)
CO2 SERPL-SCNC: 27 MMOL/L (ref 21–32)
CREAT SERPL-MCNC: 1.82 MG/DL (ref 0.5–1.3)
EGFRCR SERPLBLD CKD-EPI 2021: 42 ML/MIN/1.73M*2
EOSINOPHIL # BLD AUTO: 0.03 X10*3/UL (ref 0–0.7)
EOSINOPHIL NFR BLD AUTO: 0.7 %
ERYTHROCYTE [DISTWIDTH] IN BLOOD BY AUTOMATED COUNT: 14.6 % (ref 11.5–14.5)
GLUCOSE SERPL-MCNC: 121 MG/DL (ref 74–99)
HCT VFR BLD AUTO: 44.3 % (ref 41–52)
HGB BLD-MCNC: 14.2 G/DL (ref 13.5–17.5)
IMM GRANULOCYTES # BLD AUTO: 0.01 X10*3/UL (ref 0–0.7)
IMM GRANULOCYTES NFR BLD AUTO: 0.2 % (ref 0–0.9)
LYMPHOCYTES # BLD AUTO: 0.77 X10*3/UL (ref 1.2–4.8)
LYMPHOCYTES NFR BLD AUTO: 17.6 %
MAGNESIUM SERPL-MCNC: 2.06 MG/DL (ref 1.6–2.4)
MCH RBC QN AUTO: 29.8 PG (ref 26–34)
MCHC RBC AUTO-ENTMCNC: 32.1 G/DL (ref 32–36)
MCV RBC AUTO: 93 FL (ref 80–100)
MONOCYTES # BLD AUTO: 0.55 X10*3/UL (ref 0.1–1)
MONOCYTES NFR BLD AUTO: 12.6 %
NEUTROPHILS # BLD AUTO: 3 X10*3/UL (ref 1.2–7.7)
NEUTROPHILS NFR BLD AUTO: 68.7 %
NRBC BLD-RTO: 0 /100 WBCS (ref 0–0)
PHOSPHATE SERPL-MCNC: 2.7 MG/DL (ref 2.5–4.9)
PLATELET # BLD AUTO: 116 X10*3/UL (ref 150–450)
POTASSIUM SERPL-SCNC: 4.4 MMOL/L (ref 3.5–5.3)
RBC # BLD AUTO: 4.77 X10*6/UL (ref 4.5–5.9)
SODIUM SERPL-SCNC: 140 MMOL/L (ref 136–145)
TACROLIMUS BLD-MCNC: 4.2 NG/ML
WBC # BLD AUTO: 4.4 X10*3/UL (ref 4.4–11.3)

## 2025-05-25 PROCEDURE — 2500000001 HC RX 250 WO HCPCS SELF ADMINISTERED DRUGS (ALT 637 FOR MEDICARE OP)

## 2025-05-25 PROCEDURE — 99233 SBSQ HOSP IP/OBS HIGH 50: CPT | Performed by: INTERNAL MEDICINE

## 2025-05-25 PROCEDURE — 2500000004 HC RX 250 GENERAL PHARMACY W/ HCPCS (ALT 636 FOR OP/ED)

## 2025-05-25 PROCEDURE — 2500000001 HC RX 250 WO HCPCS SELF ADMINISTERED DRUGS (ALT 637 FOR MEDICARE OP): Performed by: REGISTERED NURSE

## 2025-05-25 PROCEDURE — 36415 COLL VENOUS BLD VENIPUNCTURE: CPT

## 2025-05-25 PROCEDURE — 2500000004 HC RX 250 GENERAL PHARMACY W/ HCPCS (ALT 636 FOR OP/ED): Mod: JZ

## 2025-05-25 PROCEDURE — 82330 ASSAY OF CALCIUM: CPT

## 2025-05-25 PROCEDURE — 83735 ASSAY OF MAGNESIUM: CPT

## 2025-05-25 PROCEDURE — 1100000001 HC PRIVATE ROOM DAILY

## 2025-05-25 PROCEDURE — 80197 ASSAY OF TACROLIMUS: CPT

## 2025-05-25 PROCEDURE — 80069 RENAL FUNCTION PANEL: CPT

## 2025-05-25 PROCEDURE — 85025 COMPLETE CBC W/AUTO DIFF WBC: CPT

## 2025-05-25 PROCEDURE — 99231 SBSQ HOSP IP/OBS SF/LOW 25: CPT

## 2025-05-25 PROCEDURE — 2500000002 HC RX 250 W HCPCS SELF ADMINISTERED DRUGS (ALT 637 FOR MEDICARE OP, ALT 636 FOR OP/ED)

## 2025-05-25 RX ADMIN — CLONIDINE HYDROCHLORIDE 0.2 MG: 0.2 TABLET ORAL at 20:33

## 2025-05-25 RX ADMIN — ASPIRIN 81 MG: 81 TABLET, COATED ORAL at 09:04

## 2025-05-25 RX ADMIN — ROSUVASTATIN CALCIUM 20 MG: 20 TABLET, FILM COATED ORAL at 20:33

## 2025-05-25 RX ADMIN — TACROLIMUS 2 MG: 5 CAPSULE, GELATIN COATED ORAL at 06:16

## 2025-05-25 RX ADMIN — CLONIDINE HYDROCHLORIDE 0.2 MG: 0.2 TABLET ORAL at 09:04

## 2025-05-25 RX ADMIN — ENOXAPARIN SODIUM 40 MG: 40 INJECTION, SOLUTION SUBCUTANEOUS at 17:18

## 2025-05-25 RX ADMIN — CLOPIDOGREL BISULFATE 75 MG: 75 TABLET, FILM COATED ORAL at 09:04

## 2025-05-25 RX ADMIN — LOSARTAN POTASSIUM 25 MG: 25 TABLET, FILM COATED ORAL at 09:04

## 2025-05-25 RX ADMIN — MYCOPHENOLATE MOFETIL 750 MG: 200 POWDER, FOR SUSPENSION ORAL at 09:05

## 2025-05-25 RX ADMIN — LOSARTAN POTASSIUM 25 MG: 25 TABLET, FILM COATED ORAL at 20:33

## 2025-05-25 RX ADMIN — MYCOPHENOLATE MOFETIL 750 MG: 200 POWDER, FOR SUSPENSION ORAL at 20:40

## 2025-05-25 RX ADMIN — TACROLIMUS 2 MG: 5 CAPSULE, GELATIN COATED ORAL at 17:19

## 2025-05-25 ASSESSMENT — COGNITIVE AND FUNCTIONAL STATUS - GENERAL
PERSONAL GROOMING: A LOT
TURNING FROM BACK TO SIDE WHILE IN FLAT BAD: A LOT
DRESSING REGULAR LOWER BODY CLOTHING: A LOT
TOILETING: A LOT
HELP NEEDED FOR BATHING: A LOT
STANDING UP FROM CHAIR USING ARMS: A LOT
DAILY ACTIVITIY SCORE: 12
CLIMB 3 TO 5 STEPS WITH RAILING: A LOT
MOVING FROM LYING ON BACK TO SITTING ON SIDE OF FLAT BED WITH BEDRAILS: A LOT
EATING MEALS: A LOT
DRESSING REGULAR UPPER BODY CLOTHING: A LOT
MOBILITY SCORE: 12
MOVING TO AND FROM BED TO CHAIR: A LOT
WALKING IN HOSPITAL ROOM: A LOT

## 2025-05-25 ASSESSMENT — PAIN SCALES - GENERAL
PAINLEVEL_OUTOF10: 3
PAINLEVEL_OUTOF10: 2

## 2025-05-25 NOTE — CARE PLAN
Problem: Skin  Goal: Decreased wound size/increased tissue granulation at next dressing change  Outcome: Progressing     Problem: Skin  Goal: Participates in plan/prevention/treatment measures  Outcome: Progressing     Problem: General Stroke  Goal: Demonstrate improvement in neurological exam throughout the shift  Outcome: Progressing     Problem: General Stroke  Goal: Establish a mutual long term goal with patient by discharge  Outcome: Progressing   The patient's goals for the shift include      The clinical goals for the shift include safety    No acute events. Assisted with ADLs. Tolerating PO, HOB at 90 degrees. Medicated per MAR. Discharge planning ongoing. Resting between care.

## 2025-05-25 NOTE — PROGRESS NOTES
"Davidson Khoury is a 60 y.o. male on day 5 of admission presenting with Acute cerebrovascular accident (CVA) due to embolism of right middle cerebral artery (Multi).    No acute events overnight.  Transferred out of ICU . Feels better this AM  Bps stable.    Scheduled medications  aspirin, 81 mg, oral, Daily   Or  aspirin, 81 mg, oral, Daily   Or  aspirin, 81 mg, nasogastric tube, Daily   Or  aspirin, 300 mg, rectal, Daily  cloNIDine, 0.2 mg, oral, q12h CHRIST  clopidogrel, 75 mg, oral, Daily  enoxaparin, 40 mg, subcutaneous, q24h  lidocaine, 1 Application, urethral, Once  losartan, 25 mg, oral, BID  mycophenolate, 750 mg, nasogastric tube, BID  ondansetron, 4 mg, intravenous, Once  perflutren protein A microsphere, 0.5 mL, intravenous, Once in imaging  polyethylene glycol, 17 g, oral, Daily  rosuvastatin, 20 mg, oral, Nightly  sulfur hexafluoride microsphr, 2 mL, intravenous, Once in imaging  tacrolimus, 2 mg, oral, q12h CHRIST      Continuous medications     PRN medications  PRN medications: acetaminophen, benzocaine-menthol, [] hydrALAZINE **FOLLOWED BY** hydrALAZINE, ondansetron **OR** ondansetron, oxyCODONE, oxyCODONE, oxygen    Last Recorded Vitals  Blood pressure 146/87, pulse 75, temperature 36.3 °C (97.3 °F), resp. rate 16, height 1.753 m (5' 9\"), weight 72.9 kg (160 lb 11.5 oz), SpO2 100%.  Intake/Output last 3 Shifts:  I/O last 3 completed shifts:  In: 2000 (27.4 mL/kg) [P.O.:1200; NG/GT:800]  Out: 1150 (15.8 mL/kg) [Urine:1150 (0.4 mL/kg/hr)]  Weight: 72.9 kg     A&ox3, no distress, pleasant  MMM, no lesions  Lungs with equal air entry, no added sounds  Rrr, no m/r/g  Abd soft, nt, nd  No allograft tenderness  No edema b/l    Results for orders placed or performed during the hospital encounter of 25 (from the past 24 hours)   CBC and Auto Differential   Result Value Ref Range    WBC 6.3 4.4 - 11.3 x10*3/uL    nRBC 0.0 0.0 - 0.0 /100 WBCs    RBC 4.87 4.50 - 5.90 x10*6/uL    Hemoglobin 14.7 13.5 " - 17.5 g/dL    Hematocrit 45.3 41.0 - 52.0 %    MCV 93 80 - 100 fL    MCH 30.2 26.0 - 34.0 pg    MCHC 32.5 32.0 - 36.0 g/dL    RDW 14.7 (H) 11.5 - 14.5 %    Platelets 118 (L) 150 - 450 x10*3/uL    Immature Granulocytes %, Automated 0.3 0.0 - 0.9 %    Immature Granulocytes Absolute, Automated 0.02 0.00 - 0.70 x10*3/uL   Calcium, Ionized   Result Value Ref Range    POCT Calcium, Ionized 1.20 1.1 - 1.33 mmol/L   Renal Function Panel   Result Value Ref Range    Glucose 101 (H) 74 - 99 mg/dL    Sodium 140 136 - 145 mmol/L    Potassium 4.2 3.5 - 5.3 mmol/L    Chloride 107 98 - 107 mmol/L    Bicarbonate 26 21 - 32 mmol/L    Anion Gap 11 10 - 20 mmol/L    Urea Nitrogen 30 (H) 6 - 23 mg/dL    Creatinine 1.96 (H) 0.50 - 1.30 mg/dL    eGFR 39 (L) >60 mL/min/1.73m*2    Calcium 9.1 8.6 - 10.6 mg/dL    Phosphorus 2.3 (L) 2.5 - 4.9 mg/dL    Albumin 3.7 3.4 - 5.0 g/dL   Magnesium   Result Value Ref Range    Magnesium 2.00 1.60 - 2.40 mg/dL   Tacrolimus level   Result Value Ref Range    Tacrolimus  4.2 <=15.0 ng/mL   Manual Differential   Result Value Ref Range    Neutrophils %, Manual 74.8 40.0 - 80.0 %    Bands %, Manual 0.0 0.0 - 5.0 %    Lymphocytes %, Manual 18.3 13.0 - 44.0 %    Monocytes %, Manual 6.9 2.0 - 10.0 %    Eosinophils %, Manual 0.0 0.0 - 6.0 %    Basophils %, Manual 0.0 0.0 - 2.0 %    Atypical Lymphocytes %, Manual 0.0 0.0 - 2.0 %    Metamyelocytes %, Manual 0.0 0.0 - 0.0 %    Myelocytes %, Manual 0.0 0.0 - 0.0 %    Plasma Cells %, Manual 0.0 0.00 - 0.00 %    Promyelocytes %, Manual 0.0 0.0 - 0.0 %    Blasts %, Manual 0.0 0.0 - 0.0 %    Seg Neutrophils Absolute, Manual 4.71 1.20 - 7.00 x10*3/uL    Bands Absolute, Manual 0.00 0.00 - 0.70 x10*3/uL    Lymphocytes Absolute, Manual 1.15 (L) 1.20 - 4.80 x10*3/uL    Monocytes Absolute, Manual 0.43 0.10 - 1.00 x10*3/uL    Eosinophils Absolute, Manual 0.00 0.00 - 0.70 x10*3/uL    Basophils Absolute, Manual 0.00 0.00 - 0.10 x10*3/uL    Atypical Lymphs Absolute, Manual 0.00  0.00 - 0.50 x10*3/uL    Metamyelocytes Absolute, Manual 0.00 0.00 - 0.00 x10*3/uL    Myelocytes Absolute, Manual 0.00 0.00 - 0.00 x10*3/uL    Plasma Cells Absolute, Manual 0.00 0.00 - 0.00 x10*3/uL    Promyelocytes Absolute, Manual 0.00 0.00 - 0.00 x10*3/uL    Blasts Absolute, Manual 0.00 0.00 - 0.00 x10*3/uL    Total Cells Counted 115     Neutrophils Absolute, Manual 4.71 1.20 - 7.70 x10*3/uL    Manual nRBC per 100 Cells 0.0 0.0 - 0.0 %    RBC Morphology See Below     Jennifer Cells Many     Acanthocytes Few        CT head w/o contrast: 5/19/25  There is development of small acute infarct component within the  right frontal opercular region. There is also suspected subtle loss  of gray-white matter differentiation within the right frontal and  parietal lobes corresponding to regions of ischemia/infarct on recent  CTA perfusion examination. This could be better characterized with  MRI as clinically warranted. No acute intraparenchymal hematoma.  Subtle petechial hemorrhage versus contrast staining or potential  beam hardening artifact within the right parietal lobe. No  significant intracranial mass effect.     MRI head:  IMPRESSION:  1. Acute nonhemorrhagic right MCA territory infarct as above. There  is associated vasogenic edema and sulcal effacement. No midline  shift. No evidence of hemorrhagic transformation.  2. Old lacunar infarcts in left cerebellum, left thalamus and  bilateral basal ganglia. Moderate degree of chronic microvascular  ischemic disease in the cerebral white matter.  3. Evidence for old hemorrhage in the right basal ganglia, with  encephalomalacia. This could be sequela of an old hypertensive bleed,  or sequela of an old hemorrhagic infarct.        Assessment/Plan  60 y.o. male presenting with ESRD secondary to hypertension s/p DDKT on 8/10/2019. Patient was induced by Simulect, no DGF no CMV mismatch no EBV mismatch, KDPI of kidney is 40% PRA 0%. No episodes of rejection.     Currently admitted  with Rt MCA occlusion CVA s/p cerebral angiogram and mecahnical thrombectomy 5/19/25     Allograft function: s/p DDKT 8/10/2019  - baseline Cr ~2. Stable this admission  - s/p contrast exposure this admission. Maintain adequate hydration.   - metabolic indices acceptable. Nonoliguric. No hypervolemia on exam.  - Hb ~13, acceptable  - Ca, Phos acceptable. Monitor and replete lytes as indicated  - Bps improved. Losartan 25 mg bid, clonidine 0.2mg bid resumed. Monitor and titrate meds as indicated.  - dose meds for CrCL. Avoid potential nephrotoxins.     Immunosuppression:  - on tac 2mg q12 5/20 AM. Last Fk level 4.2, true trough. Will wait for rpt level.   - Monitor Fk trough daily (30-60 min prior to AM dose). Goal 5-8.  - cont  mg bid.     Rt MCA CVA: s/p mechanical thrombectomy 5/19/25  - management per neuro.     Will follow.      Jayson Nicole MD

## 2025-05-25 NOTE — PROGRESS NOTES
"Davidson Khoury is a 60 y.o. male on day 6 of admission presenting with Acute cerebrovascular accident (CVA) due to embolism of right middle cerebral artery (Multi).    No acute events overnight.  Bps stable.    Scheduled medications  aspirin, 81 mg, oral, Daily   Or  aspirin, 81 mg, oral, Daily   Or  aspirin, 81 mg, nasogastric tube, Daily   Or  aspirin, 300 mg, rectal, Daily  cloNIDine, 0.2 mg, oral, q12h CHRIST  clopidogrel, 75 mg, oral, Daily  enoxaparin, 40 mg, subcutaneous, q24h  lidocaine, 1 Application, urethral, Once  losartan, 25 mg, oral, BID  mycophenolate, 750 mg, nasogastric tube, BID  ondansetron, 4 mg, intravenous, Once  perflutren protein A microsphere, 0.5 mL, intravenous, Once in imaging  polyethylene glycol, 17 g, oral, Daily  rosuvastatin, 20 mg, oral, Nightly  sulfur hexafluoride microsphr, 2 mL, intravenous, Once in imaging  tacrolimus, 2 mg, oral, q12h CHRIST      Continuous medications     PRN medications  PRN medications: acetaminophen, benzocaine-menthol, [] hydrALAZINE **FOLLOWED BY** hydrALAZINE, ondansetron **OR** ondansetron, oxyCODONE, oxyCODONE, oxygen    Last Recorded Vitals  Blood pressure 153/87, pulse 68, temperature 35.9 °C (96.6 °F), temperature source Temporal, resp. rate 18, height 1.753 m (5' 9\"), weight 72.9 kg (160 lb 11.5 oz), SpO2 99%.  Intake/Output last 3 Shifts:  I/O last 3 completed shifts:  In: - (0 mL/kg)   Out: 1100 (15.1 mL/kg) [Urine:1100 (0.4 mL/kg/hr)]  Weight: 72.9 kg     A&ox3, no distress, pleasant  MMM, no lesions  Lungs with equal air entry, no added sounds  Rrr, no m/r/g  Abd soft, nt, nd  No allograft tenderness  No edema b/l, lt hemiplegia    Results for orders placed or performed during the hospital encounter of 25 (from the past 24 hours)   CBC and Auto Differential   Result Value Ref Range    WBC 4.4 4.4 - 11.3 x10*3/uL    nRBC 0.0 0.0 - 0.0 /100 WBCs    RBC 4.77 4.50 - 5.90 x10*6/uL    Hemoglobin 14.2 13.5 - 17.5 g/dL    Hematocrit 44.3 41.0 " - 52.0 %    MCV 93 80 - 100 fL    MCH 29.8 26.0 - 34.0 pg    MCHC 32.1 32.0 - 36.0 g/dL    RDW 14.6 (H) 11.5 - 14.5 %    Platelets 116 (L) 150 - 450 x10*3/uL    Neutrophils % 68.7 40.0 - 80.0 %    Immature Granulocytes %, Automated 0.2 0.0 - 0.9 %    Lymphocytes % 17.6 13.0 - 44.0 %    Monocytes % 12.6 2.0 - 10.0 %    Eosinophils % 0.7 0.0 - 6.0 %    Basophils % 0.2 0.0 - 2.0 %    Neutrophils Absolute 3.00 1.20 - 7.70 x10*3/uL    Immature Granulocytes Absolute, Automated 0.01 0.00 - 0.70 x10*3/uL    Lymphocytes Absolute 0.77 (L) 1.20 - 4.80 x10*3/uL    Monocytes Absolute 0.55 0.10 - 1.00 x10*3/uL    Eosinophils Absolute 0.03 0.00 - 0.70 x10*3/uL    Basophils Absolute 0.01 0.00 - 0.10 x10*3/uL   Calcium, Ionized   Result Value Ref Range    POCT Calcium, Ionized 1.19 1.1 - 1.33 mmol/L   Renal Function Panel   Result Value Ref Range    Glucose 121 (H) 74 - 99 mg/dL    Sodium 140 136 - 145 mmol/L    Potassium 4.4 3.5 - 5.3 mmol/L    Chloride 107 98 - 107 mmol/L    Bicarbonate 27 21 - 32 mmol/L    Anion Gap 10 10 - 20 mmol/L    Urea Nitrogen 37 (H) 6 - 23 mg/dL    Creatinine 1.82 (H) 0.50 - 1.30 mg/dL    eGFR 42 (L) >60 mL/min/1.73m*2    Calcium 9.0 8.6 - 10.6 mg/dL    Phosphorus 2.7 2.5 - 4.9 mg/dL    Albumin 3.6 3.4 - 5.0 g/dL   Magnesium   Result Value Ref Range    Magnesium 2.06 1.60 - 2.40 mg/dL   Tacrolimus level   Result Value Ref Range    Tacrolimus  4.2 <=15.0 ng/mL       CT head w/o contrast: 5/19/25  There is development of small acute infarct component within the  right frontal opercular region. There is also suspected subtle loss  of gray-white matter differentiation within the right frontal and  parietal lobes corresponding to regions of ischemia/infarct on recent  CTA perfusion examination. This could be better characterized with  MRI as clinically warranted. No acute intraparenchymal hematoma.  Subtle petechial hemorrhage versus contrast staining or potential  beam hardening artifact within the right  parietal lobe. No  significant intracranial mass effect.     MRI head:  IMPRESSION:  1. Acute nonhemorrhagic right MCA territory infarct as above. There  is associated vasogenic edema and sulcal effacement. No midline  shift. No evidence of hemorrhagic transformation.  2. Old lacunar infarcts in left cerebellum, left thalamus and  bilateral basal ganglia. Moderate degree of chronic microvascular  ischemic disease in the cerebral white matter.  3. Evidence for old hemorrhage in the right basal ganglia, with  encephalomalacia. This could be sequela of an old hypertensive bleed,  or sequela of an old hemorrhagic infarct.        Assessment/Plan  60 y.o. male presenting with ESRD secondary to hypertension s/p DDKT on 8/10/2019. Patient was induced by Simulect, no DGF no CMV mismatch no EBV mismatch, KDPI of kidney is 40% PRA 0%. No episodes of rejection.     Currently admitted with Rt MCA occlusion CVA s/p cerebral angiogram and mecahnical thrombectomy 5/19/25     Allograft function: s/p DDKT 8/10/2019  - baseline Cr ~2. Stable this admission  - s/p contrast exposure this admission. Maintain adequate hydration.   - metabolic indices acceptable. Nonoliguric. No hypervolemia on exam.  - Hb ~13, acceptable  - Ca, Phos acceptable. Monitor and replete lytes as indicated  - Bps improved. Losartan 25 mg bid, clonidine 0.2mg bid resumed. Monitor and titrate meds as indicated.  - dose meds for CrCL. Avoid potential nephrotoxins.     Immunosuppression:  - on tac 2mg q12 5/20 AM. Fk this am 4.2. pls incr tac to 2.5 mg q12.   - Monitor Fk trough daily (30-60 min prior to AM dose). Goal 5-8.  - cont  mg bid. Not on pred.     Rt MCA CVA: s/p mechanical thrombectomy 5/19/25  - management per neuro.  - on DAPT x3 months     Will follow.  Needs acute rehab.    Jayson Nicole MD

## 2025-05-25 NOTE — PROGRESS NOTES
Davidson Khoury is a 60 y.o. left handed male on day 6 of admission presenting with Acute cerebrovascular accident (CVA) due to embolism of right middle cerebral artery (Multi).    Subjective   New acute event overnight, no new complaints.       Objective     Last Recorded Vitals  Heart Rate:  [74-75]   Temp:  [35.7 °C (96.3 °F)-37.8 °C (100.1 °F)]   Resp:  [18]   BP: (129-159)/()   SpO2:  [98 %-99 %]       [facility] NIHSS:   NIH Stroke Scale:     Date/Time of Assessment: 5/21/2025 11:03 AM    1A. Level of Consciousness:  Alert (keenly responsive) (0)    1B. Ask Month and Age:  Both questions right (0)    1C. Blink Eyes & Squeeze Hands:  Performs both tasks (0)    2. Best Gaze:  Normal (0)    3. Visual:  Complete hemianopia (+2)    4. Facial Palsy:  Partial paralysis (+2)    5A. Motor - Left Arm:  No movement (+4)    5B. Motor - Right Arm:  No drift (0)    6A. Motor - Left Leg:  No effort against gravity (+3)    6B. Motor - Right Leg:  No drift (0)    7. Limb Ataxia:  No ataxia (0)    8. Sensory Loss:  Severe to total loss (+2)    9. Best Language:  Normal (no aphasia) (0)    10. Dysarthia:  Mild-moderate dysarthria (+1)    11. Extinction and Inattention:  Visual/tactile/auditory/spatial/personal inattention (+1)    NIH Stroke Scale:  15       Physical Exam  Neurological Exam  GENERAL APPEARANCE:  No distress, alert, interactive and cooperative.     MENTAL STATE:   Orientation was normal to time, place and person. Recent and remote memory was intact.  Attention span and concentration were normal. Language testing was normal for comprehension, repetition, expression, and naming. The patient could correctly interpret a picture. General fund of knowledge was intact.     CRANIAL NERVES:   CN 2   Left HH  CN 3, 4, 6   Pupils round, 4 mm in diameter, equally reactive to light. Lids symmetric; no ptosis. EOMs normal alignment, full range with normal saccades, pursuit and convergence.   No nystagmus.   CN 5   Facial  sensation intact bilaterally.   CN 7   Left UMN paralysis  CN 8   Hearing intact to finger rub.   CN 9   Palate elevates symmetrically.   CN 11   Normal strength of shoulder shrug and neck turning.   CN 12   Tongue midline, with normal bulk and strength; no fasciculations.     Motor/Sensory:   Spacticity on the left UE and LE, LUE 0/5, LLE 3/5. right at least antigravity, left hemisensory loss and neglect.    REFLEXES:   R          L  BR:  2 3  Biceps:  2 3  Triceps:  2 3  Knee:  2 3  Ankle:  2 3    Babinski: toes downgoing to plantar stimulation. No clonus or other pathologic reflexes present.     COORDINATION:    Right finger-nose-finger was intact without dysmetria or overshoot.     GAIT:   Deferred    Cardiac: regular rate, normal s1, s2,  Chest: Equal air entry bilaterally  Abdomen soft lax non distended    Relevant Results    NIH Stroke Scale  1A. Level of Consciousness: Alert, Keenly Responsive  1B. Ask Month and Age: Both Questions Right  1C. Blink Eyes & Squeeze Hands: Performs 1 Task  2. Best Gaze: Partial Gaze Palsy  3. Visual: Partial Hemianopia  4. Facial Palsy: Partial Paralysis  5A. Motor - Left Arm: No Effort Against Gravity  5B. Motor - Right Arm: No Drift  6A. Motor - Left Leg: Some Effort Against Gravity  6B. Motor - Right Leg: No Drift  7. Limb Ataxia: Present in One Limb  8. Sensory Loss: Mild-to-Moderate Sensory Loss  9. Best Language: Mild-to-Moderate Aphasia  10. Dysarthria: Mild-to-Moderate Dysarthria  11. Extinction and Inattention: Visual, Tactile, Auditory, Spatial, or Personal Inattention  NIH Stroke Scale: 15           [place] Coma Scale  Best Eye Response: Spontaneous  Best Verbal Response: Oriented  Best Motor Response: Follows commands  [place] Coma Scale Score: 15                    Assessment & Plan  Acute cerebrovascular accident (CVA) due to embolism of right middle cerebral artery (Multi)    Davidson Khoury is a 60 y.o. male with a history notable for renal transplant (currently  on Cellcept) and hypertension presenting to the ED initially with a chief complaint of altered mental status, headache, and hypertension. Initially evaluated by general neurology team who noted subtle left sided weakness that worsened on follow up exam. BAT activated for concern of stroke. Found to have proximal R superior M2 occlusion. Not a candidate for TNK as LKN >4.5 hours. Taken for MT, TICI 2b. Exam notable for L HH, L UMN facial paralysis, LUE 0/5, LLE 3/5. right at least antigravity, left hemisensory loss and neglect.    Stroke Metrics:  EMS pre-notification?: No  Time of stroke team arrival: 3:47pm  Direct to CT?: No  Time of CTH read by stroke team: As performed  Time of TNK: n/a  Time stroke team called IR: 4:27pm  Time pt arrived to angio suite: 4:57pm  Time of groin puncture: 5:10pm  Time of recanalization: 5:31pm  Number of passes: 1  Device used: penumbra aspiration  Any delays in the process (if door to TNK >30 min): n/a     Stroke Classification:  Type: Ischemic stroke  Subtype/etiology: Suspected large artery disease  Vessels involved: R MCA  Neurological manifestations:  Pre-Intervention Deficits: NIHSS 10 *see above  Post-Intervention Deficits: NIHSS 9 *see above  Pre-stroke mRS: 0  Initial treatment: Angio  Anti-platelets or Anti-coagulation management: Aspirin  Vascular Risk Factors: HTN  Antiplatelet/antithrombotic plan for stroke prevention: Aspirin  VTE prophylaxis: Lovenox  Vascular Risk Factor modification goals:  Blood pressure goals: avoid hypotension SBP <100 that could worsen cerebral perfusion, Ischemic stroke post-thrombectomy SBP <180mmHg   Lipid Goals: education on healthy diet and statin therapy to maintain or achieve goal LDL-cholesterol < 70mg.  Glucose Goals: early treatment of hyperglycemia to goal glucose 140-180 mg/dl with long-term goal A1c < 7%   Smoking Cessation and Education  Assessment for Rehabilitation needs   Patient and family education on signs and symptoms of  stroke, calling 911, healthy strategies for stroke prevention.      Plan:  Updates 05/25/25  -[facility] started 5/23/2025  -Follow up nutrition recs  -Discharge on bridle Dobhoff  -PT/OT    #Proximal R superior M2 occlusion s/p TICI 2b MT   #HTN   #Hx of thrombocytopenia- Stable   - Continue DAPT 90 days [facility] protocol for intracranial atherosclerosis  - Stroke work up  - A1c: 5.2%, LDL 63              - TTE with bubble study: EF 70-75% LA normal in size, no PFO  - SBP goals post-thrombectomy SBP <180mmHg   - PT/OT/SLP     #Renal transplant (on Cellcept and Tacrolimus)   - Baseline BUN/Cr: 15/1.80   -Consulted nephrology recommended - resume tac 2mg q12. Monitor Fk trough daily (30-60 min prior to AM dose). Goal 5-8.  - cont  mg bid.    #Dysphagia  MBSS cleared for liquid diet  Nocturnal feeds::   Isosource 1.5 @ 80ml/hr x 12hrs.   Oral nutrition support regimen:   Trial Boost VHC daily.      Pain medications: PRN Tylenol  Fluids: Replete PRN  Electrolytes: Keep mg >2, phos >3  and K >4  Nutrition:  Enteral feeding WITH diet order Diet type: Full Liquid; Fluid consistency: Mild thick 2; Tube feeding formula: Isosource 1.5; 80; 20:00-08:00   Antimicrobials: None  Antiplatelet: ASA  Anticoagulation: None  DVT PPX: Lovenox  GI ppx: none needed  Bowel care: Miralax  Catheter: External Catheter  Lines: PIV  Oxygen: Room Air    Disposition:   PT/OT high intensity    Code Status: Full Code (confirmed on admission)   NOK:  Primary Emergency Contact: Margo Cali MD  PGY-2 Neurology

## 2025-05-26 LAB
ALBUMIN SERPL BCP-MCNC: 3.6 G/DL (ref 3.4–5)
ANION GAP SERPL CALC-SCNC: 12 MMOL/L (ref 10–20)
BASOPHILS # BLD AUTO: 0.01 X10*3/UL (ref 0–0.1)
BASOPHILS NFR BLD AUTO: 0.2 %
BUN SERPL-MCNC: 35 MG/DL (ref 6–23)
CA-I BLD-SCNC: 1.17 MMOL/L (ref 1.1–1.33)
CALCIUM SERPL-MCNC: 9.1 MG/DL (ref 8.6–10.6)
CHLORIDE SERPL-SCNC: 108 MMOL/L (ref 98–107)
CO2 SERPL-SCNC: 26 MMOL/L (ref 21–32)
CREAT SERPL-MCNC: 1.82 MG/DL (ref 0.5–1.3)
EGFRCR SERPLBLD CKD-EPI 2021: 42 ML/MIN/1.73M*2
EOSINOPHIL # BLD AUTO: 0.05 X10*3/UL (ref 0–0.7)
EOSINOPHIL NFR BLD AUTO: 0.9 %
ERYTHROCYTE [DISTWIDTH] IN BLOOD BY AUTOMATED COUNT: 14.6 % (ref 11.5–14.5)
GLUCOSE SERPL-MCNC: 117 MG/DL (ref 74–99)
HCT VFR BLD AUTO: 44.4 % (ref 41–52)
HGB BLD-MCNC: 13.9 G/DL (ref 13.5–17.5)
IMM GRANULOCYTES # BLD AUTO: 0.03 X10*3/UL (ref 0–0.7)
IMM GRANULOCYTES NFR BLD AUTO: 0.6 % (ref 0–0.9)
LYMPHOCYTES # BLD AUTO: 0.72 X10*3/UL (ref 1.2–4.8)
LYMPHOCYTES NFR BLD AUTO: 13.5 %
MAGNESIUM SERPL-MCNC: 2.05 MG/DL (ref 1.6–2.4)
MCH RBC QN AUTO: 29.3 PG (ref 26–34)
MCHC RBC AUTO-ENTMCNC: 31.3 G/DL (ref 32–36)
MCV RBC AUTO: 94 FL (ref 80–100)
MONOCYTES # BLD AUTO: 0.59 X10*3/UL (ref 0.1–1)
MONOCYTES NFR BLD AUTO: 11.1 %
NEUTROPHILS # BLD AUTO: 3.93 X10*3/UL (ref 1.2–7.7)
NEUTROPHILS NFR BLD AUTO: 73.7 %
NRBC BLD-RTO: 0 /100 WBCS (ref 0–0)
PHOSPHATE SERPL-MCNC: 2.7 MG/DL (ref 2.5–4.9)
PLATELET # BLD AUTO: 124 X10*3/UL (ref 150–450)
POTASSIUM SERPL-SCNC: 4.6 MMOL/L (ref 3.5–5.3)
RBC # BLD AUTO: 4.75 X10*6/UL (ref 4.5–5.9)
SODIUM SERPL-SCNC: 141 MMOL/L (ref 136–145)
TACROLIMUS BLD-MCNC: 3.3 NG/ML
WBC # BLD AUTO: 5.3 X10*3/UL (ref 4.4–11.3)

## 2025-05-26 PROCEDURE — 83735 ASSAY OF MAGNESIUM: CPT

## 2025-05-26 PROCEDURE — 36415 COLL VENOUS BLD VENIPUNCTURE: CPT

## 2025-05-26 PROCEDURE — 2500000001 HC RX 250 WO HCPCS SELF ADMINISTERED DRUGS (ALT 637 FOR MEDICARE OP): Performed by: REGISTERED NURSE

## 2025-05-26 PROCEDURE — 99233 SBSQ HOSP IP/OBS HIGH 50: CPT

## 2025-05-26 PROCEDURE — 80069 RENAL FUNCTION PANEL: CPT

## 2025-05-26 PROCEDURE — 2500000001 HC RX 250 WO HCPCS SELF ADMINISTERED DRUGS (ALT 637 FOR MEDICARE OP)

## 2025-05-26 PROCEDURE — 2500000004 HC RX 250 GENERAL PHARMACY W/ HCPCS (ALT 636 FOR OP/ED)

## 2025-05-26 PROCEDURE — 2500000004 HC RX 250 GENERAL PHARMACY W/ HCPCS (ALT 636 FOR OP/ED): Mod: JZ

## 2025-05-26 PROCEDURE — 1100000001 HC PRIVATE ROOM DAILY

## 2025-05-26 PROCEDURE — 80197 ASSAY OF TACROLIMUS: CPT

## 2025-05-26 PROCEDURE — 82330 ASSAY OF CALCIUM: CPT

## 2025-05-26 PROCEDURE — 85025 COMPLETE CBC W/AUTO DIFF WBC: CPT

## 2025-05-26 PROCEDURE — 99233 SBSQ HOSP IP/OBS HIGH 50: CPT | Performed by: STUDENT IN AN ORGANIZED HEALTH CARE EDUCATION/TRAINING PROGRAM

## 2025-05-26 RX ADMIN — CLOPIDOGREL BISULFATE 75 MG: 75 TABLET, FILM COATED ORAL at 08:59

## 2025-05-26 RX ADMIN — ENOXAPARIN SODIUM 40 MG: 40 INJECTION, SOLUTION SUBCUTANEOUS at 17:51

## 2025-05-26 RX ADMIN — POLYETHYLENE GLYCOL 3350 17 G: 17 POWDER, FOR SOLUTION ORAL at 09:01

## 2025-05-26 RX ADMIN — LOSARTAN POTASSIUM 25 MG: 25 TABLET, FILM COATED ORAL at 08:59

## 2025-05-26 RX ADMIN — ACETAMINOPHEN 650 MG: 325 TABLET ORAL at 20:06

## 2025-05-26 RX ADMIN — TACROLIMUS 2.5 MG: 5 CAPSULE, GELATIN COATED ORAL at 18:59

## 2025-05-26 RX ADMIN — MYCOPHENOLATE MOFETIL 750 MG: 200 POWDER, FOR SUSPENSION ORAL at 09:01

## 2025-05-26 RX ADMIN — TACROLIMUS 2 MG: 5 CAPSULE, GELATIN COATED ORAL at 07:01

## 2025-05-26 RX ADMIN — CLONIDINE HYDROCHLORIDE 0.2 MG: 0.2 TABLET ORAL at 08:59

## 2025-05-26 RX ADMIN — LOSARTAN POTASSIUM 25 MG: 25 TABLET, FILM COATED ORAL at 20:06

## 2025-05-26 RX ADMIN — CLONIDINE HYDROCHLORIDE 0.2 MG: 0.2 TABLET ORAL at 20:06

## 2025-05-26 RX ADMIN — MYCOPHENOLATE MOFETIL 750 MG: 200 POWDER, FOR SUSPENSION ORAL at 20:06

## 2025-05-26 RX ADMIN — ASPIRIN 81 MG: 81 TABLET, COATED ORAL at 08:59

## 2025-05-26 ASSESSMENT — PAIN SCALES - GENERAL: PAINLEVEL_OUTOF10: 5 - MODERATE PAIN

## 2025-05-26 NOTE — PROGRESS NOTES
5/26/25 @1111 Transitional Care Coordinator Note  Requested auth update from [facility], awaiting response. Will continue to follow.    5/26/25 @1965  addendum  Auth pending for [facility], cert number: [id number]. Will continue to follow.

## 2025-05-26 NOTE — CARE PLAN
The patient's goals for the shift include      The clinical goals for the shift include Patient will remain safe and on injury throughout the shift.    Pt remained safe and free of injury during night. Hemodynamically stable. No pain noted. No other distress noted. Call light in reach. Resting quietly at this time.     Polly Arauz RN

## 2025-05-26 NOTE — PROGRESS NOTES
"Davidson Khoury is a 60 y.o. male on day 7 of admission presenting with Acute cerebrovascular accident (CVA) due to embolism of right middle cerebral artery (Multi).    No acute events overnight.  BP slightly better later this AM.  Still has left hemiparesis but otherwise doing well.    Scheduled medications  aspirin, 81 mg, oral, Daily   Or  aspirin, 81 mg, oral, Daily   Or  aspirin, 81 mg, nasogastric tube, Daily   Or  aspirin, 300 mg, rectal, Daily  cloNIDine, 0.2 mg, oral, q12h CHRIST  clopidogrel, 75 mg, oral, Daily  enoxaparin, 40 mg, subcutaneous, q24h  lidocaine, 1 Application, urethral, Once  losartan, 25 mg, oral, BID  mycophenolate, 750 mg, nasogastric tube, BID  ondansetron, 4 mg, intravenous, Once  perflutren protein A microsphere, 0.5 mL, intravenous, Once in imaging  polyethylene glycol, 17 g, oral, Daily  rosuvastatin, 20 mg, oral, Nightly  sulfur hexafluoride microsphr, 2 mL, intravenous, Once in imaging  tacrolimus, 2.5 mg, oral, q12h CHRIST      Continuous medications     PRN medications  PRN medications: acetaminophen, benzocaine-menthol, [] hydrALAZINE **FOLLOWED BY** hydrALAZINE, ondansetron **OR** ondansetron, oxyCODONE, oxyCODONE, oxygen    Last Recorded Vitals  Blood pressure 130/87, pulse 74, temperature 36.3 °C (97.3 °F), temperature source Temporal, resp. rate 15, height 1.753 m (5' 9\"), weight 72.9 kg (160 lb 11.5 oz), SpO2 100%.  Intake/Output last 3 Shifts:  I/O last 3 completed shifts:  In: 110 (1.5 mL/kg) [NG/GT:110]  Out: 1100 (15.1 mL/kg) [Urine:1100 (0.4 mL/kg/hr)]  Weight: 72.9 kg     A&ox3, no distress, pleasant  MMM, no lesions  Lungs with equal air entry, no added sounds  Rrr, no m/r/g  Abd soft, nt, nd  No allograft tenderness  No edema b/l, lt hemiplegia    Results for orders placed or performed during the hospital encounter of 05/19/25 (from the past 24 hours)   CBC and Auto Differential   Result Value Ref Range    WBC 5.3 4.4 - 11.3 x10*3/uL    nRBC 0.0 0.0 - 0.0 /100 " WBCs    RBC 4.75 4.50 - 5.90 x10*6/uL    Hemoglobin 13.9 13.5 - 17.5 g/dL    Hematocrit 44.4 41.0 - 52.0 %    MCV 94 80 - 100 fL    MCH 29.3 26.0 - 34.0 pg    MCHC 31.3 (L) 32.0 - 36.0 g/dL    RDW 14.6 (H) 11.5 - 14.5 %    Platelets 124 (L) 150 - 450 x10*3/uL    Neutrophils % 73.7 40.0 - 80.0 %    Immature Granulocytes %, Automated 0.6 0.0 - 0.9 %    Lymphocytes % 13.5 13.0 - 44.0 %    Monocytes % 11.1 2.0 - 10.0 %    Eosinophils % 0.9 0.0 - 6.0 %    Basophils % 0.2 0.0 - 2.0 %    Neutrophils Absolute 3.93 1.20 - 7.70 x10*3/uL    Immature Granulocytes Absolute, Automated 0.03 0.00 - 0.70 x10*3/uL    Lymphocytes Absolute 0.72 (L) 1.20 - 4.80 x10*3/uL    Monocytes Absolute 0.59 0.10 - 1.00 x10*3/uL    Eosinophils Absolute 0.05 0.00 - 0.70 x10*3/uL    Basophils Absolute 0.01 0.00 - 0.10 x10*3/uL   Calcium, Ionized   Result Value Ref Range    POCT Calcium, Ionized 1.17 1.1 - 1.33 mmol/L   Renal Function Panel   Result Value Ref Range    Glucose 117 (H) 74 - 99 mg/dL    Sodium 141 136 - 145 mmol/L    Potassium 4.6 3.5 - 5.3 mmol/L    Chloride 108 (H) 98 - 107 mmol/L    Bicarbonate 26 21 - 32 mmol/L    Anion Gap 12 10 - 20 mmol/L    Urea Nitrogen 35 (H) 6 - 23 mg/dL    Creatinine 1.82 (H) 0.50 - 1.30 mg/dL    eGFR 42 (L) >60 mL/min/1.73m*2    Calcium 9.1 8.6 - 10.6 mg/dL    Phosphorus 2.7 2.5 - 4.9 mg/dL    Albumin 3.6 3.4 - 5.0 g/dL   Magnesium   Result Value Ref Range    Magnesium 2.05 1.60 - 2.40 mg/dL   Tacrolimus level   Result Value Ref Range    Tacrolimus  3.3 <=15.0 ng/mL       CT head w/o contrast: 5/19/25  There is development of small acute infarct component within the  right frontal opercular region. There is also suspected subtle loss  of gray-white matter differentiation within the right frontal and  parietal lobes corresponding to regions of ischemia/infarct on recent  CTA perfusion examination. This could be better characterized with  MRI as clinically warranted. No acute intraparenchymal hematoma.  Subtle  petechial hemorrhage versus contrast staining or potential  beam hardening artifact within the right parietal lobe. No  significant intracranial mass effect.     MRI head:  IMPRESSION:  1. Acute nonhemorrhagic right MCA territory infarct as above. There  is associated vasogenic edema and sulcal effacement. No midline  shift. No evidence of hemorrhagic transformation.  2. Old lacunar infarcts in left cerebellum, left thalamus and  bilateral basal ganglia. Moderate degree of chronic microvascular  ischemic disease in the cerebral white matter.  3. Evidence for old hemorrhage in the right basal ganglia, with  encephalomalacia. This could be sequela of an old hypertensive bleed,  or sequela of an old hemorrhagic infarct.        Assessment/Plan  60 y.o. male presenting with ESRD secondary to hypertension s/p DDKT on 8/10/2019. Patient was induced by Simulect, no DGF no CMV mismatch no EBV mismatch, KDPI of kidney is 40% PRA 0%. No episodes of rejection.     Currently admitted with Rt MCA occlusion CVA s/p cerebral angiogram and mecahnical thrombectomy 5/19/25     Allograft function: s/p DDKT 8/10/2019  - baseline Cr ~2. Stable this admission  - s/p contrast exposure this admission. Maintain adequate hydration.   - metabolic indices acceptable. Nonoliguric. No hypervolemia on exam.  - Hb ~13, acceptable  - Ca, Phos acceptable. Monitor and replete lytes as indicated  - Bps improved. Losartan 25 mg bid, clonidine 0.2mg bid  - dose meds for CrCL. Avoid potential nephrotoxins.     Immunosuppression:  - increase TAC to 2.5 mg BID   - Monitor Fk trough daily (30-60 min prior to AM dose). Goal 5-8.  - cont  mg bid. Not on pred.     Rt MCA CVA: s/p mechanical thrombectomy 5/19/25  - management per neuro.  - on DAPT x3 months     Will follow.  Needs acute rehab. Dispo plan 5/27    Latrice Guardado MD

## 2025-05-26 NOTE — PROGRESS NOTES
Davidson Khoury is a 60 y.o. left handed male on day 7 of admission presenting with Acute cerebrovascular accident (CVA) due to embolism of right middle cerebral artery (Multi).    Subjective   New acute event overnight, no new complaints.       Objective     Last Recorded Vitals  Heart Rate:  [68-82]   Temp:  [35.9 °C (96.6 °F)-37.1 °C (98.8 °F)]   Resp:  [18]   BP: (150-157)/(84-88)   SpO2:  [99 %-100 %]       [facility] NIHSS:   NIH Stroke Scale:     Date/Time of Assessment: 5/21/2025 11:03 AM    1A. Level of Consciousness:  Alert (keenly responsive) (0)    1B. Ask Month and Age:  Both questions right (0)    1C. Blink Eyes & Squeeze Hands:  Performs both tasks (0)    2. Best Gaze:  Normal (0)    3. Visual:  Complete hemianopia (+2)    4. Facial Palsy:  Partial paralysis (+2)    5A. Motor - Left Arm:  No movement (+4)    5B. Motor - Right Arm:  No drift (0)    6A. Motor - Left Leg:  No effort against gravity (+3)    6B. Motor - Right Leg:  No drift (0)    7. Limb Ataxia:  No ataxia (0)    8. Sensory Loss:  Severe to total loss (+2)    9. Best Language:  Normal (no aphasia) (0)    10. Dysarthia:  Mild-moderate dysarthria (+1)    11. Extinction and Inattention:  Visual/tactile/auditory/spatial/personal inattention (+1)    NIH Stroke Scale:  15       Physical Exam  Neurological Exam  GENERAL APPEARANCE:  No distress, alert, interactive and cooperative.     MENTAL STATE:   Orientation was normal to time, place and person. Recent and remote memory was intact.  Attention span and concentration were normal. Language testing was normal for comprehension, repetition, expression, and naming. The patient could correctly interpret a picture. General fund of knowledge was intact.     CRANIAL NERVES:   CN 2   Left inferior quadrantanopia  CN 3, 4, 6   Pupils round, 4 mm in diameter, equally reactive to light. Lids symmetric; no ptosis. EOMs normal alignment, full range with normal saccades, pursuit and convergence.   No nystagmus.   CN  5   Facial sensation intact bilaterally.   CN 7   Left UMN paralysis  CN 8   Hearing intact to conversation.   CN 9   Palate elevates symmetrically.   CN 11   Normal strength of shoulder shrug and neck turning.   CN 12   Tongue midline, with normal bulk and strength; no fasciculations.     Motor/Sensory:   Hypotonia of the left UE and LE, LUE 0/5, LLE 0/5. RUE 5/5 with elbow flexion/extension and RLE /5 with hip flexion, dorsiflexion, and plantarflexion, left hemisensory loss and neglect.    COORDINATION:    Right finger-nose-finger was intact without dysmetria or overshoot.     GAIT:   Deferred    Cardiac: regular rate, normal s1, s2,  Chest: Equal air entry bilaterally  Abdomen soft lax non distended    Relevant Results    NIH Stroke Scale  1A. Level of Consciousness: Alert, Keenly Responsive  1B. Ask Month and Age: Both Questions Right  1C. Blink Eyes & Squeeze Hands: Performs 1 Task  2. Best Gaze: Partial Gaze Palsy  3. Visual: Partial Hemianopia  4. Facial Palsy: Partial Paralysis  5A. Motor - Left Arm: No Effort Against Gravity  5B. Motor - Right Arm: No Drift  6A. Motor - Left Leg: Some Effort Against Gravity  6B. Motor - Right Leg: No Drift  7. Limb Ataxia: Present in One Limb  8. Sensory Loss: Mild-to-Moderate Sensory Loss  9. Best Language: Mild-to-Moderate Aphasia  10. Dysarthria: Mild-to-Moderate Dysarthria  11. Extinction and Inattention: Visual, Tactile, Auditory, Spatial, or Personal Inattention  NIH Stroke Scale: 15           [place] Coma Scale  Best Eye Response: Spontaneous  Best Verbal Response: Oriented  Best Motor Response: Follows commands  [place] Coma Scale Score: 15                    Assessment & Plan  Acute cerebrovascular accident (CVA) due to embolism of right middle cerebral artery (Multi)    Davidson Khoury is a 60 y.o. male with a history notable for renal transplant (currently on Cellcept) and hypertension presenting to the ED initially with a chief complaint of altered mental  status, headache, and hypertension. Initially evaluated by general neurology team who noted subtle left sided weakness that worsened on follow up exam. BAT activated for concern of stroke. Found to have proximal R superior M2 occlusion. Not a candidate for TNK as LKN >4.5 hours. Taken for MT, TICI 2b. Exam notable for L HH, L UMN facial paralysis, LUE 0/5, LLE 3/5. right at least antigravity, left hemisensory loss and neglect.    Stroke Metrics:  EMS pre-notification?: No  Time of stroke team arrival: 3:47pm  Direct to CT?: No  Time of CTH read by stroke team: As performed  Time of TNK: n/a  Time stroke team called IR: 4:27pm  Time pt arrived to angio suite: 4:57pm  Time of groin puncture: 5:10pm  Time of recanalization: 5:31pm  Number of passes: 1  Device used: penumbra aspiration  Any delays in the process (if door to TNK >30 min): n/a     Stroke Classification:  Type: Ischemic stroke  Subtype/etiology: Suspected large artery disease  Vessels involved: R MCA  Neurological manifestations:  Pre-Intervention Deficits: NIHSS 10 *see above  Post-Intervention Deficits: NIHSS 9 *see above  Pre-stroke mRS: 0  Initial treatment: Angio  Anti-platelets or Anti-coagulation management: Aspirin  Vascular Risk Factors: HTN  Antiplatelet/antithrombotic plan for stroke prevention: Aspirin  VTE prophylaxis: Lovenox  Vascular Risk Factor modification goals:  Blood pressure goals: avoid hypotension SBP <100 that could worsen cerebral perfusion, Ischemic stroke post-thrombectomy SBP <180mmHg   Lipid Goals: education on healthy diet and statin therapy to maintain or achieve goal LDL-cholesterol < 70mg.  Glucose Goals: early treatment of hyperglycemia to goal glucose 140-180 mg/dl with long-term goal A1c < 7%   Smoking Cessation and Education  Assessment for Rehabilitation needs   Patient and family education on signs and symptoms of stroke, calling 911, healthy strategies for stroke prevention.      Plan:  Updates 05/26/25  -Continue  DAPT 90 days [facility] protocol for intracranial atherosclerosis started 5/23/2025  -Increase tacrolimus to 2.5mg BID per transplant neprhology  -Discuss with transplant team regarding increasing antihypertensive regimen  -Follow up nutrition recs  -Discharge on [facility]  -PT/OT    #Proximal R superior M2 occlusion s/p TICI 2b MT   #HTN   #Hx of thrombocytopenia- Stable   - Continue DAPT 90 days [facility] protocol for intracranial atherosclerosis  - Stroke work up  - A1c: 5.2%, LDL 63              - TTE with bubble study: EF 70-75% LA normal in size, no PFO  - SBP goals normotension now 7 days post-stroke  - Discuss with transplant nephrology optimal antihypertensive regime. Currently:   - Losartan 25mg BID   - Clonidine 0.2mg BID  - PT/OT/SLP     #Renal transplant (on Cellcept and Tacrolimus)   - Baseline BUN/Cr: 15/1.80   -Consulted nephrology recommended - increase tacrolimus to  2.5mg q12. Monitor Fk trough daily (30-60 min prior to AM dose). Goal 5-8.  - cont  mg bid.    #Dysphagia  MBSS cleared for liquid diet  Nocturnal feeds::   Isosource 1.5 @ 80ml/hr x 12hrs.   Oral nutrition support regimen:   Trial Boost VHC daily.      Pain medications: PRN Tylenol  Fluids: Replete PRN  Electrolytes: Keep mg >2, phos >3  and K >4  Nutrition:  Enteral feeding WITH diet order Diet type: Full Liquid; Fluid consistency: Mild thick 2; Tube feeding formula: Isosource 1.5; 80; 20:00-08:00   Antimicrobials: None  Antiplatelet: ASA and Clopidogrel  Anticoagulation: None  DVT PPX: Lovenox  GI ppx: none needed  Bowel care: Miralax  Catheter: None  Lines: PIV  Oxygen: Room Air    Disposition:   PT/OT moderate intensity    Code Status: Full Code (confirmed on admission)   NOK:  Primary Emergency Contact: Margo Cali MD  PGY-3 Neurology

## 2025-05-26 NOTE — CARE PLAN
The clinical goals for the shift included maintaining patient safety.    Problem: General Stroke  Goal: No symptoms of aspiration throughout shift  Outcome: Met  Goal: No symptoms of hemorrhage throughout shift  Outcome: Met     Problem: Pain - Adult  Goal: Verbalizes/displays adequate comfort level or baseline comfort level  Outcome: Progressing     Problem: Safety - Adult  Goal: Free from fall injury  Outcome: Progressing     Problem: Discharge Planning  Goal: Discharge to home or other facility with appropriate resources  Outcome: Progressing     Problem: Chronic Conditions and Co-morbidities  Goal: Patient's chronic conditions and co-morbidity symptoms are monitored and maintained or improved  Outcome: Progressing     Problem: Nutrition  Goal: Nutrient intake appropriate for maintaining nutritional needs  Outcome: Progressing     Problem: Skin  Goal: Participates in plan/prevention/treatment measures  Outcome: Progressing  Goal: Prevent/manage excess moisture  Outcome: Progressing  Goal: Prevent/minimize sheer/friction injuries  Outcome: Progressing  Goal: Promote/optimize nutrition  Outcome: Progressing     Problem: Fall/Injury  Goal: Not fall by end of shift  Outcome: Met  Goal: Be free from injury by end of the shift  Outcome: Met

## 2025-05-27 LAB
ATRIAL RATE: 105 BPM
P AXIS: 59 DEGREES
P OFFSET: 196 MS
P ONSET: 141 MS
PR INTERVAL: 146 MS
Q ONSET: 214 MS
QRS COUNT: 17 BEATS
QRS DURATION: 82 MS
QT INTERVAL: 348 MS
QTC CALCULATION(BAZETT): 459 MS
QTC FREDERICIA: 419 MS
R AXIS: -34 DEGREES
T AXIS: 14 DEGREES
T OFFSET: 388 MS
TACROLIMUS BLD-MCNC: 3.8 NG/ML
VENTRICULAR RATE: 105 BPM

## 2025-05-27 PROCEDURE — 2500000004 HC RX 250 GENERAL PHARMACY W/ HCPCS (ALT 636 FOR OP/ED)

## 2025-05-27 PROCEDURE — 80197 ASSAY OF TACROLIMUS: CPT

## 2025-05-27 PROCEDURE — 36415 COLL VENOUS BLD VENIPUNCTURE: CPT

## 2025-05-27 PROCEDURE — 92526 ORAL FUNCTION THERAPY: CPT | Mod: GN

## 2025-05-27 PROCEDURE — 97112 NEUROMUSCULAR REEDUCATION: CPT | Mod: GO

## 2025-05-27 PROCEDURE — 97535 SELF CARE MNGMENT TRAINING: CPT | Mod: GO

## 2025-05-27 PROCEDURE — 2500000001 HC RX 250 WO HCPCS SELF ADMINISTERED DRUGS (ALT 637 FOR MEDICARE OP)

## 2025-05-27 PROCEDURE — 97112 NEUROMUSCULAR REEDUCATION: CPT | Mod: GP

## 2025-05-27 PROCEDURE — 97110 THERAPEUTIC EXERCISES: CPT | Mod: GP

## 2025-05-27 PROCEDURE — 1100000001 HC PRIVATE ROOM DAILY

## 2025-05-27 PROCEDURE — 2500000002 HC RX 250 W HCPCS SELF ADMINISTERED DRUGS (ALT 637 FOR MEDICARE OP, ALT 636 FOR OP/ED)

## 2025-05-27 PROCEDURE — 99232 SBSQ HOSP IP/OBS MODERATE 35: CPT

## 2025-05-27 PROCEDURE — 99233 SBSQ HOSP IP/OBS HIGH 50: CPT | Performed by: STUDENT IN AN ORGANIZED HEALTH CARE EDUCATION/TRAINING PROGRAM

## 2025-05-27 RX ADMIN — ACETAMINOPHEN 650 MG: 325 TABLET ORAL at 21:50

## 2025-05-27 RX ADMIN — CLOPIDOGREL BISULFATE 75 MG: 75 TABLET, FILM COATED ORAL at 08:34

## 2025-05-27 RX ADMIN — LOSARTAN POTASSIUM 25 MG: 25 TABLET, FILM COATED ORAL at 21:40

## 2025-05-27 RX ADMIN — LOSARTAN POTASSIUM 25 MG: 25 TABLET, FILM COATED ORAL at 08:34

## 2025-05-27 RX ADMIN — TACROLIMUS 2.5 MG: 5 CAPSULE, GELATIN COATED ORAL at 07:32

## 2025-05-27 RX ADMIN — MYCOPHENOLATE MOFETIL 750 MG: 200 POWDER, FOR SUSPENSION ORAL at 08:34

## 2025-05-27 RX ADMIN — CLONIDINE HYDROCHLORIDE 0.2 MG: 0.2 TABLET ORAL at 21:39

## 2025-05-27 RX ADMIN — ENOXAPARIN SODIUM 40 MG: 40 INJECTION, SOLUTION SUBCUTANEOUS at 18:22

## 2025-05-27 RX ADMIN — MYCOPHENOLATE MOFETIL 750 MG: 200 POWDER, FOR SUSPENSION ORAL at 21:40

## 2025-05-27 RX ADMIN — CLONIDINE HYDROCHLORIDE 0.2 MG: 0.2 TABLET ORAL at 08:34

## 2025-05-27 RX ADMIN — ROSUVASTATIN CALCIUM 20 MG: 20 TABLET, FILM COATED ORAL at 00:27

## 2025-05-27 RX ADMIN — POLYETHYLENE GLYCOL 3350 17 G: 17 POWDER, FOR SOLUTION ORAL at 08:34

## 2025-05-27 RX ADMIN — ASPIRIN 81 MG: 81 TABLET, CHEWABLE ORAL at 08:34

## 2025-05-27 RX ADMIN — TACROLIMUS 2.5 MG: 5 CAPSULE, GELATIN COATED ORAL at 18:22

## 2025-05-27 RX ADMIN — ROSUVASTATIN CALCIUM 20 MG: 20 TABLET, FILM COATED ORAL at 21:39

## 2025-05-27 ASSESSMENT — PAIN SCALES - GENERAL
PAINLEVEL_OUTOF10: 0 - NO PAIN
PAINLEVEL_OUTOF10: 0 - NO PAIN
PAINLEVEL_OUTOF10: 5 - MODERATE PAIN
PAINLEVEL_OUTOF10: 0 - NO PAIN

## 2025-05-27 ASSESSMENT — PAIN - FUNCTIONAL ASSESSMENT
PAIN_FUNCTIONAL_ASSESSMENT: 0-10

## 2025-05-27 ASSESSMENT — COGNITIVE AND FUNCTIONAL STATUS - GENERAL
TOILETING: TOTAL
CLIMB 3 TO 5 STEPS WITH RAILING: TOTAL
EATING MEALS: A LOT
DAILY ACTIVITIY SCORE: 11
MOVING FROM LYING ON BACK TO SITTING ON SIDE OF FLAT BED WITH BEDRAILS: A LOT
MOVING TO AND FROM BED TO CHAIR: TOTAL
WALKING IN HOSPITAL ROOM: TOTAL
MOBILITY SCORE: 8
DRESSING REGULAR UPPER BODY CLOTHING: A LOT
HELP NEEDED FOR BATHING: A LOT
TURNING FROM BACK TO SIDE WHILE IN FLAT BAD: A LOT
STANDING UP FROM CHAIR USING ARMS: TOTAL
PERSONAL GROOMING: A LITTLE
DRESSING REGULAR LOWER BODY CLOTHING: TOTAL

## 2025-05-27 ASSESSMENT — ACTIVITIES OF DAILY LIVING (ADL): HOME_MANAGEMENT_TIME_ENTRY: 13

## 2025-05-27 NOTE — PROGRESS NOTES
"Davidson Khoury is a 60 y.o. male on day 8 of admission presenting with Acute cerebrovascular accident (CVA) due to embolism of right middle cerebral artery (Multi).    No acute events overnight.  No changes in his neurologic status from yesterday but he says overall it is getting a bit better.    Scheduled medications  aspirin, 81 mg, oral, Daily   Or  aspirin, 81 mg, oral, Daily   Or  aspirin, 81 mg, nasogastric tube, Daily   Or  aspirin, 300 mg, rectal, Daily  cloNIDine, 0.2 mg, oral, q12h CHRIST  clopidogrel, 75 mg, oral, Daily  enoxaparin, 40 mg, subcutaneous, q24h  lidocaine, 1 Application, urethral, Once  losartan, 25 mg, oral, BID  mycophenolate, 750 mg, nasogastric tube, BID  ondansetron, 4 mg, intravenous, Once  perflutren protein A microsphere, 0.5 mL, intravenous, Once in imaging  polyethylene glycol, 17 g, oral, Daily  rosuvastatin, 20 mg, oral, Nightly  sulfur hexafluoride microsphr, 2 mL, intravenous, Once in imaging  tacrolimus, 2.5 mg, oral, q12h CHRIST      Continuous medications     PRN medications  PRN medications: acetaminophen, benzocaine-menthol, [] hydrALAZINE **FOLLOWED BY** hydrALAZINE, ondansetron **OR** ondansetron, oxyCODONE, oxyCODONE, oxygen    Last Recorded Vitals  Blood pressure 148/89, pulse 64, temperature 36.8 °C (98.3 °F), temperature source Temporal, resp. rate 14, height 1.753 m (5' 9\"), weight 72.9 kg (160 lb 11.5 oz), SpO2 98%.  Intake/Output last 3 Shifts:  I/O last 3 completed shifts:  In: 220 (3 mL/kg) [NG/GT:220]  Out: 450 (6.2 mL/kg) [Urine:450 (0.2 mL/kg/hr)]  Weight: 72.9 kg     A&ox3, no distress, pleasant  MMM, no lesions  Lungs with equal air entry, no added sounds  Rrr, no m/r/g  Abd soft, nt, nd  No allograft tenderness  No edema b/l, lt hemiplegia    Results for orders placed or performed during the hospital encounter of 25 (from the past 24 hours)   Tacrolimus level   Result Value Ref Range    Tacrolimus  3.8 <=15.0 ng/mL     *Note: Due to a large number " of results and/or encounters for the requested time period, some results have not been displayed. A complete set of results can be found in Results Review.       CT head w/o contrast: 5/19/25  There is development of small acute infarct component within the  right frontal opercular region. There is also suspected subtle loss  of gray-white matter differentiation within the right frontal and  parietal lobes corresponding to regions of ischemia/infarct on recent  CTA perfusion examination. This could be better characterized with  MRI as clinically warranted. No acute intraparenchymal hematoma.  Subtle petechial hemorrhage versus contrast staining or potential  beam hardening artifact within the right parietal lobe. No  significant intracranial mass effect.     MRI head:  IMPRESSION:  1. Acute nonhemorrhagic right MCA territory infarct as above. There  is associated vasogenic edema and sulcal effacement. No midline  shift. No evidence of hemorrhagic transformation.  2. Old lacunar infarcts in left cerebellum, left thalamus and  bilateral basal ganglia. Moderate degree of chronic microvascular  ischemic disease in the cerebral white matter.  3. Evidence for old hemorrhage in the right basal ganglia, with  encephalomalacia. This could be sequela of an old hypertensive bleed,  or sequela of an old hemorrhagic infarct.        Assessment/Plan  60 y.o. male presenting with ESRD secondary to hypertension s/p DDKT on 8/10/2019. Patient was induced by Simulect, no DGF no CMV mismatch no EBV mismatch, KDPI of kidney is 40% PRA 0%. No episodes of rejection.     Currently admitted with Rt MCA occlusion CVA s/p cerebral angiogram and mecahnical thrombectomy 5/19/25     Allograft function: s/p DDKT 8/10/2019  - baseline Cr ~2. Stable this admission  - s/p contrast exposure this admission. Maintain adequate hydration.   - metabolic indices acceptable. Nonoliguric. No hypervolemia on exam.  - Hb ~13, acceptable  - Ca, Phos acceptable.  Monitor and replete lytes as indicated  - Bps improved. Losartan 25 mg bid, clonidine 0.2mg bid  - dose meds for CrCL. Avoid potential nephrotoxins.     Immunosuppression:  - TAC to 2.5 mg BID   - Monitor Fk trough daily (30-60 min prior to AM dose). Goal 4-6  Today's level below goal but monitor for now  -  mg     Rt MCA CVA: s/p mechanical thrombectomy 5/19/25  - management per neuro.  - on DAPT x3 months     Will follow.  Needs acute rehab.     Latrice Guardado MD

## 2025-05-27 NOTE — PROGRESS NOTES
Physical Therapy    Physical Therapy Treatment    Patient Name: Davidson Khoury  MRN: [id number]  Department: [place] [address]  Room: [address] [place] [address]  Today's Date: 5/27/2025  Time Calculation  Start Time: 1126  Stop Time: 1150  Time Calculation (min): 24 min         Assessment/Plan   PT Assessment  Barriers to Discharge Home: Physical needs, Caregiver assistance  Caregiver Assistance: Caregiver assistance needed per identified barriers - however, level of patient's required assistance exceeds assistance available at home  Physical Needs: High falls risk due to function or environment  Evaluation/Treatment Tolerance: Patient tolerated treatment well  End of Session Communication: Bedside nurse  Assessment Comment: Pt completed EOB activities with min-maxAx1 at trunk for sitting balance, sit<>stand with modAx2, and one lateral sidestep with maxAx2. Pt continues to demo decreased safety awareness, decreased functional strength, decreased activity tolerance/endurance, impaired balance, and increased difficulty with functional mobility compared to baseline. Pt. will continue to benefit from skilled PT intervention while inpatient to address deficits and maximize return to PLOF. Pt remains appropriate for HIGH intensity PT upon discharge.  End of Session Patient Position: Bed, 3 rail up, Alarm on  PT Plan  Inpatient/Swing Bed or Outpatient: Inpatient    PT Plan  Treatment/Interventions: Bed mobility, Transfer training, Stair training, Gait training, Balance training, Strengthening, Neuromuscular re-education, Endurance training, Therapeutic exercise, Range of motion, Therapeutic activity, Home exercise program, Postural re-education, Positioning  PT Plan: Ongoing PT  PT Frequency: 5 times per week  PT Discharge Recommendations: High intensity level of continued care  Equipment Recommended upon Discharge:  (TBD)  PT Recommended Transfer Status: Total assist  PT - OK to Discharge: Yes (When medically ready)    PT Visit Info:  PT  Received On: 05/27/25  Response to Previous Treatment: Patient with no complaints from previous session.     General Visit Information:   General  Family/Caregiver Present: No  Co-Treatment: OT  Co-Treatment Reason: To maximize pt safety and therapeutic potential. AMPAC </= 10  Prior to Session Communication: Bedside nurse  Patient Position Received: Bed, 3 rail up, Alarm on  Preferred Learning Style: auditory, visual, verbal  General Comment: Pt received supine in bed, agreeable to participate in session.    Subjective   Precautions:  Precautions  Medical Precautions: Fall precautions  Precautions Comment: SBP <180, Left hemiparesis    Objective   Pain:  Pain Assessment  Pain Assessment: 0-10  0-10 (Numeric) Pain Score: 0 - No pain    Cognition:  Cognition  Overall Cognitive Status: Impaired  Arousal/Alertness: Appropriate responses to stimuli  Orientation Level: Oriented X4  Following Commands: Follows one step commands with repetition (approx. 75% of commands)  Safety Judgment: Decreased awareness of need for assistance  Impulsive: Moderately    Postural Control:  Postural Control  Postural Control: Impaired  Posture Comment: Pt with increased cervical flexion and L trunk lean with EOB activities. Requires significant cuesfor midline positioning. Pt pushes to L with R UE  Static Sitting Balance  Static Sitting-Balance Support: Bilateral upper extremity supported, Feet supported  Static Sitting-Level of Assistance:  (min-maxAx1)  Dynamic Sitting Balance  Dynamic Sitting-Balance Support: Bilateral upper extremity supported, Feet supported  Dynamic Sitting-Level of Assistance:  (min-maxAx1)  Dynamic Sitting-Balance: Lateral lean  Static Standing Balance  Static Standing-Balance Support: Bilateral upper extremity supported  Static Standing-Level of Assistance: Moderate assistance (x2)  Dynamic Standing Balance  Dynamic Standing-Balance Support: Bilateral upper extremity supported  Dynamic Standing-Level of  Assistance: Maximum assistance (x2)    Activity Tolerance:  Activity Tolerance  Endurance: Tolerates 10 - 20 min exercise with multiple rests  Early Mobility/Exercise Safety Screen: Proceed with mobilization - No exclusion criteria met    Treatments:  Therapeutic Exercise  Therapeutic Exercise Performed: Yes  Therapeutic Exercise Activity 1: Sup: PROM L hip/knee flexion x10, L gastroc stretch 2x20 sec  Therapeutic Exercise Activity 2: EOB: LAQ x10 on R actively, passively on L with muscle tapping technique    Therapeutic Activity  Therapeutic Activity Performed: Yes  Therapeutic Activity 1: While EOB, completed weight shift onto R and L elbows x3 reps karina, sustained sidelying position approx 15-20 sec before returning to upright. Pt required increased assist transitioning from L sidelying to upright, maxAx1 at trunk with L elbow support    Balance/Neuromuscular Re-Education  Balance/Neuromuscular Re-Education Activity Performed: Yes  Balance/Neuromuscular Re-Education Activity 1: Pt sat EOB approx. 10-12 min with assist varying from mod-maxAx1 at trunk 2/2 L lateral lean. Pt pushing through R UE. Cues for upright posture and mildine positioning.  Balance/Neuromuscular Re-Education Activity 2: Completed UE reaching activities [id number] with R UE in upright position and L sidelying onto elbow.    Bed Mobility  Bed Mobility: Yes  Bed Mobility 1  Bed Mobility 1: Supine to sitting, Sitting to supine  Level of Assistance 1: Maximum assistance, +2  Bed Mobility Comments 1: HOB elevated, assist to manage LEs and upright trunk    Ambulation/Gait Training  Ambulation/Gait Training Performed: Yes  Ambulation/Gait Training 1  Surface 1: Level tile  Device 1:  (karina arm in arm)  Assistance 1: Maximum assistance, Maximum verbal cues (x2)  Quality of Gait 1: Diminished heel strike, Foot slap, Knee(s) buckle (L knee block, unable to initiate sidesteps - required assist from therapists to facilitate karina)  Comments/Distance (ft) 1: 1  sidestep to R    Transfers  Transfer: Yes  Transfer 1  Transfer From 1: Sit to, Stand to  Transfer to 1: Stand, Sit  Technique 1: Sit to stand, Stand to sit  Transfer Device 1:  (karina arm in arm)  Transfer Level of Assistance 1: Maximum assistance, +2  Trials/Comments 1: L knee block, use of chux to assist with hip extension    Stairs  Stairs: No    Outcome Measures:  [facility] Basic Mobility  Turning from your back to your side while in a flat bed without using bedrails: A lot  Moving from lying on your back to sitting on the side of a flat bed without using bedrails: A lot  Moving to and from bed to chair (including a wheelchair): Total  Standing up from a chair using your arms (e.g. wheelchair or bedside chair): Total  To walk in hospital room: Total  Climbing 3-5 steps with railing: Total  Basic Mobility - Total Score: 8    Education Documentation  Precautions, taught by Josselin Carrillo PT at 5/27/2025 12:20 PM.  Learner: Patient  Readiness: Acceptance  Method: Explanation, Demonstration  Response: Needs Reinforcement, Verbalizes Understanding  Comment: postural awareness, midline positioning    Body Mechanics, taught by Josselin Carrillo PT at 5/27/2025 12:20 PM.  Learner: Patient  Readiness: Acceptance  Method: Explanation, Demonstration  Response: Needs Reinforcement, Verbalizes Understanding  Comment: postural awareness, midline positioning    Mobility Training, taught by Josselin Carrillo PT at 5/27/2025 12:20 PM.  Learner: Patient  Readiness: Acceptance  Method: Explanation, Demonstration  Response: Needs Reinforcement, Verbalizes Understanding  Comment: postural awareness, midline positioning    Education Comments  No comments found.        OP EDUCATION:       Encounter Problems       Encounter Problems (Active)       Balance       Patient to demo static standing with unilateral UE support, performing single UE task with SBA, no sway or LOB x 2 mins for functional carryover  (Progressing)       Start:   05/20/25    Expected End:  06/03/25            Pt will score >/= 24/28 on Tinetti balance assessment to indicate low falls risk.   (Progressing)       Start:  05/20/25    Expected End:  06/03/25               Mobility       STG - Patient will ambulate >/= 30 ft with CGA and LRAD (Progressing)       Start:  05/20/25    Expected End:  06/03/25            Pt. will tolerate >/= 10 minutes of OOB mobility without seated rest break and VSS to demo improved activity tolerance/endurance.  (Progressing)       Start:  05/20/25    Expected End:  06/03/25            Patient will actively participate in ther-ex in order to improve strength and to assist with the completion of functional mobility tasks.  (Progressing)       Start:  05/20/25    Expected End:  06/03/25               PT Transfers       STG - Patient will perform bed mobility IND (Progressing)       Start:  05/20/25    Expected End:  06/03/25            STG - Patient will transfer sit to and from stand with CGA and LRAD (Progressing)       Start:  05/20/25    Expected End:  06/03/25               Pain - Adult              05/27/25 at 12:24 PM - Josselin Carrillo, PT

## 2025-05-27 NOTE — PROGRESS NOTES
Inpatient  Speech-Language Pathology Treatment     Patient Name: Davidson Khoury  MRN: [id number]  Today's Date: 5/27/2025  Time Calculation  Start Time: 0915  Stop Time: 0925  Time Calculation (min): 10 min     Assessment/Plan:  #dysphagia      Pt seen bedside for dysphagia treatment following MBSS. Review MBSS results, deficits, recommendations, and safety precautions. Pt verbalized understanding. Introduced lingual and finger sweep to target oral pocketing, pt benefited from verbal cues and clinician modeling for accuracy and use. Facilitated collaborative discussion regarding regarding risk of aspiration, aspiration related complications and choking risks. Pt verbalized understanding. Review and reinforced diet recommendations and oral care recommendations,  pt benefited from visual cues  for accuracy. SLP continue to follow     Recommendations:  Continue FULL LIQUID DIET; NECTAR/MILDLY THICK LIQUIDS     Safe Swallow/Compensatory Strategies:  Upright positioning   Slow rate/small boluses   Alternate solids and liquids  Assistance w/ PO intake to assure adherence to safe swallow strategies/oral clearance     Oral Care 2-3 x daily       Plan:  Inpatient/Swing Bed or Outpatient: Inpatient  SLP Frequency: 2x per week  Duration: 2 weeks  SLP Discharge Recommendations: Continue skilled SLP services at the next level of care  Discussed POC: Patient  Discussed Risks/Benefits: Yes, Patient  Patient/Caregiver Agreeable: Yes  SLP - OK to Discharge: Yes    Goals:  Encounter Problems       Encounter Problems (Active)       Swallowing       LTG - Patient will tolerate the least restrictive diet without overt difficulty by time of discharge.       Start:  05/20/25    Expected End:  06/10/25             The patient/caregiver/family will demonstrate adequate return of knowledge of all compensatory strategy/instruction w/ 100% acc. to effectively assist the patient in immediate safety with oral intake and swallowing.         Start:   05/20/25    Expected End:  06/10/25            STG  - Pt will demonstrate adequate oral motor skills and cognitive function to participate in a Modified Barium Swallow Study within 1 week of SLP recommendation to fully assess swallow function and determine further treatment needs.         Start:  05/20/25    Expected End:  05/27/25                   Subjective   Pt seen bedside, no acute distress, agreeable to SLP treatment        Objective   - Review MBSS  - Education + use of lingual sweep (50% effective), Finger sweep (50% effective)   -Review diet recommendations   -Review compensatory / safe swallow strategies   -Review oral care

## 2025-05-27 NOTE — PROGRESS NOTES
Occupational Therapy    Occupational Therapy Treatment    Name: Davidson Khoury  MRN: [id number]  : 1965  Date: 25  Room: [address] [place], [place] [address]  Time Calculation  Start Time: 1128  Stop Time: 1154  Time Calculation (min): 26 min  Assessment:  Barriers to Discharge Home: Caregiver assistance, Cognition needs, Physical needs  Caregiver Assistance: Caregiver assistance needed per identified barriers - however, level of patient's required assistance exceeds assistance available at home  Cognition Needs: 24hr supervision for safety awareness needed, Cognition-related high falls risk, Insight of patient limited regarding functional ability/needs  Physical Needs: 24hr mobility assistance needed, 24hr ADL assistance needed, High falls risk due to function or environment  Evaluation/Treatment Tolerance: Patient tolerated treatment well  Medical Staff Made Aware: Yes  End of Session Communication: Bedside nurse  End of Session Patient Position: Bed, 3 rail up, Alarm on  Plan:  Treatment Interventions: ADL retraining, UE strengthening/ROM, Compensatory technique education  OT Frequency: 4 times per week  OT Discharge Recommendations: High intensity level of continued care  Equipment Recommended upon Discharge:  (TBD)  OT Recommended Transfer Status: Assist of 2  OT - OK to Discharge: Yes    Subjective   General:  OT Last Visit  OT Received On: 25  Co-Treatment: PT  Co-Treatment Reason: Mobility AMPAC <10, pt highly impulsive and unsteady - 2-person skilled assist to maximize benefit of therapy and pt's safety  Prior to Session Communication: Bedside nurse  Patient Position Received: Bed, 3 rail up, Alarm on  Family/Caregiver Present: No  General Comment: Pt pleasant and agreeable to OT session - continually asking for his exercise bike   Precautions:  Medical Precautions: Fall precautions  Precautions Comment: SBP <180  Lines/Tubes/Drains:  NG/OG/Feeding Tube Right nostril (Active)   Number of days: 7       External  Urinary Catheter Male (Active)   Number of days: 7   Cognition:  Overall Cognitive Status: Impaired  Arousal/Alertness: Appropriate responses to stimuli  Orientation Level: Oriented X4  Following Commands: Follows one step commands with repetition (~75%)  Safety Judgment: Decreased awareness of need for assistance  Safety/Judgement: Exceptions to WFL  Complex Functional Tasks: Moderate  Novel Situations: Moderate  Routine Tasks: Moderate  Insight: Moderate  Impulsive: Moderately  Processing Speed: Delayed  Pain Assessment:  Pain Assessment  Pain Assessment: 0-10  0-10 (Numeric) Pain Score: 0 - No pain     Objective   Activities of Daily Living:      Grooming  Grooming Level of Assistance: Minimum assistance  Grooming Where Assessed: Edge of bed  Grooming Comments: Mod A for seated balance, cues for use of wipe to clean mouth - cues for thoroughness with Min A for completion   UE Dressing  UE Dressing Level of Assistance: Moderate assistance  UE Dressing Where Assessed: Bed level  UE Dressing Comments: Education on josue technique - cues for gown management around L UE with assist to manage around R UE - overall, Mod A to complete     Toileting  Toileting Level of Assistance: Dependent  Where Assessed: Bed level  Toileting Comments: Total assist for clothing management and hygiene following incontinent urination in bed  - cues for improved IND  Bed Mobility/Transfers:   Bed Mobility  Bed Mobility: Yes  Bed Mobility 1  Bed Mobility 1: Supine to sitting, Sitting to supine  Level of Assistance 1: Maximum assistance, +2  Bed Mobility Comments 1: HOB slight elevation - Max A for LE and trunk management - cues for technique  Transfers  Transfer: Yes  Transfer 1  Transfer From 1: Sit to, Stand to  Transfer to 1: Stand, Sit  Technique 1: Sit to stand, Stand to sit  Transfer Level of Assistance 1: Moderate assistance, +2  Trials/Comments 1: Cues for positioning, L LE blocking, Mod A x2 for lifting to stand   Balance:  Static  Sitting Balance  Static Sitting-Balance Support: Feet supported, Right upper extremity supported  Static Sitting-Level of Assistance: Moderate assistance, Maximum assistance  Static Sitting-Comment/Number of Minutes: Mod-Max A throughout  Therapy/Activity:   Therapeutic Exercise  Therapeutic Exercise Performed: Yes  Therapeutic Exercise Activity 1: Education on SROM using R UE to guide L UE through various movements - 10 reps of shoulder and elbow flexion/extension - hand over hand for follow through with technique, will benefit from reinforcement for follow through     Balance/Neuromuscular Re-Education  Balance/Neuromuscular Re-Education Activity Performed: Yes  Balance/Neuromuscular Re-Education Activity 1: Static sitting EOB for approx 10-12 minutes, constant verbal and visual cues to correct posture and attempt to sustain midline sitting - he demos pushing through R UE and benefits from modified sitting positioning to improve. Mod A - Max A throughout for balance.  Balance/Neuromuscular Re-Education Activity 2: R forearm weight bearing to improve L lean and encourage midline - 3x lean into R forearm and push up to upright sitting  Balance/Neuromuscular Re-Education Activity 3: L UE placed in forearm weight bearing on pillow while sitting EOB, pt reached cross body with R UE to retrieve items and increase weight bearing through L UE. He continues to require Mod-Max A for seated balance and recovery back to midline.  Outcome Measures:  [facility] Daily Activity  Putting on and taking off regular lower body clothing: Total  Bathing (including washing, rinsing, drying): A lot  Putting on and taking off regular upper body clothing: A lot  Toileting, which includes using toilet, bedpan or urinal: Total  Taking care of personal grooming such as brushing teeth: A little  Eating Meals: A lot  Daily Activity - Total Score: 11     Education Documentation  Body Mechanics, taught by Jeff Vallejo OT at 5/27/2025 12:11  PM.  Learner: Patient  Readiness: Acceptance  Method: Explanation, Demonstration  Response: Needs Reinforcement  Comment: ADLs and safety    Precautions, taught by Jeff Vallejo OT at 5/27/2025 12:11 PM.  Learner: Patient  Readiness: Acceptance  Method: Explanation, Demonstration  Response: Needs Reinforcement  Comment: ADLs and safety    ADL Training, taught by Jeff Vallejo OT at 5/27/2025 12:11 PM.  Learner: Patient  Readiness: Acceptance  Method: Explanation, Demonstration  Response: Needs Reinforcement  Comment: ADLs and safety    Education Comments  No comments found.    Goals:  Encounter Problems       Encounter Problems (Active)       ADLs       Patient with complete upper body dressing with moderate assist level of assistance donning and doffing all UE clothes with PRN adaptive equipment and one handed techniques while edge of bed.  (Met)       Start:  05/21/25    Expected End:  06/11/25    Resolved:  05/27/25    Updated to: Patient with complete upper body dressing with Min assist level of assistance donning and doffing all UE clothes with PRN adaptive equipment and one handed techniques while edge of bed.    Update reason: Met         Patient will feed self with minimal assist level of assistance and verbal cues and tactile cues using PRN adaptive equipment. (Progressing)       Start:  05/21/25    Expected End:  06/11/25            Patient will complete daily grooming tasks brushing teeth and washing face/hair with minimal assist level of assistance and PRN adaptive equipment while edge of bed. (Progressing)       Start:  05/21/25    Expected End:  06/11/25            Patient with complete upper body dressing with Min assist level of assistance donning and doffing all UE clothes with PRN adaptive equipment and one handed techniques while edge of bed. (Progressing)       Start:  05/27/25    Expected End:  06/11/25                   BALANCE       Patient will maintain static standing balance during ADL task  with minimal assist level of assistance in order to demonstrate decreased risk of falling and improved postural control. (Progressing)       Start:  05/21/25    Expected End:  06/11/25               EXERCISE/STRENGTHENING       Patient will independently perform self passive ROM on LUE to increase bimanual coordination. (Progressing)       Start:  05/21/25    Expected End:  06/11/25               TRANSFERS       Patient will perform bed mobility minimal assist level of assistance in order to improve safety and independence with mobility (Progressing)       Start:  05/21/25    Expected End:  06/11/25 05/27/25 at 12:12 PM   Jeff Vallejo, OT   [phone]

## 2025-05-27 NOTE — CARE PLAN
The clinical goals for the shift include patient will remain safe/comfortable and free of falls/injuries.    Over the shift, the patient remained safe/comfortable and free of falls/injuries.

## 2025-05-27 NOTE — PROGRESS NOTES
25 1206   Discharge Planning   Expected Discharge Disposition SNF  ([facility])   Does the patient need discharge transport arranged? Yes     Transitional Care Coordination Progress Note:  Plan per Medical/Surgical team: Patient admitted with acute CV, waldo ognzalez in place receiving Isosource 1/5 @ 80mL/ hr x12  hours. Now medically cleared for discharge.  Discharge Disposition: [facility]  Potential Barriers:  precert  Patient Class:Inpatient  Financial Class: [facility]   ADOD:     This nurse spoke with pts significant other Margo and notified her that auth for [facility] pending, submitted on .   MD Cid notified of the above.     Amy Mcgowan, RN, BSN  Transitional Care Coordinator  Office: [phone]  Secure chat via [employer]

## 2025-05-27 NOTE — PROGRESS NOTES
Davidson Khoury is a 60 y.o. left handed male on day 8 of admission presenting with Acute cerebrovascular accident (CVA) due to embolism of right middle cerebral artery (Multi).    Subjective   New acute event overnight, no new complaints.       Objective     Last Recorded Vitals  Heart Rate:  [60-74]   Temp:  [36.1 °C (97 °F)-36.8 °C (98.2 °F)]   Resp:  [14-17]   BP: (129-150)/(75-87)   SpO2:  [98 %-100 %]       [facility] NIHSS:   NIH Stroke Scale:     Date/Time of Assessment: 5/27/2025 10:40 AM    1A. Level of Consciousness:  Alert (keenly responsive) (0)    1B. Ask Month and Age:  Both questions right (0)    1C. Blink Eyes & Squeeze Hands:  Performs both tasks (0)    2. Best Gaze:  Normal (0)    3. Visual:  Complete hemianopia (+2)    4. Facial Palsy:  Partial paralysis (+2)    5A. Motor - Left Arm:  No movement (+4)    5B. Motor - Right Arm:  No drift (0)    6A. Motor - Left Leg:  No effort against gravity (+3)    6B. Motor - Right Leg:  No drift (0)    7. Limb Ataxia:  No ataxia (0)    8. Sensory Loss:  Severe to total loss (+2)    9. Best Language:  Normal (no aphasia) (0)    10. Dysarthia:  Mild-moderate dysarthria (+1)    11. Extinction and Inattention:  Visual/tactile/auditory/spatial/personal inattention (+1)    NIH Stroke Scale:  15       Physical Exam  Neurological Exam  GENERAL APPEARANCE:  No distress, alert, interactive and cooperative.     MENTAL STATE:   Orientation was normal to time, place and person. Recent and remote memory was intact.  Attention span and concentration were normal. Language testing was normal for comprehension, repetition, expression, and naming. The patient could correctly interpret a picture. General fund of knowledge was intact.     CRANIAL NERVES:   CN 2   Left inferior quadrantanopia  CN 3, 4, 6   Pupils round, 4 mm in diameter, equally reactive to light. Lids symmetric; no ptosis. EOMs normal alignment, full range with normal saccades, pursuit and convergence.   No nystagmus.    CN 5   Facial sensation intact bilaterally.   CN 7   Left UMN paralysis  CN 8   Hearing intact to conversation.   CN 9   Palate elevates symmetrically.   CN 11   Normal strength of shoulder shrug and neck turning.   CN 12   Tongue midline, with normal bulk and strength; no fasciculations.     Motor/Sensory:   Hypotonia of the left UE and LE, LUE 0/5, LLE 0/5. RUE 5/5 with elbow flexion/extension and RLE /5 with hip flexion, dorsiflexion, and plantarflexion, left hemisensory loss and neglect.    COORDINATION:    Right finger-nose-finger was intact without dysmetria or overshoot.     GAIT:   Deferred    Cardiac: regular rate, normal s1, s2,  Chest: Equal air entry bilaterally  Abdomen soft lax non distended    Relevant Results    NIH Stroke Scale  1A. Level of Consciousness: Alert, Keenly Responsive  1B. Ask Month and Age: Both Questions Right  1C. Blink Eyes & Squeeze Hands: Performs 1 Task  2. Best Gaze: Partial Gaze Palsy  3. Visual: Partial Hemianopia  4. Facial Palsy: Partial Paralysis  5A. Motor - Left Arm: No Effort Against Gravity  5B. Motor - Right Arm: No Drift  6A. Motor - Left Leg: Some Effort Against Gravity  6B. Motor - Right Leg: No Drift  7. Limb Ataxia: Present in One Limb  8. Sensory Loss: Mild-to-Moderate Sensory Loss  9. Best Language: Mild-to-Moderate Aphasia  10. Dysarthria: Mild-to-Moderate Dysarthria  11. Extinction and Inattention: Visual, Tactile, Auditory, Spatial, or Personal Inattention  NIH Stroke Scale: 15           [place] Coma Scale  Best Eye Response: Spontaneous  Best Verbal Response: Oriented  Best Motor Response: Follows commands  [place] Coma Scale Score: 15                    Assessment & Plan  Acute cerebrovascular accident (CVA) due to embolism of right middle cerebral artery (Multi)    Davidson Khoury is a 60 y.o. male with a history notable for renal transplant (currently on Cellcept) and hypertension presenting to the ED initially with a chief complaint of altered mental  status, headache, and hypertension. Initially evaluated by general neurology team who noted subtle left sided weakness that worsened on follow up exam. BAT activated for concern of stroke. Found to have proximal R superior M2 occlusion. Not a candidate for TNK as LKN >4.5 hours. Taken for MT, TICI 2b. Exam notable for L HH, L UMN facial paralysis, LUE 0/5, LLE 3/5. right at least antigravity, left hemisensory loss and neglect.    Stroke Metrics:  EMS pre-notification?: No  Time of stroke team arrival: 3:47pm  Direct to CT?: No  Time of CTH read by stroke team: As performed  Time of TNK: n/a  Time stroke team called IR: 4:27pm  Time pt arrived to angio suite: 4:57pm  Time of groin puncture: 5:10pm  Time of recanalization: 5:31pm  Number of passes: 1  Device used: penumbra aspiration  Any delays in the process (if door to TNK >30 min): n/a     Stroke Classification:  Type: Ischemic stroke  Subtype/etiology: Suspected large artery disease  Vessels involved: R MCA  Neurological manifestations:  Pre-Intervention Deficits: NIHSS 10 *see above  Post-Intervention Deficits: NIHSS 9 *see above  Pre-stroke mRS: 0  Initial treatment: Angio  Anti-platelets or Anti-coagulation management: Aspirin  Vascular Risk Factors: HTN  Antiplatelet/antithrombotic plan for stroke prevention: Aspirin  VTE prophylaxis: Lovenox  Vascular Risk Factor modification goals:  Blood pressure goals: avoid hypotension SBP <100 that could worsen cerebral perfusion, Ischemic stroke post-thrombectomy SBP <180mmHg   Lipid Goals: education on healthy diet and statin therapy to maintain or achieve goal LDL-cholesterol < 70mg.  Glucose Goals: early treatment of hyperglycemia to goal glucose 140-180 mg/dl with long-term goal A1c < 7%   Smoking Cessation and Education  Assessment for Rehabilitation needs   Patient and family education on signs and symptoms of stroke, calling 911, healthy strategies for stroke prevention.      Plan:  Updates 05/27/25  -Continue  DAPT 90 days [facility] protocol for intracranial atherosclerosis started 5/23/2025  -Continue tacrolimus to 2.5mg BID per transplant nephology  - Tried to call Brother Manuel Khoury at [phone] no answer  -Follow up nutrition recs  -Discharge on bridle Dobhoff  -Pending dispo to SNF  -PT/OT    #Proximal R superior M2 occlusion s/p TICI 2b MT   #HTN   #Hx of thrombocytopenia- Stable   - Continue DAPT 90 days [facility] protocol for intracranial atherosclerosis  - Stroke work up  - A1c: 5.2%, LDL 63              - TTE with bubble study: EF 70-75% LA normal in size, no PFO  - SBP goals normotension now 7 days post-stroke  - Discuss with transplant nephrology optimal antihypertensive regime. Currently:   - Losartan 25mg BID   - Clonidine 0.2mg BID  - PT/OT/SLP     #Renal transplant (on Cellcept and Tacrolimus)   - Baseline BUN/Cr: 15/1.80   -Consulted nephrology recommended - continue tacrolimus to  2.5mg q12. Monitor Fk trough daily (30-60 min prior to AM dose). Goal 5-8.  - cont  mg bid.    #Dysphagia  MBSS cleared for liquid diet  Discharge on bridle Dobhoff  Nocturnal feeds::   Isosource 1.5 @ 80ml/hr x 12hrs.   Oral nutrition support regimen:   Trial Boost VHC daily.      Pain medications: PRN Tylenol  Fluids: Replete PRN  Electrolytes: Keep mg >2, phos >3  and K >4  Nutrition:  Enteral feeding WITH diet order Diet type: Full Liquid; Fluid consistency: Mild thick 2; Tube feeding formula: Isosource 1.5; 80; 20:00-08:00   Antimicrobials: None  Antiplatelet: ASA and Clopidogrel  Anticoagulation: None  DVT PPX: Lovenox  GI ppx: none needed  Bowel care: Miralax  Catheter: None  Lines: PIV  Oxygen: Room Air    Disposition:   PT/OT moderate intensity    Code Status: Full Code (confirmed on admission)   NOK:  Primary Emergency Contact: Margo Cali MD  PGY-2 Neurology

## 2025-05-27 NOTE — CARE PLAN
The patient's goals for the shift include      The clinical goals for the shift include Patient will remain safe and free of injury throughout the shift.    Pt remained safe and free of injury during night. Headache reported and tylenol given. Continued tube feeding during night.  No other distress noted. Call light in reach. Resting quietly at this time.     Polly Arauz RN

## 2025-05-28 ENCOUNTER — APPOINTMENT (OUTPATIENT)
Dept: RADIOLOGY | Facility: HOSPITAL | Age: 60
DRG: 023 | End: 2025-05-28
Payer: COMMERCIAL

## 2025-05-28 LAB — TACROLIMUS BLD-MCNC: 6.3 NG/ML

## 2025-05-28 PROCEDURE — 74230 X-RAY XM SWLNG FUNCJ C+: CPT

## 2025-05-28 PROCEDURE — 74230 X-RAY XM SWLNG FUNCJ C+: CPT | Performed by: RADIOLOGY

## 2025-05-28 PROCEDURE — 99232 SBSQ HOSP IP/OBS MODERATE 35: CPT

## 2025-05-28 PROCEDURE — 2500000004 HC RX 250 GENERAL PHARMACY W/ HCPCS (ALT 636 FOR OP/ED)

## 2025-05-28 PROCEDURE — 36415 COLL VENOUS BLD VENIPUNCTURE: CPT

## 2025-05-28 PROCEDURE — 1100000001 HC PRIVATE ROOM DAILY

## 2025-05-28 PROCEDURE — 2500000001 HC RX 250 WO HCPCS SELF ADMINISTERED DRUGS (ALT 637 FOR MEDICARE OP)

## 2025-05-28 PROCEDURE — 2500000005 HC RX 250 GENERAL PHARMACY W/O HCPCS: Performed by: STUDENT IN AN ORGANIZED HEALTH CARE EDUCATION/TRAINING PROGRAM

## 2025-05-28 PROCEDURE — 92526 ORAL FUNCTION THERAPY: CPT | Mod: GN

## 2025-05-28 PROCEDURE — 2500000002 HC RX 250 W HCPCS SELF ADMINISTERED DRUGS (ALT 637 FOR MEDICARE OP, ALT 636 FOR OP/ED)

## 2025-05-28 PROCEDURE — 80197 ASSAY OF TACROLIMUS: CPT

## 2025-05-28 PROCEDURE — 99233 SBSQ HOSP IP/OBS HIGH 50: CPT | Performed by: STUDENT IN AN ORGANIZED HEALTH CARE EDUCATION/TRAINING PROGRAM

## 2025-05-28 PROCEDURE — 2500000004 HC RX 250 GENERAL PHARMACY W/ HCPCS (ALT 636 FOR OP/ED): Mod: JZ

## 2025-05-28 PROCEDURE — 92611 MOTION FLUOROSCOPY/SWALLOW: CPT | Mod: GN

## 2025-05-28 PROCEDURE — 97530 THERAPEUTIC ACTIVITIES: CPT | Mod: GP

## 2025-05-28 RX ADMIN — ENOXAPARIN SODIUM 40 MG: 40 INJECTION, SOLUTION SUBCUTANEOUS at 18:01

## 2025-05-28 RX ADMIN — MYCOPHENOLATE MOFETIL 750 MG: 200 POWDER, FOR SUSPENSION ORAL at 20:25

## 2025-05-28 RX ADMIN — BARIUM SULFATE 40 ML: 400 SUSPENSION ORAL at 13:47

## 2025-05-28 RX ADMIN — CLOPIDOGREL BISULFATE 75 MG: 75 TABLET, FILM COATED ORAL at 09:41

## 2025-05-28 RX ADMIN — ROSUVASTATIN CALCIUM 20 MG: 20 TABLET, FILM COATED ORAL at 20:24

## 2025-05-28 RX ADMIN — CLONIDINE HYDROCHLORIDE 0.2 MG: 0.2 TABLET ORAL at 20:24

## 2025-05-28 RX ADMIN — BARIUM SULFATE 15 ML: 400 PASTE ORAL at 13:46

## 2025-05-28 RX ADMIN — LOSARTAN POTASSIUM 25 MG: 25 TABLET, FILM COATED ORAL at 09:41

## 2025-05-28 RX ADMIN — BARIUM SULFATE 80 ML: 0.81 POWDER, FOR SUSPENSION ORAL at 13:47

## 2025-05-28 RX ADMIN — LOSARTAN POTASSIUM 25 MG: 25 TABLET, FILM COATED ORAL at 20:25

## 2025-05-28 RX ADMIN — TACROLIMUS 2.5 MG: 5 CAPSULE, GELATIN COATED ORAL at 18:02

## 2025-05-28 RX ADMIN — POLYETHYLENE GLYCOL 3350 17 G: 17 POWDER, FOR SOLUTION ORAL at 09:43

## 2025-05-28 RX ADMIN — TACROLIMUS 2.5 MG: 5 CAPSULE, GELATIN COATED ORAL at 07:31

## 2025-05-28 RX ADMIN — ASPIRIN 81 MG: 81 TABLET, CHEWABLE ORAL at 09:41

## 2025-05-28 RX ADMIN — MYCOPHENOLATE MOFETIL 750 MG: 200 POWDER, FOR SUSPENSION ORAL at 09:41

## 2025-05-28 RX ADMIN — CLONIDINE HYDROCHLORIDE 0.2 MG: 0.2 TABLET ORAL at 09:41

## 2025-05-28 ASSESSMENT — COGNITIVE AND FUNCTIONAL STATUS - GENERAL
DAILY ACTIVITIY SCORE: 12
TURNING FROM BACK TO SIDE WHILE IN FLAT BAD: A LOT
TOILETING: A LOT
TURNING FROM BACK TO SIDE WHILE IN FLAT BAD: A LOT
MOBILITY SCORE: 10
MOVING TO AND FROM BED TO CHAIR: A LOT
MOVING FROM LYING ON BACK TO SITTING ON SIDE OF FLAT BED WITH BEDRAILS: A LOT
CLIMB 3 TO 5 STEPS WITH RAILING: A LOT
MOVING FROM LYING ON BACK TO SITTING ON SIDE OF FLAT BED WITH BEDRAILS: A LOT
WALKING IN HOSPITAL ROOM: TOTAL
MOVING TO AND FROM BED TO CHAIR: A LOT
CLIMB 3 TO 5 STEPS WITH RAILING: TOTAL
MOBILITY SCORE: 12
DRESSING REGULAR LOWER BODY CLOTHING: A LOT
HELP NEEDED FOR BATHING: A LOT
DRESSING REGULAR UPPER BODY CLOTHING: A LOT
WALKING IN HOSPITAL ROOM: A LOT
EATING MEALS: A LOT
STANDING UP FROM CHAIR USING ARMS: A LOT
PERSONAL GROOMING: A LOT
STANDING UP FROM CHAIR USING ARMS: A LOT

## 2025-05-28 ASSESSMENT — PAIN - FUNCTIONAL ASSESSMENT
PAIN_FUNCTIONAL_ASSESSMENT: 0-10
PAIN_FUNCTIONAL_ASSESSMENT: 0-10

## 2025-05-28 ASSESSMENT — PAIN SCALES - GENERAL
PAINLEVEL_OUTOF10: 3
PAINLEVEL_OUTOF10: 0 - NO PAIN
PAINLEVEL_OUTOF10: 0 - NO PAIN

## 2025-05-28 NOTE — PROGRESS NOTES
"Physical Therapy    Physical Therapy Treatment    Patient Name: Davidson Khoury  MRN: [id number]  Department: [place] [id number]  Room: [address] [place] [address]  Today's Date: 5/28/2025  Time Calculation  Start Time: 1540  Stop Time: 1620  Time Calculation (min): 40 min         Assessment/Plan   PT Assessment  PT Assessment Results: Decreased strength, Decreased endurance, Decreased mobility, Impaired balance, Impaired tone, Decreased cognition, Decreased safety awareness, Impaired sensation  Rehab Prognosis: Excellent  Barriers to Discharge Home: Physical needs, Caregiver assistance  Caregiver Assistance: Caregiver assistance needed per identified barriers - however, level of patient's required assistance exceeds assistance available at home  Physical Needs: High falls risk due to function or environment  Evaluation/Treatment Tolerance: Patient tolerated treatment well  Medical Staff Made Aware: Yes  End of Session Communication: Bedside nurse  Assessment Comment: Patient stood 4 times, transferred to recliner, and performed 48 reps of single leg press (left only). Patient is very retropulsive and pushes to the left when sitting edge of bed. He is able to self-correct when asked to \"sit upright\". Patient does require full left knee blocking to stand and to transfer to a recliner. When standing, PT facilitated weight shift (10 reps). While in recliner, PT isolated left leg and had him push himself backwards in wheelced recliner 3 sets. Patient does require infrequent quad tactile cuing, but has consistent gluteal contraction with repetitions. PT to continue to follow up as able and appropriate.  End of Session Patient Position: Alarm on, Up in chair  PT Plan  Inpatient/Swing Bed or Outpatient: Inpatient  PT Plan  Treatment/Interventions: Bed mobility, Transfer training, Gait training, Stair training, Balance training, Neuromuscular re-education, Neurodevelopmental intervention, Strengthening, Endurance training, Range of motion, Therapeutic " "exercise, Therapeutic activity, Home exercise program  PT Plan: Ongoing PT  PT Frequency: 5 times per week  PT Discharge Recommendations: High intensity level of continued care  Equipment Recommended upon Discharge:  (TBD)  PT Recommended Transfer Status: Total assist  PT - OK to Discharge: Yes    PT Visit Info:  PT Received On: 05/28/25  Response to Previous Treatment: Patient with no complaints from previous session.     General Visit Information:   General  Reason for Referral: P/w AMS, HA, HTN with notable subtle left sided weakness that worsened. Acute cerebrovascular accident due to embolism of R MCA. Proximal R superior M2 occulsion, MT TICI 2b. Pre intervention NIHSS: 10, post intervention NIHSS: 9.  Past Medical History Relevant to Rehab: h/o renal transplant (currently on Cellcept) and HTN  Family/Caregiver Present: No  Prior to Session Communication: Bedside nurse  Patient Position Received: Bed, 3 rail up, Alarm on  General Comment: Patient open to workign with PT, but fixating on his recumbent bike at home. \"My son can bring it here?\"    Subjective   Precautions:  Precautions  Hearing/Visual Limitations: Hearing WFL. Able to gaze past midline to L but does not achieve terminal L gaze.  Medical Precautions: Fall precautions  Precautions Comment: SBP <180, Left hemiparesis     Date/Time Vitals Session Patient Position Pulse Resp SpO2 BP MAP (mmHg)    05/28/25 1634 --  --  77  16  100 %  137/88  --                 Objective   Pain:  Pain Assessment  Pain Assessment: 0-10  0-10 (Numeric) Pain Score: 0 - No pain  Cognition:  Cognition  Overall Cognitive Status: Impaired  Arousal/Alertness: Delayed responses to stimuli  Orientation Level: Oriented X4  Following Commands: Follows one step commands with increased time  Insight: Moderate  Impulsive: Moderately  Processing Speed: Delayed  Coordination:       Activity Tolerance:  Activity Tolerance  Endurance: Decreased tolerance for upright " activites  Treatments:  Therapeutic Activity  Therapeutic Activity 1: Bed mobility  Therapeutic Activity 2: Stand x4  Therapeutic Activity 3: Bed to chair transfer  Therapeutic Activity 4: Leg press in chair. Left leg only 2 sets of 20, 1 set of 8. Infrequent actile cuing to quad belly    Bed Mobility 1  Bed Mobility 1: Supine to sitting, Sitting to supine  Level of Assistance 1: Moderate assistance  Bed Mobility Comments 1: Out of bed to patient's left. Full support of left leg. Moderate torso assist    Ambulation/Gait Training  Ambulation/Gait Training Performed: Yes  Ambulation/Gait Training 1  Surface 1: Level tile  Assistance 1: Maximum assistance  Comments/Distance (ft) 1: 4 steps with right leg only. left knee blocking  Transfers  Transfer: Yes  Transfer 1  Transfer From 1: Sit to, Stand to  Transfer to 1: Stand, Sit  Technique 1: Sit to stand, Stand to sit  Transfer Level of Assistance 1: Maximum assistance  Trials/Comments 1: left knee blocked only. weight shifting onto left leg. Immediate buckling when not blocked  Transfers 2  Transfer From 2: Bed to  Transfer to 2: Chair with arms  Technique 2: Lateral  Transfer Level of Assistance 2: Maximum assistance  Trials/Comments 2: Closer to moderate assist based on right LE and UE strength    Outcome Measures:  [facility] Basic Mobility  Turning from your back to your side while in a flat bed without using bedrails: A lot  Moving from lying on your back to sitting on the side of a flat bed without using bedrails: A lot  Moving to and from bed to chair (including a wheelchair): A lot  Standing up from a chair using your arms (e.g. wheelchair or bedside chair): A lot  To walk in hospital room: Total  Climbing 3-5 steps with railing: Total  Basic Mobility - Total Score: 10    Education Documentation  Precautions, taught by Troy Davis PT at 5/28/2025  4:52 PM.  Learner: Patient  Readiness: Acceptance  Method: Demonstration, Explanation  Response: Verbalizes  Understanding, Demonstrated Understanding  Comment: Goals of PT, safety with mobility    Body Mechanics, taught by Troy Davis PT at 5/28/2025  4:52 PM.  Learner: Patient  Readiness: Acceptance  Method: Demonstration, Explanation  Response: Verbalizes Understanding, Demonstrated Understanding  Comment: Goals of PT, safety with mobility    Home Exercise Program, taught by Troy Davis PT at 5/28/2025  4:52 PM.  Learner: Patient  Readiness: Acceptance  Method: Demonstration, Explanation  Response: Verbalizes Understanding, Demonstrated Understanding  Comment: Goals of PT, safety with mobility    Mobility Training, taught by Troy Davis PT at 5/28/2025  4:52 PM.  Learner: Patient  Readiness: Acceptance  Method: Demonstration, Explanation  Response: Verbalizes Understanding, Demonstrated Understanding  Comment: Goals of PT, safety with mobility    Education Comments  No comments found.        OP EDUCATION:       Encounter Problems       Encounter Problems (Active)       Balance       Patient to demo static standing with unilateral UE support, performing single UE task with SBA, no sway or LOB x 2 mins for functional carryover  (Progressing)       Start:  05/20/25    Expected End:  06/03/25            Pt will score >/= 24/28 on Tinetti balance assessment to indicate low falls risk.   (Progressing)       Start:  05/20/25    Expected End:  06/03/25               Mobility       STG - Patient will ambulate >/= 30 ft with CGA and LRAD (Not Progressing)       Start:  05/20/25    Expected End:  06/03/25            Pt. will tolerate >/= 10 minutes of OOB mobility without seated rest break and VSS to demo improved activity tolerance/endurance.  (Progressing)       Start:  05/20/25    Expected End:  06/03/25            Patient will actively participate in ther-ex in order to improve strength and to assist with the completion of functional mobility tasks.  (Progressing)       Start:  05/20/25    Expected End:   06/03/25               PT Transfers       STG - Patient will perform bed mobility IND (Progressing)       Start:  05/20/25    Expected End:  06/03/25            STG - Patient will transfer sit to and from stand with CGA and LRAD (Progressing)       Start:  05/20/25    Expected End:  06/03/25               Pain - Adult

## 2025-05-28 NOTE — PROGRESS NOTES
05/28/25 0910   Discharge Planning   Expected Discharge Disposition SNF     Transitional Care Coordination Progress Note:  [facility] submitted auth on 5/26, still pending as of this morning. This nurse requested that DSC support escalate.     Addendum 0948: This nurse met with pt and notified him that auth is still pending.     Amy Mcgowan RN, BSN  Transitional Care Coordinator  Office: [phone]  Secure chat via [employer]

## 2025-05-28 NOTE — PROGRESS NOTES
Davidson Khoury is a 60 y.o. left handed male on day 9 of admission presenting with Acute cerebrovascular accident (CVA) due to embolism of right middle cerebral artery (Multi).    Subjective   New acute event overnight, no new complaints.       Objective     Last Recorded Vitals  Heart Rate:  [64-78]   Temp:  [36 °C (96.8 °F)-36.8 °C (98.3 °F)]   Resp:  [14-17]   BP: (147-159)/(57-93)   SpO2:  [95 %-98 %]       [facility] NIHSS:   NIH Stroke Scale:     Date/Time of Assessment: 5/27/2025 10:40 AM    1A. Level of Consciousness:  Alert (keenly responsive) (0)    1B. Ask Month and Age:  Both questions right (0)    1C. Blink Eyes & Squeeze Hands:  Performs both tasks (0)    2. Best Gaze:  Normal (0)    3. Visual:  Complete hemianopia (+2)    4. Facial Palsy:  Partial paralysis (+2)    5A. Motor - Left Arm:  No movement (+4)    5B. Motor - Right Arm:  No drift (0)    6A. Motor - Left Leg:  No effort against gravity (+3)    6B. Motor - Right Leg:  No drift (0)    7. Limb Ataxia:  No ataxia (0)    8. Sensory Loss:  Severe to total loss (+2)    9. Best Language:  Normal (no aphasia) (0)    10. Dysarthia:  Mild-moderate dysarthria (+1)    11. Extinction and Inattention:  Visual/tactile/auditory/spatial/personal inattention (+1)    NIH Stroke Scale:  15       Physical Exam  Neurological Exam  GENERAL APPEARANCE:  No distress, alert, interactive and cooperative.     MENTAL STATE:   Orientation was normal to time, place and person. Recent and remote memory was intact.  Attention span and concentration were normal. Language testing was normal for comprehension, repetition, expression, and naming. The patient could correctly interpret a picture. General fund of knowledge was intact.     CRANIAL NERVES:   CN 2   Left inferior quadrantanopia  CN 3, 4, 6   Pupils round, 4 mm in diameter, equally reactive to light. Lids symmetric; no ptosis. EOMs normal alignment, full range with normal saccades, pursuit and convergence.   No nystagmus.   CN  5   Facial sensation intact bilaterally.   CN 7   Left UMN paralysis  CN 8   Hearing intact to conversation.   CN 9   Palate elevates symmetrically.   CN 11   Normal strength of shoulder shrug and neck turning.   CN 12   Tongue midline, with normal bulk and strength; no fasciculations.     Motor/Sensory:   Hypotonia of the left UE and LE, LUE 0/5, LLE 1/5. RUE 5/5 with elbow flexion/extension and RLE /5 with hip flexion, dorsiflexion, and plantarflexion, left hemisensory loss and neglect.    COORDINATION:    Right finger-nose-finger was intact without dysmetria or overshoot.     GAIT:   Deferred    Cardiac: regular rate, normal s1, s2,  Chest: Equal air entry bilaterally  Abdomen soft lax non distended    Relevant Results    NIH Stroke Scale  1A. Level of Consciousness: Alert, Keenly Responsive  1B. Ask Month and Age: Both Questions Right  1C. Blink Eyes & Squeeze Hands: Performs 1 Task  2. Best Gaze: Partial Gaze Palsy  3. Visual: Partial Hemianopia  4. Facial Palsy: Partial Paralysis  5A. Motor - Left Arm: No Effort Against Gravity  5B. Motor - Right Arm: No Drift  6A. Motor - Left Leg: Some Effort Against Gravity  6B. Motor - Right Leg: No Drift  7. Limb Ataxia: Present in One Limb  8. Sensory Loss: Mild-to-Moderate Sensory Loss  9. Best Language: Mild-to-Moderate Aphasia  10. Dysarthria: Mild-to-Moderate Dysarthria  11. Extinction and Inattention: Visual, Tactile, Auditory, Spatial, or Personal Inattention  NIH Stroke Scale: 15           [place] Coma Scale  Best Eye Response: Spontaneous  Best Verbal Response: Oriented  Best Motor Response: Follows commands  [place] Coma Scale Score: 15                    Assessment & Plan  Acute cerebrovascular accident (CVA) due to embolism of right middle cerebral artery (Multi)    Davidson Khoury is a 60 y.o. male with a history notable for renal transplant (currently on Cellcept) and hypertension presenting to the ED initially with a chief complaint of altered mental  status, headache, and hypertension. Initially evaluated by general neurology team who noted subtle left sided weakness that worsened on follow up exam. BAT activated for concern of stroke. Found to have proximal R superior M2 occlusion. Not a candidate for TNK as LKN >4.5 hours. Taken for MT, TICI 2b. Exam notable for L HH, L UMN facial paralysis, LUE 0/5, LLE 3/5. right at least antigravity, left hemisensory loss and neglect.    Stroke Metrics:  EMS pre-notification?: No  Time of stroke team arrival: 3:47pm  Direct to CT?: No  Time of CTH read by stroke team: As performed  Time of TNK: n/a  Time stroke team called IR: 4:27pm  Time pt arrived to angio suite: 4:57pm  Time of groin puncture: 5:10pm  Time of recanalization: 5:31pm  Number of passes: 1  Device used: penumbra aspiration  Any delays in the process (if door to TNK >30 min): n/a     Stroke Classification:  Type: Ischemic stroke  Subtype/etiology: Suspected large artery disease  Vessels involved: R MCA  Neurological manifestations:  Pre-Intervention Deficits: NIHSS 10 *see above  Post-Intervention Deficits: NIHSS 9 *see above  Pre-stroke mRS: 0  Initial treatment: Angio  Anti-platelets or Anti-coagulation management: Aspirin  Vascular Risk Factors: HTN  Antiplatelet/antithrombotic plan for stroke prevention: Aspirin  VTE prophylaxis: Lovenox  Vascular Risk Factor modification goals:  Blood pressure goals: avoid hypotension SBP <100 that could worsen cerebral perfusion, Ischemic stroke post-thrombectomy SBP <180mmHg   Lipid Goals: education on healthy diet and statin therapy to maintain or achieve goal LDL-cholesterol < 70mg.  Glucose Goals: early treatment of hyperglycemia to goal glucose 140-180 mg/dl with long-term goal A1c < 7%   Smoking Cessation and Education  Assessment for Rehabilitation needs   Patient and family education on signs and symptoms of stroke, calling 911, healthy strategies for stroke prevention.      Plan:  Updates 05/28/25  -Continue  DAPT 90 days [facility] protocol for intracranial atherosclerosis started 5/23/2025  -Follow up transplant nephrology recs continue tacrolimus to 2.5mg BID   -Follow up SLP MBSS  -Follow up nutrition recs  -Discharge on bridle Dobhoff  -Pending dispo to SNF  -PT/OT    #Proximal R superior M2 occlusion s/p TICI 2b MT   #HTN   #Hx of thrombocytopenia- Stable   - Continue DAPT 90 days [facility] protocol for intracranial atherosclerosis  - Stroke work up  - A1c: 5.2%, LDL 63              - TTE with bubble study: EF 70-75% LA normal in size, no PFO  - SBP goals normotension now 7 days post-stroke  - Discuss with transplant nephrology optimal antihypertensive regime. Currently:   - Losartan 25mg BID   - Clonidine 0.2mg BID  - PT/OT/SLP     #Renal transplant (on Cellcept and Tacrolimus)   - Baseline BUN/Cr: 15/1.80   -Consulted nephrology recommended - continue tacrolimus to  2.5mg q12. Monitor Fk trough daily (30-60 min prior to AM dose). Goal 5-8.  - cont  mg bid.    #Dysphagia  MBSS cleared for liquid diet  Discharge on bridle Dobhoff  Nocturnal feeds::   Isosource 1.5 @ 80ml/hr x 12hrs.   Oral nutrition support regimen:   Trial Boost VHC daily.      Pain medications: PRN Tylenol  Fluids: Replete PRN  Electrolytes: Keep mg >2, phos >3  and K >4  Nutrition:  Enteral feeding WITH diet order Diet type: Full Liquid; Fluid consistency: Mild thick 2; Tube feeding formula: Isosource 1.5; 80; 20:00-08:00   Antimicrobials: None  Antiplatelet: ASA and Clopidogrel  Anticoagulation: None  DVT PPX: Lovenox  GI ppx: none needed  Bowel care: Miralax  Catheter: None  Lines: PIV  Oxygen: Room Air    Disposition:   PT/OT moderate intensity    Code Status: Full Code (confirmed on admission)   NOK:  Primary Emergency Contact: Margo Cali MD  PGY-2 Neurology

## 2025-05-28 NOTE — CARE PLAN
Problem: General Stroke  Goal: Establish a mutual long term goal with patient by discharge  Outcome: Progressing     Problem: General Stroke  Goal: Demonstrate improvement in neurological exam throughout the shift  Outcome: Progressing   The patient's goals for the shift include      The clinical goals for the shift include safety, therapy    No acute events. OOB with PT and max assist to chair. Voiding without difficulty. Assisted with ADLs. Discharge planning ongoing. Resting between care.

## 2025-05-28 NOTE — PROCEDURES
Speech-Language Pathology    Inpatient  Modified Barium Swallow Study    Patient Name: Davidson Khoury  MRN: [id number]  : 1965  Today's Date: 25  Time Calculation  Start Time: 915  Stop Time: 925  Time Calculation (min): 10 min        Modified Barium Swallow Study completed. Informed verbal consent obtained prior to completion of exam. The study was completed per protocol with various liquid barium consistencies, pudding, solids.  A 1.9 cm or .75 inch (outer diameter) ring was placed on the chin in the lateral view and on the lateral, in order to complete objective measurements during swallowing. The anatomic structures and function of the oropharynx, larynx, hypopharynx and cervical esophagus were evaluated.    SLP: Kamla Esteves MA, CCC/SLP  Inpatient Speech-Language Pathologist  Epic Secure Chat Preferred      Reason for Referral: Further assessment of oropharyngeal swallow and to guide diet recommendations   Patient Hx: Davidson Khoury is a 60 Y.o. male with a history notable for renal transplant (currently on Cellcept) and hypertension presenting to the ED initially with a chief complaint of altered mental status, headache, and hypertension. Initially evaluated by general neurology team who noted subtle left sided weakness that worsened on follow up exam. BAT activated for concern of stroke. Found to have proximal R superior M2 occlusion. Not a candidate for TNK as LKN >4.5 hours. Taken for MT, TICI 2b. Exam notable for L HH, L UMN facial paralysis, LUE 0/5, LLE 3/5. right at least antigravity, left hemisensory loss and neglect.   Respiratory Status: Room air   Current diet: Mildly Thick Liquids/ Liquids Only     Pain:  Pain Scale: 0-10  Rating: Denied pain     Subjective: Pt alert, oriented x4, agreeable to evaluation       DIET RECOMMENDATIONS:   - Pureed (IDDSI Level 4)  - Thin liquids (IDDSI Level 0)      STRATEGIES:  - Small bites  - Small, single sips  - Alternate consistencies  - Upright for  all PO intake  - Complete oral care frequently throughout the day  - Full supervision with meals; One to one assist with meals      SLP PLAN:  Inpatient/Swing Bed or Outpatient: Inpatient  Treatment/Interventions: Respiratory muscle strength training, Bolus trials, Assess diet tolerance, Diet recommendations, Complete MBSS  SLP Plan: Skilled SLP  SLP Frequency: 2x per week  Duration: 2 weeks  SLP Discharge Recommendations: Continue skilled SLP services at the next level of care  Diet Recommendations: Solid, Liquid  Solid Consistency: NPO  Liquid Consistency: Ice chips after oral care, NPO  Discussed POC: Patient  Discussed Risks/Benefits: Yes, Patient  Patient/Caregiver Agreeable: Yes  SLP - OK to Discharge: Yes      Discussed POC: Patient  Discussed Risks/Benefits: Yes  Patient/Caregiver Agreeable: Yes    Goals:  Encounter Problems       Encounter Problems (Active)       Swallowing       LTG - Patient will tolerate the least restrictive diet without overt difficulty by time of discharge.       Start:  05/20/25    Expected End:  06/10/25             The patient/caregiver/family will demonstrate adequate return of knowledge of all compensatory strategy/instruction w/ 100% acc. to effectively assist the patient in immediate safety with oral intake and swallowing.         Start:  05/20/25    Expected End:  06/10/25            STG  - Pt will demonstrate adequate oral motor skills and cognitive function to participate in a Modified Barium Swallow Study within 1 week of SLP recommendation to fully assess swallow function and determine further treatment needs.         Start:  05/20/25    Expected End:  05/27/25                   Education Provided: Results and recommendations per MBSS, with video review; recommendations and POC at this time. Verbal understanding and agreement given on all accounts.     Treatment Provided Today: ST provided extensive education and training to pt/pt family regarding anatomy/physiology of  swallow function, risk factors of aspiration/aspiration pna & how to mitigate factors, diet modifications, and the use of compensatory swallow strategies to promote pt safety upon PO intake including small bites, small sips, lingual sweep. In addition, ST provided instruction/training on oropharyngeal exercises (re: effortful swallow).     Additional Medical Consults Suggested:   - No new disciplines indicated    Repeat Study:  Per treating SLP/MD    Mechanics of the Swallow Summary:  ORAL PHASE:  Lip Closure - Escape progressing to mid-chin   Tongue Control During Bolus Hold - Escape to lateral buccal cavity and/or floor of mouth   Bolus prep/mastication - Minimal mastication noted with majority of bolus left unchewed   Bolus transport/lingual motion - Slowed Tongue Motion for A-P movement of the bolus   Oral residue - Residue collection on oral structure     PHARYNGEAL PHASE:  Initiation of pharyngeal swallow - Bolus head at posterior laryngeal surface of epiglottis   Soft palate elevation - No bolus between soft palate/pharyngeal wall   Laryngeal elevation - Complete superior movement of thyroid cartilage with contact of arytenoids to epiglottic petiole   Anterior hyoid excursion - Complete anterior movement   Epiglottic movement - Complete inversion    Laryngeal vestibule closure - Complete - no air/contrast in laryngeal vestibule   Pharyngeal stripping wave - Present, however, diminished   Pharyngeal contraction (A/P view) - Not tested       Pharyngoesophageal segment opening - Partial distension/partial duration with partial obstruction of flow of bolus   Tongue base retraction - Trace column of contrast or air between tongue base and pharyngeal wall   Pharyngeal residue - Collection of residue within or on the pharyngeal structures     ESOPHAGEAL PHASE:  Esophageal clearance - Esophageal retention     Strategies attempted- Liquid wash resulted in improved clearance of solids    SLP Impressions with Severity  Rating:   Pt presents with mild-moderate oropharyngeal dysphagia. Swallow safety is primarily intact detailed by no observed aspiration nor penetration w/ all trials. Swallow efficiency is impaired detailed by significant oral residue and impaired mastication of solids requiring cues to clear and mild pharyngeal residue requiring liquid wash to clear. Entirety of  swallowing physiology is detailed above. Given results of this study pt is at a decreased risk of aspiration.  Additionally 2/2 dysphagia and diet modifications pt is at increased risk of malnutrition/dehydration with only PO intake.. Overall pt is appropriate for initiation of an oral diet with diet modifications to decrease increase efficiency and continue intensive dysphagia therapy to target deficits stated above.       OUTCOME MEASURES:  Dynamic Imaging Grade of Swallowing Toxicity - DIGEST SCORE  Safety Grade: Grade 0: No penetration/aspiration or flash penetration above the vocal folds   **Safety Grade is MAX PAS over all bolus trials. Rated based on liquid, solid, and pudding. Not rated on swallows in which strategies were applied.    Efficiency Grade: Grade 1: 10-49% of bolus residue, Less than half residue. Any bolus type.   **Efficiency Grade is Max % of bolus in pharynx across all bolus trials. Rated based on liquid, solid and pudding. Rate based on initial swallow attempt of each bolus. Not rated based on oral residue. Not rated for swallows which strategies were applied.    DIGEST SCORE Common Terminology Criteria for Adverse Events; Dysphagia   1 - Mild 1 - Symptomatic and able to eat a regular diet      Functional Oral Intake Scale  Functional Oral Intake Scale: Level 5        total oral diet with multiple consistencies, but requires special preparations and compensations     Rosenbek's Penetration Aspiration Scale  Thin Liquids:  1. NO ASPIRATION & NO PENETRATION - no aspiration, contrast does not enter airway  [place] Thick Liquids: 1. NO  ASPIRATION & NO PENETRATION - no aspiration, contrast does not enter airway  Puree: 1. NO ASPIRATION & NO PENETRATION - no aspiration, contrast does not enter airway  Solids: 1. NO ASPIRATION & NO PENETRATION - no aspiration, contrast does not enter airway

## 2025-05-28 NOTE — PROGRESS NOTES
"Davidson Khoury is a 60 y.o. male on day 9 of admission presenting with Acute cerebrovascular accident (CVA) due to embolism of right middle cerebral artery (Multi).    No acute events overnight.  No changes in his neurologic status       Scheduled medications  aspirin, 81 mg, oral, Daily   Or  aspirin, 81 mg, oral, Daily   Or  aspirin, 81 mg, nasogastric tube, Daily   Or  aspirin, 300 mg, rectal, Daily  cloNIDine, 0.2 mg, oral, q12h CHRIST  clopidogrel, 75 mg, oral, Daily  enoxaparin, 40 mg, subcutaneous, q24h  lidocaine, 1 Application, urethral, Once  losartan, 25 mg, oral, BID  mycophenolate, 750 mg, nasogastric tube, BID  ondansetron, 4 mg, intravenous, Once  perflutren protein A microsphere, 0.5 mL, intravenous, Once in imaging  polyethylene glycol, 17 g, oral, Daily  rosuvastatin, 20 mg, oral, Nightly  sulfur hexafluoride microsphr, 2 mL, intravenous, Once in imaging  tacrolimus, 2.5 mg, oral, q12h CHRIST      Continuous medications     PRN medications  PRN medications: acetaminophen, benzocaine-menthol, [] hydrALAZINE **FOLLOWED BY** hydrALAZINE, ondansetron **OR** ondansetron, oxyCODONE, oxyCODONE, oxygen    Last Recorded Vitals  Blood pressure (!) 147/96, pulse 68, temperature 36.5 °C (97.7 °F), temperature source Temporal, resp. rate 15, height 1.753 m (5' 9\"), weight 72.9 kg (160 lb 11.5 oz), SpO2 99%.  Intake/Output last 3 Shifts:  I/O last 3 completed shifts:  In: 110 (1.5 mL/kg) [NG/GT:110]  Out: 500 (6.9 mL/kg) [Urine:500 (0.2 mL/kg/hr)]  Weight: 72.9 kg     A&ox3, no distress, pleasant  MMM, no lesions  Lungs with equal air entry, no added sounds  Rrr, no m/r/g  Abd soft, nt, nd  No allograft tenderness  No edema b/l, lt hemiplegia    No results found. However, due to the size of the patient record, not all encounters were searched. Please check Results Review for a complete set of results.      CT head w/o contrast: 25  There is development of small acute infarct component within the  right " frontal opercular region. There is also suspected subtle loss  of gray-white matter differentiation within the right frontal and  parietal lobes corresponding to regions of ischemia/infarct on recent  CTA perfusion examination. This could be better characterized with  MRI as clinically warranted. No acute intraparenchymal hematoma.  Subtle petechial hemorrhage versus contrast staining or potential  beam hardening artifact within the right parietal lobe. No  significant intracranial mass effect.     MRI head:  IMPRESSION:  1. Acute nonhemorrhagic right MCA territory infarct as above. There  is associated vasogenic edema and sulcal effacement. No midline  shift. No evidence of hemorrhagic transformation.  2. Old lacunar infarcts in left cerebellum, left thalamus and  bilateral basal ganglia. Moderate degree of chronic microvascular  ischemic disease in the cerebral white matter.  3. Evidence for old hemorrhage in the right basal ganglia, with  encephalomalacia. This could be sequela of an old hypertensive bleed,  or sequela of an old hemorrhagic infarct.        Assessment/Plan  60 y.o. male presenting with ESRD secondary to hypertension s/p DDKT on 8/10/2019. Patient was induced by Simulect, no DGF no CMV mismatch no EBV mismatch, KDPI of kidney is 40% PRA 0%. No episodes of rejection.     Currently admitted with Rt MCA occlusion CVA s/p cerebral angiogram and mecahnical thrombectomy 5/19/25     Allograft function: s/p DDKT 8/10/2019  - baseline Cr ~2. Stable this admission  - s/p contrast exposure this admission. Maintain adequate hydration.   - metabolic indices acceptable. Nonoliguric. No hypervolemia on exam.  - Hb ~13, acceptable  - Ca, Phos acceptable. Monitor and replete lytes as indicated  - Bps improved. Losartan 25 mg bid, clonidine 0.2mg bid  - dose meds for CrCL. Avoid potential nephrotoxins.     Immunosuppression:  - TAC to 2.5 mg BID   - Monitor Fk trough daily (30-60 min prior to AM dose). Goal  4-6  Today's level below goal but monitor for now  -  mg     Rt MCA CVA: s/p mechanical thrombectomy 5/19/25  - management per neuro.  - on DAPT x3 months     Will follow.  Needs acute rehab.     Latrice Guardado MD

## 2025-05-29 LAB — TACROLIMUS BLD-MCNC: 6.2 NG/ML

## 2025-05-29 PROCEDURE — 99232 SBSQ HOSP IP/OBS MODERATE 35: CPT

## 2025-05-29 PROCEDURE — 2500000004 HC RX 250 GENERAL PHARMACY W/ HCPCS (ALT 636 FOR OP/ED)

## 2025-05-29 PROCEDURE — 2500000001 HC RX 250 WO HCPCS SELF ADMINISTERED DRUGS (ALT 637 FOR MEDICARE OP)

## 2025-05-29 PROCEDURE — 97116 GAIT TRAINING THERAPY: CPT | Mod: GP

## 2025-05-29 PROCEDURE — 36415 COLL VENOUS BLD VENIPUNCTURE: CPT

## 2025-05-29 PROCEDURE — 2500000002 HC RX 250 W HCPCS SELF ADMINISTERED DRUGS (ALT 637 FOR MEDICARE OP, ALT 636 FOR OP/ED)

## 2025-05-29 PROCEDURE — 80197 ASSAY OF TACROLIMUS: CPT

## 2025-05-29 PROCEDURE — 99233 SBSQ HOSP IP/OBS HIGH 50: CPT | Performed by: STUDENT IN AN ORGANIZED HEALTH CARE EDUCATION/TRAINING PROGRAM

## 2025-05-29 PROCEDURE — 97530 THERAPEUTIC ACTIVITIES: CPT | Mod: GP

## 2025-05-29 PROCEDURE — 1100000001 HC PRIVATE ROOM DAILY

## 2025-05-29 PROCEDURE — 97112 NEUROMUSCULAR REEDUCATION: CPT | Mod: GP

## 2025-05-29 RX ORDER — MYCOPHENOLATE MOFETIL 200 MG/ML
750 POWDER, FOR SUSPENSION ORAL 2 TIMES DAILY
Status: DISCONTINUED | OUTPATIENT
Start: 2025-05-30 | End: 2025-06-04 | Stop reason: HOSPADM

## 2025-05-29 RX ORDER — NYSTATIN 100000 [USP'U]/ML
4 SUSPENSION ORAL 2 TIMES DAILY
Status: DISCONTINUED | OUTPATIENT
Start: 2025-05-30 | End: 2025-06-04 | Stop reason: HOSPADM

## 2025-05-29 RX ADMIN — MYCOPHENOLATE MOFETIL 750 MG: 200 POWDER, FOR SUSPENSION ORAL at 23:16

## 2025-05-29 RX ADMIN — ROSUVASTATIN CALCIUM 20 MG: 20 TABLET, FILM COATED ORAL at 23:00

## 2025-05-29 RX ADMIN — ASPIRIN 81 MG: 81 TABLET, COATED ORAL at 08:55

## 2025-05-29 RX ADMIN — POLYETHYLENE GLYCOL 3350 17 G: 17 POWDER, FOR SOLUTION ORAL at 08:59

## 2025-05-29 RX ADMIN — LOSARTAN POTASSIUM 25 MG: 25 TABLET, FILM COATED ORAL at 23:00

## 2025-05-29 RX ADMIN — CLONIDINE HYDROCHLORIDE 0.2 MG: 0.2 TABLET ORAL at 08:56

## 2025-05-29 RX ADMIN — TACROLIMUS 2.5 MG: 5 CAPSULE, GELATIN COATED ORAL at 08:56

## 2025-05-29 RX ADMIN — ENOXAPARIN SODIUM 40 MG: 40 INJECTION, SOLUTION SUBCUTANEOUS at 17:46

## 2025-05-29 RX ADMIN — LOSARTAN POTASSIUM 25 MG: 25 TABLET, FILM COATED ORAL at 08:56

## 2025-05-29 RX ADMIN — CLOPIDOGREL BISULFATE 75 MG: 75 TABLET, FILM COATED ORAL at 08:56

## 2025-05-29 RX ADMIN — TACROLIMUS 2.5 MG: 5 CAPSULE, GELATIN COATED ORAL at 17:46

## 2025-05-29 RX ADMIN — MYCOPHENOLATE MOFETIL 750 MG: 200 POWDER, FOR SUSPENSION ORAL at 08:58

## 2025-05-29 RX ADMIN — CLONIDINE HYDROCHLORIDE 0.2 MG: 0.2 TABLET ORAL at 23:00

## 2025-05-29 ASSESSMENT — PAIN SCALES - GENERAL
PAINLEVEL_OUTOF10: 0 - NO PAIN

## 2025-05-29 ASSESSMENT — PAIN - FUNCTIONAL ASSESSMENT
PAIN_FUNCTIONAL_ASSESSMENT: 0-10

## 2025-05-29 NOTE — PROGRESS NOTES
Davidson Khoury is a 60 y.o. left handed male on day 10 of admission presenting with Acute cerebrovascular accident (CVA) due to embolism of right middle cerebral artery (Multi).    Subjective   New acute event overnight, no new complaints.       Objective     Last Recorded Vitals  Heart Rate:  [67-77]   Temp:  [36.2 °C (97.2 °F)-36.8 °C (98.3 °F)]   Resp:  [15-17]   BP: (137-163)/()   SpO2:  [99 %-100 %]        NIHSS:   NIH Stroke Scale:     Date/Time of Assessment: 5/29/2025 8:39 AM    1A. Level of Consciousness:  Alert (keenly responsive) (0)    1B. Ask Month and Age:  Both questions right (0)    1C. Blink Eyes & Squeeze Hands:  Performs both tasks (0)    2. Best Gaze:  Normal (0)    3. Visual:  Complete hemianopia (+2)    4. Facial Palsy:  Partial paralysis (+2)    5A. Motor - Left Arm:  No movement (+4)    5B. Motor - Right Arm:  No drift (0)    6A. Motor - Left Leg:  No effort against gravity (+3)    6B. Motor - Right Leg:  No drift (0)    7. Limb Ataxia:  No ataxia (0)    8. Sensory Loss:  Severe to total loss (+2)    9. Best Language:  Normal (no aphasia) (0)    10. Dysarthia:  Mild-moderate dysarthria (+1)    11. Extinction and Inattention:  Visual/tactile/auditory/spatial/personal inattention (+1)    NIH Stroke Scale:  15       Physical Exam  Neurological Exam  GENERAL APPEARANCE:  No distress, alert, interactive and cooperative.     MENTAL STATE:   Orientation was normal to time, place and person. Recent and remote memory was intact.  Attention span and concentration were normal. Language testing was normal for comprehension, repetition, expression, and naming. The patient could correctly interpret a picture. General fund of knowledge was intact.     CRANIAL NERVES:   CN 2   Left inferior quadrantanopia  CN 3, 4, 6   Pupils round, 4 mm in diameter, equally reactive to light. Lids symmetric; no ptosis. EOMs normal alignment, full range with normal saccades, pursuit and convergence.   No nystagmus.    CN 5   Facial sensation intact bilaterally.   CN 7   Left UMN paralysis  CN 8   Hearing intact to conversation.   CN 9   Palate elevates symmetrically.   CN 11   Normal strength of shoulder shrug and neck turning.   CN 12   Tongue midline, with normal bulk and strength; no fasciculations.     Motor/Sensory:   Hypotonia of the left UE and LE, LUE 0/5, LLE 1/5. RUE 5/5 with elbow flexion/extension and RLE /5 with hip flexion, dorsiflexion, and plantarflexion, left hemisensory loss and neglect.    COORDINATION:    Right finger-nose-finger was intact without dysmetria or overshoot.     GAIT:   Deferred    Cardiac: regular rate, normal s1, s2,  Chest: Equal air entry bilaterally  Abdomen soft lax non distended    Relevant Results    NIH Stroke Scale  1A. Level of Consciousness: Alert, Keenly Responsive  1B. Ask Month and Age: Both Questions Right  1C. Blink Eyes & Squeeze Hands: Performs 1 Task  2. Best Gaze: Partial Gaze Palsy  3. Visual: Partial Hemianopia  4. Facial Palsy: Partial Paralysis  5A. Motor - Left Arm: No Effort Against Gravity  5B. Motor - Right Arm: No Drift  6A. Motor - Left Leg: Some Effort Against Gravity  6B. Motor - Right Leg: No Drift  7. Limb Ataxia: Present in One Limb  8. Sensory Loss: Mild-to-Moderate Sensory Loss  9. Best Language: Mild-to-Moderate Aphasia  10. Dysarthria: Mild-to-Moderate Dysarthria  11. Extinction and Inattention: Visual, Tactile, Auditory, Spatial, or Personal Inattention  NIH Stroke Scale: 15           [place] Coma Scale  Best Eye Response: Spontaneous  Best Verbal Response: Oriented  Best Motor Response: Follows commands  [place] Coma Scale Score: 15                    Assessment & Plan  Acute cerebrovascular accident (CVA) due to embolism of right middle cerebral artery (Multi)    Davidson Khoury is a 60 y.o. male with a history notable for renal transplant (currently on Cellcept) and hypertension presenting to the ED initially with a chief complaint of altered mental  status, headache, and hypertension. Initially evaluated by general neurology team who noted subtle left sided weakness that worsened on follow up exam. BAT activated for concern of stroke. Found to have proximal R superior M2 occlusion. Not a candidate for TNK as LKN >4.5 hours. Taken for MT, TICI 2b. Exam notable for L HH, L UMN facial paralysis, LUE 0/5, LLE 3/5. right at least antigravity, left hemisensory loss and neglect.    Stroke Metrics:  EMS pre-notification?: No  Time of stroke team arrival: 3:47pm  Direct to CT?: No  Time of CTH read by stroke team: As performed  Time of TNK: n/a  Time stroke team called IR: 4:27pm  Time pt arrived to angio suite: 4:57pm  Time of groin puncture: 5:10pm  Time of recanalization: 5:31pm  Number of passes: 1  Device used: penumbra aspiration  Any delays in the process (if door to TNK >30 min): n/a     Stroke Classification:  Type: Ischemic stroke  Subtype/etiology: Suspected large artery disease  Vessels involved: R MCA  Neurological manifestations:  Pre-Intervention Deficits: NIHSS 10 *see above  Post-Intervention Deficits: NIHSS 9 *see above  Pre-stroke mRS: 0  Initial treatment: Angio  Anti-platelets or Anti-coagulation management: Aspirin  Vascular Risk Factors: HTN  Antiplatelet/antithrombotic plan for stroke prevention: Aspirin  VTE prophylaxis: Lovenox  Vascular Risk Factor modification goals:  Blood pressure goals: avoid hypotension SBP <100 that could worsen cerebral perfusion, Ischemic stroke post-thrombectomy SBP <180mmHg   Lipid Goals: education on healthy diet and statin therapy to maintain or achieve goal LDL-cholesterol < 70mg.  Glucose Goals: early treatment of hyperglycemia to goal glucose 140-180 mg/dl with long-term goal A1c < 7%   Smoking Cessation and Education  Assessment for Rehabilitation needs   Patient and family education on signs and symptoms of stroke, calling 911, healthy strategies for stroke prevention.      Plan:  Updates 05/29/25  -Continue  DAPT 90 days [facility] protocol for intracranial atherosclerosis started 5/23/2025  -Follow up transplant nephrology recs continue tacrolimus to 2.5mg BID   -SLP following underwent MBSS 5/28 - Pureed (IDDSI Level 4), Thin liquids (IDDSI Level 0)   -Calorie count started on 5/28/25  -Follow up nutrition recs  -Pending dispo to SNF  -PT/OT    #Proximal R superior M2 occlusion s/p TICI 2b MT   #HTN   #Hx of thrombocytopenia- Stable   - Continue DAPT 90 days [facility] protocol for intracranial atherosclerosis  - Stroke work up  - A1c: 5.2%, LDL 63              - TTE with bubble study: EF 70-75% LA normal in size, no PFO  - SBP goals normotension now 7 days post-stroke  - Discuss with transplant nephrology optimal antihypertensive regime. Currently:   - Losartan 25mg BID   - Clonidine 0.2mg BID  - PT/OT/SLP     #Renal transplant (on Cellcept and Tacrolimus)   - Baseline BUN/Cr: 15/1.80   -Consulted nephrology recommended - continue tacrolimus to  2.5mg q12. Monitor Fk trough daily (30-60 min prior to AM dose). Goal 5-8.  - cont  mg bid.    #Dysphagia  -SLP following underwent MBSS 5/28 - Pureed (IDDSI Level 4), Thin liquids (IDDSI Level 0)   -Calorie count started on 5/28/25  Discharge on bridcatrina Dooff  Nocturnal feeds::   Isosource 1.5 @ 80ml/hr x 12hrs.   Oral nutrition support regimen:   Trial Boost VHC daily.      Pain medications: PRN Tylenol  Fluids: Replete PRN  Electrolytes: Keep mg >2, phos >3  and K >4  Nutrition:  Adult diet Regular; Pureed 4; Thin 0; 1:1 Feeding   Antimicrobials: None  Antiplatelet: ASA and Clopidogrel  Anticoagulation: None  DVT PPX: Lovenox  GI ppx: none needed  Bowel care: Miralax  Catheter: None  Lines: PIV  Oxygen: Room Air    Disposition:   PT/OT moderate intensity    Code Status: Full Code (confirmed on admission)   NOK:  Primary Emergency Contact: Margo Cali MD  PGY-2 Neurology

## 2025-05-29 NOTE — PROGRESS NOTES
"Davidson Khoury is a 60 y.o. male on day 10 of admission presenting with Acute cerebrovascular accident (CVA) due to embolism of right middle cerebral artery (Multi).    No acute events overnight.  No changes in his neurologic status   Able to swallow      Scheduled medications  aspirin, 81 mg, oral, Daily   Or  aspirin, 81 mg, oral, Daily   Or  aspirin, 81 mg, nasogastric tube, Daily   Or  aspirin, 300 mg, rectal, Daily  cloNIDine, 0.2 mg, oral, q12h CHRIST  clopidogrel, 75 mg, oral, Daily  enoxaparin, 40 mg, subcutaneous, q24h  lidocaine, 1 Application, urethral, Once  losartan, 25 mg, oral, BID  mycophenolate, 750 mg, nasogastric tube, BID  ondansetron, 4 mg, intravenous, Once  perflutren protein A microsphere, 0.5 mL, intravenous, Once in imaging  polyethylene glycol, 17 g, oral, Daily  rosuvastatin, 20 mg, oral, Nightly  sulfur hexafluoride microsphr, 2 mL, intravenous, Once in imaging  tacrolimus, 2.5 mg, oral, q12h CHRIST      Continuous medications     PRN medications  PRN medications: acetaminophen, benzocaine-menthol, [] hydrALAZINE **FOLLOWED BY** hydrALAZINE, ondansetron **OR** ondansetron, oxyCODONE, oxyCODONE, oxygen    Last Recorded Vitals  Blood pressure 132/86, pulse 74, temperature 36.4 °C (97.5 °F), temperature source Temporal, resp. rate 16, height 1.753 m (5' 9\"), weight 72.9 kg (160 lb 11.5 oz), SpO2 100%.  Intake/Output last 3 Shifts:  I/O last 3 completed shifts:  In: - (0 mL/kg)   Out: 150 (2.1 mL/kg) [Urine:150 (0.1 mL/kg/hr)]  Weight: 72.9 kg     A&ox3, no distress, pleasant  MMM, no lesions  Lungs with equal air entry, no added sounds  Rrr, no m/r/g  Abd soft, nt, nd  No allograft tenderness  No edema b/l, lt hemiplegia    Results for orders placed or performed during the hospital encounter of 25 (from the past 24 hours)   Tacrolimus level   Result Value Ref Range    Tacrolimus  6.2 <=15.0 ng/mL     *Note: Due to a large number of results and/or encounters for the requested time " period, some results have not been displayed. A complete set of results can be found in Results Review.         CT head w/o contrast: 5/19/25  There is development of small acute infarct component within the  right frontal opercular region. There is also suspected subtle loss  of gray-white matter differentiation within the right frontal and  parietal lobes corresponding to regions of ischemia/infarct on recent  CTA perfusion examination. This could be better characterized with  MRI as clinically warranted. No acute intraparenchymal hematoma.  Subtle petechial hemorrhage versus contrast staining or potential  beam hardening artifact within the right parietal lobe. No  significant intracranial mass effect.     MRI head:  IMPRESSION:  1. Acute nonhemorrhagic right MCA territory infarct as above. There  is associated vasogenic edema and sulcal effacement. No midline  shift. No evidence of hemorrhagic transformation.  2. Old lacunar infarcts in left cerebellum, left thalamus and  bilateral basal ganglia. Moderate degree of chronic microvascular  ischemic disease in the cerebral white matter.  3. Evidence for old hemorrhage in the right basal ganglia, with  encephalomalacia. This could be sequela of an old hypertensive bleed,  or sequela of an old hemorrhagic infarct.        Assessment/Plan  60 y.o. male presenting with ESRD secondary to hypertension s/p DDKT on 8/10/2019. Patient was induced by Simulect, no DGF no CMV mismatch no EBV mismatch, KDPI of kidney is 40% PRA 0%. No episodes of rejection.     Currently admitted with Rt MCA occlusion CVA s/p cerebral angiogram and mecahnical thrombectomy 5/19/25     Allograft function: s/p DDKT 8/10/2019  - baseline Cr ~2. Stable this admission  - s/p contrast exposure this admission. Maintain adequate hydration.   - metabolic indices acceptable. Nonoliguric. No hypervolemia on exam.  - Hb ~13, acceptable  - Ca, Phos acceptable. Monitor and replete lytes as indicated  - Bps  improved. Losartan 25 mg bid, clonidine 0.2mg bid  If intensification is needed, consider nifedipine 30 mg/day and up-titrate to BID if needed.  - dose meds for CrCL. Avoid potential nephrotoxins.     Immunosuppression:  - TAC to 2.5 mg BID   - Monitor Fk trough daily (30-60 min prior to AM dose). Goal 4-6  Today's level below goal but monitor for now  -  mg     Rt MCA CVA: s/p mechanical thrombectomy 5/19/25  - management per neuro.  - on DAPT x3 months     Will follow.  Needs acute rehab.     Latrice Guardado MD

## 2025-05-29 NOTE — CARE PLAN
Problem: General Stroke  Goal: Establish a mutual long term goal with patient by discharge  5/29/2025 1625 by Luiz Orourke RN  Outcome: Progressing  5/29/2025 1625 by Luiz Orourke RN  Outcome: Progressing  5/29/2025 1621 by Luiz Orourke RN  Outcome: Progressing  Goal: Demonstrate improvement in neurological exam throughout the shift  5/29/2025 1625 by Luiz Orourke RN  Outcome: Progressing  5/29/2025 1625 by Luiz Orourke RN  Outcome: Progressing  5/29/2025 1621 by Luiz Orourke RN  Outcome: Progressing  Goal: Maintain BP within ordered limits throughout shift  5/29/2025 1625 by Luiz Orourke RN  Outcome: Progressing  5/29/2025 1625 by Luiz Orourke RN  Outcome: Progressing  5/29/2025 1621 by Luiz Orourke RN  Outcome: Progressing  Goal: Participate in treatment (ie., meds, therapy) throughout shift  5/29/2025 1625 by Luiz Orourke RN  Outcome: Progressing  5/29/2025 1625 by Luiz Orourke RN  Outcome: Progressing  5/29/2025 1621 by Luiz Orourke RN  Outcome: Progressing  Goal: Tolerate enteral feeding throughout shift  5/29/2025 1625 by Luiz Orourke RN  Outcome: Progressing  5/29/2025 1625 by Luiz Orourke RN  Outcome: Progressing  5/29/2025 1621 by Luiz Orourke RN  Outcome: Progressing  Goal: Decreased nausea/vomiting throughout shift  5/29/2025 1625 by Luiz Orourke RN  Outcome: Progressing  5/29/2025 1625 by Luiz Orourke RN  Outcome: Progressing  5/29/2025 1621 by Luiz Orourke RN  Outcome: Progressing  Goal: Out of bed three times today  5/29/2025 1625 by Luiz Orourke RN  Outcome: Progressing  5/29/2025 1625 by Luiz Orourke RN  Outcome: Progressing  5/29/2025 1621 by Luiz Orourke RN  Outcome: Progressing   The patient's goals for the shift include      The clinical goals for the shift include Patient will remain safe    Over the shift, the patient did not make progress toward the following goals. Barriers to  progression include . Recommendations to address these barriers include .

## 2025-05-29 NOTE — PROGRESS NOTES
05/29/25 1404   Discharge Planning   Expected Discharge Disposition SNF   Does the patient need discharge transport arranged? Yes     Transitional Care Coordination Progress Note:  This nurse received a telephone call from liaison at [facility] (Aris [phone]) who stated that per pts insurance plan SNF stay approved, but [facility] denied due to their physician being out of network with pts insurance plan.  Liaison confirmed that facility in network with pts plan. This nurse notified MD Harper, requested that someone from the team call to appeal this action phone #[phone], ext [phone].    This nurse spoke with pts spouse and notified her of the above. Notified of accepting SNFs, stated she isn't familiar with either facility. SNF list printed and placed at bedside.  Pt notified of the above.   Pts spouse to review SNF list this afternoon.    Addendum 1009: Per MD Richter the facility is out of network, auth denied.     Amy Mcgowan, RN, BSN  Transitional Care Coordinator  Office: [phone]  Secure chat via [employer]

## 2025-05-29 NOTE — PROGRESS NOTES
Physical Therapy    Physical Therapy Treatment    Patient Name: Davidson Khoury  MRN: [id number]  Department: [place] [id number]  Room: [address] [place] [address]  Today's Date: 5/29/2025  Time Calculation  Start Time: 1549  Stop Time: 1643  Time Calculation (min): 54 min         Assessment/Plan   PT Assessment  PT Assessment Results: Decreased strength, Decreased endurance, Decreased mobility, Impaired balance, Impaired tone, Decreased cognition, Decreased safety awareness, Impaired sensation  Rehab Prognosis: Excellent  Barriers to Discharge Home: Physical needs, Caregiver assistance  Caregiver Assistance: Caregiver assistance needed per identified barriers - however, level of patient's required assistance exceeds assistance available at home  Physical Needs: High falls risk due to function or environment  Evaluation/Treatment Tolerance: Patient tolerated treatment well  Medical Staff Made Aware: Yes  End of Session Communication: Bedside nurse  Assessment Comment: Patient stood 6 times and took 25 steps with his right leg only with left leg blocking. Patient's static balance is improving as he only requires min-modA to sit edge of bed and has less pushing to the left. Patient able to self correct to neutral position in a chair when verbally cued without assist. Patient still has palpable gluteal contraction, but PT unable to feel quad or calf contraction. High intensity still recommended. PT to follow up as able and appropriate.  End of Session Patient Position: Up in chair, Alarm on (spouse in room)  PT Plan  Inpatient/Swing Bed or Outpatient: Inpatient  PT Plan  Treatment/Interventions: Bed mobility, Transfer training, Gait training, Stair training, Balance training, Neuromuscular re-education, Neurodevelopmental intervention, Strengthening, Endurance training, Range of motion, Therapeutic exercise, Therapeutic activity, Home exercise program  PT Plan: Ongoing PT  PT Frequency: 5 times per week  PT Discharge Recommendations: High  intensity level of continued care  Equipment Recommended upon Discharge:  (TBD)  PT Recommended Transfer Status: Assist x2  PT - OK to Discharge: Yes    PT Visit Info:  PT Received On: 05/29/25  Response to Previous Treatment: Patient with no complaints from previous session.     General Visit Information:   General  Reason for Referral: P/w AMS, HA, HTN with notable subtle left sided weakness that worsened. Acute cerebrovascular accident due to embolism of R MCA. Proximal R superior M2 occulsion, MT TICI 2b. Pre intervention NIHSS: 10, post intervention NIHSS: 9.  Past Medical History Relevant to Rehab: h/o renal transplant (currently on Cellcept) and HTN  Family/Caregiver Present: Yes  Caregiver Feedback: Spouse present and supportive  Prior to Session Communication: Bedside nurse  Patient Position Received: Bed, 3 rail up, Alarm on  General Comment: Patient looking forward to working with PT    Subjective   Precautions:  Precautions  Hearing/Visual Limitations: Hearing WFL. Able to gaze past midline to L but does not achieve terminal L gaze.  Medical Precautions: Fall precautions  Precautions Comment: SBP <180, Left hemiparesis     Date/Time Vitals Session Patient Position Pulse Resp SpO2 BP MAP (mmHg)    05/29/25 1534 --  --  68  16  98 %  144/87  106                 Objective   Pain:  Pain Assessment  Pain Assessment: 0-10  0-10 (Numeric) Pain Score: 0 - No pain  Cognition:  Cognition  Overall Cognitive Status: Impaired  Arousal/Alertness: Delayed responses to stimuli  Orientation Level: Disoriented to situation  Following Commands: Follows one step commands with increased time  Safety Judgment: Decreased awareness of need for assistance  Insight: Moderate  Impulsive: Moderately  Processing Speed: Delayed  Coordination:       Activity Tolerance:  Activity Tolerance  Endurance: Decreased tolerance for upright activites  Treatments:  Therapeutic Activity  Therapeutic Activity 1: Bed mobility  Therapeutic Activity  2: Sit to stand x6  Therapeutic Activity 3: Bed to chair transfer  Therapeutic Activity 4: standing marching 25 steps total,    Balance/Neuromuscular Re-Education  Balance/Neuromuscular Re-Education Activity 1: Seated static balance    Bed Mobility 1  Bed Mobility 1: Supine to sitting  Level of Assistance 1: Moderate assistance    Ambulation/Gait Training  Ambulation/Gait Training Performed: Yes  Ambulation/Gait Training 1  Surface 1: Level tile  Device 1:  (arm in arm with left knee blocking)  Assistance 1: Maximum assistance  Comments/Distance (ft) 1: 25 steps on right leg alone. Blocked left knee. Poor foot clearance on right. Barely able to lift right foot off ground  Transfer 1  Transfer From 1: Sit to, Stand to  Transfer to 1: Stand, Sit  Technique 1: Sit to stand, Stand to sit  Transfer Device 1: Gait belt  Transfer Level of Assistance 1: Maximum assistance  Trials/Comments 1: left knee blocked only. weight shifting onto left leg. Immediate buckling when not blocked    Outcome Measures:       Education Documentation  Precautions, taught by Troy Davis PT at 5/29/2025  4:58 PM.  Learner: Family, Patient  Readiness: Acceptance  Method: Demonstration, Explanation  Response: Verbalizes Understanding, Demonstrated Understanding, Needs Reinforcement  Comment: Goals of PT, safety with mobility    Body Mechanics, taught by Troy Davis PT at 5/29/2025  4:58 PM.  Learner: Family, Patient  Readiness: Acceptance  Method: Demonstration, Explanation  Response: Verbalizes Understanding, Demonstrated Understanding, Needs Reinforcement  Comment: Goals of PT, safety with mobility    Home Exercise Program, taught by Troy Davis PT at 5/29/2025  4:58 PM.  Learner: Family, Patient  Readiness: Acceptance  Method: Demonstration, Explanation  Response: Verbalizes Understanding, Demonstrated Understanding, Needs Reinforcement  Comment: Goals of PT, safety with mobility    Mobility Training, taught by Troy  Ryan, PT at 5/29/2025  4:58 PM.  Learner: Family, Patient  Readiness: Acceptance  Method: Demonstration, Explanation  Response: Verbalizes Understanding, Demonstrated Understanding, Needs Reinforcement  Comment: Goals of PT, safety with mobility    Education Comments  No comments found.        OP EDUCATION:       Encounter Problems       Encounter Problems (Active)       Balance       Patient to demo static standing with unilateral UE support, performing single UE task with SBA, no sway or LOB x 2 mins for functional carryover  (Progressing)       Start:  05/20/25    Expected End:  06/03/25            Pt will score >/= 24/28 on Tinetti balance assessment to indicate low falls risk.   (Not Progressing)       Start:  05/20/25    Expected End:  06/03/25               Mobility       STG - Patient will ambulate >/= 30 ft with CGA and LRAD (Not Progressing)       Start:  05/20/25    Expected End:  06/03/25            Pt. will tolerate >/= 10 minutes of OOB mobility without seated rest break and VSS to demo improved activity tolerance/endurance.  (Progressing)       Start:  05/20/25    Expected End:  06/03/25            Patient will actively participate in ther-ex in order to improve strength and to assist with the completion of functional mobility tasks.  (Progressing)       Start:  05/20/25    Expected End:  06/03/25               PT Transfers       STG - Patient will perform bed mobility IND (Progressing)       Start:  05/20/25    Expected End:  06/03/25            STG - Patient will transfer sit to and from stand with CGA and LRAD (Progressing)       Start:  05/20/25    Expected End:  06/03/25               Pain - Adult

## 2025-05-29 NOTE — CARE PLAN
Problem: General Stroke  Goal: Establish a mutual long term goal with patient by discharge  5/29/2025 1628 by Luiz Orourke RN  Outcome: Progressing  5/29/2025 1625 by Luiz Orourke RN  Outcome: Progressing  5/29/2025 1625 by Luiz Orourke RN  Outcome: Progressing  5/29/2025 1621 by Luiz Orourke RN  Outcome: Progressing  Goal: Demonstrate improvement in neurological exam throughout the shift  5/29/2025 1628 by Luiz Orourke RN  Outcome: Progressing  5/29/2025 1625 by Luiz Orourke RN  Outcome: Progressing  5/29/2025 1625 by Luiz Orourke RN  Outcome: Progressing  5/29/2025 1621 by Luiz Orourke RN  Outcome: Progressing  Goal: Maintain BP within ordered limits throughout shift  5/29/2025 1628 by Luiz Orourke RN  Outcome: Progressing  5/29/2025 1625 by Luiz Orourke RN  Outcome: Progressing  5/29/2025 1625 by Luiz Orourke RN  Outcome: Progressing  5/29/2025 1621 by Luiz Orourke RN  Outcome: Progressing  Goal: Participate in treatment (ie., meds, therapy) throughout shift  5/29/2025 1628 by Luiz Orourke RN  Outcome: Progressing  5/29/2025 1625 by Luiz Orourke RN  Outcome: Progressing  5/29/2025 1625 by Luiz Orourke RN  Outcome: Progressing  5/29/2025 1621 by Luiz Orourke RN  Outcome: Progressing  Goal: Tolerate enteral feeding throughout shift  5/29/2025 1628 by Luiz Orourke RN  Outcome: Progressing  5/29/2025 1625 by Luiz Orourke RN  Outcome: Progressing  5/29/2025 1625 by Luiz Orourke RN  Outcome: Progressing  5/29/2025 1621 by Luiz Orourke RN  Outcome: Progressing  Goal: Decreased nausea/vomiting throughout shift  5/29/2025 1628 by Luiz Orourke RN  Outcome: Progressing  5/29/2025 1625 by Luiz Orourke RN  Outcome: Progressing  5/29/2025 1625 by Luiz Orourke RN  Outcome: Progressing  5/29/2025 1621 by Luiz Orourke RN  Outcome: Progressing  Goal: Out of bed three times today  5/29/2025 1628 by Luiz  JUAN DIEGO Orourke  Outcome: Progressing  5/29/2025 1625 by Luiz Orourke RN  Outcome: Progressing  5/29/2025 1625 by Luiz Orourke RN  Outcome: Progressing  5/29/2025 1621 by Luiz Orourke RN  Outcome: Progressing   The patient's goals for the shift include      The clinical goals for the shift include Patient will remain safe    Over the shift, the patient did not make progress toward the following goals. Barriers to progression include . Recommendations to address these barriers include.

## 2025-05-29 NOTE — CARE PLAN
The patient's goals for the shift include      The clinical goals for the shift include Patient will remain safe    Over the shift, the patient did not make progress toward the following goals. Barriers to progression include . Recommendations to address these barriers include .    Problem: General Stroke  Goal: Establish a mutual long term goal with patient by discharge  5/29/2025 1625 by Luiz Orourke RN  Outcome: Progressing  5/29/2025 1621 by Luiz Orourke RN  Outcome: Progressing  Goal: Demonstrate improvement in neurological exam throughout the shift  5/29/2025 1625 by Luiz Orourke RN  Outcome: Progressing  5/29/2025 1621 by Luiz Orourke RN  Outcome: Progressing  Goal: Maintain BP within ordered limits throughout shift  5/29/2025 1625 by Luiz Orourke RN  Outcome: Progressing  5/29/2025 1621 by Luiz Orourke RN  Outcome: Progressing  Goal: Participate in treatment (ie., meds, therapy) throughout shift  5/29/2025 1625 by Luiz Orourke RN  Outcome: Progressing  5/29/2025 1621 by Luiz Orourke RN  Outcome: Progressing  Goal: Tolerate enteral feeding throughout shift  5/29/2025 1625 by Luiz Orourke RN  Outcome: Progressing  5/29/2025 1621 by Luiz Orourke RN  Outcome: Progressing  Goal: Decreased nausea/vomiting throughout shift  5/29/2025 1625 by Luiz Orourke RN  Outcome: Progressing  5/29/2025 1621 by Luiz Orourke RN  Outcome: Progressing  Goal: Out of bed three times today  5/29/2025 1625 by Luiz Orourke RN  Outcome: Progressing  5/29/2025 1621 by Luiz Orourke RN  Outcome: Progressing

## 2025-05-29 NOTE — CARE PLAN
The patient's goals for the shift include      The clinical goals for the shift include safety, therapy    Over the shift, the patient did not make progress toward the following goals. Barriers to progression include . Recommendations to address these barriers include .    Problem: General Stroke  Goal: Establish a mutual long term goal with patient by discharge  Outcome: Progressing  Goal: Demonstrate improvement in neurological exam throughout the shift  Outcome: Progressing  Goal: Maintain BP within ordered limits throughout shift  Outcome: Progressing  Goal: Participate in treatment (ie., meds, therapy) throughout shift  Outcome: Progressing  Goal: Tolerate enteral feeding throughout shift  Outcome: Progressing  Goal: Decreased nausea/vomiting throughout shift  Outcome: Progressing  Goal: Out of bed three times today  Outcome: Progressing

## 2025-05-30 LAB — TACROLIMUS BLD-MCNC: 4.9 NG/ML

## 2025-05-30 PROCEDURE — 2500000004 HC RX 250 GENERAL PHARMACY W/ HCPCS (ALT 636 FOR OP/ED): Mod: JZ

## 2025-05-30 PROCEDURE — 2500000001 HC RX 250 WO HCPCS SELF ADMINISTERED DRUGS (ALT 637 FOR MEDICARE OP)

## 2025-05-30 PROCEDURE — 99233 SBSQ HOSP IP/OBS HIGH 50: CPT | Performed by: STUDENT IN AN ORGANIZED HEALTH CARE EDUCATION/TRAINING PROGRAM

## 2025-05-30 PROCEDURE — 2500000004 HC RX 250 GENERAL PHARMACY W/ HCPCS (ALT 636 FOR OP/ED): Performed by: STUDENT IN AN ORGANIZED HEALTH CARE EDUCATION/TRAINING PROGRAM

## 2025-05-30 PROCEDURE — 1100000001 HC PRIVATE ROOM DAILY

## 2025-05-30 PROCEDURE — 2500000002 HC RX 250 W HCPCS SELF ADMINISTERED DRUGS (ALT 637 FOR MEDICARE OP, ALT 636 FOR OP/ED)

## 2025-05-30 PROCEDURE — 2500000001 HC RX 250 WO HCPCS SELF ADMINISTERED DRUGS (ALT 637 FOR MEDICARE OP): Performed by: STUDENT IN AN ORGANIZED HEALTH CARE EDUCATION/TRAINING PROGRAM

## 2025-05-30 PROCEDURE — 80197 ASSAY OF TACROLIMUS: CPT

## 2025-05-30 PROCEDURE — 36415 COLL VENOUS BLD VENIPUNCTURE: CPT

## 2025-05-30 PROCEDURE — 2500000004 HC RX 250 GENERAL PHARMACY W/ HCPCS (ALT 636 FOR OP/ED)

## 2025-05-30 PROCEDURE — 99232 SBSQ HOSP IP/OBS MODERATE 35: CPT

## 2025-05-30 RX ORDER — MYCOPHENOLATE MOFETIL 200 MG/ML
750 POWDER, FOR SUSPENSION ORAL 2 TIMES DAILY
Start: 2025-05-30 | End: 2025-06-04 | Stop reason: HOSPADM

## 2025-05-30 RX ADMIN — ASPIRIN 81 MG: 81 TABLET, CHEWABLE ORAL at 08:26

## 2025-05-30 RX ADMIN — TACROLIMUS 2.5 MG: 5 CAPSULE, GELATIN COATED ORAL at 06:55

## 2025-05-30 RX ADMIN — CLONIDINE HYDROCHLORIDE 0.2 MG: 0.2 TABLET ORAL at 08:26

## 2025-05-30 RX ADMIN — ROSUVASTATIN CALCIUM 20 MG: 20 TABLET, FILM COATED ORAL at 22:15

## 2025-05-30 RX ADMIN — LOSARTAN POTASSIUM 25 MG: 25 TABLET, FILM COATED ORAL at 22:15

## 2025-05-30 RX ADMIN — MYCOPHENOLATE MOFETIL 750 MG: 200 POWDER, FOR SUSPENSION ORAL at 22:15

## 2025-05-30 RX ADMIN — CLOPIDOGREL BISULFATE 75 MG: 75 TABLET, FILM COATED ORAL at 08:26

## 2025-05-30 RX ADMIN — LOSARTAN POTASSIUM 25 MG: 25 TABLET, FILM COATED ORAL at 08:26

## 2025-05-30 RX ADMIN — POLYETHYLENE GLYCOL 3350 17 G: 17 POWDER, FOR SOLUTION ORAL at 08:25

## 2025-05-30 RX ADMIN — MYCOPHENOLATE MOFETIL 750 MG: 200 POWDER, FOR SUSPENSION ORAL at 08:37

## 2025-05-30 RX ADMIN — ENOXAPARIN SODIUM 40 MG: 40 INJECTION, SOLUTION SUBCUTANEOUS at 18:51

## 2025-05-30 RX ADMIN — CLONIDINE HYDROCHLORIDE 0.2 MG: 0.2 TABLET ORAL at 22:14

## 2025-05-30 RX ADMIN — NYSTATIN 4 ML: 100000 SUSPENSION ORAL at 03:38

## 2025-05-30 RX ADMIN — TACROLIMUS 2.5 MG: 5 CAPSULE, GELATIN COATED ORAL at 18:56

## 2025-05-30 RX ADMIN — NYSTATIN 400000 UNITS: 100000 SUSPENSION ORAL at 15:00

## 2025-05-30 ASSESSMENT — COGNITIVE AND FUNCTIONAL STATUS - GENERAL
TOILETING: A LOT
DRESSING REGULAR UPPER BODY CLOTHING: A LOT
WALKING IN HOSPITAL ROOM: A LOT
STANDING UP FROM CHAIR USING ARMS: A LOT
DAILY ACTIVITIY SCORE: 12
CLIMB 3 TO 5 STEPS WITH RAILING: A LOT
HELP NEEDED FOR BATHING: A LOT
EATING MEALS: A LOT
MOVING TO AND FROM BED TO CHAIR: A LOT
TURNING FROM BACK TO SIDE WHILE IN FLAT BAD: A LOT
MOBILITY SCORE: 12
PERSONAL GROOMING: A LOT
MOVING FROM LYING ON BACK TO SITTING ON SIDE OF FLAT BED WITH BEDRAILS: A LOT
DRESSING REGULAR LOWER BODY CLOTHING: A LOT

## 2025-05-30 ASSESSMENT — PAIN SCALES - GENERAL
PAINLEVEL_OUTOF10: 0 - NO PAIN
PAINLEVEL_OUTOF10: 0 - NO PAIN

## 2025-05-30 NOTE — CARE PLAN
Problem: General Stroke  Goal: Establish a mutual long term goal with patient by discharge  Outcome: Progressing  Goal: Demonstrate improvement in neurological exam throughout the shift  Outcome: Progressing  Goal: Maintain BP within ordered limits throughout shift  Outcome: Progressing  Goal: Participate in treatment (ie., meds, therapy) throughout shift  Outcome: Progressing  Goal: Out of bed three times today  Outcome: Progressing     Problem: Pain - Adult  Goal: Verbalizes/displays adequate comfort level or baseline comfort level  Outcome: Progressing     Problem: Discharge Planning  Goal: Discharge to home or other facility with appropriate resources  Outcome: Progressing     Problem: Chronic Conditions and Co-morbidities  Goal: Patient's chronic conditions and co-morbidity symptoms are monitored and maintained or improved  Outcome: Progressing     Problem: Nutrition  Goal: Nutrient intake appropriate for maintaining nutritional needs  Outcome: Progressing     Problem: Skin  Goal: Participates in plan/prevention/treatment measures  Outcome: Progressing  Goal: Prevent/manage excess moisture  Outcome: Progressing  Goal: Prevent/minimize sheer/friction injuries  Outcome: Progressing  Goal: Promote/optimize nutrition  Outcome: Progressing  Goal: Promote skin healing  Outcome: Progressing     Problem: Fall/Injury  Goal: Verbalize understanding of personal risk factors for fall in the hospital  Outcome: Progressing  Goal: Verbalize understanding of risk factor reduction measures to prevent injury from fall in the home  Outcome: Progressing  Goal: Use assistive devices by end of the shift  Outcome: Progressing  Goal: Pace activities to prevent fatigue by end of the shift  Outcome: Progressing    The clinical goals for the shift include Patient's pain controlled to tolerable level. Patient compliant with DVT prophylaxis. Patient remains safe and free from falls. Patient compliant with skin prevention measures.

## 2025-05-30 NOTE — CARE PLAN
The patient's goals for the shift include      The clinical goals for the shift include Patient will remain HDS by end of shift      Problem: General Stroke  Goal: Establish a mutual long term goal with patient by discharge  Outcome: Progressing  Goal: Demonstrate improvement in neurological exam throughout the shift  Outcome: Progressing  Goal: Maintain BP within ordered limits throughout shift  Outcome: Progressing  Goal: Participate in treatment (ie., meds, therapy) throughout shift  Outcome: Progressing  Goal: Tolerate enteral feeding throughout shift  Outcome: Progressing  Goal: Decreased nausea/vomiting throughout shift  Outcome: Progressing  Goal: Out of bed three times today  Outcome: Progressing     Problem: Pain - Adult  Goal: Verbalizes/displays adequate comfort level or baseline comfort level  Outcome: Progressing     Problem: Safety - Adult  Goal: Free from fall injury  Outcome: Progressing     Problem: Discharge Planning  Goal: Discharge to home or other facility with appropriate resources  Outcome: Progressing     Problem: Skin  Goal: Decreased wound size/increased tissue granulation at next dressing change  Outcome: Progressing  Flowsheets (Taken 5/30/2025 1015)  Decreased wound size/increased tissue granulation at next dressing change: Protective dressings over bony prominences  Goal: Participates in plan/prevention/treatment measures  Outcome: Progressing  Flowsheets (Taken 5/30/2025 1015)  Participates in plan/prevention/treatment measures: Elevate heels  Goal: Prevent/manage excess moisture  Outcome: Progressing  Flowsheets (Taken 5/30/2025 1015)  Prevent/manage excess moisture: Cleanse incontinence/protect with barrier cream  Goal: Prevent/minimize sheer/friction injuries  Outcome: Progressing  Goal: Promote/optimize nutrition  Outcome: Progressing  Goal: Promote skin healing  Outcome: Progressing     Problem: Fall/Injury  Goal: Verbalize understanding of personal risk factors for fall in the  hospital  Outcome: Progressing  Goal: Verbalize understanding of risk factor reduction measures to prevent injury from fall in the home  Outcome: Progressing  Goal: Use assistive devices by end of the shift  Outcome: Progressing  Goal: Pace activities to prevent fatigue by end of the shift  Outcome: Progressing

## 2025-05-30 NOTE — PROGRESS NOTES
"Davidson Khoury is a 60 y.o. male on day 11 of admission presenting with Acute cerebrovascular accident (CVA) due to embolism of right middle cerebral artery (Multi).    No acute events overnight.  Stable neurological status.  He complaints about things at the hospital and states he wants to go home.      Scheduled medications  aspirin, 81 mg, oral, Daily   Or  aspirin, 81 mg, oral, Daily   Or  aspirin, 81 mg, nasogastric tube, Daily   Or  aspirin, 300 mg, rectal, Daily  cloNIDine, 0.2 mg, oral, q12h CHRIST  clopidogrel, 75 mg, oral, Daily  enoxaparin, 40 mg, subcutaneous, q24h  lidocaine, 1 Application, urethral, Once  losartan, 25 mg, oral, BID  mycophenolate, 750 mg, oral, BID  nystatin, 4 mL, Swish & Swallow, BID  ondansetron, 4 mg, intravenous, Once  perflutren protein A microsphere, 0.5 mL, intravenous, Once in imaging  polyethylene glycol, 17 g, oral, Daily  rosuvastatin, 20 mg, oral, Nightly  sulfur hexafluoride microsphr, 2 mL, intravenous, Once in imaging  tacrolimus, 2.5 mg, oral, q12h CHRIST      Continuous medications     PRN medications  PRN medications: acetaminophen, benzocaine-menthol, [] hydrALAZINE **FOLLOWED BY** hydrALAZINE, ondansetron **OR** ondansetron, oxyCODONE, oxyCODONE, oxygen    Last Recorded Vitals  Blood pressure (!) 174/94, pulse 83, temperature 36.1 °C (97 °F), temperature source Temporal, resp. rate 17, height 1.753 m (5' 9\"), weight 72.9 kg (160 lb 11.5 oz), SpO2 94%.  Intake/Output last 3 Shifts:  I/O last 3 completed shifts:  In: - (0 mL/kg)   Out: 700 (9.6 mL/kg) [Urine:700 (0.3 mL/kg/hr)]  Weight: 72.9 kg     A&ox3, no distress, pleasant  MMM, no lesions  Lungs with equal air entry, no added sounds  Rrr, no m/r/g  Abd soft, nt, nd  No allograft tenderness  No edema b/l, lt hemiplegia    No results found. However, due to the size of the patient record, not all encounters were searched. Please check Results Review for a complete set of results.        CT head w/o contrast: " 5/19/25  There is development of small acute infarct component within the  right frontal opercular region. There is also suspected subtle loss  of gray-white matter differentiation within the right frontal and  parietal lobes corresponding to regions of ischemia/infarct on recent  CTA perfusion examination. This could be better characterized with  MRI as clinically warranted. No acute intraparenchymal hematoma.  Subtle petechial hemorrhage versus contrast staining or potential  beam hardening artifact within the right parietal lobe. No  significant intracranial mass effect.     MRI head:  IMPRESSION:  1. Acute nonhemorrhagic right MCA territory infarct as above. There  is associated vasogenic edema and sulcal effacement. No midline  shift. No evidence of hemorrhagic transformation.  2. Old lacunar infarcts in left cerebellum, left thalamus and  bilateral basal ganglia. Moderate degree of chronic microvascular  ischemic disease in the cerebral white matter.  3. Evidence for old hemorrhage in the right basal ganglia, with  encephalomalacia. This could be sequela of an old hypertensive bleed,  or sequela of an old hemorrhagic infarct.        Assessment/Plan  60 y.o. male presenting with ESRD secondary to hypertension s/p DDKT on 8/10/2019. Patient was induced by Simulect, no DGF no CMV mismatch no EBV mismatch, KDPI of kidney is 40% PRA 0%. No episodes of rejection.     Currently admitted with Rt MCA occlusion CVA s/p cerebral angiogram and mecahnical thrombectomy 5/19/25     Allograft function: s/p DDKT 8/10/2019  - baseline Cr ~2. Stable this admission  - s/p contrast exposure this admission. Maintain adequate hydration.   - metabolic indices acceptable. Nonoliguric. No hypervolemia on exam.  - Hb ~13, acceptable  - Ca, Phos acceptable. Monitor and replete lytes as indicated  - BP management per neuro: Losartan 25 mg bid, clonidine 0.2mg bid  If intensification is needed, consider nifedipine 30 mg/day  - dose meds  for CrCL. Avoid potential nephrotoxins.     Immunosuppression:  - TAC to 2.5 mg BID  - at goal  - Monitor Fk trough daily (30-60 min prior to AM dose). Goal 4-6  Today's level below goal but monitor for now  -  mg     Rt MCA CVA: s/p mechanical thrombectomy 5/19/25  - management per neuro.  - secondary prevention: DAPT, statin     Dispo: acute rehab.     Latrice Guardado MD

## 2025-05-30 NOTE — PROGRESS NOTES
05/30/25 1222   Discharge Planning   Expected Discharge Disposition SNF   Does the patient need discharge transport arranged? Yes     Transitional Care Coordination Progress Note:  Telephone call to pts spouse discussed dispo planning. Pts spouse inquiring about high intensity therapy vs moderate intensity therapy, this nurse notified her of  PM&R recommendation for SNF.   Agreeable to SNF referrals being placed to facilities near their zipcode area. Stated she'll be in this afternoon to review SNF list with patient.     Addendum 1425:  This nurse met with patient and spouse, notified them of the accepting SNFs, pts spouse declined all accepting SNFs in [facility].  Pts spouse declined [facility].   Stated they will review SNF list for additional choices.    My contact information provided.    Addendum 1502: Telephone call from pts spouse who confirmed that FOC is now [facility], stated this is their FOC, Updates sent to facility, awaiting a response from facility for acceptance and auth submission.     Addendum 1600: [facility] confirmed acceptance, facility to submit auth.     Amy Mcgowan RN, BSN  Transitional Care Coordinator  Office: [phone]  Secure chat via Haiku

## 2025-05-30 NOTE — PROGRESS NOTES
Davidson Khoury is a 60 y.o. left handed male on day 11 of admission presenting with Acute cerebrovascular accident (CVA) due to embolism of right middle cerebral artery (Multi).    Subjective   New acute event overnight, no new complaints.       Objective     Last Recorded Vitals  Heart Rate:  [67-86]   Temp:  [36.3 °C (97.3 °F)-37 °C (98.6 °F)]   Resp:  [16-17]   BP: (132-163)/()   SpO2:  [95 %-100 %]       [facility] NIHSS:   NIH Stroke Scale:     Date/Time of Assessment: 5/29/2025 8:39 AM    1A. Level of Consciousness:  Alert (keenly responsive) (0)    1B. Ask Month and Age:  Both questions right (0)    1C. Blink Eyes & Squeeze Hands:  Performs both tasks (0)    2. Best Gaze:  Normal (0)    3. Visual:  Complete hemianopia (+2)    4. Facial Palsy:  Partial paralysis (+2)    5A. Motor - Left Arm:  No movement (+4)    5B. Motor - Right Arm:  No drift (0)    6A. Motor - Left Leg:  No effort against gravity (+3)    6B. Motor - Right Leg:  No drift (0)    7. Limb Ataxia:  No ataxia (0)    8. Sensory Loss:  Severe to total loss (+2)    9. Best Language:  Normal (no aphasia) (0)    10. Dysarthia:  Mild-moderate dysarthria (+1)    11. Extinction and Inattention:  Visual/tactile/auditory/spatial/personal inattention (+1)    NIH Stroke Scale:  15       Physical Exam  Neurological Exam  GENERAL APPEARANCE:  No distress, alert, interactive and cooperative.     MENTAL STATE:   Orientation was normal to time, place and person. Recent and remote memory was intact.  Attention span and concentration were normal. Language testing was normal for comprehension, repetition, expression, and naming. The patient could correctly interpret a picture. General fund of knowledge was intact.     CRANIAL NERVES:   CN 2   Left inferior quadrantanopia  CN 3, 4, 6   Pupils round, 4 mm in diameter, equally reactive to light. Lids symmetric; no ptosis. EOMs normal alignment, full range with normal saccades, pursuit and convergence.   No nystagmus.    CN 5   Facial sensation intact bilaterally.   CN 7   Left UMN paralysis  CN 8   Hearing intact to conversation.   CN 9   Palate elevates symmetrically.   CN 11   Normal strength of shoulder shrug and neck turning.   CN 12   Tongue midline, with normal bulk and strength; no fasciculations.     Motor/Sensory:   Hypotonia of the left UE and LE, LUE 0/5, LLE 1/5. RUE 5/5 with elbow flexion/extension and RLE /5 with hip flexion, dorsiflexion, and plantarflexion, left hemisensory loss and neglect.    COORDINATION:    Right finger-nose-finger was intact without dysmetria or overshoot.     GAIT:   Deferred    Cardiac: regular rate, normal s1, s2,  Chest: Equal air entry bilaterally  Abdomen soft lax non distended    Relevant Results    NIH Stroke Scale  1A. Level of Consciousness: Alert, Keenly Responsive  1B. Ask Month and Age: Both Questions Right  1C. Blink Eyes & Squeeze Hands: Performs 1 Task  2. Best Gaze: Partial Gaze Palsy  3. Visual: Partial Hemianopia  4. Facial Palsy: Partial Paralysis  5A. Motor - Left Arm: No Effort Against Gravity  5B. Motor - Right Arm: No Drift  6A. Motor - Left Leg: Some Effort Against Gravity  6B. Motor - Right Leg: No Drift  7. Limb Ataxia: Present in One Limb  8. Sensory Loss: Mild-to-Moderate Sensory Loss  9. Best Language: Mild-to-Moderate Aphasia  10. Dysarthria: Mild-to-Moderate Dysarthria  11. Extinction and Inattention: Visual, Tactile, Auditory, Spatial, or Personal Inattention  NIH Stroke Scale: 15           [place] Coma Scale  Best Eye Response: Spontaneous  Best Verbal Response: Oriented  Best Motor Response: Follows commands  [place] Coma Scale Score: 15                    Assessment & Plan  Acute cerebrovascular accident (CVA) due to embolism of right middle cerebral artery (Multi)    Davidson Khoury is a 60 y.o. male with a history notable for renal transplant (currently on Cellcept) and hypertension presenting to the ED initially with a chief complaint of altered mental  status, headache, and hypertension. Initially evaluated by general neurology team who noted subtle left sided weakness that worsened on follow up exam. BAT activated for concern of stroke. Found to have proximal R superior M2 occlusion. Not a candidate for TNK as LKN >4.5 hours. Taken for MT, TICI 2b. Exam notable for L HH, L UMN facial paralysis, LUE 0/5, LLE 3/5. right at least antigravity, left hemisensory loss and neglect.    Stroke Metrics:  EMS pre-notification?: No  Time of stroke team arrival: 3:47pm  Direct to CT?: No  Time of CTH read by stroke team: As performed  Time of TNK: n/a  Time stroke team called IR: 4:27pm  Time pt arrived to angio suite: 4:57pm  Time of groin puncture: 5:10pm  Time of recanalization: 5:31pm  Number of passes: 1  Device used: penumbra aspiration  Any delays in the process (if door to TNK >30 min): n/a     Stroke Classification:  Type: Ischemic stroke  Subtype/etiology: Suspected large artery disease  Vessels involved: R MCA  Neurological manifestations:  Pre-Intervention Deficits: NIHSS 10 *see above  Post-Intervention Deficits: NIHSS 9 *see above  Pre-stroke mRS: 0  Initial treatment: Angio  Anti-platelets or Anti-coagulation management: Aspirin  Vascular Risk Factors: HTN  Antiplatelet/antithrombotic plan for stroke prevention: Aspirin  VTE prophylaxis: Lovenox  Vascular Risk Factor modification goals:  Blood pressure goals: avoid hypotension SBP <100 that could worsen cerebral perfusion, Ischemic stroke post-thrombectomy SBP <180mmHg   Lipid Goals: education on healthy diet and statin therapy to maintain or achieve goal LDL-cholesterol < 70mg.  Glucose Goals: early treatment of hyperglycemia to goal glucose 140-180 mg/dl with long-term goal A1c < 7%   Smoking Cessation and Education  Assessment for Rehabilitation needs   Patient and family education on signs and symptoms of stroke, calling 911, healthy strategies for stroke prevention.      Plan:  Updates 05/30/25  -Continue  DAPT 90 days [facility] protocol for intracranial atherosclerosis started 5/23/2025  -Follow up transplant nephrology recs continue tacrolimus to 2.5mg BID   -SLP following underwent MBSS 5/28 - Pureed (IDDSI Level 4), Thin liquids (IDDSI Level 0)   -Calorie count started on 5/28/25  -Follow up nutrition recs  -Pending dispo to SNF  -PT/OT    #Proximal R superior M2 occlusion s/p TICI 2b MT   #HTN   #Hx of thrombocytopenia- Stable   - Continue DAPT 90 days [facility] protocol for intracranial atherosclerosis  - Stroke work up  - A1c: 5.2%, LDL 63              - TTE with bubble study: EF 70-75% LA normal in size, no PFO  - SBP goals normotension now 7 days post-stroke  - Discuss with transplant nephrology optimal antihypertensive regime. Currently:   - Losartan 25mg BID   - Clonidine 0.2mg BID  - PT/OT/SLP     #Renal transplant (on Cellcept and Tacrolimus)   - Baseline BUN/Cr: 15/1.80   -Consulted nephrology recommended - continue tacrolimus to  2.5mg q12. Monitor Fk trough daily (30-60 min prior to AM dose). Goal 5-8.  - cont  mg bid.    #Dysphagia  -SLP following underwent MBSS 5/28 - Pureed (IDDSI Level 4), Thin liquids (IDDSI Level 0)   -Calorie count started on 5/28/25  Discharge on bridcatrina Dooff  Nocturnal feeds::   Isosource 1.5 @ 80ml/hr x 12hrs.   Oral nutrition support regimen:   Trial Boost VHC daily.      Pain medications: PRN Tylenol  Fluids: Replete PRN  Electrolytes: Keep mg >2, phos >3  and K >4  Nutrition:  Adult diet Regular; Pureed 4; Thin 0; 1:1 Feeding   Antimicrobials: None  Antiplatelet: ASA and Clopidogrel  Anticoagulation: None  DVT PPX: Lovenox  GI ppx: none needed  Bowel care: Miralax  Catheter: None  Lines: PIV  Oxygen: Room Air    Disposition:   PT/OT moderate intensity    Code Status: Full Code (confirmed on admission)   NOK:  Primary Emergency Contact: Margo Cali MD  PGY-2 Neurology

## 2025-05-31 LAB — TACROLIMUS BLD-MCNC: 6.7 NG/ML

## 2025-05-31 PROCEDURE — 2500000002 HC RX 250 W HCPCS SELF ADMINISTERED DRUGS (ALT 637 FOR MEDICARE OP, ALT 636 FOR OP/ED)

## 2025-05-31 PROCEDURE — 99233 SBSQ HOSP IP/OBS HIGH 50: CPT | Performed by: STUDENT IN AN ORGANIZED HEALTH CARE EDUCATION/TRAINING PROGRAM

## 2025-05-31 PROCEDURE — 97112 NEUROMUSCULAR REEDUCATION: CPT | Mod: GO | Performed by: OCCUPATIONAL THERAPIST

## 2025-05-31 PROCEDURE — 2500000001 HC RX 250 WO HCPCS SELF ADMINISTERED DRUGS (ALT 637 FOR MEDICARE OP)

## 2025-05-31 PROCEDURE — 2500000001 HC RX 250 WO HCPCS SELF ADMINISTERED DRUGS (ALT 637 FOR MEDICARE OP): Performed by: STUDENT IN AN ORGANIZED HEALTH CARE EDUCATION/TRAINING PROGRAM

## 2025-05-31 PROCEDURE — 2500000004 HC RX 250 GENERAL PHARMACY W/ HCPCS (ALT 636 FOR OP/ED): Performed by: STUDENT IN AN ORGANIZED HEALTH CARE EDUCATION/TRAINING PROGRAM

## 2025-05-31 PROCEDURE — 80197 ASSAY OF TACROLIMUS: CPT

## 2025-05-31 PROCEDURE — 2500000004 HC RX 250 GENERAL PHARMACY W/ HCPCS (ALT 636 FOR OP/ED): Mod: JZ

## 2025-05-31 PROCEDURE — 2500000004 HC RX 250 GENERAL PHARMACY W/ HCPCS (ALT 636 FOR OP/ED)

## 2025-05-31 PROCEDURE — 36415 COLL VENOUS BLD VENIPUNCTURE: CPT

## 2025-05-31 PROCEDURE — 1100000001 HC PRIVATE ROOM DAILY

## 2025-05-31 PROCEDURE — 99232 SBSQ HOSP IP/OBS MODERATE 35: CPT

## 2025-05-31 RX ADMIN — CLOPIDOGREL BISULFATE 75 MG: 75 TABLET, FILM COATED ORAL at 08:27

## 2025-05-31 RX ADMIN — MYCOPHENOLATE MOFETIL 750 MG: 200 POWDER, FOR SUSPENSION ORAL at 21:31

## 2025-05-31 RX ADMIN — MYCOPHENOLATE MOFETIL 750 MG: 200 POWDER, FOR SUSPENSION ORAL at 08:27

## 2025-05-31 RX ADMIN — LOSARTAN POTASSIUM 25 MG: 25 TABLET, FILM COATED ORAL at 21:31

## 2025-05-31 RX ADMIN — CLONIDINE HYDROCHLORIDE 0.2 MG: 0.2 TABLET ORAL at 08:27

## 2025-05-31 RX ADMIN — CLONIDINE HYDROCHLORIDE 0.2 MG: 0.2 TABLET ORAL at 21:31

## 2025-05-31 RX ADMIN — ROSUVASTATIN CALCIUM 20 MG: 20 TABLET, FILM COATED ORAL at 21:31

## 2025-05-31 RX ADMIN — ENOXAPARIN SODIUM 40 MG: 40 INJECTION, SOLUTION SUBCUTANEOUS at 18:16

## 2025-05-31 RX ADMIN — TACROLIMUS 2.5 MG: 5 CAPSULE, GELATIN COATED ORAL at 06:35

## 2025-05-31 RX ADMIN — ASPIRIN 81 MG: 81 TABLET, CHEWABLE ORAL at 08:26

## 2025-05-31 RX ADMIN — TACROLIMUS 2.5 MG: 5 CAPSULE, GELATIN COATED ORAL at 18:16

## 2025-05-31 RX ADMIN — NYSTATIN 400000 UNITS: 100000 SUSPENSION ORAL at 15:53

## 2025-05-31 RX ADMIN — NYSTATIN 400000 UNITS: 100000 SUSPENSION ORAL at 04:14

## 2025-05-31 RX ADMIN — POLYETHYLENE GLYCOL 3350 17 G: 17 POWDER, FOR SOLUTION ORAL at 08:30

## 2025-05-31 RX ADMIN — LOSARTAN POTASSIUM 25 MG: 25 TABLET, FILM COATED ORAL at 08:26

## 2025-05-31 ASSESSMENT — COGNITIVE AND FUNCTIONAL STATUS - GENERAL
DAILY ACTIVITIY SCORE: 11
DRESSING REGULAR LOWER BODY CLOTHING: TOTAL
HELP NEEDED FOR BATHING: A LOT
DRESSING REGULAR UPPER BODY CLOTHING: A LOT
TOILETING: TOTAL
EATING MEALS: A LOT
PERSONAL GROOMING: A LITTLE

## 2025-05-31 ASSESSMENT — PAIN - FUNCTIONAL ASSESSMENT
PAIN_FUNCTIONAL_ASSESSMENT: 0-10
PAIN_FUNCTIONAL_ASSESSMENT: 0-10

## 2025-05-31 ASSESSMENT — PAIN SCALES - GENERAL
PAINLEVEL_OUTOF10: 0 - NO PAIN
PAINLEVEL_OUTOF10: 0 - NO PAIN

## 2025-05-31 NOTE — CARE PLAN
The clinical goals for the shift include Patient's pain controlled to tolerable level. Patient compliant with DVT prophylaxis. Patient remains safe and free from falls. Patient compliant with skin prevention measures.      Problem: General Stroke  Goal: Establish a mutual long term goal with patient by discharge  Outcome: Progressing  Goal: Demonstrate improvement in neurological exam throughout the shift  Outcome: Progressing  Goal: Maintain BP within ordered limits throughout shift  Outcome: Progressing  Goal: Participate in treatment (ie., meds, therapy) throughout shift  Outcome: Progressing  Goal: Tolerate enteral feeding throughout shift  Outcome: Progressing  Goal: Decreased nausea/vomiting throughout shift  Outcome: Progressing  Goal: Out of bed three times today  Outcome: Progressing     Problem: ICU Stroke  Goal: Maintain ICP within ordered limits throughout shift  Outcome: Progressing  Goal: Tolerate EVD clamping trial throughout shift  Outcome: Progressing  Goal: Tolerate ventilator weaning trial during shift  Outcome: Progressing  Goal: Maintain patent airway throughout shift  Outcome: Progressing  Goal: Achieve/maintain targeted sodium level throughout shift  Outcome: Progressing     Problem: Pain - Adult  Goal: Verbalizes/displays adequate comfort level or baseline comfort level  Outcome: Progressing     Problem: Safety - Adult  Goal: Free from fall injury  Outcome: Progressing     Problem: Discharge Planning  Goal: Discharge to home or other facility with appropriate resources  Outcome: Progressing     Problem: Chronic Conditions and Co-morbidities  Goal: Patient's chronic conditions and co-morbidity symptoms are monitored and maintained or improved  Outcome: Progressing     Problem: Nutrition  Goal: Nutrient intake appropriate for maintaining nutritional needs  Outcome: Progressing     Problem: Skin  Goal: Decreased wound size/increased tissue granulation at next dressing change  Outcome:  Progressing  Goal: Participates in plan/prevention/treatment measures  Outcome: Progressing  Goal: Prevent/manage excess moisture  Outcome: Progressing  Goal: Prevent/minimize sheer/friction injuries  Outcome: Progressing  Goal: Promote/optimize nutrition  Outcome: Progressing  Goal: Promote skin healing  Outcome: Progressing     Problem: Fall/Injury  Goal: Verbalize understanding of personal risk factors for fall in the hospital  Outcome: Progressing  Goal: Verbalize understanding of risk factor reduction measures to prevent injury from fall in the home  Outcome: Progressing  Goal: Use assistive devices by end of the shift  Outcome: Progressing  Goal: Pace activities to prevent fatigue by end of the shift  Outcome: Progressing

## 2025-05-31 NOTE — PROGRESS NOTES
"Davidson Khoury is a 60 y.o. male on day 12 of admission presenting with Acute cerebrovascular accident (CVA) due to embolism of right middle cerebral artery (Multi).    No acute events overnight.  Stable neurological status.      Scheduled medications  aspirin, 81 mg, oral, Daily   Or  aspirin, 81 mg, oral, Daily   Or  aspirin, 81 mg, nasogastric tube, Daily   Or  aspirin, 300 mg, rectal, Daily  cloNIDine, 0.2 mg, oral, q12h CHRIST  clopidogrel, 75 mg, oral, Daily  enoxaparin, 40 mg, subcutaneous, q24h  lidocaine, 1 Application, urethral, Once  losartan, 25 mg, oral, BID  mycophenolate, 750 mg, oral, BID  nystatin, 4 mL, Swish & Swallow, BID  ondansetron, 4 mg, intravenous, Once  perflutren protein A microsphere, 0.5 mL, intravenous, Once in imaging  polyethylene glycol, 17 g, oral, Daily  rosuvastatin, 20 mg, oral, Nightly  sulfur hexafluoride microsphr, 2 mL, intravenous, Once in imaging  tacrolimus, 2.5 mg, oral, q12h CHRIST      Continuous medications     PRN medications  PRN medications: acetaminophen, benzocaine-menthol, [] hydrALAZINE **FOLLOWED BY** hydrALAZINE, ondansetron **OR** ondansetron, oxyCODONE, oxyCODONE, oxygen    Last Recorded Vitals  Blood pressure 149/88, pulse 58, temperature 36.3 °C (97.4 °F), temperature source Temporal, resp. rate 16, height 1.753 m (5' 9\"), weight 72.9 kg (160 lb 11.5 oz), SpO2 99%.  Intake/Output last 3 Shifts:  I/O last 3 completed shifts:  In: - (0 mL/kg)   Out: 800 (11 mL/kg) [Urine:800 (0.3 mL/kg/hr)]  Weight: 72.9 kg     A&ox3, no distress, pleasant  MMM, no lesions  Lungs with equal air entry, no added sounds  Rrr, no m/r/g  Abd soft, nt, nd  No allograft tenderness  No edema b/l, lt hemiplegia    Results for orders placed or performed during the hospital encounter of 25 (from the past 24 hours)   Tacrolimus level   Result Value Ref Range    Tacrolimus  6.7 <=15.0 ng/mL           CT head w/o contrast: 25  There is development of small acute infarct " component within the  right frontal opercular region. There is also suspected subtle loss  of gray-white matter differentiation within the right frontal and  parietal lobes corresponding to regions of ischemia/infarct on recent  CTA perfusion examination. This could be better characterized with  MRI as clinically warranted. No acute intraparenchymal hematoma.  Subtle petechial hemorrhage versus contrast staining or potential  beam hardening artifact within the right parietal lobe. No  significant intracranial mass effect.     MRI head:  IMPRESSION:  1. Acute nonhemorrhagic right MCA territory infarct as above. There  is associated vasogenic edema and sulcal effacement. No midline  shift. No evidence of hemorrhagic transformation.  2. Old lacunar infarcts in left cerebellum, left thalamus and  bilateral basal ganglia. Moderate degree of chronic microvascular  ischemic disease in the cerebral white matter.  3. Evidence for old hemorrhage in the right basal ganglia, with  encephalomalacia. This could be sequela of an old hypertensive bleed,  or sequela of an old hemorrhagic infarct.        Assessment/Plan  60 y.o. male presenting with ESRD secondary to hypertension s/p DDKT on 8/10/2019. Patient was induced by Simulect, no DGF no CMV mismatch no EBV mismatch, KDPI of kidney is 40% PRA 0%. No episodes of rejection.     Currently admitted with Rt MCA occlusion CVA s/p cerebral angiogram and mecahnical thrombectomy 5/19/25     Allograft function: s/p DDKT 8/10/2019  - baseline Cr ~2. Stable this admission  - s/p contrast exposure this admission. Maintain adequate hydration.   - metabolic indices acceptable. Nonoliguric. No hypervolemia on exam.  - Hb ~13, acceptable  - Ca, Phos acceptable. Monitor and replete lytes as indicated  - BP management per neuro: Losartan 25 mg bid, clonidine 0.2mg bid  If intensification is needed, consider nifedipine 30 mg/day  - dose meds for CrCL. Avoid potential nephrotoxins.      Immunosuppression:  - TAC to 2.5 mg BID  - at goal  - Monitor Fk trough daily (30-60 min prior to AM dose). Goal 4-6  Today's level below goal but monitor for now  -  mg     Rt MCA CVA: s/p mechanical thrombectomy 5/19/25  - management per neuro.  - secondary prevention: DAPT, statin     Dispo: acute rehab.     Latrice Guardado MD

## 2025-05-31 NOTE — PROGRESS NOTES
05/31/25 1237   Discharge Planning   Expected Discharge Disposition SNF     Transitional Care Coordination Progress Note:  This nurse requested that DSC support team escalate auth, stated [facility] is closed on the weekend, will escalate on Monday.    Updated PT/OT notes requested for Sunday 6/1.    Amy Mcgowan, RN, BSN  Transitional Care Coordinator  Office: [phone]  Secure chat via [employer]

## 2025-05-31 NOTE — CARE PLAN
Problem: ADLs  Goal: Patient will feed self with minimal assist level of assistance and verbal cues and tactile cues using PRN adaptive equipment.  Outcome: Progressing  Goal: Patient will complete daily grooming tasks brushing teeth and washing face/hair with minimal assist level of assistance and PRN adaptive equipment while edge of bed.  Outcome: Progressing  Goal: Patient with complete upper body dressing with Min assist level of assistance donning and doffing all UE clothes with PRN adaptive equipment and one handed techniques while edge of bed.  Outcome: Progressing     Problem: TRANSFERS  Goal: Patient will perform bed mobility minimal assist level of assistance in order to improve safety and independence with mobility  Outcome: Progressing     Problem: BALANCE  Goal: Patient will maintain static standing balance during ADL task with minimal assist level of assistance in order to demonstrate decreased risk of falling and improved postural control.  Outcome: Progressing     Problem: EXERCISE/STRENGTHENING  Goal: Patient will independently perform self passive ROM on LUE to increase bimanual coordination.  Outcome: Progressing

## 2025-05-31 NOTE — CONSULTS
Nutrition Note:     Message received from resident MD via secure chat regarding pt's calorie count order. Calorie count was ordered on 5/28. Over the last 3 days there have been 2 meals documented; Breakfast 5/29 with 33% intake (estimated 270kcal and 12 gm protein); Breakfast 5/30 with 25% intake (estimated 165kcal and 7gm protein).     Unclear if pt has been consuming Boost supplements. Above information relayed to MD. Plan is to continue Calorie Count with re-assessment early next week pending adequate intake documentation. Continue current diet and supplement order.

## 2025-05-31 NOTE — PROGRESS NOTES
Occupational Therapy    OT Treatment    Patient Name: Davidson Khoury  MRN: [id number]  Department: [place] [id number]  Room: [address] [place] [address]  Today's Date: 5/31/2025  Time Calculation  Start Time: 1320  Stop Time: 1342  Time Calculation (min): 22 min        Assessment:        Plan:  Treatment Interventions: ADL retraining, UE strengthening/ROM, Compensatory technique education  OT Frequency: 4 times per week  OT Discharge Recommendations: High intensity level of continued care  Equipment Recommended upon Discharge:  (TBD)  OT Recommended Transfer Status: Assist of 2  OT - OK to Discharge: Yes  Treatment Interventions: ADL retraining, UE strengthening/ROM, Compensatory technique education    Subjective   OT Visit Info:  OT Received On: 05/31/25  General Visit Info:  General  Reason for Referral: P/w AMS, HA, HTN with notable subtle left sided weakness that worsened. Acute cerebrovascular accident due to embolism of R MCA. Proximal R superior M2 occulsion, MT TICI 2b. Pre intervention NIHSS: 10, post intervention NIHSS: 9.  Past Medical History Relevant to Rehab: h/o renal transplant (currently on Cellcept) and HTN  Precautions:               Pain:  Pain Assessment  Pain Assessment: 0-10    Objective    Cognition:  Cognition  Orientation Level: Oriented X4    Bed Mobility/Transfers: Bed Mobility  Bed Mobility: Yes  Bed Mobility 1  Bed Mobility 1: Supine to sitting  Level of Assistance 1: Moderate assistance  Bed Mobility Comments 1:  (HOB elevated, use of bed rail. Assist to manage LEs, cues for hip alignment and positioning on L UE as pt neglecting UE.)  Bed Mobility 2  Bed Mobility  2: Sitting to supine  Level of Assistance 2: Maximum assistance  Modalities:      Tolerated sitting EOB ~8 minutes with Max assist and use of RUE on bedrail with intermittent periods of CGA-MOD assist with verbal prompts as preperatory task for adls      Outcome Measures:[facility] Daily Activity  Putting on and taking off regular lower body clothing:  Total  Bathing (including washing, rinsing, drying): A lot  Putting on and taking off regular upper body clothing: A lot  Toileting, which includes using toilet, bedpan or urinal: Total  Taking care of personal grooming such as brushing teeth: A little  Eating Meals: A lot  Daily Activity - Total Score: 11        Education Documentation  Body Mechanics, taught by Flor Vela OT at 5/31/2025  2:57 PM.  Learner: Patient  Readiness: Acceptance  Method: Explanation  Response: Verbalizes Understanding    Precautions, taught by Flor Vela OT at 5/31/2025  2:57 PM.  Learner: Patient  Readiness: Acceptance  Method: Explanation  Response: Verbalizes Understanding    ADL Training, taught by Flor Vela OT at 5/31/2025  2:57 PM.  Learner: Patient  Readiness: Acceptance  Method: Explanation  Response: Verbalizes Understanding    Education Comments  No comments found.        OP EDUCATION:       Goals:  Encounter Problems       Encounter Problems (Active)       ADLs       Patient with complete upper body dressing with moderate assist level of assistance donning and doffing all UE clothes with PRN adaptive equipment and one handed techniques while edge of bed.  (Met)       Start:  05/21/25    Expected End:  06/11/25    Resolved:  05/27/25    Updated to: Patient with complete upper body dressing with Min assist level of assistance donning and doffing all UE clothes with PRN adaptive equipment and one handed techniques while edge of bed.    Update reason: Met         Patient will feed self with minimal assist level of assistance and verbal cues and tactile cues using PRN adaptive equipment. (Progressing)       Start:  05/21/25    Expected End:  06/11/25            Patient will complete daily grooming tasks brushing teeth and washing face/hair with minimal assist level of assistance and PRN adaptive equipment while edge of bed. (Progressing)       Start:  05/21/25    Expected End:  06/11/25            Patient with  complete upper body dressing with Min assist level of assistance donning and doffing all UE clothes with PRN adaptive equipment and one handed techniques while edge of bed. (Progressing)       Start:  05/27/25    Expected End:  06/11/25                   BALANCE       Patient will maintain static standing balance during ADL task with minimal assist level of assistance in order to demonstrate decreased risk of falling and improved postural control. (Progressing)       Start:  05/21/25    Expected End:  06/11/25               EXERCISE/STRENGTHENING       Patient will independently perform self passive ROM on LUE to increase bimanual coordination. (Progressing)       Start:  05/21/25    Expected End:  06/11/25               TRANSFERS       Patient will perform bed mobility minimal assist level of assistance in order to improve safety and independence with mobility (Progressing)       Start:  05/21/25    Expected End:  06/11/25

## 2025-05-31 NOTE — PROGRESS NOTES
Davidson Khoury is a 60 y.o. left handed male on day 12 of admission presenting with Acute cerebrovascular accident (CVA) due to embolism of right middle cerebral artery (Multi).    Subjective   New acute events overnight. Denies pain. States he feels he is eating enough, denies hunger. Eager for rehab.       Objective     Last Recorded Vitals  Heart Rate:  [65-83]   Temp:  [35.7 °C (96.3 °F)-36.8 °C (98.3 °F)]   Resp:  [15-18]   BP: (150-174)/(90-99)   SpO2:  [94 %-100 %]       [facility] NIHSS:   NIH Stroke Scale:     Date/Time of Assessment: 5/31/2025 7:30 AM    1A. Level of Consciousness:  Alert (keenly responsive) (0)    1B. Ask Month and Age:  Both questions right (0)    1C. Blink Eyes & Squeeze Hands:  Performs both tasks (0)    2. Best Gaze:  Partial gaze palsy (+1)    3. Visual:  Complete hemianopia (+2)    4. Facial Palsy:  Complete paralysis (+3)    5A. Motor - Left Arm:  No movement (+4)    5B. Motor - Right Arm:  No drift (0)    6A. Motor - Left Leg:  No effort against gravity (+3)    6B. Motor - Right Leg:  No drift (0)    7. Limb Ataxia:  No ataxia (0)    8. Sensory Loss:  Mild-moderate loss (+1)    9. Best Language:  Normal (no aphasia) (0)    10. Dysarthia:  Mild-moderate dysarthria (+1)    11. Extinction and Inattention:  Visual/tactile/auditory/spatial/personal inattention (+1)    NIH Stroke Scale:  16       Physical Exam  Neurological Exam  GENERAL APPEARANCE:  No distress, alert, interactive and cooperative.     MENTAL STATE:   Orientation was normal to time, place and person.     CRANIAL NERVES:   CN 2   Left HH.  CN 3, 4, 6   Lids symmetric; no ptosis. EOMs normal alignment, full range with normal saccades, pursuit and convergence.   No nystagmus.    CN 7   Left UMN FD.  CN 8   Hearing intact to conversation.   CN 9   Palate elevates symmetrically.   CN 12   Tongue midline, with normal bulk and strength; no fasciculations.     Motor/Sensory:   Hypotonia of the left UE and LE, LUE 0/5, LLE 2/5. 5/5 on  RUE and RLE.    COORDINATION:    Right finger-nose-finger was intact without dysmetria or overshoot.     GAIT:   Deferred.            [place] Coma Scale  Best Eye Response: Spontaneous  Best Verbal Response: Oriented  Best Motor Response: Follows commands  [place] Coma Scale Score: 15                  Assessment & Plan  Acute cerebrovascular accident (CVA) due to embolism of right middle cerebral artery (Multi)    Davidson Khoury is a 60 y.o. male with a history notable for renal transplant (currently on Cellcept) and hypertension presenting to the ED initially with a chief complaint of altered mental status, headache, and hypertension. Initially evaluated by general neurology team who noted subtle left sided weakness that worsened on follow up exam. BAT activated for concern of stroke. Found to have proximal R superior M2 occlusion. Not a candidate for TNK as LKN >4.5 hours. Taken for MT, TICI 2b. Exam notable for L HH, L UMN facial paralysis, LUE 0/5, LLE 3/5. right at least antigravity, left hemisensory loss and neglect.    Stroke Metrics:  EMS pre-notification?: No  Time of stroke team arrival: 3:47pm  Direct to CT?: No  Time of CTH read by stroke team: As performed  Time of TNK: n/a  Time stroke team called IR: 4:27pm  Time pt arrived to angio suite: 4:57pm  Time of groin puncture: 5:10pm  Time of recanalization: 5:31pm  Number of passes: 1  Device used: penumbra aspiration  Any delays in the process (if door to TNK >30 min): n/a     Stroke Classification:  Type: Ischemic stroke  Subtype/etiology: Suspected large artery disease  Vessels involved: R MCA  Neurological manifestations:  Pre-Intervention Deficits: NIHSS 10 *see above  Post-Intervention Deficits: NIHSS 9 *see above  Pre-stroke mRS: 0  Initial treatment: Angio  Anti-platelets or Anti-coagulation management: Aspirin  Vascular Risk Factors: HTN  Antiplatelet/antithrombotic plan for stroke prevention: Aspirin  VTE prophylaxis: Lovenox  Vascular Risk  Factor modification goals:  Blood pressure goals: avoid hypotension SBP <100 that could worsen cerebral perfusion, Ischemic stroke post-thrombectomy SBP <180mmHg   Lipid Goals: education on healthy diet and statin therapy to maintain or achieve goal LDL-cholesterol < 70mg.  Glucose Goals: early treatment of hyperglycemia to goal glucose 140-180 mg/dl with long-term goal A1c < 7%   Smoking Cessation and Education  Assessment for Rehabilitation needs   Patient and family education on signs and symptoms of stroke, calling 911, healthy strategies for stroke prevention.      Plan:  Updates 05/31/25  -Continue DAPT 90 days sampriss protocol for intracranial atherosclerosis started 5/23/2025  -Follow up transplant nephrology recs continue tacrolimus to 2.5mg BID   -SLP following underwent MBSS 5/28 - Pureed (IDDSI Level 4), Thin liquids (IDDSI Level 0), will reach out in regards to need for dobhoff  -Calorie count started on 5/28/25  -Follow up nutrition recs  -Pending dispo to SNF  -PT/OT    #Proximal R superior M2 occlusion s/p TICI 2b MT   #HTN   #Hx of thrombocytopenia- Stable   - Continue DAPT 90 days sampriss protocol for intracranial atherosclerosis  - Stroke work up  - A1c: 5.2%, LDL 63              - TTE with bubble study: EF 70-75% LA normal in size, no PFO  - SBP goals normotension now 7 days post-stroke  - Discuss with transplant nephrology optimal antihypertensive regime. Currently:   - Losartan 25mg BID   - Clonidine 0.2mg BID  - PT/OT/SLP     #Renal transplant (on Cellcept and Tacrolimus)   - Baseline BUN/Cr: 15/1.80   -Consulted nephrology recommended - continue tacrolimus to  2.5mg q12. Monitor Fk trough daily (30-60 min prior to AM dose). Goal 5-8.  - cont  mg bid.    #Dysphagia  -SLP following underwent MBSS 5/28 - Pureed (IDDSI Level 4), Thin liquids (IDDSI Level 0)   -Calorie count started on 5/28/25  Discharge on bridle Dobhoff - will reach out to SLP to confirm this  Nocturnal feeds::    Isosource 1.5 @ 80ml/hr x 12hrs.   Oral nutrition support regimen:   Trial Boost VHC daily.      Pain medications: PRN Tylenol  Fluids: Replete PRN  Electrolytes: Keep mg >2, phos >3  and K >4  Nutrition:  Adult diet Regular; Pureed 4; Thin 0; 1:1 Feeding   Antimicrobials: None  Antiplatelet: ASA and Clopidogrel  Anticoagulation: None  DVT PPX: Lovenox  GI ppx: none needed  Bowel care: Miralax  Catheter: None  Lines: PIV  Oxygen: Room Air    Disposition:   PT/OT moderate intensity    Code Status: Full Code (confirmed on admission)   NOK:  Primary Emergency Contact: Margo Cali MD  PGY-2 Neurology

## 2025-05-31 NOTE — CARE PLAN
Problem: General Stroke  Goal: Establish a mutual long term goal with patient by discharge  Outcome: Progressing  Goal: Demonstrate improvement in neurological exam throughout the shift  Outcome: Progressing  Goal: Maintain BP within ordered limits throughout shift  Outcome: Progressing  Goal: Participate in treatment (ie., meds, therapy) throughout shift  Outcome: Progressing     Problem: Pain - Adult  Goal: Verbalizes/displays adequate comfort level or baseline comfort level  Outcome: Progressing     Problem: Discharge Planning  Goal: Discharge to home or other facility with appropriate resources  Outcome: Progressing     Problem: Chronic Conditions and Co-morbidities  Goal: Patient's chronic conditions and co-morbidity symptoms are monitored and maintained or improved  Outcome: Progressing     Problem: Nutrition  Goal: Nutrient intake appropriate for maintaining nutritional needs  Outcome: Progressing     Problem: Skin  Goal: Decreased wound size/increased tissue granulation at next dressing change  Outcome: Progressing  Goal: Participates in plan/prevention/treatment measures  Outcome: Progressing  Goal: Prevent/manage excess moisture  Outcome: Progressing  Goal: Prevent/minimize sheer/friction injuries  Outcome: Progressing  Goal: Promote/optimize nutrition  Outcome: Progressing  Goal: Promote skin healing  Outcome: Progressing     Problem: Fall/Injury  Goal: Verbalize understanding of personal risk factors for fall in the hospital  Outcome: Progressing  Goal: Verbalize understanding of risk factor reduction measures to prevent injury from fall in the home  Outcome: Progressing  Goal: Use assistive devices by end of the shift  Outcome: Progressing  Goal: Pace activities to prevent fatigue by end of the shift  Outcome: Progressing    The clinical goals for the shift include Patient's pain controlled to tolerable level. Patient compliant with DVT prophylaxis. Patient remains safe and free from falls. Patient  compliant with skin prevention measures.

## 2025-06-01 VITALS
RESPIRATION RATE: 19 BRPM | OXYGEN SATURATION: 94 % | DIASTOLIC BLOOD PRESSURE: 91 MMHG | WEIGHT: 160.72 LBS | HEIGHT: 69 IN | HEART RATE: 63 BPM | TEMPERATURE: 97.8 F | BODY MASS INDEX: 23.8 KG/M2 | SYSTOLIC BLOOD PRESSURE: 147 MMHG

## 2025-06-01 LAB
ALBUMIN SERPL BCP-MCNC: 3.8 G/DL (ref 3.4–5)
ANION GAP SERPL CALC-SCNC: 13 MMOL/L (ref 10–20)
BASOPHILS # BLD AUTO: 0.01 X10*3/UL (ref 0–0.1)
BASOPHILS NFR BLD AUTO: 0.2 %
BUN SERPL-MCNC: 28 MG/DL (ref 6–23)
CALCIUM SERPL-MCNC: 9.1 MG/DL (ref 8.6–10.6)
CHLORIDE SERPL-SCNC: 106 MMOL/L (ref 98–107)
CO2 SERPL-SCNC: 25 MMOL/L (ref 21–32)
CREAT SERPL-MCNC: 1.78 MG/DL (ref 0.5–1.3)
EGFRCR SERPLBLD CKD-EPI 2021: 43 ML/MIN/1.73M*2
EOSINOPHIL # BLD AUTO: 0.03 X10*3/UL (ref 0–0.7)
EOSINOPHIL NFR BLD AUTO: 0.6 %
ERYTHROCYTE [DISTWIDTH] IN BLOOD BY AUTOMATED COUNT: 13.6 % (ref 11.5–14.5)
GLUCOSE SERPL-MCNC: 105 MG/DL (ref 74–99)
HCT VFR BLD AUTO: 41.6 % (ref 41–52)
HGB BLD-MCNC: 13.7 G/DL (ref 13.5–17.5)
IMM GRANULOCYTES # BLD AUTO: 0.01 X10*3/UL (ref 0–0.7)
IMM GRANULOCYTES NFR BLD AUTO: 0.2 % (ref 0–0.9)
LYMPHOCYTES # BLD AUTO: 0.87 X10*3/UL (ref 1.2–4.8)
LYMPHOCYTES NFR BLD AUTO: 16.2 %
MAGNESIUM SERPL-MCNC: 2.02 MG/DL (ref 1.6–2.4)
MCH RBC QN AUTO: 30.6 PG (ref 26–34)
MCHC RBC AUTO-ENTMCNC: 32.9 G/DL (ref 32–36)
MCV RBC AUTO: 93 FL (ref 80–100)
MONOCYTES # BLD AUTO: 0.62 X10*3/UL (ref 0.1–1)
MONOCYTES NFR BLD AUTO: 11.5 %
NEUTROPHILS # BLD AUTO: 3.83 X10*3/UL (ref 1.2–7.7)
NEUTROPHILS NFR BLD AUTO: 71.3 %
NRBC BLD-RTO: 0 /100 WBCS (ref 0–0)
PHOSPHATE SERPL-MCNC: 2.9 MG/DL (ref 2.5–4.9)
PLATELET # BLD AUTO: 136 X10*3/UL (ref 150–450)
POTASSIUM SERPL-SCNC: 4 MMOL/L (ref 3.5–5.3)
RBC # BLD AUTO: 4.48 X10*6/UL (ref 4.5–5.9)
SODIUM SERPL-SCNC: 140 MMOL/L (ref 136–145)
TACROLIMUS BLD-MCNC: 8.8 NG/ML
WBC # BLD AUTO: 5.4 X10*3/UL (ref 4.4–11.3)

## 2025-06-01 PROCEDURE — 2500000001 HC RX 250 WO HCPCS SELF ADMINISTERED DRUGS (ALT 637 FOR MEDICARE OP): Performed by: STUDENT IN AN ORGANIZED HEALTH CARE EDUCATION/TRAINING PROGRAM

## 2025-06-01 PROCEDURE — 99232 SBSQ HOSP IP/OBS MODERATE 35: CPT

## 2025-06-01 PROCEDURE — 85025 COMPLETE CBC W/AUTO DIFF WBC: CPT

## 2025-06-01 PROCEDURE — 2500000004 HC RX 250 GENERAL PHARMACY W/ HCPCS (ALT 636 FOR OP/ED): Mod: JZ

## 2025-06-01 PROCEDURE — 1100000001 HC PRIVATE ROOM DAILY

## 2025-06-01 PROCEDURE — 2500000004 HC RX 250 GENERAL PHARMACY W/ HCPCS (ALT 636 FOR OP/ED)

## 2025-06-01 PROCEDURE — 2500000002 HC RX 250 W HCPCS SELF ADMINISTERED DRUGS (ALT 637 FOR MEDICARE OP, ALT 636 FOR OP/ED)

## 2025-06-01 PROCEDURE — 2500000004 HC RX 250 GENERAL PHARMACY W/ HCPCS (ALT 636 FOR OP/ED): Performed by: STUDENT IN AN ORGANIZED HEALTH CARE EDUCATION/TRAINING PROGRAM

## 2025-06-01 PROCEDURE — 2500000001 HC RX 250 WO HCPCS SELF ADMINISTERED DRUGS (ALT 637 FOR MEDICARE OP)

## 2025-06-01 PROCEDURE — 83735 ASSAY OF MAGNESIUM: CPT

## 2025-06-01 PROCEDURE — 97530 THERAPEUTIC ACTIVITIES: CPT | Mod: GP | Performed by: PHYSICAL THERAPIST

## 2025-06-01 PROCEDURE — 36415 COLL VENOUS BLD VENIPUNCTURE: CPT

## 2025-06-01 PROCEDURE — 97112 NEUROMUSCULAR REEDUCATION: CPT | Mod: GP | Performed by: PHYSICAL THERAPIST

## 2025-06-01 PROCEDURE — 99233 SBSQ HOSP IP/OBS HIGH 50: CPT | Performed by: STUDENT IN AN ORGANIZED HEALTH CARE EDUCATION/TRAINING PROGRAM

## 2025-06-01 PROCEDURE — 84100 ASSAY OF PHOSPHORUS: CPT

## 2025-06-01 PROCEDURE — 80197 ASSAY OF TACROLIMUS: CPT

## 2025-06-01 RX ORDER — AMOXICILLIN 250 MG
2 CAPSULE ORAL NIGHTLY
Status: DISCONTINUED | OUTPATIENT
Start: 2025-06-01 | End: 2025-06-04 | Stop reason: HOSPADM

## 2025-06-01 RX ORDER — POLYETHYLENE GLYCOL 3350 17 G/17G
17 POWDER, FOR SOLUTION ORAL EVERY 12 HOURS
Status: DISCONTINUED | OUTPATIENT
Start: 2025-06-01 | End: 2025-06-04 | Stop reason: HOSPADM

## 2025-06-01 RX ADMIN — CLONIDINE HYDROCHLORIDE 0.2 MG: 0.2 TABLET ORAL at 09:15

## 2025-06-01 RX ADMIN — MYCOPHENOLATE MOFETIL 750 MG: 200 POWDER, FOR SUSPENSION ORAL at 09:15

## 2025-06-01 RX ADMIN — TACROLIMUS 2 MG: 5 CAPSULE, GELATIN COATED ORAL at 17:33

## 2025-06-01 RX ADMIN — POLYETHYLENE GLYCOL 3350 17 G: 17 POWDER, FOR SOLUTION ORAL at 09:15

## 2025-06-01 RX ADMIN — MYCOPHENOLATE MOFETIL 750 MG: 200 POWDER, FOR SUSPENSION ORAL at 20:33

## 2025-06-01 RX ADMIN — LOSARTAN POTASSIUM 25 MG: 25 TABLET, FILM COATED ORAL at 20:33

## 2025-06-01 RX ADMIN — CLOPIDOGREL BISULFATE 75 MG: 75 TABLET, FILM COATED ORAL at 09:15

## 2025-06-01 RX ADMIN — TACROLIMUS 2.5 MG: 5 CAPSULE, GELATIN COATED ORAL at 06:53

## 2025-06-01 RX ADMIN — NYSTATIN 400000 UNITS: 100000 SUSPENSION ORAL at 14:52

## 2025-06-01 RX ADMIN — LOSARTAN POTASSIUM 25 MG: 25 TABLET, FILM COATED ORAL at 09:15

## 2025-06-01 RX ADMIN — ASPIRIN 81 MG: 81 TABLET, CHEWABLE ORAL at 09:15

## 2025-06-01 RX ADMIN — CLONIDINE HYDROCHLORIDE 0.2 MG: 0.2 TABLET ORAL at 20:33

## 2025-06-01 RX ADMIN — ENOXAPARIN SODIUM 40 MG: 40 INJECTION, SOLUTION SUBCUTANEOUS at 18:40

## 2025-06-01 RX ADMIN — POLYETHYLENE GLYCOL 3350 17 G: 17 POWDER, FOR SOLUTION ORAL at 20:34

## 2025-06-01 RX ADMIN — ROSUVASTATIN CALCIUM 20 MG: 20 TABLET, FILM COATED ORAL at 20:33

## 2025-06-01 RX ADMIN — SENNOSIDES AND DOCUSATE SODIUM 2 TABLET: 50; 8.6 TABLET ORAL at 20:33

## 2025-06-01 RX ADMIN — NYSTATIN 400000 UNITS: 100000 SUSPENSION ORAL at 03:59

## 2025-06-01 ASSESSMENT — COGNITIVE AND FUNCTIONAL STATUS - GENERAL
STANDING UP FROM CHAIR USING ARMS: A LOT
CLIMB 3 TO 5 STEPS WITH RAILING: TOTAL
MOVING FROM LYING ON BACK TO SITTING ON SIDE OF FLAT BED WITH BEDRAILS: A LOT
MOBILITY SCORE: 10
TURNING FROM BACK TO SIDE WHILE IN FLAT BAD: A LOT
MOVING TO AND FROM BED TO CHAIR: A LOT
WALKING IN HOSPITAL ROOM: TOTAL

## 2025-06-01 ASSESSMENT — PAIN SCALES - WONG BAKER: WONGBAKER_NUMERICALRESPONSE: NO HURT

## 2025-06-01 ASSESSMENT — PAIN SCALES - GENERAL
PAINLEVEL_OUTOF10: 0 - NO PAIN

## 2025-06-01 ASSESSMENT — PAIN - FUNCTIONAL ASSESSMENT: PAIN_FUNCTIONAL_ASSESSMENT: 0-10

## 2025-06-01 NOTE — CARE PLAN
The patient's goals for the shift include  safety    The clinical goals for the shift include Patient's pain controlled to tolerable level. Patient compliant with DVT prophylaxis. Patient remains safe and free from falls. Patient compliant with skin prevention measures.    Over the shift, the patient did not make progress toward the following goals. Barriers to progression include CVA. Recommendations to address these barriers include safety and fall precations.

## 2025-06-01 NOTE — PROGRESS NOTES
Davidson Khoury is a 60 y.o. left handed male on day 13 of admission presenting with Acute cerebrovascular accident (CVA) due to embolism of right middle cerebral artery (Multi).    Subjective   New acute events overnight. This morning, states he continue to eat and drink better day-by-day. Denies pain or worsening weakness.       Objective     Last Recorded Vitals  Heart Rate:  [58-74]   Temp:  [36.3 °C (97.4 °F)-36.8 °C (98.3 °F)]   Resp:  [16-20]   BP: (124-161)/(78-94)   SpO2:  [96 %-99 %]       [facility] NIHSS:   NIH Stroke Scale:     Date/Time of Assessment: 5/31/2025 7:30 AM    1A. Level of Consciousness:  Alert (keenly responsive) (0)    1B. Ask Month and Age:  Both questions right (0)    1C. Blink Eyes & Squeeze Hands:  Performs both tasks (0)    2. Best Gaze:  Partial gaze palsy (+1)    3. Visual:  Complete hemianopia (+2)    4. Facial Palsy:  Complete paralysis (+3)    5A. Motor - Left Arm:  No movement (+4)    5B. Motor - Right Arm:  No drift (0)    6A. Motor - Left Leg:  No effort against gravity (+3)    6B. Motor - Right Leg:  No drift (0)    7. Limb Ataxia:  No ataxia (0)    8. Sensory Loss:  Mild-moderate loss (+1)    9. Best Language:  Normal (no aphasia) (0)    10. Dysarthia:  Mild-moderate dysarthria (+1)    11. Extinction and Inattention:  Visual/tactile/auditory/spatial/personal inattention (+1)    NIH Stroke Scale:  16       Physical Exam  Neurological Exam  GENERAL APPEARANCE:  No distress, alert, interactive and cooperative.     MENTAL STATE:   Orientation was normal to time, place and person.     CRANIAL NERVES:   CN 2   Left HH.  CN 3, 4, 6   Lids symmetric; no ptosis. EOMs normal alignment, full range with normal saccades, pursuit and convergence. No nystagmus.    CN 7   Left UMN FD.  CN 8   Hearing intact to conversation.   CN 9   Palate elevates symmetrically.   CN 12   Tongue midline, with normal bulk and strength; no fasciculations.     Motor/Sensory:   Hypotonia of the left UE and LE, LUE  0/5, LLE 2/5. 5/5 on RUE and RLE.    COORDINATION:    Right finger-nose-finger was intact without dysmetria or overshoot.     GAIT:   Deferred.    CARDIAC: RRR. No murmurs or rubs.  ABDOMEN: Soft, NT/ND.   LUNGS: CTAB.            [place] Coma Scale  Best Eye Response: Spontaneous  Best Verbal Response: Oriented  Best Motor Response: Follows commands  [place] Coma Scale Score: 15                  Assessment & Plan  Acute cerebrovascular accident (CVA) due to embolism of right middle cerebral artery (Multi)    Davidson Khoury is a 60 y.o. male with a history notable for renal transplant (currently on Cellcept) and hypertension presenting to the ED initially with a chief complaint of altered mental status, headache, and hypertension. Initially evaluated by general neurology team who noted subtle left sided weakness that worsened on follow up exam. BAT activated for concern of stroke. Found to have proximal R superior M2 occlusion. Not a candidate for TNK as LKN >4.5 hours. Taken for MT, TICI 2b. Exam notable for L HH, L UMN facial paralysis, LUE 0/5, LLE 3/5. right at least antigravity, left hemisensory loss and neglect.    Stroke Metrics:  EMS pre-notification?: No  Time of stroke team arrival: 3:47pm  Direct to CT?: No  Time of CTH read by stroke team: As performed  Time of TNK: n/a  Time stroke team called IR: 4:27pm  Time pt arrived to angio suite: 4:57pm  Time of groin puncture: 5:10pm  Time of recanalization: 5:31pm  Number of passes: 1  Device used: penumbra aspiration  Any delays in the process (if door to TNK >30 min): n/a     Stroke Classification:  Type: Ischemic stroke  Subtype/etiology: Suspected large artery disease  Vessels involved: R MCA  Neurological manifestations:  Pre-Intervention Deficits: NIHSS 10 *see above  Post-Intervention Deficits: NIHSS 9 *see above  Pre-stroke mRS: 0  Initial treatment: Angio  Anti-platelets or Anti-coagulation management: Aspirin  Vascular Risk Factors:  HTN  Antiplatelet/antithrombotic plan for stroke prevention: Aspirin  VTE prophylaxis: Lovenox  Vascular Risk Factor modification goals:  Blood pressure goals: avoid hypotension SBP <100 that could worsen cerebral perfusion, Ischemic stroke post-thrombectomy SBP <180mmHg   Lipid Goals: education on healthy diet and statin therapy to maintain or achieve goal LDL-cholesterol < 70mg.  Glucose Goals: early treatment of hyperglycemia to goal glucose 140-180 mg/dl with long-term goal A1c < 7%   Smoking Cessation and Education  Assessment for Rehabilitation needs   Patient and family education on signs and symptoms of stroke, calling 911, healthy strategies for stroke prevention.      Plan:  Updates 06/01/25  -[facility]  -Follow up transplant nephrology recs: continue tacrolimus to 2.5mg BID   -SLP okay for removal of dobhoff pending calorie count (possible removal of dobhoff 6/2)  -Calorie count - follow-up on 6/2 to see if pt's dobhoff can be removed  -Follow up nutrition recs  -Pending dispo to SNF  -Monitor for BM - last BM 5/27 - increased bowel regimen to miralax BID and added docsenna    #Proximal R superior M2 occlusion s/p TICI 2b MT   #HTN   #Hx of thrombocytopenia- Stable   - Continue DAPT 90 days sampriss protocol for intracranial atherosclerosis  - Stroke work up  - A1c: 5.2%, LDL 63              - TTE with bubble study: EF 70-75% LA normal in size, no PFO  - SBP goals normotension now 7 days post-stroke  - Discuss with transplant nephrology optimal antihypertensive regime. Currently:   - Losartan 25mg BID   - Clonidine 0.2mg BID  - PT/OT/SLP     #Renal transplant (on Cellcept and Tacrolimus)   - Baseline BUN/Cr: 15/1.80   -Consulted nephrology recommended - continue tacrolimus to  2.5mg q12. Monitor Fk trough daily (30-60 min prior to AM dose). Goal 5-8.  - cont  mg bid.    #Dysphagia  -SLP following underwent MBSS 5/28 -  Pureed (IDDSI Level 4), Thin liquids (IDDSI Level 0)   -Calorie count started on 5/28/25 - follow-up on 6/2 for possible dobhoff removal   Nocturnal feeds::   Isosource 1.5 @ 80ml/hr x 12hrs.   Oral nutrition support regimen:   Trial Boost VHC daily.      Pain medications: PRN Tylenol  Fluids: Replete PRN  Electrolytes: Keep mg >2, phos >3  and K >4  Nutrition:  Adult diet Regular; Pureed 4; Thin 0; 1:1 Feeding   Antimicrobials: None  Antiplatelet: ASA and Clopidogrel  Anticoagulation: None  DVT PPX: Lovenox  GI ppx: none needed  Bowel care: Miralax  Catheter: None  Lines: PIV  Oxygen: Room Air    Disposition:   PT/OT moderate intensity    Code Status: Full Code (confirmed on admission)   NOK:  Primary Emergency Contact: Margo Cali MD  PGY-2 Neurology

## 2025-06-01 NOTE — PROGRESS NOTES
Physical Therapy    Physical Therapy Treatment    Patient Name: Davidson Khoury  MRN: [id number]  Today's Date: 6/1/2025  Time Calculation  Start Time: 1304  Stop Time: 1329  Time Calculation (min): 25 min     6056/6056-A    Assessment/Plan   PT Assessment  PT Assessment Results: Decreased strength, Decreased endurance, Decreased mobility, Impaired balance, Impaired tone, Decreased cognition, Decreased safety awareness, Impaired sensation  Rehab Prognosis: Good  Barriers to Discharge Home: Physical needs, Caregiver assistance  Caregiver Assistance: Caregiver assistance needed per identified barriers - however, level of patient's required assistance exceeds assistance available at home  Physical Needs: High falls risk due to function or environment  Evaluation/Treatment Tolerance: Patient limited by fatigue  Medical Staff Made Aware: Yes  Strengths: Ability to acquire knowledge, Housing layout, Premorbid level of function, Support and attitude of living partners  Barriers to Participation: Access to adaptive/assistive products  End of Session Communication: Bedside nurse  Assessment Comment: Pt. continues to present with flaccid LUE/LE, requires heavy A for all functional mobility with deficits in strength, balance, endurance, motor control, and functional mobility compared to baseline. Remains appropraite for high intensity therapy to address these deficits and maximize independence.  End of Session Patient Position: Bed, 3 rail up, Alarm on (bed in chair position to promote increased alertness, LUE propped on pillow.)  PT Plan  Inpatient/Swing Bed or Outpatient: Inpatient  PT Plan  Treatment/Interventions: Bed mobility, Transfer training, Gait training, Stair training, Balance training, Neuromuscular re-education, Neurodevelopmental intervention, Strengthening, Endurance training, Range of motion, Therapeutic exercise, Therapeutic activity, Home exercise program  PT Plan: Ongoing PT  PT Frequency: 5 times per  week  PT Discharge Recommendations: High intensity level of continued care  Equipment Recommended upon Discharge:  (TBD)  PT Recommended Transfer Status: Assist x2  PT - OK to Discharge: Yes      General Visit Information:   PT  Visit  PT Received On: 06/01/25  Response to Previous Treatment: Patient with no complaints from previous session.  General  Reason for Referral: P/w AMS, HA, HTN with notable subtle left sided weakness that worsened. Acute cerebrovascular accident due to embolism of R MCA. Proximal R superior M2 occulsion, MT TICI 2b. Pre intervention NIHSS: 10, post intervention NIHSS: 9.  Past Medical History Relevant to Rehab: h/o renal transplant (currently on Cellcept) and HTN  Family/Caregiver Present: No  Prior to Session Communication: Bedside nurse  Patient Position Received: Bed, 3 rail up, Alarm on  General Comment: pt. sleeping in bed with contents of lunch spilled. +L neglect, and flaccid LUE/LE. Required heavy A x 1-2 for all functional mobility.  Subjective     Precautions:  Precautions  Hearing/Visual Limitations: Hearing WFL. Able to gaze past midline to L but does not achieve terminal L gaze.  Medical Precautions: Fall precautions  Precautions Comment: SBP <180, Left hemiparesis       Objective     Pain:  Pain Assessment  Pain Assessment: 0-10  0-10 (Numeric) Pain Score: 0 - No pain    Cognition:  Cognition  Overall Cognitive Status: Within Functional Limits  Orientation Level: Oriented X4  Following Commands: Follows one step commands with increased time  Safety/Judgement: Within Functional Limits  Processing Speed: Delayed    Postural Control:  Postural Control  Postural Control: Impaired    Activity Tolerance:  Activity Tolerance  Endurance: Tolerates 10 - 20 min exercise with multiple rests    Treatments:     Therapeutic Activity  Therapeutic Activity Performed: Yes  Therapeutic Activity 1: bed mobility, transfers  Balance/Neuromuscular Re-Education  Balance/Neuromuscular Re-Education  Activity Performed: Yes  Balance/Neuromuscular Re-Education Activity 1: Pt. sat EOB ~ 13 min during session. heavy focus on sitting balance, engaging abdominals and postural control this visit. +L neglect and unable to track past midline. Heavy L lateral lean, downcast gaze without RUE supported. Multiple cues for eye opening throughout session. Progressed to CGA with RUE supported on bedrail.  Bed Mobility  Bed Mobility: Yes  Bed Mobility 1  Bed Mobility 1: Supine to sitting, Sitting to supine  Level of Assistance 1: Moderate assistance  Bed Mobility 2  Bed Mobility  2: Scooting  Level of Assistance 2: Contact guard  Bed Mobility Comments 2: pt. able to bridge/scoot to reposition midline in bed with CGA to maintain LE's in hooklying  Bed Mobility 3  Bed Mobility 3: Scooting (boost to reposition in bed at end of session)  Level of Assistance 3: Dependent, +2  Ambulation/Gait Training  Ambulation/Gait Training Performed: No (unable to progress LE's for side steps at EOB this session)  Transfers  Transfer: Yes  Transfer 1  Technique 1: Sit to stand, Stand to sit  Transfer Device 1:  (B arm in arm A)  Transfer Level of Assistance 1: Moderate assistance, +2  Trials/Comments 1: x 2 trials. Blocking to prevent buckling LLE. Pt. able to maintain standing ~ 20 seconds each trial.  Stairs  Stairs: No       Outcome Measures:     [facility] Basic Mobility  Turning from your back to your side while in a flat bed without using bedrails: A lot  Moving from lying on your back to sitting on the side of a flat bed without using bedrails: A lot  Moving to and from bed to chair (including a wheelchair): A lot  Standing up from a chair using your arms (e.g. wheelchair or bedside chair): A lot  To walk in hospital room: Total  Climbing 3-5 steps with railing: Total  Basic Mobility - Total Score: 10       Education Documentation  Precautions, taught by Brittani Scott, PT at 6/1/2025  1:50 PM.  Learner: Patient  Readiness:  Acceptance  Method: Explanation  Response: Verbalizes Understanding, Needs Reinforcement    Body Mechanics, taught by Brittani Scott, PT at 6/1/2025  1:50 PM.  Learner: Patient  Readiness: Acceptance  Method: Explanation  Response: Verbalizes Understanding, Needs Reinforcement    Mobility Training, taught by Brittani Scott, PT at 6/1/2025  1:50 PM.  Learner: Patient  Readiness: Acceptance  Method: Explanation  Response: Verbalizes Understanding, Needs Reinforcement    Education Comments  No comments found.           EDUCATION:     Encounter Problems       Encounter Problems (Active)       Balance       Patient to demo static standing with unilateral UE support, performing single UE task with SBA, no sway or LOB x 2 mins for functional carryover  (Progressing)       Start:  05/20/25    Expected End:  06/03/25            Pt will score >/= 24/28 on Tinetti balance assessment to indicate low falls risk.   (Progressing)       Start:  05/20/25    Expected End:  06/03/25               Mobility       STG - Patient will ambulate >/= 30 ft with CGA and LRAD (Progressing)       Start:  05/20/25    Expected End:  06/03/25            Pt. will tolerate >/= 10 minutes of OOB mobility without seated rest break and VSS to demo improved activity tolerance/endurance.  (Progressing)       Start:  05/20/25    Expected End:  06/03/25            Patient will actively participate in ther-ex in order to improve strength and to assist with the completion of functional mobility tasks.  (Progressing)       Start:  05/20/25    Expected End:  06/03/25               PT Transfers       STG - Patient will perform bed mobility IND (Progressing)       Start:  05/20/25    Expected End:  06/03/25            STG - Patient will transfer sit to and from stand with CGA and LRAD (Progressing)       Start:  05/20/25    Expected End:  06/03/25               Pain - Adult

## 2025-06-01 NOTE — PROGRESS NOTES
"Davidson Khoury is a 60 y.o. male on day 13 of admission presenting with Acute cerebrovascular accident (CVA) due to embolism of right middle cerebral artery (Multi).    No acute events overnight.  Stable neurological status.      Scheduled medications  aspirin, 81 mg, oral, Daily   Or  aspirin, 81 mg, oral, Daily   Or  aspirin, 81 mg, nasogastric tube, Daily   Or  aspirin, 300 mg, rectal, Daily  cloNIDine, 0.2 mg, oral, q12h CHRIST  clopidogrel, 75 mg, oral, Daily  enoxaparin, 40 mg, subcutaneous, q24h  lidocaine, 1 Application, urethral, Once  losartan, 25 mg, oral, BID  mycophenolate, 750 mg, oral, BID  nystatin, 4 mL, Swish & Swallow, BID  ondansetron, 4 mg, intravenous, Once  perflutren protein A microsphere, 0.5 mL, intravenous, Once in imaging  polyethylene glycol, 17 g, oral, Daily  rosuvastatin, 20 mg, oral, Nightly  sulfur hexafluoride microsphr, 2 mL, intravenous, Once in imaging  tacrolimus, 2.5 mg, oral, q12h CHRIST      Continuous medications     PRN medications  PRN medications: acetaminophen, benzocaine-menthol, [] hydrALAZINE **FOLLOWED BY** hydrALAZINE, ondansetron **OR** ondansetron, oxyCODONE, oxyCODONE, oxygen    Last Recorded Vitals  Blood pressure 150/90, pulse 72, temperature 36.4 °C (97.5 °F), temperature source Temporal, resp. rate 16, height 1.753 m (5' 9\"), weight 72.9 kg (160 lb 11.5 oz), SpO2 99%.  Intake/Output last 3 Shifts:  I/O last 3 completed shifts:  In: - (0 mL/kg)   Out: 175 (2.4 mL/kg) [Urine:175 (0.1 mL/kg/hr)]  Weight: 72.9 kg     A&ox3, no distress, pleasant  MMM, no lesions  Lungs with equal air entry, no added sounds  Rrr, no m/r/g  Abd soft, nt, nd  No allograft tenderness  No edema b/l, lt hemiplegia    Results for orders placed or performed during the hospital encounter of 25 (from the past 24 hours)   CBC and Auto Differential   Result Value Ref Range    WBC 5.4 4.4 - 11.3 x10*3/uL    nRBC 0.0 0.0 - 0.0 /100 WBCs    RBC 4.48 (L) 4.50 - 5.90 x10*6/uL    " Hemoglobin 13.7 13.5 - 17.5 g/dL    Hematocrit 41.6 41.0 - 52.0 %    MCV 93 80 - 100 fL    MCH 30.6 26.0 - 34.0 pg    MCHC 32.9 32.0 - 36.0 g/dL    RDW 13.6 11.5 - 14.5 %    Platelets 136 (L) 150 - 450 x10*3/uL    Neutrophils % 71.3 40.0 - 80.0 %    Immature Granulocytes %, Automated 0.2 0.0 - 0.9 %    Lymphocytes % 16.2 13.0 - 44.0 %    Monocytes % 11.5 2.0 - 10.0 %    Eosinophils % 0.6 0.0 - 6.0 %    Basophils % 0.2 0.0 - 2.0 %    Neutrophils Absolute 3.83 1.20 - 7.70 x10*3/uL    Immature Granulocytes Absolute, Automated 0.01 0.00 - 0.70 x10*3/uL    Lymphocytes Absolute 0.87 (L) 1.20 - 4.80 x10*3/uL    Monocytes Absolute 0.62 0.10 - 1.00 x10*3/uL    Eosinophils Absolute 0.03 0.00 - 0.70 x10*3/uL    Basophils Absolute 0.01 0.00 - 0.10 x10*3/uL   Renal Function Panel   Result Value Ref Range    Glucose 105 (H) 74 - 99 mg/dL    Sodium 140 136 - 145 mmol/L    Potassium 4.0 3.5 - 5.3 mmol/L    Chloride 106 98 - 107 mmol/L    Bicarbonate 25 21 - 32 mmol/L    Anion Gap 13 10 - 20 mmol/L    Urea Nitrogen 28 (H) 6 - 23 mg/dL    Creatinine 1.78 (H) 0.50 - 1.30 mg/dL    eGFR 43 (L) >60 mL/min/1.73m*2    Calcium 9.1 8.6 - 10.6 mg/dL    Phosphorus 2.9 2.5 - 4.9 mg/dL    Albumin 3.8 3.4 - 5.0 g/dL   Magnesium   Result Value Ref Range    Magnesium 2.02 1.60 - 2.40 mg/dL           CT head w/o contrast: 5/19/25  There is development of small acute infarct component within the  right frontal opercular region. There is also suspected subtle loss  of gray-white matter differentiation within the right frontal and  parietal lobes corresponding to regions of ischemia/infarct on recent  CTA perfusion examination. This could be better characterized with  MRI as clinically warranted. No acute intraparenchymal hematoma.  Subtle petechial hemorrhage versus contrast staining or potential  beam hardening artifact within the right parietal lobe. No  significant intracranial mass effect.     MRI head:  IMPRESSION:  1. Acute nonhemorrhagic right  MCA territory infarct as above. There  is associated vasogenic edema and sulcal effacement. No midline  shift. No evidence of hemorrhagic transformation.  2. Old lacunar infarcts in left cerebellum, left thalamus and  bilateral basal ganglia. Moderate degree of chronic microvascular  ischemic disease in the cerebral white matter.  3. Evidence for old hemorrhage in the right basal ganglia, with  encephalomalacia. This could be sequela of an old hypertensive bleed,  or sequela of an old hemorrhagic infarct.        Assessment/Plan  60 y.o. male presenting with ESRD secondary to hypertension s/p DDKT on 8/10/2019. Patient was induced by Simulect, no DGF no CMV mismatch no EBV mismatch, KDPI of kidney is 40% PRA 0%. No episodes of rejection.     Currently admitted with Rt MCA occlusion CVA s/p cerebral angiogram and mecahnical thrombectomy 5/19/25     Allograft function: s/p DDKT 8/10/2019  - baseline Cr ~2. Stable this admission  - s/p contrast exposure this admission. Maintain adequate hydration.   - metabolic indices acceptable. Nonoliguric. No hypervolemia on exam.  - Hb ~13, acceptable  - Ca, Phos acceptable. Monitor and replete lytes as indicated  - BP management per neuro: Losartan 25 mg bid, clonidine 0.2mg bid  If intensification is needed, consider nifedipine 30 mg/day  - dose meds for CrCL. Avoid potential nephrotoxins.     Immunosuppression:  - TAC to 2.5 mg BID-->reduced to 2 mg BID  - at goal  - Monitor Fk trough daily (30-60 min prior to AM dose). Goal 4-6  Today's level below goal but monitor for now  -  mg     Rt MCA CVA: s/p mechanical thrombectomy 5/19/25  - management per neuro.  - secondary prevention: DAPT, statin     Dispo: acute rehab.     Latrice Guardado MD

## 2025-06-01 NOTE — PROGRESS NOTES
06/01/25 1552   Discharge Planning   Who is requesting discharge planning? Provider   Home or Post Acute Services Post acute facilities (Rehab/SNF/etc)   Type of Post Acute Facility Services Skilled nursing     Weekend updates sent to [facility], as they are now FOC and completing pre-cert. [facility] previously requested therapy updates for 6-1. Will forward OT note if completed today.    Natalya Forte RN  (Weekend Transitional Care Coordinator-[facility], send Epic message)

## 2025-06-02 LAB — TACROLIMUS BLD-MCNC: 7.3 NG/ML

## 2025-06-02 PROCEDURE — 2500000004 HC RX 250 GENERAL PHARMACY W/ HCPCS (ALT 636 FOR OP/ED): Performed by: STUDENT IN AN ORGANIZED HEALTH CARE EDUCATION/TRAINING PROGRAM

## 2025-06-02 PROCEDURE — 2500000001 HC RX 250 WO HCPCS SELF ADMINISTERED DRUGS (ALT 637 FOR MEDICARE OP)

## 2025-06-02 PROCEDURE — 97535 SELF CARE MNGMENT TRAINING: CPT | Mod: GO | Performed by: OCCUPATIONAL THERAPIST

## 2025-06-02 PROCEDURE — 36415 COLL VENOUS BLD VENIPUNCTURE: CPT

## 2025-06-02 PROCEDURE — 2500000004 HC RX 250 GENERAL PHARMACY W/ HCPCS (ALT 636 FOR OP/ED)

## 2025-06-02 PROCEDURE — 1100000001 HC PRIVATE ROOM DAILY

## 2025-06-02 PROCEDURE — 2500000002 HC RX 250 W HCPCS SELF ADMINISTERED DRUGS (ALT 637 FOR MEDICARE OP, ALT 636 FOR OP/ED)

## 2025-06-02 PROCEDURE — 80197 ASSAY OF TACROLIMUS: CPT

## 2025-06-02 PROCEDURE — 97112 NEUROMUSCULAR REEDUCATION: CPT | Mod: GO | Performed by: OCCUPATIONAL THERAPIST

## 2025-06-02 PROCEDURE — 2500000001 HC RX 250 WO HCPCS SELF ADMINISTERED DRUGS (ALT 637 FOR MEDICARE OP): Performed by: STUDENT IN AN ORGANIZED HEALTH CARE EDUCATION/TRAINING PROGRAM

## 2025-06-02 PROCEDURE — 99232 SBSQ HOSP IP/OBS MODERATE 35: CPT

## 2025-06-02 RX ADMIN — CLONIDINE HYDROCHLORIDE 0.2 MG: 0.2 TABLET ORAL at 09:24

## 2025-06-02 RX ADMIN — LOSARTAN POTASSIUM 25 MG: 25 TABLET, FILM COATED ORAL at 09:24

## 2025-06-02 RX ADMIN — POLYETHYLENE GLYCOL 3350 17 G: 17 POWDER, FOR SOLUTION ORAL at 20:15

## 2025-06-02 RX ADMIN — ENOXAPARIN SODIUM 40 MG: 40 INJECTION, SOLUTION SUBCUTANEOUS at 20:15

## 2025-06-02 RX ADMIN — ASPIRIN 81 MG: 81 TABLET, COATED ORAL at 09:24

## 2025-06-02 RX ADMIN — MYCOPHENOLATE MOFETIL 750 MG: 200 POWDER, FOR SUSPENSION ORAL at 20:16

## 2025-06-02 RX ADMIN — CLOPIDOGREL BISULFATE 75 MG: 75 TABLET, FILM COATED ORAL at 09:24

## 2025-06-02 RX ADMIN — ROSUVASTATIN CALCIUM 20 MG: 20 TABLET, FILM COATED ORAL at 20:16

## 2025-06-02 RX ADMIN — TACROLIMUS 2 MG: 5 CAPSULE, GELATIN COATED ORAL at 18:28

## 2025-06-02 RX ADMIN — SENNOSIDES AND DOCUSATE SODIUM 2 TABLET: 50; 8.6 TABLET ORAL at 20:15

## 2025-06-02 RX ADMIN — NYSTATIN 400000 UNITS: 100000 SUSPENSION ORAL at 03:00

## 2025-06-02 RX ADMIN — MYCOPHENOLATE MOFETIL 750 MG: 200 POWDER, FOR SUSPENSION ORAL at 09:26

## 2025-06-02 RX ADMIN — LOSARTAN POTASSIUM 25 MG: 25 TABLET, FILM COATED ORAL at 20:15

## 2025-06-02 RX ADMIN — CLONIDINE HYDROCHLORIDE 0.2 MG: 0.2 TABLET ORAL at 20:15

## 2025-06-02 RX ADMIN — TACROLIMUS 2 MG: 5 CAPSULE, GELATIN COATED ORAL at 06:25

## 2025-06-02 ASSESSMENT — ACTIVITIES OF DAILY LIVING (ADL): HOME_MANAGEMENT_TIME_ENTRY: 23

## 2025-06-02 ASSESSMENT — PAIN SCALES - GENERAL: PAINLEVEL_OUTOF10: 0 - NO PAIN

## 2025-06-02 ASSESSMENT — COGNITIVE AND FUNCTIONAL STATUS - GENERAL
HELP NEEDED FOR BATHING: A LOT
DAILY ACTIVITIY SCORE: 12
DRESSING REGULAR LOWER BODY CLOTHING: A LOT
EATING MEALS: A LOT
PERSONAL GROOMING: A LITTLE
DRESSING REGULAR UPPER BODY CLOTHING: A LOT
TOILETING: TOTAL

## 2025-06-02 ASSESSMENT — PAIN - FUNCTIONAL ASSESSMENT: PAIN_FUNCTIONAL_ASSESSMENT: 0-10

## 2025-06-02 NOTE — CONSULTS
Nutrition Calorie Count:   ---->Calorie Count Results    Diet: Pureed with Thin Liquids  Oral Nutrition Supplements: Ensure Plus TID--each 350 kcals, 13g pro (was previously Boost VHC BID, order updated by this writer earlier today)  Estimated Kcal Needs: 6825-3263 kcals/day  Estimated Pro Needs: 90 g/day    ---Pt has been receiving 3 meals/day, per [facility]. The last meal intake that was recorded by staff was on 05/30. No further documentation of pt's PO intakes since then.    ---This writer met with pt this afternoon, pt laying in bed, small-bore feeding tube still in place during visit with pt. Pt reports there is nothing wrong with his appetite. He wants to eat but does not like the Pureed Diet, so he has not been eating much.    ---Not drinking the Boost VHC, does not like vanilla flavor. Requesting something chocolate. Agreeable to chocolate Ensure Plus TID.    ---When this writer spoke to RN later this afternoon, RN reporting small-bore feeding tube has now been removed.    ---As team made the decision to remove small-bore feeding tube, RDN is discontinuing calorie count today. Please continue PO diet, per SLP recs. Orders for oral nutrition supplements updated by this writer.        Time Spent (min): 45 minutes

## 2025-06-02 NOTE — PROGRESS NOTES
Occupational Therapy  Occupational Therapy    Occupational Therapy Treatment    Name: Davidson Khoury  MRN: [id number]  : 1965  Date: 25  Room: [address] [place], [place] [address]A      Time Calculation  Start Time: 1038  Stop Time: 1118  Time Calculation (min): 40 min    Assessment:  OT Assessment: Improvements noted in self care, bed mobility and balance this date.  Impaired awareness of position in space and left side inattention affecting ability to maintain balance on EOB.  Patient highly motivated to return to previous level of function.  He was cooperative throughout session.  Lower body dressing and bathing goal added this date.  Prognosis: Good  Barriers to Discharge Home: Caregiver assistance, Physical needs  Caregiver Assistance: Caregiver assistance needed per identified barriers - however, level of patient's required assistance exceeds assistance available at home  Cognition Needs: 24hr supervision for safety awareness needed, Recollection or understanding of precautions/restrictions limited, Recollection or understanding of home exercise program limited  Physical Needs: 24hr mobility assistance needed, 24hr ADL assistance needed  Medical Staff Made Aware: Yes  End of Session Communication: Bedside nurse  End of Session Patient Position: Bed, 3 rail up, Alarm on  Plan:  Treatment Interventions: ADL retraining, UE strengthening/ROM, Compensatory technique education  OT Frequency: 4 times per week  OT Discharge Recommendations: High intensity level of continued care  Equipment Recommended upon Discharge:  (TBD)  OT Recommended Transfer Status: Assist of 2  OT - OK to Discharge: Yes    Subjective   General:  OT Last Visit  OT Received On: 25  Reason for Referral: P/w AMS, HA, HTN with notable subtle left sided weakness that worsened. Acute cerebrovascular accident due to embolism of R MCA. Proximal R superior M2 occulsion, MT TICI 2b. Pre intervention NIHSS: 10, post intervention NIHSS: 9.  Past Medical History  Relevant to Rehab: h/o renal transplant (currently on Cellcept) and HTN  Prior to Session Communication: Bedside nurse  Patient Position Received: Bed, 3 rail up, Alarm on   Precautions:  Hearing/Visual Limitations: Hearing WFL. Able to gaze past midline to L but does not achieve terminal L gaze.  Medical Precautions: Fall precautions  Precautions Comment: SBP <180, Left hemiparesis      Lines/Tubes/Drains:  NG/OG/Feeding Tube Right nostril (Active)   Number of days: 13       External Urinary Catheter Male (Active)   Number of days: 13       Cognition:  Arousal/Alertness: Delayed responses to stimuli  Orientation Level: Disoriented to place, Disoriented to situation (confused to date)  Following Commands: Follows one step commands with increased time (and repetition)  Sustained Attention: Impaired  Novel Situations: Moderate  Routine Tasks: Moderate  Unable to Self-Monitor and Self-Correct Consistently: Moderate  Insight: Moderate  Impulsive: Mildly  Task Initiation:  (verbal and tactile cues for initiation of ADL tasks)  Processing Speed: Delayed    Pain Assessment:  Pain Assessment  Pain Assessment: 0-10  0-10 (Numeric) Pain Score: 0 - No pain     Objective   Activities of Daily Living:      Grooming  Grooming Level of Assistance: Minimum assistance (with mod verbal and min tactile cues to wash face and perform oral care)  Grooming Where Assessed: Edge of bed  Grooming Comments: Patient required max verbal cues to turn head ot left to locate grooming supplies.  Impaired object recognition, patient did not recognize mouthwash and argued that it was not mouthwash since it didn't look like what he had at home.           LE Dressing  LE Dressing: Yes  Sock Level of Assistance: Moderate assistance, Moderate verbal cues (and verbal cues to don right sock)  LE Dressing Where Assessed: Bed level            Bed Mobility/Transfers:   Bed Mobility  Bed Mobility: Yes  Bed Mobility 1  Bed Mobility 1: Supine to sitting  Level  of Assistance 1: Minimum assistance, Moderate verbal cues  Bed Mobility Comments 1: Patient utilized right LE to hook left LE with verbal cues.  Bed Mobility 2  Bed Mobility  2: Sitting to supine  Level of Assistance 2: Moderate assistance (wtih min cues)  Bed Mobility 3  Bed Mobility 3: Scooting (up in bed)  Level of Assistance 3: Minimum assistance, Moderate verbal cues, Moderate tactile cues  Bed Mobility Comments 3: to scoot up in bed using bed rails                   Balance:  Static Sitting Balance  Static Sitting-Level of Assistance:  (fluctuated between CGA and mod assist)  Static Sitting-Comment/Number of Minutes: left lean present, patient with decreased awareness of position in space       Therapy/Activity:         Balance/Neuromuscular Re-Education  Balance/Neuromuscular Re-Education Activity Performed: Yes (Utilized factility techniques to encourage left UE movement. No response to tapping for scapular movement, elbow movement.  1 finger shoulder subluxation present.)       Vision:  Vision  Visual Field Cut: Compensatory techniques, Education  Visual Field Cut Comments: Patient required max verbal and auditory cues to turn head to left. He was able to move eyes from right  side to close to midline, but was unable to cross midline. Provided instruction on visual inattention and visual field cut.  Patient with significant inattention evident both visually and motorically.           Outcome Measures:  [facility] Daily Activity  Putting on and taking off regular lower body clothing: A lot  Bathing (including washing, rinsing, drying): A lot  Putting on and taking off regular upper body clothing: A lot  Toileting, which includes using toilet, bedpan or urinal: Total  Taking care of personal grooming such as brushing teeth: A little  Eating Meals: A lot  Daily Activity - Total Score: 12        06/02/25 1038   Education   Individual(s) Educated Patient   Education Provided   (ADL technique, OT plan of care, left  inattention/field cut, bed mobility techniques, orientation)   Patient Response to Education Patient/Caregiver Verbalized Understanding of Information  (additional instruction indicated due to cognitive deficits)       Goals:  Encounter Problems       Encounter Problems (Active)       ADLs       Patient with complete upper body dressing with moderate assist level of assistance donning and doffing all UE clothes with PRN adaptive equipment and one handed techniques while edge of bed.  (Met)       Start:  05/21/25    Expected End:  06/11/25    Resolved:  05/27/25    Updated to: Patient with complete upper body dressing with Min assist level of assistance donning and doffing all UE clothes with PRN adaptive equipment and one handed techniques while edge of bed.    Update reason: Met         Patient will feed self with minimal assist level of assistance and verbal cues and tactile cues using PRN adaptive equipment. (Progressing)       Start:  05/21/25    Expected End:  06/11/25            Patient will complete daily grooming tasks brushing teeth and washing face/hair with minimal assist level of assistance and PRN adaptive equipment while edge of bed. (Progressing)       Start:  05/21/25    Expected End:  06/11/25            Patient with complete upper body dressing with Min assist level of assistance donning and doffing all UE clothes with PRN adaptive equipment and one handed techniques while edge of bed. (Progressing)       Start:  05/27/25    Expected End:  06/11/25                Patient will complete lower body dressing with moderate assistance overall.  (Progressing)       Start:  06/02/25    Expected End:  06/11/25                Patient  will complete upper body bathing and lower body bathing with mod assist and verbal/tactile cues.        Start:  06/02/25    Expected End:  06/11/25                   BALANCE       Patient will maintain static standing balance during ADL task with minimal assist level of  assistance in order to demonstrate decreased risk of falling and improved postural control. (Progressing)       Start:  05/21/25    Expected End:  06/11/25               EXERCISE/STRENGTHENING       Patient will independently perform self passive ROM on LUE to increase bimanual coordination. (Progressing)       Start:  05/21/25    Expected End:  06/11/25               TRANSFERS       Patient will perform bed mobility minimal assist level of assistance in order to improve safety and independence with mobility (Progressing)       Start:  05/21/25    Expected End:  06/11/25 06/02/25 at 11:41 AM   Silke Molina, OT   [phone]

## 2025-06-02 NOTE — PROGRESS NOTES
Davidson Khoury is a 60 y.o. left handed male on day 14 of admission presenting with Acute cerebrovascular accident (CVA) due to embolism of right middle cerebral artery (Multi).    Subjective   No acute events overnight. Denies any complaints this morning. States he has still not had a BM since PTA.       Objective     Last Recorded Vitals  Heart Rate:  [63-72]   Temp:  [36.4 °C (97.5 °F)-37 °C (98.6 °F)]   Resp:  [15-19]   BP: (126-150)/(75-92)   SpO2:  [94 %-99 %]       [facility] NIHSS:   NIH Stroke Scale:     Date/Time of Assessment: 5/31/2025 7:30 AM    1A. Level of Consciousness:  Alert (keenly responsive) (0)    1B. Ask Month and Age:  Both questions right (0)    1C. Blink Eyes & Squeeze Hands:  Performs both tasks (0)    2. Best Gaze:  Partial gaze palsy (+1)    3. Visual:  Complete hemianopia (+2)    4. Facial Palsy:  Complete paralysis (+3)    5A. Motor - Left Arm:  No movement (+4)    5B. Motor - Right Arm:  No drift (0)    6A. Motor - Left Leg:  No effort against gravity (+3)    6B. Motor - Right Leg:  No drift (0)    7. Limb Ataxia:  No ataxia (0)    8. Sensory Loss:  Mild-moderate loss (+1)    9. Best Language:  Normal (no aphasia) (0)    10. Dysarthia:  Mild-moderate dysarthria (+1)    11. Extinction and Inattention:  Visual/tactile/auditory/spatial/personal inattention (+1)    NIH Stroke Scale:  16       Physical Exam  Neurological Exam  GENERAL APPEARANCE:  No distress, alert, interactive and cooperative.     MENTAL STATE:   Orientation was normal to time, place and person.     CRANIAL NERVES:   CN 2   Left HH.  CN 3, 4, 6   Lids symmetric; no ptosis. EOMs normal alignment, full range with normal saccades, pursuit and convergence. No nystagmus.    CN 7   Left UMN FD.  CN 8   Hearing intact to conversation.   CN 9   Palate elevates symmetrically.   CN 12   Tongue midline, with normal bulk and strength; no fasciculations.     Motor/Sensory:   Hypotonia of the left UE and LE, LUE 0/5, LLE 2/5. 5/5 on RUE and  RLE.    COORDINATION:    Right finger-nose-finger was intact without dysmetria or overshoot.     GAIT:   Deferred.    CARDIAC: RRR. No murmurs or rubs.  ABDOMEN: Soft, NT/ND.   LUNGS: CTAB.            [place] Coma Scale  Best Eye Response: Spontaneous  Best Verbal Response: Oriented  Best Motor Response: Follows commands  [place] Coma Scale Score: 15                  Assessment & Plan  Acute cerebrovascular accident (CVA) due to embolism of right middle cerebral artery (Multi)    Davidson Khoury is a 60 y.o. male with a history notable for renal transplant (currently on Cellcept) and hypertension presenting to the ED initially with a chief complaint of altered mental status, headache, and hypertension. Initially evaluated by general neurology team who noted subtle left sided weakness that worsened on follow up exam. BAT activated for concern of stroke. Found to have proximal R superior M2 occlusion. Not a candidate for TNK as LKN >4.5 hours. Taken for MT, TICI 2b. Exam notable for L HH, L UMN facial paralysis, LUE 0/5, LLE 3/5. right at least antigravity, left hemisensory loss and neglect.    Stroke Metrics:  EMS pre-notification?: No  Time of stroke team arrival: 3:47pm  Direct to CT?: No  Time of CTH read by stroke team: As performed  Time of TNK: n/a  Time stroke team called IR: 4:27pm  Time pt arrived to angio suite: 4:57pm  Time of groin puncture: 5:10pm  Time of recanalization: 5:31pm  Number of passes: 1  Device used: penumbra aspiration  Any delays in the process (if door to TNK >30 min): n/a     Stroke Classification:  Type: Ischemic stroke  Subtype/etiology: Suspected large artery disease  Vessels involved: R MCA  Neurological manifestations:  Pre-Intervention Deficits: NIHSS 10 *see above  Post-Intervention Deficits: NIHSS 9 *see above  Pre-stroke mRS: 0  Initial treatment: Angio  Anti-platelets or Anti-coagulation management: Aspirin  Vascular Risk Factors: HTN  Antiplatelet/antithrombotic plan for  stroke prevention: Aspirin  VTE prophylaxis: Lovenox  Vascular Risk Factor modification goals:  Blood pressure goals: avoid hypotension SBP <100 that could worsen cerebral perfusion, Ischemic stroke post-thrombectomy SBP <180mmHg   Lipid Goals: education on healthy diet and statin therapy to maintain or achieve goal LDL-cholesterol < 70mg.  Glucose Goals: early treatment of hyperglycemia to goal glucose 140-180 mg/dl with long-term goal A1c < 7%   Smoking Cessation and Education  Assessment for Rehabilitation needs   Patient and family education on signs and symptoms of stroke, calling 911, healthy strategies for stroke prevention.      Plan:  Updates 06/02/25  -[facility]  -Follow up transplant nephrology recs: continue tacrolimus to 2 mg BID   -SLP okay for removal of dobhoff pending calorie count (possible removal of dobhoff 6/2)  -Calorie count - follow-up on 6/2 to see if pt's dobhoff can be removed  -Follow up nutrition recs  -Pending dispo to SNF  -Monitor for BM - last BM 5/27 - increased bowel regimen to miralax BID and added docsenna    #Proximal R superior M2 occlusion s/p TICI 2b MT   #HTN   #Hx of thrombocytopenia- Stable   - Continue DAPT 90 days [facility] protocol for intracranial atherosclerosis  - Stroke work up  - A1c: 5.2%, LDL 63              - TTE with bubble study: EF 70-75% LA normal in size, no PFO  - SBP goals normotension now 7 days post-stroke  - Discuss with transplant nephrology optimal antihypertensive regime. Currently:   - Losartan 25mg BID   - Clonidine 0.2mg BID  - PT/OT/SLP     #Renal transplant (on Cellcept and Tacrolimus)   - Baseline BUN/Cr: 15/1.80   -Consulted nephrology recommended - continue tacrolimus to  2 mg q12. Monitor Fk trough daily (30-60 min prior to AM dose). Goal 5-8.  - cont  mg bid.    #Dysphagia  -SLP following underwent MBSS 5/28 - Pureed (IDDSI Level 4), Thin liquids (IDDSI  Level 0)   -Calorie count started on 5/28/25 - follow-up on 6/2 for possible dobhoff removal   Nocturnal feeds::   Isosource 1.5 @ 80ml/hr x 12hrs.   Oral nutrition support regimen:   Trial Boost VHC daily.      Pain medications: PRN Tylenol  Fluids: Replete PRN  Electrolytes: Keep mg >2, phos >3  and K >4  Nutrition:  Adult diet Regular; Pureed 4; Thin 0; 1:1 Feeding   Antimicrobials: None  Antiplatelet: ASA and Clopidogrel  Anticoagulation: None  DVT PPX: Lovenox  GI ppx: none needed  Bowel care: Docusate and Miralax  Catheter: None  Lines: PIV  Oxygen: Room Air    Disposition:   PT/OT moderate intensity    Code Status: Full Code (confirmed on admission)   NOK:  Primary Emergency Contact: Margo Cali DO  PGY-2 Neurology   Statement Selected

## 2025-06-02 NOTE — PROGRESS NOTES
06/02/25 0913   Discharge Planning   Expected Discharge Disposition SNF  ([facility])     Transitional Care Coordination Progress Note:  Plan per Medical/Surgical team: Possibly medically ready tomorrow pending decision regarding Dobhoff continuation.   Discharge Disposition: [facility]  Potential Barriers: medical, precert  Patient Class: Inpatient  Financial Class: [facility]  ADOD: 6/4/25    Updated PT/OT notes requested for auth.  Precert for [facility] submitted on 5/30, is pending.     Amy Mcgowan, RN, BSN  Transitional Care Coordinator  Office: [phone]  Secure chat via [employer]

## 2025-06-03 LAB
ALBUMIN SERPL BCP-MCNC: 3.7 G/DL (ref 3.4–5)
ANION GAP SERPL CALC-SCNC: 14 MMOL/L (ref 10–20)
BASOPHILS # BLD AUTO: 0.01 X10*3/UL (ref 0–0.1)
BASOPHILS NFR BLD AUTO: 0.2 %
BUN SERPL-MCNC: 26 MG/DL (ref 6–23)
CALCIUM SERPL-MCNC: 9.5 MG/DL (ref 8.6–10.6)
CHLORIDE SERPL-SCNC: 107 MMOL/L (ref 98–107)
CO2 SERPL-SCNC: 24 MMOL/L (ref 21–32)
CREAT SERPL-MCNC: 1.79 MG/DL (ref 0.5–1.3)
EGFRCR SERPLBLD CKD-EPI 2021: 43 ML/MIN/1.73M*2
EOSINOPHIL # BLD AUTO: 0.04 X10*3/UL (ref 0–0.7)
EOSINOPHIL NFR BLD AUTO: 0.6 %
ERYTHROCYTE [DISTWIDTH] IN BLOOD BY AUTOMATED COUNT: 13.2 % (ref 11.5–14.5)
GLUCOSE SERPL-MCNC: 72 MG/DL (ref 74–99)
HCT VFR BLD AUTO: 41.5 % (ref 41–52)
HGB BLD-MCNC: 13.8 G/DL (ref 13.5–17.5)
IMM GRANULOCYTES # BLD AUTO: 0.02 X10*3/UL (ref 0–0.7)
IMM GRANULOCYTES NFR BLD AUTO: 0.3 % (ref 0–0.9)
LYMPHOCYTES # BLD AUTO: 1.3 X10*3/UL (ref 1.2–4.8)
LYMPHOCYTES NFR BLD AUTO: 19.5 %
MAGNESIUM SERPL-MCNC: 1.92 MG/DL (ref 1.6–2.4)
MCH RBC QN AUTO: 30.3 PG (ref 26–34)
MCHC RBC AUTO-ENTMCNC: 33.3 G/DL (ref 32–36)
MCV RBC AUTO: 91 FL (ref 80–100)
MONOCYTES # BLD AUTO: 0.62 X10*3/UL (ref 0.1–1)
MONOCYTES NFR BLD AUTO: 9.3 %
NEUTROPHILS # BLD AUTO: 4.66 X10*3/UL (ref 1.2–7.7)
NEUTROPHILS NFR BLD AUTO: 70.1 %
NRBC BLD-RTO: 0 /100 WBCS (ref 0–0)
PHOSPHATE SERPL-MCNC: 2.8 MG/DL (ref 2.5–4.9)
PLATELET # BLD AUTO: 155 X10*3/UL (ref 150–450)
POTASSIUM SERPL-SCNC: 4.2 MMOL/L (ref 3.5–5.3)
RBC # BLD AUTO: 4.56 X10*6/UL (ref 4.5–5.9)
SODIUM SERPL-SCNC: 141 MMOL/L (ref 136–145)
TACROLIMUS BLD-MCNC: 8.8 NG/ML
WBC # BLD AUTO: 6.7 X10*3/UL (ref 4.4–11.3)

## 2025-06-03 PROCEDURE — 80197 ASSAY OF TACROLIMUS: CPT

## 2025-06-03 PROCEDURE — 2500000004 HC RX 250 GENERAL PHARMACY W/ HCPCS (ALT 636 FOR OP/ED)

## 2025-06-03 PROCEDURE — 97535 SELF CARE MNGMENT TRAINING: CPT | Mod: GO

## 2025-06-03 PROCEDURE — 97530 THERAPEUTIC ACTIVITIES: CPT | Mod: GO

## 2025-06-03 PROCEDURE — 2500000002 HC RX 250 W HCPCS SELF ADMINISTERED DRUGS (ALT 637 FOR MEDICARE OP, ALT 636 FOR OP/ED)

## 2025-06-03 PROCEDURE — 97112 NEUROMUSCULAR REEDUCATION: CPT | Mod: GO

## 2025-06-03 PROCEDURE — 1100000001 HC PRIVATE ROOM DAILY

## 2025-06-03 PROCEDURE — 2500000001 HC RX 250 WO HCPCS SELF ADMINISTERED DRUGS (ALT 637 FOR MEDICARE OP)

## 2025-06-03 PROCEDURE — 99232 SBSQ HOSP IP/OBS MODERATE 35: CPT

## 2025-06-03 PROCEDURE — 85025 COMPLETE CBC W/AUTO DIFF WBC: CPT

## 2025-06-03 PROCEDURE — 36415 COLL VENOUS BLD VENIPUNCTURE: CPT

## 2025-06-03 PROCEDURE — 2500000004 HC RX 250 GENERAL PHARMACY W/ HCPCS (ALT 636 FOR OP/ED): Performed by: STUDENT IN AN ORGANIZED HEALTH CARE EDUCATION/TRAINING PROGRAM

## 2025-06-03 PROCEDURE — 97530 THERAPEUTIC ACTIVITIES: CPT | Mod: GP

## 2025-06-03 PROCEDURE — 84100 ASSAY OF PHOSPHORUS: CPT

## 2025-06-03 PROCEDURE — 83735 ASSAY OF MAGNESIUM: CPT

## 2025-06-03 PROCEDURE — 2500000001 HC RX 250 WO HCPCS SELF ADMINISTERED DRUGS (ALT 637 FOR MEDICARE OP): Performed by: STUDENT IN AN ORGANIZED HEALTH CARE EDUCATION/TRAINING PROGRAM

## 2025-06-03 RX ADMIN — LOSARTAN POTASSIUM 25 MG: 25 TABLET, FILM COATED ORAL at 21:24

## 2025-06-03 RX ADMIN — CLONIDINE HYDROCHLORIDE 0.2 MG: 0.2 TABLET ORAL at 08:38

## 2025-06-03 RX ADMIN — SENNOSIDES AND DOCUSATE SODIUM 2 TABLET: 50; 8.6 TABLET ORAL at 21:23

## 2025-06-03 RX ADMIN — ENOXAPARIN SODIUM 40 MG: 40 INJECTION, SOLUTION SUBCUTANEOUS at 18:31

## 2025-06-03 RX ADMIN — ASPIRIN 81 MG: 81 TABLET, CHEWABLE ORAL at 08:38

## 2025-06-03 RX ADMIN — MYCOPHENOLATE MOFETIL 750 MG: 200 POWDER, FOR SUSPENSION ORAL at 21:44

## 2025-06-03 RX ADMIN — LOSARTAN POTASSIUM 25 MG: 25 TABLET, FILM COATED ORAL at 08:38

## 2025-06-03 RX ADMIN — POLYETHYLENE GLYCOL 3350 17 G: 17 POWDER, FOR SOLUTION ORAL at 21:22

## 2025-06-03 RX ADMIN — ROSUVASTATIN CALCIUM 20 MG: 20 TABLET, FILM COATED ORAL at 21:23

## 2025-06-03 RX ADMIN — TACROLIMUS 2 MG: 5 CAPSULE, GELATIN COATED ORAL at 18:31

## 2025-06-03 RX ADMIN — MYCOPHENOLATE MOFETIL 750 MG: 200 POWDER, FOR SUSPENSION ORAL at 08:38

## 2025-06-03 RX ADMIN — CLONIDINE HYDROCHLORIDE 0.2 MG: 0.2 TABLET ORAL at 21:24

## 2025-06-03 RX ADMIN — NYSTATIN 400000 UNITS: 100000 SUSPENSION ORAL at 15:21

## 2025-06-03 RX ADMIN — CLOPIDOGREL BISULFATE 75 MG: 75 TABLET, FILM COATED ORAL at 08:38

## 2025-06-03 RX ADMIN — TACROLIMUS 2 MG: 5 CAPSULE, GELATIN COATED ORAL at 06:20

## 2025-06-03 RX ADMIN — POLYETHYLENE GLYCOL 3350 17 G: 17 POWDER, FOR SOLUTION ORAL at 08:38

## 2025-06-03 ASSESSMENT — PAIN - FUNCTIONAL ASSESSMENT
PAIN_FUNCTIONAL_ASSESSMENT: 0-10

## 2025-06-03 ASSESSMENT — COGNITIVE AND FUNCTIONAL STATUS - GENERAL
PERSONAL GROOMING: A LITTLE
HELP NEEDED FOR BATHING: A LOT
DAILY ACTIVITIY SCORE: 12
DRESSING REGULAR UPPER BODY CLOTHING: A LOT
MOVING FROM LYING ON BACK TO SITTING ON SIDE OF FLAT BED WITH BEDRAILS: A LITTLE
DRESSING REGULAR LOWER BODY CLOTHING: TOTAL
WALKING IN HOSPITAL ROOM: TOTAL
TOILETING: TOTAL
MOBILITY SCORE: 9
TURNING FROM BACK TO SIDE WHILE IN FLAT BAD: A LOT
MOVING TO AND FROM BED TO CHAIR: TOTAL
STANDING UP FROM CHAIR USING ARMS: TOTAL
CLIMB 3 TO 5 STEPS WITH RAILING: TOTAL
EATING MEALS: A LITTLE

## 2025-06-03 ASSESSMENT — PAIN SCALES - GENERAL
PAINLEVEL_OUTOF10: 0 - NO PAIN

## 2025-06-03 ASSESSMENT — ACTIVITIES OF DAILY LIVING (ADL): HOME_MANAGEMENT_TIME_ENTRY: 24

## 2025-06-03 NOTE — CARE PLAN
The clinical goals for the shift include pt will remain safe and free from falls/injury through end of shift   Which was met. Pt A&Ox4, VSS, left side flaccid, left sided facial droop, and garbled/slurred speech, no complaints of pain. Pt impulsive and agitated just complaining that we are not allowing him to walk. After yelling at RN and threatening to leave AMA, MD spoke with pt and asked PT/OT to see him. PT/OT got pt to the bedside commode via a max 2 person assist to pivot- pt also unable to support his trunk independently and PT/OT had to hold pt upright for about 45 minutes while he attempted to have a BM but only had a smear of stool. Pt urinated independently into a urinal. Q2 turns and passive ROM to the left side completed throughout the shift. All Q4 neuro checks were unchanged.

## 2025-06-03 NOTE — PROGRESS NOTES
Davidson Khoury is a 60 y.o. left handed male on day 15 of admission presenting with Acute cerebrovascular accident (CVA) due to embolism of right middle cerebral artery (Multi).    Subjective   NAEON. Still no BM. Otherwise he is neurologically intact.        Objective     Last Recorded Vitals  Heart Rate:  [58-64]   Temp:  [36.3 °C (97.3 °F)-36.8 °C (98.2 °F)]   Resp:  [14-18]   BP: (142-161)/(87-93)   SpO2:  [98 %-100 %]       [facility] NIHSS:   NIH Stroke Scale:     Date/Time of Assessment: 5/31/2025 7:30 AM    1A. Level of Consciousness:  Alert (keenly responsive) (0)    1B. Ask Month and Age:  Both questions right (0)    1C. Blink Eyes & Squeeze Hands:  Performs both tasks (0)    2. Best Gaze:  Partial gaze palsy (+1)    3. Visual:  Partial hemianopia (+1)    4. Facial Palsy:  Complete paralysis (+3)    5A. Motor - Left Arm:  No movement (+4)    5B. Motor - Right Arm:  No drift (0)    6A. Motor - Left Leg:  No effort against gravity (+3)    6B. Motor - Right Leg:  No drift (0)    7. Limb Ataxia:  No ataxia (0)    8. Sensory Loss:  Mild-moderate loss (+1)    9. Best Language:  Normal (no aphasia) (0)    10. Dysarthia:  Mild-moderate dysarthria (+1)    11. Extinction and Inattention:  Visual/tactile/auditory/spatial/personal inattention (+1)    NIH Stroke Scale:  15       Physical Exam  Neurological Exam  GENERAL APPEARANCE:  No distress, alert, interactive and cooperative.     MENTAL STATE:   Orientation was normal to time, place and person.     CRANIAL NERVES:   CN 2   Left inferior HH.  CN 3, 4, 6   Lids symmetric; no ptosis. EOMs normal alignment, full range with normal saccades, pursuit and convergence. No nystagmus.    CN 7   Left UMN FD.  CN 8   Hearing intact to conversation.   CN 9   Palate elevates symmetrically.   CN 12   Tongue midline, with normal bulk and strength; no fasciculations.     Motor/Sensory:   Hypotonia of the left UE and LE, LUE 0/5, LLE 2/5. 5/5 on RUE and RLE.    COORDINATION:    Right  finger-nose-finger was intact without dysmetria or overshoot.     GAIT:   Deferred.    CARDIAC: RRR. No murmurs or rubs.  ABDOMEN: Soft, NT/ND.   LUNGS: CTAB.            [place] Coma Scale  Best Eye Response: Spontaneous  Best Verbal Response: Oriented  Best Motor Response: Follows commands  [place] Coma Scale Score: 15                  Assessment & Plan  Acute cerebrovascular accident (CVA) due to embolism of right middle cerebral artery (Multi)    Davidson Khoury is a 60 y.o. male with a history notable for renal transplant (currently on Cellcept) and hypertension presenting to the ED initially with a chief complaint of altered mental status, headache, and hypertension. Initially evaluated by general neurology team who noted subtle left sided weakness that worsened on follow up exam. BAT activated for concern of stroke. Found to have proximal R superior M2 occlusion. Not a candidate for TNK as LKN >4.5 hours. Taken for MT, TICI 2b. Exam notable for L HH, L UMN facial paralysis, LUE 0/5, LLE 3/5. right at least antigravity, left hemisensory loss and neglect.    Stroke Metrics:  EMS pre-notification?: No  Time of stroke team arrival: 3:47pm  Direct to CT?: No  Time of CTH read by stroke team: As performed  Time of TNK: n/a  Time stroke team called IR: 4:27pm  Time pt arrived to angio suite: 4:57pm  Time of groin puncture: 5:10pm  Time of recanalization: 5:31pm  Number of passes: 1  Device used: penumbra aspiration  Any delays in the process (if door to TNK >30 min): n/a     Stroke Classification:  Type: Ischemic stroke  Subtype/etiology: Suspected large artery disease  Vessels involved: R MCA  Neurological manifestations:  Pre-Intervention Deficits: NIHSS 10 *see above  Post-Intervention Deficits: NIHSS 9 *see above  Pre-stroke mRS: 0  Initial treatment: Angio  Anti-platelets or Anti-coagulation management: Aspirin  Vascular Risk Factors: HTN  Antiplatelet/antithrombotic plan for stroke prevention: Aspirin  VTE  prophylaxis: Lovenox  Vascular Risk Factor modification goals:  Blood pressure goals: avoid hypotension SBP <100 that could worsen cerebral perfusion, Ischemic stroke post-thrombectomy SBP <180mmHg   Lipid Goals: education on healthy diet and statin therapy to maintain or achieve goal LDL-cholesterol < 70mg.  Glucose Goals: early treatment of hyperglycemia to goal glucose 140-180 mg/dl with long-term goal A1c < 7%   Smoking Cessation and Education  Assessment for Rehabilitation needs   Patient and family education on signs and symptoms of stroke, calling 911, healthy strategies for stroke prevention.      Plan:  Updates 06/03/25  -[facility]  -Follow up transplant nephrology recs: continue tacrolimus to 2 mg BID   - DH removed  -Follow up nutrition recs  -Pending dispo to SNF  -Monitor for BM - last BM 5/27 - increased bowel regimen to miralax BID and added docsenna    #Proximal R superior M2 occlusion s/p TICI 2b MT   #HTN   #Hx of thrombocytopenia- Stable   - [facility]  - Stroke work up  - A1c: 5.2%, LDL 63              - TTE with bubble study: EF 70-75% LA normal in size, no PFO  - SBP goals normotension now 7 days post-stroke  - Discuss with transplant nephrology optimal antihypertensive regime. Currently:   - Losartan 25mg BID   - Clonidine 0.2mg BID  - PT/OT/SLP     #Renal transplant (on Cellcept and Tacrolimus)   - Baseline BUN/Cr: 15/1.80   -Consulted nephrology recommended - continue tacrolimus to  2 mg q12. Monitor Fk trough daily (30-60 min prior to AM dose). Goal 5-8.  - cont  mg bid.    #Dysphagia  -SLP following underwent MBSS 5/28 - Pureed (IDDSI Level 4), Thin liquids (IDDSI Level 0)        Pain medications: PRN Tylenol  Fluids: Replete PRN  Electrolytes: Keep mg >2, phos >3  and K >4  Nutrition:  Adult diet Regular; Pureed 4; Thin 0; 1:1 Feeding    Antimicrobials: None  Antiplatelet: ASA and Clopidogrel  Anticoagulation: None  DVT PPX: Lovenox  GI ppx: none needed  Bowel care: Docusate and Miralax  Catheter: None  Lines: PIV  Oxygen: Room Air    Disposition:   PT/OT moderate intensity    Code Status: Full Code (confirmed on admission)   NOK:  Primary Emergency Contact: Margo Cali, DO  PGY-2 Neurology

## 2025-06-03 NOTE — PROGRESS NOTES
Physical Therapy    Physical Therapy Treatment    Patient Name: Davidson Khoury  MRN: [id number]  Department: [place] [id number]  Room: [address] [place] [address]  Today's Date: 6/3/2025  Time Calculation  Start Time: 1422  Stop Time: 1516  Time Calculation (min): 54 min    Assessment/Plan   PT Assessment  Barriers to Discharge Home: Physical needs, Caregiver assistance  Caregiver Assistance: Caregiver assistance needed per identified barriers - however, level of patient's required assistance exceeds assistance available at home  Physical Needs: High falls risk due to function or environment  Evaluation/Treatment Tolerance: Patient tolerated treatment well  End of Session Communication: Bedside nurse  Assessment Comment: pt transferring to and from bed side commode with max x2 assist. Continue to recommend HIGH intensity PT after DC.  End of Session Patient Position: Bed, 3 rail up, Alarm on  PT Plan  Inpatient/Swing Bed or Outpatient: Inpatient  PT Plan  Treatment/Interventions: Bed mobility, Transfer training, Gait training, Stair training, Balance training, Neuromuscular re-education, Neurodevelopmental intervention, Strengthening, Endurance training, Range of motion, Therapeutic exercise, Therapeutic activity, Home exercise program  PT Plan: Ongoing PT  PT Frequency: 5 times per week  PT Discharge Recommendations: High intensity level of continued care  Equipment Recommended upon Discharge:  (TBD)  PT Recommended Transfer Status: Assist x2  PT - OK to Discharge: Yes    PT Visit Info:  PT Received On: 06/03/25  Response to Previous Treatment: Patient with no complaints from previous session.     General Visit Information:   General  Co-Treatment: OT  Co-Treatment Reason: to safely progress functional mobility with pt heavy 2 person assist  Prior to Session Communication: Bedside nurse, Physician (via secure chat)  Patient Position Received: Bed, 3 rail up, Alarm on  General Comment: Pt supine in bed upon entry to room. Pt pleasant,  cooperative and willing to work with PT.    Subjective   Precautions:  Precautions  Medical Precautions: Fall precautions  Precautions Comment: normotensive goal    Objective   Pain:  Pain Assessment  Pain Assessment: 0-10  0-10 (Numeric) Pain Score: 0 - No pain  Cognition:  Cognition  Overall Cognitive Status: Impaired  Orientation Level: Oriented X4  Cognition Comments: repetition of commands throughout session, easily distractable    Treatments:  Therapeutic Activity  Therapeutic Activity Performed: Yes  Therapeutic Activity 1: sititng EOB for ~10 minutes with cga-max assist, with initially noted to have strong L lateral lean, appearing to push to L side, when corrected to midline, pt moving into R lean; max verbal, tacticle and visual cuing to maintain midline, with pt able to briefly with cuing and assist. x3 STS transfer with max x2 assist and arm in arm to assist in stephanie care.    Bed Mobility  Bed Mobility: Yes  Bed Mobility 1  Bed Mobility 1: Supine to sitting  Level of Assistance 1: Moderate assistance, Maximum verbal cues, Maximum tactile cues  Bed Mobility Comments 1: x2 assist, pt educated on hooking RLE under LLE to assist in advancement  Bed Mobility 2  Bed Mobility  2: Sitting to supine  Level of Assistance 2: Maximum assistance, Maximum verbal cues, Maximum tactile cues  Bed Mobility Comments 2: x2 assist, pt with noted decreased eccentric control and increased impulvitity with sit to supine transfer    Ambulation/Gait Training  Ambulation/Gait Training Performed: No  Transfers  Transfer: Yes  Transfer 1  Technique 1: Stand pivot, Sit pivot  Transfer Device 1:  (B arm in arm assist)  Transfer Level of Assistance 1: Maximum assistance, Maximum verbal cues, Maximum tactile cues  Trials/Comments 1: x2 assist; x2 trials to and from commode; assist on L side to block and for hand placement and assist at hip to advance back to bed.    Outcome Measures:  [facility] Basic Mobility  Turning from your back to your  side while in a flat bed without using bedrails: A little  Moving from lying on your back to sitting on the side of a flat bed without using bedrails: A lot  Moving to and from bed to chair (including a wheelchair): Total  Standing up from a chair using your arms (e.g. wheelchair or bedside chair): Total  To walk in hospital room: Total  Climbing 3-5 steps with railing: Total  Basic Mobility - Total Score: 9    Education Documentation  Mobility Training, taught by Lois Manzano, PT at 6/3/2025  4:20 PM.  Learner: Patient  Readiness: Acceptance  Method: Explanation  Response: Verbalizes Understanding  Comment: importance of functional mobility    Education Comments  No comments found.      Encounter Problems       Encounter Problems (Active)       Balance       Patient to demo static standing with unilateral UE support, performing single UE task with SBA, no sway or LOB x 2 mins for functional carryover  (Progressing)       Start:  05/20/25    Expected End:  06/03/25            Pt will score >/= 24/28 on Tinetti balance assessment to indicate low falls risk.   (Progressing)       Start:  05/20/25    Expected End:  06/03/25               Mobility       STG - Patient will ambulate >/= 30 ft with CGA and LRAD (Progressing)       Start:  05/20/25    Expected End:  06/03/25            Pt. will tolerate >/= 10 minutes of OOB mobility without seated rest break and VSS to demo improved activity tolerance/endurance.  (Progressing)       Start:  05/20/25    Expected End:  06/03/25            Patient will actively participate in ther-ex in order to improve strength and to assist with the completion of functional mobility tasks.  (Progressing)       Start:  05/20/25    Expected End:  06/03/25               PT Transfers       STG - Patient will perform bed mobility IND (Progressing)       Start:  05/20/25    Expected End:  06/03/25            STG - Patient will transfer sit to and from stand with CGA and LRAD (Progressing)        Start:  05/20/25    Expected End:  06/03/25               Pain - Adult            Lois Manzano, PT

## 2025-06-03 NOTE — NURSING NOTE
"Pt irate and yelling at RN that she needs to get physical therapy here to walk him to the bathroom or a doctor here so he can sign himself out now. Pt has been educated multiple times today on his falls precautions and the safety risk of trying to even stand with his left side completely flaccid. RN tried to deescalate the situation and discuss pts inability to move his left side at all and how he was going to be able to care for himself at home. Pt just began yelling louder over RN that \"You don't know nothing about my home I have everything I need there. I can do more and care for myself better there than ya'll are here. You're just letting me sit on my ass,\" despite completing Q2 turns and active/passive ROM throughout shift. PT/OT were messaged earlier in the day and stated that he was a very heavy assist in previous sessions so he is definitely not able to walk and they do not have him on their lists today but will try to see him later this afternoon. Pt informed that they recommended rehab at discharge where he would be working closer with PT/OT. Pt then yelled at RN that he doesn't want to go to rehab and it doesn't do anything for you. Pt then stated his fiance is on the way to pick him up because he is signing himself out and going home. RN stated she was going to call the doctor to come speak with pt and left the room. Pt continued yelling into the alegria after RN left. MD Emanuel stated he is on the way to speak with pt.  "

## 2025-06-03 NOTE — CARE PLAN
The patient's goals for the shift include  remain comfortable throughout shift.     The clinical goals for the shift include patient remains safe throughout shift        Problem: General Stroke  Goal: Establish a mutual long term goal with patient by discharge  Outcome: Progressing  Goal: Demonstrate improvement in neurological exam throughout the shift  Outcome: Progressing  Goal: Maintain BP within ordered limits throughout shift  Outcome: Progressing  Goal: Participate in treatment (ie., meds, therapy) throughout shift  Outcome: Progressing  Goal: Tolerate enteral feeding throughout shift  Outcome: Progressing  Goal: Decreased nausea/vomiting throughout shift  Outcome: Progressing  Goal: Out of bed three times today  Outcome: Progressing     Problem: ICU Stroke  Goal: Maintain ICP within ordered limits throughout shift  Outcome: Progressing  Goal: Tolerate EVD clamping trial throughout shift  Outcome: Progressing  Goal: Tolerate ventilator weaning trial during shift  Outcome: Progressing  Goal: Maintain patent airway throughout shift  Outcome: Progressing  Goal: Achieve/maintain targeted sodium level throughout shift  Outcome: Progressing     Problem: Pain - Adult  Goal: Verbalizes/displays adequate comfort level or baseline comfort level  Outcome: Progressing     Problem: Safety - Adult  Goal: Free from fall injury  Outcome: Progressing     Problem: Discharge Planning  Goal: Discharge to home or other facility with appropriate resources  Outcome: Progressing     Problem: Chronic Conditions and Co-morbidities  Goal: Patient's chronic conditions and co-morbidity symptoms are monitored and maintained or improved  Outcome: Progressing     Problem: Nutrition  Goal: Nutrient intake appropriate for maintaining nutritional needs  Outcome: Progressing     Problem: Skin  Goal: Decreased wound size/increased tissue granulation at next dressing change  Outcome: Progressing  Goal: Participates in plan/prevention/treatment  measures  Outcome: Progressing  Goal: Prevent/manage excess moisture  Outcome: Progressing  Goal: Prevent/minimize sheer/friction injuries  Outcome: Progressing  Goal: Promote/optimize nutrition  Outcome: Progressing  Goal: Promote skin healing  Outcome: Progressing     Problem: Fall/Injury  Goal: Verbalize understanding of personal risk factors for fall in the hospital  Outcome: Progressing  Goal: Verbalize understanding of risk factor reduction measures to prevent injury from fall in the home  Outcome: Progressing  Goal: Use assistive devices by end of the shift  Outcome: Progressing  Goal: Pace activities to prevent fatigue by end of the shift  Outcome: Progressing

## 2025-06-03 NOTE — PROGRESS NOTES
06/03/25 1044   Discharge Planning   Expected Discharge Disposition SNF   Does the patient need discharge transport arranged? Yes     Transitional Care Coordination Progress Note:  This nurse requested that DSC support team escalate pts auth.   DSC support team completed 7000 form.    Addendum 1416: Auth for [facility] pending.     Amy Mcgowan, RN, BSN  Transitional Care Coordinator  Office: [phone]  Secure chat via Haiku

## 2025-06-03 NOTE — PROGRESS NOTES
"Occupational Therapy    Occupational Therapy Treatment    Name: Davidson Khoury  MRN: [id number]  : 1965  Date: 25  Room: [address] [place], [place] [address]      Time Calculation  Start Time: 1422  Stop Time: 1516  Time Calculation (min): 54 min    Assessment:  Barriers to Discharge Home: Caregiver assistance, Physical needs  Caregiver Assistance: Caregiver assistance needed per identified barriers - however, level of patient's required assistance exceeds assistance available at home  Cognition Needs: 24hr supervision for safety awareness needed, Recollection or understanding of precautions/restrictions limited, Recollection or understanding of home exercise program limited  Physical Needs: 24hr mobility assistance needed, 24hr ADL assistance needed  Evaluation/Treatment Tolerance: Patient tolerated treatment well  Medical Staff Made Aware: Yes  End of Session Communication: Bedside nurse  End of Session Patient Position: Bed, 3 rail up, Alarm on  Plan:  Treatment Interventions: ADL retraining, UE strengthening/ROM, Compensatory technique education  OT Frequency: 4 times per week  OT Discharge Recommendations: High intensity level of continued care  Equipment Recommended upon Discharge:  (TBD)  OT Recommended Transfer Status: Assist of 2  OT - OK to Discharge: Yes    Subjective   General:  OT Last Visit  OT Received On: 25  Co-Treatment: PT  Co-Treatment Reason: Maximize pt's safety - pt with poor seated balance requiring 2-person skilled assist for safe transfers and mobility  Prior to Session Communication: Bedside nurse  Patient Position Received: Bed, 3 rail up, Alarm on  Family/Caregiver Present: No  General Comment: Pt pleasant and agreeable to OT session - motivated throughout and reports \"I am going to be walking through these hallways soon.\"   Precautions:  Medical Precautions: Fall precautions  Precautions Comment: SBP <180  Lines/Tubes/Drains:  External Urinary Catheter Male (Active)   Number of days: 14 "       Cognition:  Overall Cognitive Status: Impaired  Arousal/Alertness: Delayed responses to stimuli  Orientation Level: Oriented X4  Following Commands: Follows one step commands with repetition (~75%)  Safety Judgment: Decreased awareness of need for assistance  Attention: Exceptions to WFL  Sustained Attention: Impaired  Other (Comment): Pt easily distractable - ex. answering phone during bed mobility  Safety/Judgement: Exceptions to WFL  Complex Functional Tasks: Moderate  Novel Situations: Moderate  Routine Tasks: Moderate  Insight: Moderate  Impulsive: Moderately  Processing Speed: Delayed    Pain Assessment:  Pain Assessment  Pain Assessment: 0-10  0-10 (Numeric) Pain Score: 0 - No pain     Objective   Activities of Daily Living:      Grooming  Grooming Level of Assistance: Moderate assistance  Grooming Where Assessed:  ([facility])  Grooming Comments: Assistance with wiping mouth with poor awareness of oral secretions        UE Dressing  UE Dressing Level of Assistance: Moderate assistance  UE Dressing Where Assessed: Edge of bed  UE Dressing Comments: Mod A for management of gown at EOB - cues for increased IND     Toileting  Toileting Level of Assistance: Dependent  Where Assessed: Bedside commode  Toileting Comments: Total assist for clothing management and hygiene while standing at [facility] - pt requires extended time to attempt a BM at [facility] with assistance positioning L UE on pillow for support. After exteneded time, pt with minimal BM. Education provided on positioning and toileting techniques to increase success.  Bed Mobility/Transfers:   Bed Mobility  Bed Mobility: Yes  Bed Mobility 1  Bed Mobility 1: Supine to sitting  Level of Assistance 1: Moderate assistance, +2  Bed Mobility Comments 1: Mod A x2 for management of LEs and trunk to achieve upright sitting, education throughout for technique  Bed Mobility 2  Bed Mobility  2: Sitting to supine  Level of Assistance 2: Maximum assistance, +2  Bed Mobility  Comments 2: Max A for LE and trunk positioning, cues for increased IND  Transfers  Transfer: Yes  Transfer 1  Transfer From 1: Bed to  Transfer to 1: Commode-standard  Technique 1: Stand pivot  Transfer Level of Assistance 1: Maximum assistance, +2  Trials/Comments 1: Max A x2 for transfer to and from commode  Toilet Transfers  Toilet Transfer From: Bed  Toilet Transfer Type: To and from  Toilet Transfer to: Standard bedside commode  Toilet Transfer Technique: Stand pivot  Toilet Transfers: Maximal assistance (x2)  Toilet Transfers Comments: Max A x2 for stand pivot from bed to [facility] - education for technique and cues to follow through - L LE blocking with poor ability to pivot feet  Therapy/Activity:   Therapeutic Exercise  Therapeutic Exercise Activity 1: SROM of L UE using R UE - poor follow through with demonstrated techniques - 5-10 reps of shoulder and elbow flexion within limited ROM  Therapeutic Activity  Therapeutic Activity 1: 3x sit to stand during session - Max A x2 for lifting to stand with blocking of L LE - cues for hand placement and improved positioning, pt requires ffrequently repeated cues to remain on task  Therapeutic Activity 2: Education provided on improved IND with bed mobility, Mod A x1 for sup to sitting, Max A x2 for sitting to supine.  Balance/Neuromuscular Re-Education  Balance/Neuromuscular Re-Education Activity Performed: Yes  Balance/Neuromuscular Re-Education Activity 1: Static sitting EOB for approx 10 minutes - cues for improved positioning to obtain midline with trunk - CGA-Max A for seated balance with pt pushing trunk to L with R UE. Poor body awareness throughout.  Balance/Neuromuscular Re-Education Activity 2: Pt provided with items on L side of body to address L inattention, cues to locate several items on L side with fair accuracy following cues.  Outcome Measures:  [facility] Daily Activity  Putting on and taking off regular lower body clothing: Total  Bathing (including washing,  rinsing, drying): A lot  Putting on and taking off regular upper body clothing: A lot  Toileting, which includes using toilet, bedpan or urinal: Total  Taking care of personal grooming such as brushing teeth: A little  Eating Meals: A little  Daily Activity - Total Score: 12     Education Documentation  Body Mechanics, taught by Jeff Vallejo OT at 6/3/2025  3:41 PM.  Learner: Patient  Readiness: Acceptance  Method: Explanation, Demonstration  Response: Verbalizes Understanding, Needs Reinforcement  Comment: ADLs and safety    Precautions, taught by Jeff Vallejo OT at 6/3/2025  3:41 PM.  Learner: Patient  Readiness: Acceptance  Method: Explanation, Demonstration  Response: Verbalizes Understanding, Needs Reinforcement  Comment: ADLs and safety    ADL Training, taught by Jeff Vallejo OT at 6/3/2025  3:41 PM.  Learner: Patient  Readiness: Acceptance  Method: Explanation, Demonstration  Response: Verbalizes Understanding, Needs Reinforcement  Comment: ADLs and safety    Education Comments  No comments found.    Goals:  Encounter Problems       Encounter Problems (Active)       ADLs       Patient with complete upper body dressing with moderate assist level of assistance donning and doffing all UE clothes with PRN adaptive equipment and one handed techniques while edge of bed.  (Met)       Start:  05/21/25    Expected End:  06/11/25    Resolved:  05/27/25    Updated to: Patient with complete upper body dressing with Min assist level of assistance donning and doffing all UE clothes with PRN adaptive equipment and one handed techniques while edge of bed.    Update reason: Met         Patient will feed self with minimal assist level of assistance and verbal cues and tactile cues using PRN adaptive equipment. (Progressing)       Start:  05/21/25    Expected End:  06/11/25            Patient will complete daily grooming tasks brushing teeth and washing face/hair with minimal assist level of assistance and PRN adaptive  equipment while edge of bed. (Progressing)       Start:  05/21/25    Expected End:  06/11/25            Patient with complete upper body dressing with Min assist level of assistance donning and doffing all UE clothes with PRN adaptive equipment and one handed techniques while edge of bed. (Progressing)       Start:  05/27/25    Expected End:  06/11/25                Patient will complete lower body dressing with moderate assistance overall.  (Progressing)       Start:  06/02/25    Expected End:  06/11/25                Patient  will complete upper body bathing and lower body bathing with mod assist and verbal/tactile cues.  (Progressing)       Start:  06/02/25    Expected End:  06/11/25                   BALANCE       Patient will maintain static standing balance during ADL task with minimal assist level of assistance in order to demonstrate decreased risk of falling and improved postural control. (Progressing)       Start:  05/21/25    Expected End:  06/11/25               EXERCISE/STRENGTHENING       Patient will independently perform self passive ROM on LUE to increase bimanual coordination. (Progressing)       Start:  05/21/25    Expected End:  06/11/25               TRANSFERS       Patient will perform bed mobility minimal assist level of assistance in order to improve safety and independence with mobility (Progressing)       Start:  05/21/25    Expected End:  06/11/25 06/03/25 at 3:42 PM   Jeff Vallejo, OT   [phone]

## 2025-06-04 VITALS
SYSTOLIC BLOOD PRESSURE: 138 MMHG | HEIGHT: 69 IN | WEIGHT: 160.72 LBS | HEART RATE: 66 BPM | RESPIRATION RATE: 16 BRPM | OXYGEN SATURATION: 97 % | BODY MASS INDEX: 23.8 KG/M2 | TEMPERATURE: 97.7 F | DIASTOLIC BLOOD PRESSURE: 87 MMHG

## 2025-06-04 LAB — TACROLIMUS BLD-MCNC: 8.9 NG/ML

## 2025-06-04 PROCEDURE — 2500000004 HC RX 250 GENERAL PHARMACY W/ HCPCS (ALT 636 FOR OP/ED): Performed by: STUDENT IN AN ORGANIZED HEALTH CARE EDUCATION/TRAINING PROGRAM

## 2025-06-04 PROCEDURE — 92523 SPEECH SOUND LANG COMPREHEN: CPT | Mod: GN

## 2025-06-04 PROCEDURE — 2500000001 HC RX 250 WO HCPCS SELF ADMINISTERED DRUGS (ALT 637 FOR MEDICARE OP)

## 2025-06-04 PROCEDURE — 80197 ASSAY OF TACROLIMUS: CPT

## 2025-06-04 PROCEDURE — 2500000004 HC RX 250 GENERAL PHARMACY W/ HCPCS (ALT 636 FOR OP/ED)

## 2025-06-04 PROCEDURE — 36415 COLL VENOUS BLD VENIPUNCTURE: CPT

## 2025-06-04 PROCEDURE — 92526 ORAL FUNCTION THERAPY: CPT | Mod: GN

## 2025-06-04 RX ORDER — ROSUVASTATIN CALCIUM 20 MG/1
20 TABLET, COATED ORAL NIGHTLY
Start: 2025-06-04 | End: 2025-06-09 | Stop reason: HOSPADM

## 2025-06-04 RX ORDER — POLYETHYLENE GLYCOL 3350 17 G/17G
17 POWDER, FOR SOLUTION ORAL EVERY 12 HOURS
Start: 2025-06-04

## 2025-06-04 RX ORDER — AMOXICILLIN 250 MG
2 CAPSULE ORAL NIGHTLY
Start: 2025-06-04

## 2025-06-04 RX ADMIN — LOSARTAN POTASSIUM 25 MG: 25 TABLET, FILM COATED ORAL at 09:52

## 2025-06-04 RX ADMIN — CLONIDINE HYDROCHLORIDE 0.2 MG: 0.2 TABLET ORAL at 09:52

## 2025-06-04 RX ADMIN — MYCOPHENOLATE MOFETIL 750 MG: 200 POWDER, FOR SUSPENSION ORAL at 09:51

## 2025-06-04 RX ADMIN — TACROLIMUS 2 MG: 5 CAPSULE, GELATIN COATED ORAL at 07:55

## 2025-06-04 RX ADMIN — POLYETHYLENE GLYCOL 3350 17 G: 17 POWDER, FOR SOLUTION ORAL at 09:52

## 2025-06-04 RX ADMIN — CLOPIDOGREL BISULFATE 75 MG: 75 TABLET, FILM COATED ORAL at 09:52

## 2025-06-04 RX ADMIN — ASPIRIN 81 MG: 81 TABLET, CHEWABLE ORAL at 09:51

## 2025-06-04 ASSESSMENT — PAIN SCALES - GENERAL
PAINLEVEL_OUTOF10: 0 - NO PAIN
PAINLEVEL_OUTOF10: 0 - NO PAIN

## 2025-06-04 ASSESSMENT — PAIN - FUNCTIONAL ASSESSMENT
PAIN_FUNCTIONAL_ASSESSMENT: 0-10
PAIN_FUNCTIONAL_ASSESSMENT: 0-10

## 2025-06-04 NOTE — PROGRESS NOTES
06/04/25 0851   Discharge Planning   Expected Discharge Disposition Short Term A  ([facility])   Does the patient need discharge transport arranged? Yes   RoundTrip coordination needed? Yes   Has discharge transport been arranged? Yes   What day is the transport expected? 06/04/25   What time is the transport expected? 1200     Transitional Care Coordination Progress Note:  Per team, pt  medically ready for discharge.  [facility] SNF can accept pt today.  Transport confirmed for 12:00pm, Neurology team notified.   Bedside nurse notified, report #[phone] provided.  Transport slip delivered to unit secretary.  This nurse met with patient at the bedside and notified of approval and discharge time. Pts spouse Margo notified.  DSC support team completed 7000 form on 6/3.    Amy Mcgowan RN, BSN  Transitional Care Coordinator  Office: [phone]  Secure chat via [employer]iku

## 2025-06-04 NOTE — DISCHARGE SUMMARY
Discharge Diagnosis  Acute cerebrovascular accident (CVA) due to embolism of right middle cerebral artery (Multi)    Problem List  Assessment & Plan  Acute cerebrovascular accident (CVA) due to embolism of right middle cerebral artery (Multi)      Davidson Khoury is a 60 y.o. male who presented to the hospital with Left sided weakness. They were diagnosed with a stroke.  Etiology: Intracranial Atherosclerosis    Relevant hospital complications: dysphagia  Discharge antithrombotics: ASA & Plavix  Pending evaluation:  none   Issues Requiring Follow-Up  [ ] Follow-up primary care provider for further medical management  [ ] Follow-up with neurology 8/25 at 12:30 with Dr. Rosario    No MRI head results found for the past 14 days  No CT head results found for the past 14 days  No echocardiogram results found for the past 14 days        BNP   Date/Time Value Ref Range Status   05/19/2025 11:41  (H) 0 - 99 pg/mL Final       Test Results Pending At Discharge  Pending Labs       Order Current Status    Tacrolimus level In process            Hospital Course  60 y.o. male with PMH Renal transplant (on Cellcept), and HTN. Admitted 5/19/2025 with Acute cerebrovascular accident (CVA) due to embolism of right middle cerebral artery (Multi) after presenting with AMS, HA, and HTN  with /113 c/f PRES. It was then noted patient developed subtle L FD and ataxia that progressed to worsening L sided weakness. LKW 5/19 0400AM. 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 CTP with 11cc core infarct, 127cc penumbra, ratio 12.5. Patient OOW for TNK, s/p TICI 2b MT. XperCT c/f hemorrhage vs contrast staining. MRI brain showed acute nonhemorrhagic right MCA territory infarction with associated vasogenic edema.     During his hospital stay, Mr. Khoury was managed with dual antiplatelet therapy as per the SAMPRISS protocol for intracranial atherosclerosis. His post-intervention NIH Stroke Scale score was 15, indicating  significant deficits, including left hemianopia, left upper motor neuron facial paralysis, and left-sided weakness and sensory loss. He also experienced mild-to-moderate dysarthria and neglect.     Mr. Khoury also underwent a modified barium swallow study, which revealed dysphagia, necessitating a pureed diet and thin liquids. Nutritional support was provided through nocturnal tube feeds and oral supplements. He was evaluated by physical, occupational and PM&R and recommended SNF then acute care if needed.    Workup:  Hemoglobin A1C 5.2   LDL Calculated  63  TTE: Left ventricular EF 70-75%. Left Atrium normal in size no PFO  Cardiac telemtry monitor during admission was normal.    Medications:  Continue dual antiplatelet therapy (DAPT) for 90 days as per [facility] protocol until August 21, 2025 then continue on aspirin indefinitely.    Follow-Up Appointments:  [ ] Follow-up primary care provider.  [ ] Follow-up with neurology 8/25 at 12:30 with Dr. Rosario    Issues requiring follow up:  Manage risk factors BP      Home Medications     Medication List      START taking these medications     aspirin 81 mg EC tablet; Take 1 tablet (81 mg) by mouth once daily.   clopidogrel 75 mg tablet; Commonly known as: Plavix; Take 1 tablet (75   mg) by mouth once daily. Take 1 tablet daily for 90 days then stop.   polyethylene glycol 17 gram packet; Commonly known as: Glycolax,   Miralax; Take 17 g by mouth every 12 hours.   rosuvastatin 20 mg tablet; Commonly known as: Crestor; Take 1 tablet (20   mg) by mouth once daily at bedtime.   sennosides-docusate sodium 8.6-50 mg tablet; Commonly known as:   Alda-Colace; Take 2 tablets by mouth once daily at bedtime.     CHANGE how you take these medications     tacrolimus 1 mg capsule; Commonly known as: Prograf; TAKE 2 CAPSULES (2   MG) BY MOUTH 2 TIMES A DAY.; What changed: Another medication with the   same name was removed. Continue taking this medication, and follow the   directions  you see here.     CONTINUE taking these medications     cetirizine 10 mg tablet; Commonly known as: ZyrTEC   cloNIDine 0.2 mg tablet; Commonly known as: Catapres; TAKE 1 TABLET   TWICE A DAY   furosemide 20 mg tablet; Commonly known as: Lasix; Take 1 tablet (20 mg)   by mouth once daily as needed (for swelling).   mycophenolate 250 mg capsule; Commonly known as: Cellcept; Take three   (3) capsules by mouth twice daily   potassium chloride CR 20 mEq ER tablet; Commonly known as: Klor-Con M20;   TAKE 1 TABLET (20 MEQ) BY MOUTH ONCE DAILY. DO NOT CRUSH OR CHEW.   tamsulosin 0.4 mg 24 hr capsule; Commonly known as: Flomax; TAKE ONE (1)   CAPSULE BY MOUTH EVERYDAY AT BEDTIME.     STOP taking these medications     atorvastatin 20 mg tablet; Commonly known as: Lipitor     ASK your doctor about these medications     losartan 25 mg tablet; Commonly known as: Cozaar; TAKE 1 TABLET BY MOUTH   TWICE A DAY       Pertinent Physical Exam At Time of Discharge  Physical Exam  Neurological Exam   1A. Level of Consciousness:  Alert (keenly responsive) (0)    1B. Ask Month and Age:  Both questions right (0)    1C. Blink Eyes & Squeeze Hands:  Performs both tasks (0)    2. Best Gaze:  Partial gaze palsy (+1)    3. Visual:  Partial hemianopia (+1)    4. Facial Palsy:  Complete paralysis (+3)    5A. Motor - Left Arm:  No movement (+4)    5B. Motor - Right Arm:  No drift (0)    6A. Motor - Left Leg:  No effort against gravity (+3)    6B. Motor - Right Leg:  No drift (0)    7. Limb Ataxia:  No ataxia (0)    8. Sensory Loss:  Mild-moderate loss (+1)    9. Best Language:  Normal (no aphasia) (0)    10. Dysarthia:  Mild-moderate dysarthria (+1)    11. Extinction and Inattention:  Visual/tactile/auditory/spatial/personal inattention (+1)    NIH Stroke Scale:  15        Physical Exam  Neurological Exam  GENERAL APPEARANCE:  No distress, alert, interactive and cooperative.      MENTAL STATE:   Orientation was normal to time, place and person.       CRANIAL NERVES:   CN 2      Left inferior HH.  CN 3, 4, 6   Lids symmetric; no ptosis. EOMs normal alignment, full range with normal saccades, pursuit and convergence. No nystagmus.    CN 7   Left UMN FD.  CN 8   Hearing intact to conversation.   CN 9   Palate elevates symmetrically.   CN 12   Tongue midline, with normal bulk and strength; no fasciculations.      Motor/Sensory:   Hypotonia of the left UE and LE, LUE 0/5, LLE 2/5. 5/5 on RUE and RLE.     COORDINATION:    Right finger-nose-finger was intact without dysmetria or overshoot.     Outpatient Follow-Up  Future Appointments   Date Time Provider Department Center   7/15/2025  9:30 AM TXP KIDNEY PHYSICIAN [id number]   8/25/2025 12:30 PM Autumn Rosario MD [id number] John Emanuel DO  Neurology, PGY-2

## 2025-06-04 NOTE — PROGRESS NOTES
Speech-Language Pathology    SLP Adult Inpatient  Swallow Treatment     Patient Name: Davidson Khoury  MRN: [id number]  Today's Date: 6/4/2025   Time in: 1035  Time out 1050  Total time (min): 15         Current Problem:   1. Acute cerebrovascular accident (CVA) due to embolism of right middle cerebral artery (Multi)  Follow Up In Neurology    aspirin 81 mg EC tablet    clopidogrel (Plavix) 75 mg tablet      2. Acute ischemic right MCA stroke (Multi)  Transthoracic Echo Complete    Transthoracic Echo Complete    Follow Up In Neurology    polyethylene glycol (Glycolax, Miralax) 17 gram packet    sennosides-docusate sodium (Alda-Colace) 8.6-50 mg tablet    rosuvastatin (Crestor) 20 mg tablet      3. Kidney transplant recipient ([facility])  DISCONTINUED: mycophenolate (Cellcept) 200 mg/mL suspension    DISCONTINUED: tacrolimus (Prograf) 1 mg/mL oral suspension - Compounded - Outpatient        Patient appears safe to continue recommended diet textures.  Attempted trials of regular solid po this date, patient unable to successfully masticate full bolus on R side.  Moderate amount of bolus removed from left lateral sulcus.  Attempted compensatory strategies of alternating liquids and solids and lingual sweep with minimal improvement in decreasing residue.  Patient will benefit from ongoing instruction and therapeutic intervention to address areas of deficit and provide intensive instruction on rationale and use of strategies.    SLP Assessment:  SLP Assessment Results: Motor Speech Deficits  Prognosis: Good  Treatment Provided: Yes   Treatment Tolerance: Patient tolerated treatment well  Strengths: Motivation, Family/Caregiver Support  Education Provided: Yes       Plan:  Treatment/Interventions: Dysphagia treatment  SLP TX Plan: Continue Plan of Care  SLP Plan: Skilled SLP  SLP Frequency: 2x per week  SLP Discharge Recommendations: Continue skilled SLP services at the next level of care  Discussed POC:  "Patient  Patient/Caregiver Agreeable: Yes  SLP - OK to Discharge: Yes      Subjective   \"I can't use my fingers, they're too dirty.\"     SLP Visit Info:  SLP Received On: 06/04/25    General Visit Information:  Past Medical History Relevant to Rehab: h/o renal transplant (currently on Cellcept) and HTN    Baseline Assessment:        Pain Assessment:  Pain Assessment  Pain Assessment: 0-10  0-10 (Numeric) Pain Score: 0 - No pain      Objective       Therapeutic Swallow:  Therapeutic Swallow Intervention : Compensatory Strategies, PO Trials  Solid Diet Recommendations: Pureed/extremely thick  (IDDSI Level 4)  Liquid Diet Recommendations: Thin (IDDSI Level 0)             Encounter Problems       Encounter Problems (Active)       Swallowing       LTG - Patient will tolerate the least restrictive diet without overt difficulty by time of discharge.       Start:  05/20/25    Expected End:  06/10/25             The patient/caregiver/family will demonstrate adequate return of knowledge of all compensatory strategy/instruction w/ 100% acc. to effectively assist the patient in immediate safety with oral intake and swallowing.         Start:  05/20/25    Expected End:  06/10/25            STG  - Pt will demonstrate adequate oral motor skills and cognitive function to participate in a Modified Barium Swallow Study within 1 week of SLP recommendation to fully assess swallow function and determine further treatment needs.         Start:  05/20/25    Expected End:  05/27/25                                    "

## 2025-06-04 NOTE — PROGRESS NOTES
Speech-Language Pathology    SLP Adult Inpatient Speech-Language Cognition    Patient Name: Davidson Khoury  MRN: [id number]  Today's Date: 6/4/2025   Time In: 1025  Time Out: 1035  Total time (min) 10            Current Problem:   1. Acute cerebrovascular accident (CVA) due to embolism of right middle cerebral artery (Multi)  Follow Up In Neurology    aspirin 81 mg EC tablet    clopidogrel (Plavix) 75 mg tablet      2. Acute ischemic right MCA stroke (Multi)  Transthoracic Echo Complete    Transthoracic Echo Complete    Follow Up In Neurology    polyethylene glycol (Glycolax, Miralax) 17 gram packet    sennosides-docusate sodium (Alda-Colace) 8.6-50 mg tablet    rosuvastatin (Crestor) 20 mg tablet      3. Kidney transplant recipient ([facility])  DISCONTINUED: mycophenolate (Cellcept) 200 mg/mL suspension    DISCONTINUED: tacrolimus (Prograf) 1 mg/mL oral suspension - Compounded - Outpatient        Patient will benefit from ongoing speech therapy services to address motor speech deficits and provide intervention and compensatory strategies to increase intelligibility of speech.     SLP Assessment:  SLP Assessment  SLP Assessment Results: Motor Speech Deficits  Prognosis: Good  Treatment Provided: Yes   Treatment Tolerance: Patient tolerated treatment well  Strengths: Motivation, Family/Caregiver Support  Education Provided: Yes      SLP Plan:  Plan  Treatment/Interventions: Communication functioning, Oral motor exercises, Patient/family education, Other (Comment) (motor speech)  SLP Plan: Skilled SLP  SLP Frequency: 2x per week  SLP Discharge Recommendations: Continue skilled speech therapy services post discharge  Discussed POC: Patient  Patient/Caregiver Agreeable: Yes  SLP - OK to Discharge: Yes      Subjective   Current Problem:  Patient reports difficulty with communication and communication breakdown.       SLP Visit Info:  SLP Received On: 06/04/25      General Visit Information:  General Information  Chart  Reviewed: Yes  Past Medical History Relevant to Rehab: h/o renal transplant (currently on Cellcept) and HTN      Objective   Pain:  Pain Assessment  Pain Assessment: 0-10  0-10 (Numeric) Pain Score: 0 - No pain      Cognition:  Cognition  Overall Cognitive Status: Impaired  Arousal/Alertness: Appropriate responses to stimuli  Orientation Level: Oriented X4  Safety Judgment: Decreased awareness of need for safety precautions  Awareness of Deficits: Decreased Awareness of Deficits  Attention Span: Attends with cues to redirect  Problem Solving: Assistance required to identify errors made  Attention: Exceptions to WFL  Alternating Attention: To be assessed in therapy  Sustained Attention: Impaired  Problem Solving: Exceptions to WFL       Motor Speech Production:  Motor Speech Production  Oral Motor : Impaired, Facial sensation, Facial symmetry, Labial deviation L, Lingual deviation L, Lingual ROM, Lingual agility  Automatic Speech: WFL  Repetition: Impaired  Intelligibility: Phonemic Distortions, Word Level, Phrase Level, Conversation  Motor Speech Disorder: Dysarthria      Auditory Comprehension:   Auditory Comprehension  Yes/No Questions: Within Functional Limits  Commands: Within Functional Limits  Conversation: Within Functional Limits    Verbal:  Verbal Expression  Primary Mode of Expression: Verbal  Primary Language: English  Confrontation Naming: Within Functional Limits  Responsive Naming: Within Functional Limits  Repetition: Within Functional Limits  Sentence Completion: Within Functional Limits  Conversation: Within Functional Limits  Topic Maintenance: Impaired      Encounter Problems       Encounter Problems (Active)       Swallowing       LTG - Patient will tolerate the least restrictive diet without overt difficulty by time of discharge.       Start:  05/20/25    Expected End:  06/10/25             The patient/caregiver/family will demonstrate adequate return of knowledge of all compensatory  strategy/instruction w/ 100% acc. to effectively assist the patient in immediate safety with oral intake and swallowing.         Start:  05/20/25    Expected End:  06/10/25            STG  - Pt will demonstrate adequate oral motor skills and cognitive function to participate in a Modified Barium Swallow Study within 1 week of SLP recommendation to fully assess swallow function and determine further treatment needs.         Start:  05/20/25    Expected End:  05/27/25

## 2025-06-04 NOTE — PROGRESS NOTES
Davidson Khoury is a 60 y.o. left handed male on day 16 of admission presenting with Acute cerebrovascular accident (CVA) due to embolism of right middle cerebral artery (Multi).    Subjective   NAEON. Was reportedly able to have a large BM yesterday after he was sat up on bedside commode with the help of PT.        Objective     Last Recorded Vitals  Heart Rate:  [58-79]   Temp:  [36.4 °C (97.5 °F)-36.8 °C (98.2 °F)]   Resp:  [16-18]   BP: (120-149)/(70-96)   SpO2:  [95 %-100 %]       [facility] NIHSS:   NIH Stroke Scale:     Date/Time of Assessment: 5/31/2025 7:30 AM    1A. Level of Consciousness:  Alert (keenly responsive) (0)    1B. Ask Month and Age:  Both questions right (0)    1C. Blink Eyes & Squeeze Hands:  Performs both tasks (0)    2. Best Gaze:  Partial gaze palsy (+1)    3. Visual:  Partial hemianopia (+1)    4. Facial Palsy:  Complete paralysis (+3)    5A. Motor - Left Arm:  No movement (+4)    5B. Motor - Right Arm:  No drift (0)    6A. Motor - Left Leg:  No effort against gravity (+3)    6B. Motor - Right Leg:  No drift (0)    7. Limb Ataxia:  No ataxia (0)    8. Sensory Loss:  Mild-moderate loss (+1)    9. Best Language:  Normal (no aphasia) (0)    10. Dysarthia:  Mild-moderate dysarthria (+1)    11. Extinction and Inattention:  Visual/tactile/auditory/spatial/personal inattention (+1)    NIH Stroke Scale:  15       Physical Exam  Neurological Exam  GENERAL APPEARANCE:  No distress, alert, interactive and cooperative.     MENTAL STATE:   Orientation was normal to time, place and person.     CRANIAL NERVES:   CN 2   Left inferior HH.  CN 3, 4, 6   Lids symmetric; no ptosis. EOMs normal alignment, full range with normal saccades, pursuit and convergence. No nystagmus.    CN 7   Left UMN FD.  CN 8   Hearing intact to conversation.   CN 9   Palate elevates symmetrically.   CN 12   Tongue midline, with normal bulk and strength; no fasciculations.     Motor/Sensory:   Hypotonia of the left UE and LE, LUE 0/5, LLE  2/5. 5/5 on RUE and RLE.    COORDINATION:    Right finger-nose-finger was intact without dysmetria or overshoot.     GAIT:   Deferred.    CARDIAC: RRR. No murmurs or rubs.  ABDOMEN: Soft, NT/ND.   LUNGS: CTAB.            [place] Coma Scale  Best Eye Response: Spontaneous  Best Verbal Response: Oriented  Best Motor Response: Follows commands  [place] Coma Scale Score: 15                  Assessment & Plan  Acute cerebrovascular accident (CVA) due to embolism of right middle cerebral artery (Multi)    Davidson Khoury is a 60 y.o. male with a history notable for renal transplant (currently on Cellcept) and hypertension presenting to the ED initially with a chief complaint of altered mental status, headache, and hypertension. Initially evaluated by general neurology team who noted subtle left sided weakness that worsened on follow up exam. BAT activated for concern of stroke. Found to have proximal R superior M2 occlusion. Not a candidate for TNK as LKN >4.5 hours. Taken for MT, TICI 2b. Exam notable for L HH, L UMN facial paralysis, LUE 0/5, LLE 3/5. right at least antigravity, left hemisensory loss and neglect.    Stroke Metrics:  EMS pre-notification?: No  Time of stroke team arrival: 3:47pm  Direct to CT?: No  Time of CTH read by stroke team: As performed  Time of TNK: n/a  Time stroke team called IR: 4:27pm  Time pt arrived to angio suite: 4:57pm  Time of groin puncture: 5:10pm  Time of recanalization: 5:31pm  Number of passes: 1  Device used: penumbra aspiration  Any delays in the process (if door to TNK >30 min): n/a     Stroke Classification:  Type: Ischemic stroke  Subtype/etiology: Suspected large artery disease  Vessels involved: R MCA  Neurological manifestations:  Pre-Intervention Deficits: NIHSS 10 *see above  Post-Intervention Deficits: NIHSS 9 *see above  Pre-stroke mRS: 0  Initial treatment: Angio  Anti-platelets or Anti-coagulation management: Aspirin  Vascular Risk Factors:  HTN  Antiplatelet/antithrombotic plan for stroke prevention: Aspirin  VTE prophylaxis: Lovenox  Vascular Risk Factor modification goals:  Blood pressure goals: avoid hypotension SBP <100 that could worsen cerebral perfusion, Ischemic stroke post-thrombectomy SBP <180mmHg   Lipid Goals: education on healthy diet and statin therapy to maintain or achieve goal LDL-cholesterol < 70mg.  Glucose Goals: early treatment of hyperglycemia to goal glucose 140-180 mg/dl with long-term goal A1c < 7%   Smoking Cessation and Education  Assessment for Rehabilitation needs   Patient and family education on signs and symptoms of stroke, calling 911, healthy strategies for stroke prevention.      Plan:  Updates 06/04/25  - Patient is medically ready pending disposition to SNF. Tentatively plan for discharge today.   -Continue DAPT 90 days sampriss protocol for intracranial atherosclerosis started 5/23/2025    #Proximal R superior M2 occlusion s/p TICI 2b MT   #HTN   #Hx of thrombocytopenia- Stable   - Continue DAPT 90 days sampriss protocol for intracranial atherosclerosis  - Stroke work up  - A1c: 5.2%, LDL 63              - TTE with bubble study: EF 70-75% LA normal in size, no PFO  - SBP goals normotension now 7 days post-stroke  - Discuss with transplant nephrology optimal antihypertensive regime. Currently:   - Losartan 25mg BID   - Clonidine 0.2mg BID  - PT/OT/SLP     #Renal transplant (on Cellcept and Tacrolimus)   - Baseline BUN/Cr: 15/1.80   -Consulted nephrology recommended - continue tacrolimus to  2 mg q12. Monitor Fk trough daily (30-60 min prior to AM dose). Goal 5-8.  - cont  mg bid.    #Dysphagia  -SLP following underwent MBSS 5/28 - Pureed (IDDSI Level 4), Thin liquids (IDDSI Level 0)        Pain medications: PRN Tylenol  Fluids: Replete PRN  Electrolytes: Keep mg >2, phos >3  and K >4  Nutrition:  Adult diet Regular; Pureed 4; Thin 0; 1:1 Feeding   Antimicrobials: None  Antiplatelet: ASA and  Clopidogrel  Anticoagulation: None  DVT PPX: Lovenox  GI ppx: none needed  Bowel care: Docusate and Miralax  Catheter: None  Lines: PIV  Oxygen: Room Air    Disposition:   PT/OT moderate intensity    Code Status: Full Code (confirmed on admission)   NOK:  Primary Emergency Contact: Margo Cali,   PGY-2 Neurology

## 2025-06-04 NOTE — CARE PLAN
The clinical goals for the shift include patient remains safe throughout shift    Problem: Pain - Adult  Goal: Verbalizes/displays adequate comfort level or baseline comfort level  Outcome: Met     Problem: Safety - Adult  Goal: Free from fall injury  Outcome: Met     Problem: Discharge Planning  Goal: Discharge to home or other facility with appropriate resources  Outcome: Met

## 2025-06-04 NOTE — CARE PLAN
The patient's goals for the shift include      The clinical goals for the shift include pt will not fall and will be safe by the end of the shift    Over the shift, the patient did make progress toward the following goals. Barriers to progression include n/a. Recommendations to address these barriers include n/a.

## 2025-06-05 ENCOUNTER — APPOINTMENT (OUTPATIENT)
Dept: RADIOLOGY | Facility: HOSPITAL | Age: 60
End: 2025-06-05
Payer: COMMERCIAL

## 2025-06-05 ENCOUNTER — HOSPITAL ENCOUNTER (OUTPATIENT)
Facility: HOSPITAL | Age: 60
Setting detail: OBSERVATION
End: 2025-06-05
Attending: EMERGENCY MEDICINE | Admitting: INTERNAL MEDICINE
Payer: COMMERCIAL

## 2025-06-05 ENCOUNTER — APPOINTMENT (OUTPATIENT)
Dept: CARDIOLOGY | Facility: HOSPITAL | Age: 60
End: 2025-06-05
Payer: COMMERCIAL

## 2025-06-05 DIAGNOSIS — R13.12 OROPHARYNGEAL DYSPHAGIA: ICD-10-CM

## 2025-06-05 DIAGNOSIS — E87.6 HYPOKALEMIA: ICD-10-CM

## 2025-06-05 DIAGNOSIS — Z94.0 KIDNEY REPLACED BY TRANSPLANT (HHS-HCC): ICD-10-CM

## 2025-06-05 DIAGNOSIS — I69.328 SPEECH OR LANGUAGE DEFICIT, POST-STROKE: ICD-10-CM

## 2025-06-05 DIAGNOSIS — N18.9 CHRONIC KIDNEY DISEASE, UNSPECIFIED CKD STAGE: ICD-10-CM
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
## 2025-06-05 LAB
ALBUMIN SERPL BCP-MCNC: 4 G/DL (ref 3.4–5)
ALP SERPL-CCNC: 126 U/L (ref 33–120)
ALT SERPL W P-5'-P-CCNC: 20 U/L (ref 10–52)
ANION GAP SERPL CALC-SCNC: 15 MMOL/L (ref 10–20)
AST SERPL W P-5'-P-CCNC: 29 U/L (ref 9–39)
BASOPHILS # BLD AUTO: 0.02 X10*3/UL (ref 0–0.1)
BASOPHILS NFR BLD AUTO: 0.3 %
BILIRUB SERPL-MCNC: 0.9 MG/DL (ref 0–1.2)
BUN SERPL-MCNC: 22 MG/DL (ref 6–23)
CALCIUM SERPL-MCNC: 9.3 MG/DL (ref 8.6–10.3)
CARDIAC TROPONIN I PNL SERPL HS: 14 NG/L (ref 0–20)
CHLORIDE SERPL-SCNC: 107 MMOL/L (ref 98–107)
CO2 SERPL-SCNC: 19 MMOL/L (ref 21–32)
CREAT SERPL-MCNC: 1.62 MG/DL (ref 0.5–1.3)
EGFRCR SERPLBLD CKD-EPI 2021: 49 ML/MIN/1.73M*2
EOSINOPHIL # BLD AUTO: 0.03 X10*3/UL (ref 0–0.7)
EOSINOPHIL NFR BLD AUTO: 0.5 %
ERYTHROCYTE [DISTWIDTH] IN BLOOD BY AUTOMATED COUNT: 13.5 % (ref 11.5–14.5)
GLUCOSE SERPL-MCNC: ABNORMAL MG/DL
HCT VFR BLD AUTO: 46.4 % (ref 41–52)
HGB BLD-MCNC: 15.3 G/DL (ref 13.5–17.5)
IMM GRANULOCYTES # BLD AUTO: 0.01 X10*3/UL (ref 0–0.7)
IMM GRANULOCYTES NFR BLD AUTO: 0.2 % (ref 0–0.9)
LYMPHOCYTES # BLD AUTO: 1.24 X10*3/UL (ref 1.2–4.8)
LYMPHOCYTES NFR BLD AUTO: 21 %
MCH RBC QN AUTO: 30.4 PG (ref 26–34)
MCHC RBC AUTO-ENTMCNC: 33 G/DL (ref 32–36)
MCV RBC AUTO: 92 FL (ref 80–100)
MONOCYTES # BLD AUTO: 0.54 X10*3/UL (ref 0.1–1)
MONOCYTES NFR BLD AUTO: 9.1 %
NEUTROPHILS # BLD AUTO: 4.07 X10*3/UL (ref 1.2–7.7)
NEUTROPHILS NFR BLD AUTO: 68.9 %
NRBC BLD-RTO: 0 /100 WBCS (ref 0–0)
PLATELET # BLD AUTO: 151 X10*3/UL (ref 150–450)
POTASSIUM SERPL-SCNC: 4 MMOL/L (ref 3.5–5.3)
POTASSIUM SERPL-SCNC: 6.2 MMOL/L (ref 3.5–5.3)
PROT SERPL-MCNC: 7.1 G/DL (ref 6.4–8.2)
RBC # BLD AUTO: 5.04 X10*6/UL (ref 4.5–5.9)
SODIUM SERPL-SCNC: 135 MMOL/L (ref 136–145)
WBC # BLD AUTO: 5.9 X10*3/UL (ref 4.4–11.3)

## 2025-06-05 PROCEDURE — 82040 ASSAY OF SERUM ALBUMIN: CPT | Performed by: EMERGENCY MEDICINE

## 2025-06-05 PROCEDURE — 93005 ELECTROCARDIOGRAM TRACING: CPT

## 2025-06-05 PROCEDURE — 83735 ASSAY OF MAGNESIUM: CPT | Performed by: INTERNAL MEDICINE

## 2025-06-05 PROCEDURE — 84075 ASSAY ALKALINE PHOSPHATASE: CPT | Performed by: EMERGENCY MEDICINE

## 2025-06-05 PROCEDURE — 36415 COLL VENOUS BLD VENIPUNCTURE: CPT | Performed by: EMERGENCY MEDICINE

## 2025-06-05 PROCEDURE — 84132 ASSAY OF SERUM POTASSIUM: CPT | Mod: 59 | Performed by: INTERNAL MEDICINE

## 2025-06-05 PROCEDURE — 70450 CT HEAD/BRAIN W/O DYE: CPT | Performed by: RADIOLOGY

## 2025-06-05 PROCEDURE — 99285 EMERGENCY DEPT VISIT HI MDM: CPT | Mod: 25 | Performed by: EMERGENCY MEDICINE

## 2025-06-05 PROCEDURE — 82310 ASSAY OF CALCIUM: CPT | Performed by: INTERNAL MEDICINE

## 2025-06-05 PROCEDURE — 84484 ASSAY OF TROPONIN QUANT: CPT | Performed by: EMERGENCY MEDICINE

## 2025-06-05 PROCEDURE — 72125 CT NECK SPINE W/O DYE: CPT

## 2025-06-05 PROCEDURE — 84132 ASSAY OF SERUM POTASSIUM: CPT | Performed by: EMERGENCY MEDICINE

## 2025-06-05 PROCEDURE — 72125 CT NECK SPINE W/O DYE: CPT | Performed by: RADIOLOGY

## 2025-06-05 PROCEDURE — 85025 COMPLETE CBC W/AUTO DIFF WBC: CPT | Performed by: EMERGENCY MEDICINE

## 2025-06-05 PROCEDURE — 70450 CT HEAD/BRAIN W/O DYE: CPT

## 2025-06-05 ASSESSMENT — PAIN SCALES - GENERAL: PAINLEVEL_OUTOF10: 0 - NO PAIN

## 2025-06-05 ASSESSMENT — COLUMBIA-SUICIDE SEVERITY RATING SCALE - C-SSRS
6. HAVE YOU EVER DONE ANYTHING, STARTED TO DO ANYTHING, OR PREPARED TO DO ANYTHING TO END YOUR LIFE?: NO
1. IN THE PAST MONTH, HAVE YOU WISHED YOU WERE DEAD OR WISHED YOU COULD GO TO SLEEP AND NOT WAKE UP?: NO
2. HAVE YOU ACTUALLY HAD ANY THOUGHTS OF KILLING YOURSELF?: NO

## 2025-06-05 ASSESSMENT — PAIN - FUNCTIONAL ASSESSMENT: PAIN_FUNCTIONAL_ASSESSMENT: 0-10

## 2025-06-05 NOTE — ED PROVIDER NOTES
Chief Complaint   Patient presents with    Fall     History of Present Illness   Davidson Khoury   MRN [id number]    60-year-old presents for evaluation after fall last night.  Patient reports that he was reaching for something on his bedside table and slipped out of bed last night.  No definite head strike and patient reports no loss of consciousness.  No headache.  Patient reports persistent left-sided weakness after recent acute ischemic stroke but no new weakness or numbness of upper or lower extremities.  Reports that vision is not entirely back to normal but improving after recent stroke.  Discharge summary from yesterday 6/4/2025 lists new prescriptions for aspirin and Plavix however patient states he does not believe these were administered at the rehab facility yet.  No neck pain, chest pain, back pain, shortness of breath, abdominal pain, nausea, vomiting, or other concerns.    I reviewed discharge summary from yesterday.  History of renal transplant on CellCept, hypertension.  Admitted 5/19/2025 for right MCA stroke presenting with altered mental status, headache, hypertension and elevated blood pressure.  Initial concern was for press however patient developed worsening left-sided weakness and subsequent CT angio demonstrated superior M2 occlusion.  MRI brain showed acute nonhemorrhagic right MCA territory infarction with associated vasogenic edema.  Started on dual antiplatelet therapy and postintervention NIH stroke scale 15 for left hemianopia, left upper motor neuron facial paralysis, left-sided weakness and sensory loss, mild to moderate dysarthria and neglect.    Physical Exam   T 36.8 °C (98.3 °F)  HR 88  BP (!) 160/106 (Simultaneous filing. User may not have seen previous data.)  RR 18  O2 99 % (Simultaneous filing. User may not have seen previous data.) None (Room air)    General:  No acute distress.  Head: Atraumatic.  Eyes: No conjunctival injection.   Ears Nose Throat: No epistaxis. Oral  mucosa moist.  Neck: Supple.  Cardiovascular: Extremities warm.  Regular rhythm.  No JVD.  Pulmonary: No stridor. Normal respiratory effort.  No conversational dyspnea.  Abdominal: No distention.  Musculoskeletal: No deformity or joint swelling.  Skin: No rash.  Psychiatric: Does not appear to respond to internal stimuli.  Neurologic: Alert. Follows directions. Extraocular movements intact. No nystagmus.  Left facial droop. No aphasia or dysarthria. Strength 5/5 right upper extremity and right lower extremity.  No significant movement of left upper extremity.  Left lower extremity antigravity strength.  Sensation to light touch intact right upper and right lower extremity.  Decreased sensation left upper and left lower extremities.  Gait not tested.    ED Course & Medical Decision Making   Arrived by EMS after a fall out of bed last night at rehab facility.  Patient reported no headache, loss of consciousness, new weakness or numbness, chest pain or shortness of breath, abdominal pain or back pain or any other complaints.  ECG performed after reported fall appears similar to previous performed in May 2025 and patient without any anginal symptoms therefore do not suspect acute coronary syndrome.  Ordered CT head and cervical spine to assess for intracranial hemorrhage after reported fall on dual antiplatelet therapy though I have low suspicion.    CT head demonstrated no acute intracranial hemorrhage.  Expected appearance of evolving changes from right MCA ischemic infarct.  Degenerative chronic changes of the cervical spine similar to previous imaging.  Initial potassium of 6.2 but markedly hemolyzed.  Repeat was normal 4.0.  I discussed results with patient and his wife at bedside.  They adamantly refused to return to current rehab facility citing concerns regarding patient not receiving medications and infrequent care.  Discussed with hospitalist.      ED Course as of 06/05/25 2315   Thu Jun 05, 2025   1810 ECG 12  lead  ECG interpreted by me.  Performed June 5, 2025 at 1801.  Sinus rhythm.  86 bpm.  , QRS 94, QTc 433.  Appears similar to previous ECGs from May 2025. [MC]      ED Course User Index  [MC] Phuc Davies MD         Diagnoses as of 06/05/25 2315   Fall from bed, initial encounter   Chronic kidney disease, unspecified CKD stage            Phuc Davies MD  06/06/25 0004

## 2025-06-05 NOTE — ED TRIAGE NOTES
Pt to ED via EMS after a fall out of the bed at the rehab facility. Pt states he slipped out of bed, denies any dizziness/LoC. Pt denies hitting their head, denies any pain. Pt states he is on blood thinners. Pt stated he had a stroke last frday at this facility. Pt has left sided neglect.
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
## 2025-06-06 LAB
ATRIAL RATE: 86 BPM
P AXIS: 80 DEGREES
P OFFSET: 183 MS
P ONSET: 122 MS
PR INTERVAL: 178 MS
Q ONSET: 211 MS
QRS COUNT: 14 BEATS
QRS DURATION: 94 MS
QT INTERVAL: 362 MS
QTC CALCULATION(BAZETT): 433 MS
QTC FREDERICIA: 408 MS
R AXIS: -60 DEGREES
T AXIS: 16 DEGREES
T OFFSET: 392 MS
VENTRICULAR RATE: 86 BPM

## 2025-06-06 PROCEDURE — 2500000001 HC RX 250 WO HCPCS SELF ADMINISTERED DRUGS (ALT 637 FOR MEDICARE OP): Performed by: HOSPITALIST

## 2025-06-06 PROCEDURE — 2500000001 HC RX 250 WO HCPCS SELF ADMINISTERED DRUGS (ALT 637 FOR MEDICARE OP): Performed by: INTERNAL MEDICINE

## 2025-06-06 PROCEDURE — G0378 HOSPITAL OBSERVATION PER HR: HCPCS

## 2025-06-06 PROCEDURE — 99222 1ST HOSP IP/OBS MODERATE 55: CPT

## 2025-06-06 PROCEDURE — 2500000001 HC RX 250 WO HCPCS SELF ADMINISTERED DRUGS (ALT 637 FOR MEDICARE OP)

## 2025-06-06 PROCEDURE — 92610 EVALUATE SWALLOWING FUNCTION: CPT | Mod: GN

## 2025-06-06 PROCEDURE — 2500000004 HC RX 250 GENERAL PHARMACY W/ HCPCS (ALT 636 FOR OP/ED): Performed by: HOSPITALIST

## 2025-06-06 PROCEDURE — 96372 THER/PROPH/DIAG INJ SC/IM: CPT | Performed by: HOSPITALIST

## 2025-06-06 PROCEDURE — 92523 SPEECH SOUND LANG COMPREHEN: CPT | Mod: GN

## 2025-06-06 PROCEDURE — 97165 OT EVAL LOW COMPLEX 30 MIN: CPT | Mod: GO

## 2025-06-06 PROCEDURE — 2500000002 HC RX 250 W HCPCS SELF ADMINISTERED DRUGS (ALT 637 FOR MEDICARE OP, ALT 636 FOR OP/ED): Performed by: HOSPITALIST

## 2025-06-06 PROCEDURE — 97162 PT EVAL MOD COMPLEX 30 MIN: CPT | Mod: GP

## 2025-06-06 RX ORDER — PANTOPRAZOLE SODIUM 40 MG/1
40 TABLET, DELAYED RELEASE ORAL
Status: DISPENSED | OUTPATIENT
Start: 2025-06-06

## 2025-06-06 RX ORDER — ACETAMINOPHEN 325 MG/1
650 TABLET ORAL EVERY 4 HOURS PRN
Status: ACTIVE | OUTPATIENT
Start: 2025-06-06

## 2025-06-06 RX ORDER — CLOPIDOGREL BISULFATE 75 MG/1
75 TABLET ORAL DAILY
Status: DISPENSED | OUTPATIENT
Start: 2025-06-06

## 2025-06-06 RX ORDER — AMOXICILLIN 250 MG
2 CAPSULE ORAL NIGHTLY
Status: DISCONTINUED | OUTPATIENT
Start: 2025-06-06 | End: 2025-06-07

## 2025-06-06 RX ORDER — MYCOPHENOLATE MOFETIL 250 MG/1
750 CAPSULE ORAL 2 TIMES DAILY
Status: DISPENSED | OUTPATIENT
Start: 2025-06-06

## 2025-06-06 RX ORDER — ATORVASTATIN CALCIUM 40 MG/1
40 TABLET, FILM COATED ORAL DAILY
Status: ON HOLD | COMMUNITY

## 2025-06-06 RX ORDER — CLONIDINE HYDROCHLORIDE 0.2 MG/1
0.2 TABLET ORAL 2 TIMES DAILY
Status: DISPENSED | OUTPATIENT
Start: 2025-06-06

## 2025-06-06 RX ORDER — ATORVASTATIN CALCIUM 40 MG/1
40 TABLET, FILM COATED ORAL NIGHTLY
Status: DISPENSED | OUTPATIENT
Start: 2025-06-06

## 2025-06-06 RX ORDER — ROSUVASTATIN CALCIUM 20 MG/1
20 TABLET, COATED ORAL NIGHTLY
Status: DISCONTINUED | OUTPATIENT
Start: 2025-06-06 | End: 2025-06-06

## 2025-06-06 RX ORDER — ENOXAPARIN SODIUM 100 MG/ML
40 INJECTION SUBCUTANEOUS
Status: DISPENSED | OUTPATIENT
Start: 2025-06-06

## 2025-06-06 RX ORDER — ONDANSETRON 4 MG/1
4 TABLET, ORALLY DISINTEGRATING ORAL EVERY 8 HOURS PRN
Status: ACTIVE | OUTPATIENT
Start: 2025-06-06

## 2025-06-06 RX ORDER — POLYETHYLENE GLYCOL 3350 17 G/17G
17 POWDER, FOR SOLUTION ORAL EVERY 12 HOURS SCHEDULED
Status: DISPENSED | OUTPATIENT
Start: 2025-06-06

## 2025-06-06 RX ORDER — ONDANSETRON HYDROCHLORIDE 2 MG/ML
4 INJECTION, SOLUTION INTRAVENOUS EVERY 8 HOURS PRN
Status: ACTIVE | OUTPATIENT
Start: 2025-06-06

## 2025-06-06 RX ORDER — TACROLIMUS 1 MG/1
2 CAPSULE ORAL 2 TIMES DAILY
Status: DISPENSED | OUTPATIENT
Start: 2025-06-06

## 2025-06-06 RX ORDER — ASPIRIN 81 MG/1
81 TABLET ORAL DAILY
Status: DISPENSED | OUTPATIENT
Start: 2025-06-06

## 2025-06-06 RX ORDER — TAMSULOSIN HYDROCHLORIDE 0.4 MG/1
0.4 CAPSULE ORAL NIGHTLY
Status: DISPENSED | OUTPATIENT
Start: 2025-06-06

## 2025-06-06 RX ORDER — FUROSEMIDE 20 MG/1
20 TABLET ORAL DAILY PRN
Status: DISCONTINUED | OUTPATIENT
Start: 2025-06-06 | End: 2025-06-06

## 2025-06-06 RX ADMIN — ROSUVASTATIN 20 MG: 20 TABLET, FILM COATED ORAL at 01:54

## 2025-06-06 RX ADMIN — CLONIDINE HYDROCHLORIDE 0.2 MG: 0.2 TABLET ORAL at 20:51

## 2025-06-06 RX ADMIN — ATORVASTATIN CALCIUM 40 MG: 40 TABLET, FILM COATED ORAL at 20:51

## 2025-06-06 RX ADMIN — TAMSULOSIN HYDROCHLORIDE 0.4 MG: 0.4 CAPSULE ORAL at 20:51

## 2025-06-06 RX ADMIN — SENNOSIDES AND DOCUSATE SODIUM 2 TABLET: 50; 8.6 TABLET ORAL at 01:54

## 2025-06-06 RX ADMIN — PANTOPRAZOLE SODIUM 40 MG: 40 TABLET, DELAYED RELEASE ORAL at 08:16

## 2025-06-06 RX ADMIN — CLONIDINE HYDROCHLORIDE 0.2 MG: 0.2 TABLET ORAL at 08:17

## 2025-06-06 RX ADMIN — TACROLIMUS 2 MG: 1 CAPSULE ORAL at 01:54

## 2025-06-06 RX ADMIN — CLOPIDOGREL 75 MG: 75 TABLET ORAL at 08:16

## 2025-06-06 RX ADMIN — MYCOPHENOLATE MOFETIL 750 MG: 250 CAPSULE ORAL at 08:16

## 2025-06-06 RX ADMIN — POLYETHYLENE GLYCOL 3350 17 G: 17 POWDER, FOR SOLUTION ORAL at 20:51

## 2025-06-06 RX ADMIN — ENOXAPARIN SODIUM 40 MG: 40 INJECTION SUBCUTANEOUS at 08:17

## 2025-06-06 RX ADMIN — TAMSULOSIN HYDROCHLORIDE 0.4 MG: 0.4 CAPSULE ORAL at 01:54

## 2025-06-06 RX ADMIN — TACROLIMUS 2 MG: 1 CAPSULE ORAL at 20:51

## 2025-06-06 RX ADMIN — MYCOPHENOLATE MOFETIL 750 MG: 250 CAPSULE ORAL at 01:54

## 2025-06-06 RX ADMIN — MYCOPHENOLATE MOFETIL 750 MG: 250 CAPSULE ORAL at 20:51

## 2025-06-06 RX ADMIN — CLONIDINE HYDROCHLORIDE 0.2 MG: 0.2 TABLET ORAL at 01:54

## 2025-06-06 RX ADMIN — ASPIRIN 81 MG: 81 TABLET, COATED ORAL at 08:16

## 2025-06-06 RX ADMIN — POLYETHYLENE GLYCOL 3350 17 G: 17 POWDER, FOR SOLUTION ORAL at 08:17

## 2025-06-06 RX ADMIN — TACROLIMUS 2 MG: 1 CAPSULE ORAL at 08:16

## 2025-06-06 RX ADMIN — SENNOSIDES AND DOCUSATE SODIUM 2 TABLET: 50; 8.6 TABLET ORAL at 20:52

## 2025-06-06 SDOH — SOCIAL STABILITY: SOCIAL INSECURITY: HAVE YOU HAD THOUGHTS OF HARMING ANYONE ELSE?: NO

## 2025-06-06 SDOH — SOCIAL STABILITY: SOCIAL INSECURITY
WITHIN THE LAST YEAR, HAVE YOU BEEN RAPED OR FORCED TO HAVE ANY KIND OF SEXUAL ACTIVITY BY YOUR PARTNER OR EX-PARTNER?: NO

## 2025-06-06 SDOH — ECONOMIC STABILITY: FOOD INSECURITY: WITHIN THE PAST 12 MONTHS, THE FOOD YOU BOUGHT JUST DIDN'T LAST AND YOU DIDN'T HAVE MONEY TO GET MORE.: NEVER TRUE

## 2025-06-06 SDOH — ECONOMIC STABILITY: INCOME INSECURITY: IN THE PAST 12 MONTHS HAS THE ELECTRIC, GAS, OIL, OR WATER COMPANY THREATENED TO SHUT OFF SERVICES IN YOUR HOME?: NO

## 2025-06-06 SDOH — SOCIAL STABILITY: SOCIAL INSECURITY: WITHIN THE LAST YEAR, HAVE YOU BEEN AFRAID OF YOUR PARTNER OR EX-PARTNER?: NO

## 2025-06-06 SDOH — SOCIAL STABILITY: SOCIAL INSECURITY
WITHIN THE LAST YEAR, HAVE YOU BEEN KICKED, HIT, SLAPPED, OR OTHERWISE PHYSICALLY HURT BY YOUR PARTNER OR EX-PARTNER?: NO

## 2025-06-06 SDOH — SOCIAL STABILITY: SOCIAL INSECURITY: WITHIN THE LAST YEAR, HAVE YOU BEEN HUMILIATED OR EMOTIONALLY ABUSED IN OTHER WAYS BY YOUR PARTNER OR EX-PARTNER?: NO

## 2025-06-06 SDOH — ECONOMIC STABILITY: FOOD INSECURITY: WITHIN THE PAST 12 MONTHS, YOU WORRIED THAT YOUR FOOD WOULD RUN OUT BEFORE YOU GOT THE MONEY TO BUY MORE.: NEVER TRUE

## 2025-06-06 SDOH — SOCIAL STABILITY: SOCIAL INSECURITY: WERE YOU ABLE TO COMPLETE ALL THE BEHAVIORAL HEALTH SCREENINGS?: YES

## 2025-06-06 ASSESSMENT — COGNITIVE AND FUNCTIONAL STATUS - GENERAL
MOBILITY SCORE: 10
HELP NEEDED FOR BATHING: TOTAL
DRESSING REGULAR LOWER BODY CLOTHING: TOTAL
DRESSING REGULAR UPPER BODY CLOTHING: TOTAL
TOILETING: TOTAL
CLIMB 3 TO 5 STEPS WITH RAILING: TOTAL
TURNING FROM BACK TO SIDE WHILE IN FLAT BAD: A LOT
TOILETING: A LOT
DRESSING REGULAR UPPER BODY CLOTHING: A LITTLE
EATING MEALS: A LITTLE
EATING MEALS: A LITTLE
WALKING IN HOSPITAL ROOM: TOTAL
DAILY ACTIVITIY SCORE: 10
STANDING UP FROM CHAIR USING ARMS: A LOT
WALKING IN HOSPITAL ROOM: TOTAL
MOVING FROM LYING ON BACK TO SITTING ON SIDE OF FLAT BED WITH BEDRAILS: A LOT
STANDING UP FROM CHAIR USING ARMS: TOTAL
PERSONAL GROOMING: A LOT
WALKING IN HOSPITAL ROOM: TOTAL
MOBILITY SCORE: 10
CLIMB 3 TO 5 STEPS WITH RAILING: TOTAL
MOVING FROM LYING ON BACK TO SITTING ON SIDE OF FLAT BED WITH BEDRAILS: A LOT
CLIMB 3 TO 5 STEPS WITH RAILING: TOTAL
MOVING FROM LYING ON BACK TO SITTING ON SIDE OF FLAT BED WITH BEDRAILS: A LITTLE
DRESSING REGULAR LOWER BODY CLOTHING: A LOT
MOVING TO AND FROM BED TO CHAIR: TOTAL
PERSONAL GROOMING: A LOT
PERSONAL GROOMING: A LITTLE
HELP NEEDED FOR BATHING: A LOT
WALKING IN HOSPITAL ROOM: TOTAL
DAILY ACTIVITIY SCORE: 10
MOVING TO AND FROM BED TO CHAIR: A LOT
STANDING UP FROM CHAIR USING ARMS: A LOT
EATING MEALS: A LITTLE
HELP NEEDED FOR BATHING: TOTAL
TOILETING: TOTAL
MOVING FROM LYING ON BACK TO SITTING ON SIDE OF FLAT BED WITH BEDRAILS: A LITTLE
MOVING TO AND FROM BED TO CHAIR: TOTAL
MOBILITY SCORE: 8
DAILY ACTIVITIY SCORE: 12
TURNING FROM BACK TO SIDE WHILE IN FLAT BAD: A LOT
STANDING UP FROM CHAIR USING ARMS: TOTAL
MOBILITY SCORE: 9
EATING MEALS: A LITTLE
DAILY ACTIVITIY SCORE: 15
DRESSING REGULAR UPPER BODY CLOTHING: TOTAL
TURNING FROM BACK TO SIDE WHILE IN FLAT BAD: A LOT
HELP NEEDED FOR BATHING: A LOT
CLIMB 3 TO 5 STEPS WITH RAILING: TOTAL
PERSONAL GROOMING: A LITTLE
TOILETING: A LOT
TURNING FROM BACK TO SIDE WHILE IN FLAT BAD: A LOT
MOVING TO AND FROM BED TO CHAIR: TOTAL
DRESSING REGULAR LOWER BODY CLOTHING: A LOT
PATIENT BASELINE BEDBOUND: NO
DRESSING REGULAR UPPER BODY CLOTHING: A LOT
DRESSING REGULAR LOWER BODY CLOTHING: TOTAL

## 2025-06-06 ASSESSMENT — PAIN - FUNCTIONAL ASSESSMENT
PAIN_FUNCTIONAL_ASSESSMENT: 0-10

## 2025-06-06 ASSESSMENT — ACTIVITIES OF DAILY LIVING (ADL)
FEEDING YOURSELF: INDEPENDENT
ADL_ASSISTANCE: INDEPENDENT
PATIENT'S MEMORY ADEQUATE TO SAFELY COMPLETE DAILY ACTIVITIES?: YES
TOILETING: NEEDS ASSISTANCE
LACK_OF_TRANSPORTATION: NO
DRESSING YOURSELF: NEEDS ASSISTANCE
BATHING: NEEDS ASSISTANCE
ADEQUATE_TO_COMPLETE_ADL: YES
HEARING - LEFT EAR: FUNCTIONAL
ADL_ASSISTANCE: INDEPENDENT
HEARING - RIGHT EAR: FUNCTIONAL
GROOMING: NEEDS ASSISTANCE
WALKS IN HOME: NEEDS ASSISTANCE
LACK_OF_TRANSPORTATION: NO
JUDGMENT_ADEQUATE_SAFELY_COMPLETE_DAILY_ACTIVITIES: YES

## 2025-06-06 ASSESSMENT — ENCOUNTER SYMPTOMS
WEAKNESS: 1
FACIAL ASYMMETRY: 1

## 2025-06-06 ASSESSMENT — PATIENT HEALTH QUESTIONNAIRE - PHQ9
1. LITTLE INTEREST OR PLEASURE IN DOING THINGS: NOT AT ALL
SUM OF ALL RESPONSES TO PHQ9 QUESTIONS 1 & 2: 0
2. FEELING DOWN, DEPRESSED OR HOPELESS: NOT AT ALL

## 2025-06-06 ASSESSMENT — LIFESTYLE VARIABLES
AUDIT-C TOTAL SCORE: 0
HOW OFTEN DO YOU HAVE A DRINK CONTAINING ALCOHOL: NEVER
SKIP TO QUESTIONS 9-10: 1
AUDIT-C TOTAL SCORE: 0
HOW MANY STANDARD DRINKS CONTAINING ALCOHOL DO YOU HAVE ON A TYPICAL DAY: PATIENT DOES NOT DRINK
HOW OFTEN DO YOU HAVE 6 OR MORE DRINKS ON ONE OCCASION: NEVER

## 2025-06-06 ASSESSMENT — PAIN SCALES - GENERAL
PAINLEVEL_OUTOF10: 0 - NO PAIN

## 2025-06-06 NOTE — CARE PLAN
The patient's goals for the shift include      The clinical goals for the shift include patient remain HDS throughout shift    Over the shift, the patient did not make progress toward the following goals.       Problem: Discharge Planning  Goal: Discharge to home or other facility with appropriate resources  Outcome: Progressing     Problem: Chronic Conditions and Co-morbidities  Goal: Patient's chronic conditions and co-morbidity symptoms are monitored and maintained or improved  Outcome: Progressing     Problem: Nutrition  Goal: Nutrient intake appropriate for maintaining nutritional needs  Outcome: Progressing     Problem: Skin  Goal: Promote/optimize nutrition  Outcome: Progressing

## 2025-06-06 NOTE — PROGRESS NOTES
Speech-Language Pathology    SLP Adult Inpatient Speech-Language Pathology Clinical Swallow Evaluation    Patient Name: Davidson Khoury  MRN: [id number]  Today's Date: 6/6/2025   Time Calculation  Start Time: 0859  Stop Time: 0937  Time Calculation (min): 38 min         Current Problem:   1. Fall from bed, initial encounter        2. Chronic kidney disease, unspecified CKD stage        3. Oropharyngeal dysphagia        4. Speech or language deficit, post-stroke              Recommendations:  Risk for Aspiration: Yes  Additional Recommendations: Dysphagia treatment  Solid Diet Recommendations : Pureed/extremely thick  (IDDSI Level 4)  Liquid Diet Recommendations: Thin (IDDSI Level 0)  Compensatory Swallowing Strategies: Upright 90 degrees as possible for all oral intake, Decrease distractions during eating/feeding, Remain upright for 20-30 minutes after meals, Alternate solids and liquids, Single sips, Small bites/sips, Eat/feed slowly, Right lingual sweep, Left lingual sweep, Check for pocketing of food, Assist tray setup  Medication Administration Recommendations: Crushed, With Pureed    Long-term Dysphagia Goal(s): Established at [facility] 6/6  Patient will tolerate recommended diet without clinical signs of aspiration in 95% observed opportunities, and/or e/o worsening respiratory functioning     Short-term Dysphagia Goal(s): Established at [facility] 6/6  - Pt will demo independent use of safe swallow strategies with 95% acc  - Pt will trial advancing PO consistency trials with SLP ONLY  - Pt/family will demo comprehension of education       Assessment:  Assessment Results: Pt presents with known h/o oral-pharyngeal dysphagia s/p recent CVA; findings today c/w prior dx.  See chart for details re: prior ST tx and MBSS studies completed.  Medical Staff Made Aware: Yes  Strengths: Motivation      Plan:  Inpatient/Swing Bed or Outpatient: Inpatient  Treatment/Interventions: Bolus trials, Assess diet tolerance, Patient/family  education, Oral motor exercises, Pharyngeal exercises  SLP Plan: Skilled SLP  SLP Frequency: 3x per week  Duration: 2 weeks  SLP Discharge Recommendations: Continue skilled SLP services at the next level of care, Continue home program upon D/C  Discussed POC: Patient  Discussed Risks/Benefits: Yes  Patient/Caregiver Agreeable: Yes  SLP - OK to Discharge: Yes      Subjective   Current Problem:  Pt admitted after falling out of bed at the rehab facility.      General Visit Information:  Patient Class: Observation  Living Environment: Nursing home (skilled/long-term)  Caregiver Feedback: Pt previously seen by ST during recent admission and s/p during rehab. MBSS completed  and ; see chart for details.  Ordering Physician: MAURICE Ortiz MD  Reason for Referral: Swallow Eval  Past Medical History Relevant to Rehab: recent CVA (R MCA; 25), renal transplant, HTN, ESRD  Prior to Session Communication: Bedside nurse, Physician  Date of Order: 25  BaseLine Diet: Puree  Current Diet : Puree      Objective     Baseline Assessment:  Respiratory Status: Room air  Behavior/Cognition: Alert, Cooperative, Pleasant mood, Requires cueing, Distractible  Vision: Functional for self-feeding  Hearing: Within Functional Limits  Patient Positioning: Upright in Bed (Required repositioning as unable to complete independently)  Baseline Vocal Quality: Normal      Pain:  Pain Assessment  0-10 (Numeric) Pain Score: 0 - No pain      Oral/Motor Assessment:  Oral Hygiene: WFL  Dentition: Edentulous  Oral Motor: Impaired Function  Facial Symmetry: Left droop  Labial ROM: Reduced left  Labial Symmetry: Abnormal symmetry left  Intelligibility: Intelligibility reduced  Intelligibility Ratin%-99%  Breath Support: Adequate for speech  Hearing: Within Functional Limits      Consistencies Trialed:  Consistencies Trialed: Yes  Pt fed himself all PO trials, includinx 1tsp puree  Approx 3-4oz thin/water      Clinical  Observations:  Management of Oral Secretions: Adequate  Was The 3 oz Swallow Protocol Completed: No (Pt independently utilizes safe swallow strategies of single/small bites/sips)  Anterior Spillage:  (Secretions to corners of lips amid PO trials)    No oral residue noted  No overt s/s airway aspiration noted      Patient Education:  Pt reports comprehension of all information discussed, including aspiration risks/precautions (including concern for pneumonia), safe swallow strategies, and diet rec'ds.  Pt asked appropriate questions. Pt likely to benefit from repeated education to reinforce swallowing/safety considerations.

## 2025-06-06 NOTE — PROGRESS NOTES
06/06/25 0932   Discharge Planning   Living Arrangements Spouse/significant other   Support Systems Spouse/significant other   Type of Residence Private residence   Number of Stairs to Enter Residence 3   Home or Post Acute Services Post acute facilities (Rehab/SNF/etc)   Type of Post Acute Facility Services Skilled nursing   Expected Discharge Disposition SNF   Does the patient need discharge transport arranged? Yes   RoundTrip coordination needed? Yes   Financial Resource Strain   How hard is it for you to pay for the very basics like food, housing, medical care, and heating? Not hard   Housing Stability   In the last 12 months, was there a time when you were not able to pay the mortgage or rent on time? N   At any time in the past 12 months, were you homeless or living in a shelter (including now)? N   Transportation Needs   In the past 12 months, has lack of transportation kept you from medical appointments or from getting medications? no   In the past 12 months, has lack of transportation kept you from meetings, work, or from getting things needed for daily living? No   Patient Choice   Provider Choice list and CMS website (https://medicare.gov/care-compare#search) for post-acute Quality and Resource Measure Data were provided and reviewed with: Patient;Family   Intensity of Service   Intensity of Service 0-30 min     Spoke to patient at bedside--does not want to return to the SNF--wants a new facility.  Gave patient SNF list printed from [facility] [place] based on [employer] insurance and desired zip code to review.  Patient asked me to call his wife Margo.    Spoke to Margo by phone--she asked for referrals to be sent to [facility] and the [facility] [address]--sent referrals to [facility] along with [facility].  Needs new pt/ot and will need new auth.    1115  [facility] is [facility].  Auth started by [facility]--waiting on pt/ot notes

## 2025-06-06 NOTE — PROGRESS NOTES
Physical Therapy    Physical Therapy Evaluation    Patient Name: Davidson Khoury  MRN: [id number]  Department: [place] [id number]  Room: [address] [place] [address]  Today's Date: 6/6/2025   Time Calculation  Start Time: 0933  Stop Time: 0953  Time Calculation (min): 20 min    Assessment/Plan   PT Assessment  PT Assessment Results: Decreased strength, Decreased endurance, Impaired balance, Decreased mobility, Decreased cognition, Impaired judgement, Decreased safety awareness, Impaired tone  Rehab Prognosis: Good  Barriers to Discharge Home: Caregiver assistance, Physical needs, Cognition needs  Caregiver Assistance: Caregiver assistance needed per identified barriers - however, level of patient's required assistance exceeds assistance available at home  Cognition Needs: Cognition-related high falls risk  Physical Needs: Stair navigation into home limited by function/safety, In-home setup navigation limited by function/safety, Ambulating household distances limited by function/safety, 24hr mobility assistance needed, 24hr ADL assistance needed  Evaluation/Treatment Tolerance: Patient tolerated treatment well  Medical Staff Made Aware: Yes  Strengths: Ability to acquire knowledge, Housing layout, Premorbid level of function, Support and attitude of living partners  Barriers to Participation: Comorbidities  End of Session Communication: Bedside nurse  Assessment Comment: Pt demonstrating deficits in generalized strength, endurance and balance as well as increased pain resulting in impaired functional mobility, gait and self care. Due to the impairments listed above, pt would benefit from skilled physical therapy services in acute care setting as well as additional therapy in  End of Session Patient Position: Up in chair, Alarm on  IP OR SWING BED PT PLAN  Inpatient or Swing Bed: Inpatient  PT Plan  Treatment/Interventions: Bed mobility, Transfer training, Gait training, Stair training, Balance training, Neuromuscular re-education, Strengthening,  Endurance training, Therapeutic exercise, Therapeutic activity, Home exercise program  PT Plan: Ongoing PT  PT Frequency: 4 times per week  PT Discharge Recommendations: High intensity level of continued care  Equipment Recommended upon Discharge:  (TBD)  PT Recommended Transfer Status: Assist x2  PT - OK to Discharge: Yes (PT POC initiated this date)    Subjective     PT Visit Info:  PT Received On: 06/06/25  General Visit Information:  General  Reason for Referral: Pt is a 59 yo male presenting from SNF due to mechanical fall. 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  Referred By: Chiara Stanley MD  Past Medical History Relevant to Rehab: recent CVA (R MCA; 5/19/25), renal transplant, HTN, ESRD  Family/Caregiver Present: No  Co-Treatment: OT  Co-Treatment Reason: for maximal pt safety and participation  Prior to Session Communication: Bedside nurse  Patient Position Received: Bed, 3 rail up, Alarm on  General Comment: Pt pleasant and agreeable to PT/OT evaluations. Pt highly motivated to regain prior level of function. Pt reports frustration with SNF care due to insufficient assistance/therapy provided. Pt limited due to significant L sided hemiplegia and L sided inattention  Home Living:  Home Living  Type of Home: House  Lives With: Significant other  Home Adaptive Equipment: Cane  Home Layout: One level  Home Access: Stairs to enter with rails  Entrance Stairs-Rails: Both  Entrance Stairs-Number of Steps: 3  Bathroom Shower/Tub: Tub/shower unit  Bathroom Toilet: Standard  Bathroom Equipment: Grab bars in shower, Shower chair with back, Grab bars around toilet  Home Living Comments: Reports fiance is home during the day and able to assist as needed  Prior Level of Function:  Prior Function Per Pt/Caregiver Report  Level of [place]: Independent with ADLs and functional transfers, Independent with homemaking with ambulation  ADL  Assistance: Independent  Homemaking Assistance: Independent  Ambulatory Assistance: Independent (no device)  Vocational: Unemployed  Prior Function Comments: Denies driving, fiance provides transportation. Pt fully independent prior to CVA on 5/19/25. Assist x1-2 of hospital and SNF staff since CVA  Precautions:  Precautions  Medical Precautions: Fall precautions  Precautions Comment: L side flaccid hemiplegia      Date/Time Vitals Session Patient Position Pulse Resp SpO2 BP MAP (mmHg)    06/06/25 1100 --  --  89  17  100 %  130/91  102                 Objective   Pain:  Pain Assessment  Pain Assessment: 0-10  0-10 (Numeric) Pain Score: 0 - No pain  Cognition:  Cognition  Overall Cognitive Status:  (mild impairment noted however easily directed to all therapy activities)  Orientation Level: Oriented X4 (Pt initially stating at [facility] requiring cues to reorient to [facility].)  Following Commands: Follows all commands and directions without difficulty  Attention:  (tangential)  Memory: Within Funtional Limits  Insight: Mild  Impulsive: Mildly    General Assessments:  Activity Tolerance  Endurance: Tolerates 10 - 20 min exercise with multiple rests    Sensation  Light Touch: Severe deficits in the LLE, Severe deficits in the LUE  Sharp/Dull: Severe deficits in the LLE, Severe deficits in the LUE  Proprioception: Severe deficits in the LLE, Severe deficits in the LUE  Sensation Comment: Pt reports able to detect sensation at L side extremities however severely diminished    Coordination  Movements are Fluid and Coordinated: No  Upper Body Coordination: LUE flaccid  Lower Body Coordination: LLE flaccid with some motor activation noted with L hip AAROM    Static Sitting Balance  Static Sitting-Balance Support: No upper extremity supported, Feet supported, Right upper extremity supported  Static Sitting-Level of Assistance: Contact guard, Minimum assistance (heavy L lateral lean and difficulty determining midline position.  Required mod A without UE support, CGA with RUE positioned on bedrail.)  Static Sitting-Comment/Number of Minutes: 5 min total  Dynamic Sitting Balance  Dynamic Sitting-Balance Support: No upper extremity supported, Feet supported  Dynamic Sitting-Level of Assistance: Maximum assistance  Dynamic Sitting-Balance: Reaching for objects, Reaching across midline, Lateral lean, Forward lean    Static Standing Balance  Static Standing-Balance Support: Bilateral upper extremity supported (BUE arm in arm assist. Significant L lateral lean noted. Assist for L knee block and LUE shoulder support)  Static Standing-Level of Assistance: Maximum assistance (x2)  Static Standing-Comment/Number of Minutes: x2 trials between 30-60 sec each  Functional Assessments:  Bed Mobility  Bed Mobility: Yes  Bed Mobility 1  Bed Mobility 1: Supine to sitting  Level of Assistance 1: Moderate assistance, +2  Bed Mobility Comments 1: HOB slightly elevated, assist to manage BLE/trunk toward EOB. Pt utilizing RLE to assist LLE off of bed surface  Bed Mobility 2  Bed Mobility  2: Sitting to supine  Level of Assistance 2: Maximum assistance, +2  Bed Mobility Comments 2: Assist with managing BLE/trunk into bed, cues for sequencing, HOB slightly elevated.    Transfers  Transfer: Yes  Transfer 1  Technique 1: Sit to stand, Stand to sit  Transfer Device 1: Gait belt  Transfer Level of Assistance 1: Maximum assistance, +2, Arm in arm assistance  Trials/Comments 1: L knee block and LUE supported. Assist to elevate into standing. Pt participating with mobility however limited due to significant L lateral lean    Ambulation/Gait Training  Ambulation/Gait Training Performed: Yes  Ambulation/Gait Training 1  Surface 1: Level tile  Device 1: No device (arm in arm with L knee block and support of LUE)  Gait Support Devices: Gait belt  Assistance 1: Maximum assistance (x2)  Comments/Distance (ft) 1: x2ft (total 4 steps bilaterally). x1 ft (total of 2 steps  bilaterally). Completed lateral sidestepping at EOB. L knee block required. LUE supported. Assist for balance due to trunk flexion and significant L lateral lean. Pt requiring L knee block during R sidestep (unable to clear R foot from floor - scooting foot laterally to enable step). Assist required for LLE sidestep/placement  Extremity/Trunk Assessments:  RLE   RLE : Within Functional Limits  Strength RLE  RLE Overall Strength: Within Functional Limits - strength 5/5  LLE   LLE : Exceptions to WFL (grossly 0/5 at ankle and knee. 2-/5 at hip)  Tone LLE  LLE Tone: Flaccid  Outcome Measures:  [facility] Basic Mobility  Turning from your back to your side while in a flat bed without using bedrails: A lot  Moving from lying on your back to sitting on the side of a flat bed without using bedrails: A lot  Moving to and from bed to chair (including a wheelchair): Total  Standing up from a chair using your arms (e.g. wheelchair or bedside chair): Total  To walk in hospital room: Total  Climbing 3-5 steps with railing: Total  Basic Mobility - Total Score: 8    Encounter Problems       Encounter Problems (Active)       Balance       STG - Maintains dynamic sitting balance with upper extremity support       Start:  06/06/25    Expected End:  06/20/25       >10 min with SUP and midline orientation noted            Mobility       STG - Patient will ambulate       Start:  06/06/25    Expected End:  06/20/25       Mod A x2  >5ft LRAD            PT Transfers       STG - Patient will perform bed mobility       Start:  06/06/25    Expected End:  06/20/25       Mod A         STG - Patient will transfer sit to and from stand       Start:  06/06/25    Expected End:  06/20/25       Mod A            Pain - Adult              Education Documentation  Precautions, taught by Mac Goodwin, PT at 6/6/2025 12:04 PM.  Learner: Patient  Readiness: Acceptance  Method: Explanation  Response: Verbalizes Understanding  Comment: see above    Body  Mechanics, taught by Mac Goodwin, PT at 6/6/2025 12:04 PM.  Learner: Patient  Readiness: Acceptance  Method: Explanation  Response: Verbalizes Understanding  Comment: see above    Mobility Training, taught by Mac Goodwin, PT at 6/6/2025 12:04 PM.  Learner: Patient  Readiness: Acceptance  Method: Explanation  Response: Verbalizes Understanding  Comment: see above    Education Comments  No comments found.

## 2025-06-06 NOTE — PROGRESS NOTES
Speech-Language Pathology    SLP Adult Inpatient Speech-Language Cognition    Patient Name: Davidson Khoury  MRN: [id number]  Today's Date: 6/6/2025   Time Calculation  Start Time: 0859  Stop Time: 0937  Time Calculation (min): 38 min         Current Problem:   1. Fall from bed, initial encounter        2. Chronic kidney disease, unspecified CKD stage        3. Oropharyngeal dysphagia        4. Speech or language deficit, post-stroke              SLP Assessment:  SLP Assessment Results: Pt presents with mild-moderate dysarthria of speech, c/w recent CVA and subsequent ST dx.  Medical Staff Made Aware: Yes  Strengths: Motivation  Education Provided: Yes      Long-term Speech-Language Goal(s): Established at [facility] 6/6  Pt to communicate effectively for ADLs independently      Short-term Speech-Language Goal(s): Established at [facility] 6/6  Pt will:  - participate in speech exercises to improve communication for ADLs with % intelligibility   - demo independence with compensatory strategies with 80% acc  - demo/report comprehension of all education provided        SLP Plan:  Plan  Inpatient/Swing Bed or Outpatient: Inpatient  SLP Plan: Skilled SLP  SLP Frequency: 3x per week  Duration: 2 weeks  SLP Discharge Recommendations: Continue skilled SLP services at the next level of care, Continue home program upon D/C  Discussed POC: Patient  Discussed Risks/Benefits: Yes  Patient/Caregiver Agreeable: Yes  SLP - OK to Discharge: Yes      Subjective   Current Problem:  Pt admitted after falling out of bed at the rehab facility.     Pt admitted confusion as to where his wife is, stating that she wasn't there when he woke up, but she was there when he went to sleep. Pt repeated these points several times throughout session, despite redirection/reorientation.      General Visit Information:  General Information  Chart Reviewed: Yes  Caregiver Feedback: Pt previously seen by ST during recent admission and s/p during rehab. MBSS  completed 5/21 and 5/28; see chart for details.  Reason for Referral: Swallow Eval  Past Medical History Relevant to Rehab: recent CVA (R MCA; 5/19/25), renal transplant, HTN, ESRD  Prior to Session Communication: Bedside nurse, Physician      Objective     Pain:  Pain Assessment  0-10 (Numeric) Pain Score: 0 - No pain      Cognition:  Cognition  Arousal/Alertness: Appropriate responses to stimuli  Orientation Level: Oriented X4  Attention: Within Functional Limits  (See subjective section re: confusion and decreased insight this morning)       Motor Speech Production:  Motor Speech Production  Oral Motor : Impaired, Labial deviation L, Labial ROM, Lingual deviation L  Intelligibility: Impaired, Word Level, Phrase Level, Conversation  Paralinguistic Features: WFL  Respiratory Support: WFL  Motor Speech Disorder: Dysarthria      Auditory Comprehension:   Auditory Comprehension  Yes/No Questions: Within Functional Limits  Conversation: Within Functional Limits      Verbal:  Verbal Expression  Primary Mode of Expression: Verbal  Primary Language: English  Open Ended Questions: Within Functional Limits  Conversation: Within Functional Limits  Affect: Within Functional Limits  [place]: Within Functional Limits  Eye Contact: Within Functional Limits  Topic Initiation: Within Functional Limits  Topic Maintenance: Impaired  Turn Taking: Within Functional Limits      Patient Education:  Pt reports comprehension of information discussed, including continuing ST for motor speech deficits and rec'd to cont ST after discharge.

## 2025-06-06 NOTE — PROGRESS NOTES
Pharmacy Medication History     Source of Information: Per med list from rehab     Additional concerns with the patient's PTA list.   N/a  Notified Provider via [facility] : Yes    The following updates were made to the Prior to Admission medication list:     Medications ADDED:   Atorvastatin   Medications CHANGED:  Has they are taking losartan   Medications REMOVED:   N/a  Medications NOT TAKING:   Crestor     Allergy reviewed : No    Meds 2 Beds : N/A    Outpatient pharmacy confirmed and updated in chart : N/A    Pharmacy name: SNF    The list below reflectives the updated PTA list. Please review each medication in order reconciliation for additional clarification and justification.    Prior to Admission Medications   Prescriptions Last Dose   aspirin 81 mg EC tablet 6/5/2025 Morning   Sig: Take 1 tablet (81 mg) by mouth once daily.   atorvastatin (Lipitor) 40 mg tablet    Sig: Take 1 tablet (40 mg) by mouth once daily.   cetirizine (ZyrTEC) 10 mg tablet 6/5/2025 Morning   Sig: Take 1 tablet (10 mg) by mouth once daily as needed for allergies or rhinitis. Every morning as needed   cloNIDine (Catapres) 0.2 mg tablet 6/5/2025 Morning   Sig: TAKE 1 TABLET TWICE A DAY   clopidogrel (Plavix) 75 mg tablet 6/5/2025 Morning   Sig: Take 1 tablet (75 mg) by mouth once daily. Take 1 tablet daily for 90 days then stop.   furosemide (Lasix) 20 mg tablet Unknown   Sig: Take 1 tablet (20 mg) by mouth once daily as needed (for swelling).   losartan (Cozaar) 25 mg tablet    Sig: TAKE 1 TABLET BY MOUTH TWICE A DAY   mycophenolate (Cellcept) 250 mg capsule 6/5/2025 Morning   Sig: Take three (3) capsules by mouth twice daily   Patient taking differently: Take 3 capsules (750 mg) by mouth 2 times a day.   polyethylene glycol (Glycolax, Miralax) 17 gram packet Unknown   Sig: Take 17 g by mouth every 12 hours.   potassium chloride CR 20 mEq ER tablet 6/5/2025   Sig: TAKE 1 TABLET (20 MEQ) BY MOUTH ONCE DAILY. DO NOT CRUSH OR CHEW.           sennosides-docusate sodium (Alda-Colace) 8.6-50 mg tablet 6/5/2025 Morning   Sig: Take 2 tablets by mouth once daily at bedtime.   tacrolimus (Prograf) 1 mg capsule 6/6/2025 Morning   Sig: TAKE 2 CAPSULES (2 MG) BY MOUTH 2 TIMES A DAY.   tamsulosin (Flomax) 0.4 mg 24 hr capsule 6/5/2025 Morning   Sig: TAKE ONE (1) CAPSULE BY MOUTH EVERYDAY AT BEDTIME.   Patient taking differently: Take 1 capsule (0.4 mg) by mouth once daily.      Facility-Administered Medications: None       The list below reflectives the updated allergy list. Please review each documented allergy for additional clarification and justification.    Allergies   Allergen Reactions    Amoxicillin Other and Nausea Only     vomiting    Ace Inhibitors Angioedema          06/06/25 at 3:07 PM - Antonietta Ortiz

## 2025-06-06 NOTE — CONSULTS
Nutrition Initial Assessment Note    Reason for Assessment: Admission nursing screening    Pt admitted for:  Fall from bed, initial encounter [W06.XXXA]  Chronic kidney disease, unspecified CKD stage [N18.9]    MST triggered for weight loss and eating poorly due to decreased appetite.  Pt known to nutrition services.  Chart reviewed and pt visited.  6/2 feeding tube removed, diet advanced.  SLP on consult.  Per pt prior to hospital admissions would eat 2 meals a day as his norm.  Reports # however does not align with weight history.    Pt agreeable to supplement while admitted.    Medical History[1]    Results for orders placed or performed during the hospital encounter of 06/05/25 (from the past 24 hours)   Electrocardiogram, 12-lead PRN ACS symptoms   Result Value Ref Range    Ventricular Rate 86 BPM    Atrial Rate 86 BPM    UT Interval 178 ms    QRS Duration 94 ms    QT Interval 362 ms    QTC Calculation(Bazett) 433 ms    P Axis 80 degrees    R Axis -60 degrees    T Axis 16 degrees    QRS Count 14 beats    Q Onset 211 ms    P Onset 122 ms    P Offset 183 ms    T Offset 392 ms    QTC Fredericia 408 ms   CBC and Auto Differential   Result Value Ref Range    WBC 5.9 4.4 - 11.3 x10*3/uL    nRBC 0.0 0.0 - 0.0 /100 WBCs    RBC 5.04 4.50 - 5.90 x10*6/uL    Hemoglobin 15.3 13.5 - 17.5 g/dL    Hematocrit 46.4 41.0 - 52.0 %    MCV 92 80 - 100 fL    MCH 30.4 26.0 - 34.0 pg    MCHC 33.0 32.0 - 36.0 g/dL    RDW 13.5 11.5 - 14.5 %    Platelets 151 150 - 450 x10*3/uL    Neutrophils % 68.9 40.0 - 80.0 %    Immature Granulocytes %, Automated 0.2 0.0 - 0.9 %    Lymphocytes % 21.0 13.0 - 44.0 %    Monocytes % 9.1 2.0 - 10.0 %    Eosinophils % 0.5 0.0 - 6.0 %    Basophils % 0.3 0.0 - 2.0 %    Neutrophils Absolute 4.07 1.20 - 7.70 x10*3/uL    Immature Granulocytes Absolute, Automated 0.01 0.00 - 0.70 x10*3/uL    Lymphocytes Absolute 1.24 1.20 - 4.80 x10*3/uL    Monocytes Absolute 0.54 0.10 - 1.00 x10*3/uL    Eosinophils  Absolute 0.03 0.00 - 0.70 x10*3/uL    Basophils Absolute 0.02 0.00 - 0.10 x10*3/uL   Comprehensive metabolic panel   Result Value Ref Range    Glucose      Sodium 135 (L) 136 - 145 mmol/L    Potassium 6.2 (HH) 3.5 - 5.3 mmol/L    Chloride 107 98 - 107 mmol/L    Bicarbonate 19 (L) 21 - 32 mmol/L    Anion Gap 15 10 - 20 mmol/L    Urea Nitrogen 22 6 - 23 mg/dL    Creatinine 1.62 (H) 0.50 - 1.30 mg/dL    eGFR 49 (L) >60 mL/min/1.73m*2    Calcium 9.3 8.6 - 10.3 mg/dL    Albumin 4.0 3.4 - 5.0 g/dL    Alkaline Phosphatase 126 (H) 33 - 120 U/L    Total Protein 7.1 6.4 - 8.2 g/dL    AST 29 9 - 39 U/L    Bilirubin, Total 0.9 0.0 - 1.2 mg/dL    ALT 20 10 - 52 U/L   Troponin I, High Sensitivity   Result Value Ref Range    Troponin I, High Sensitivity 14 0 - 20 ng/L   Potassium   Result Value Ref Range    Potassium 4.0 3.5 - 5.3 mmol/L     Scheduled medications  Scheduled Medications[2]  Continuous medications  Continuous Medications[3]  PRN medications  PRN Medications[4]  Dietary Orders (From admission, onward)       Start     Ordered    06/06/25 1721  Oral nutritional supplements  Until discontinued        Question Answer Comment   Deliver with All meals    Select supplement: Ensure Clear        06/06/25 1720    06/06/25 0951  Adult diet Cardiac; 70 gm fat; 2 - 3 grams Sodium; Pureed 4  Diet effective now        Comments: Assist meal/tray setup  Aspiration precautions, including: sit upright for all PO, and remain upright for 30-60 minutes after any PO.   Question Answer Comment   Diet type Cardiac    Fat restriction: 70 gm fat    Sodium restriction: 2 - 3 grams Sodium    Texture Pureed 4        06/06/25 0950    06/06/25 0149  May Participate in Room Service  ( ROOM SERVICE MAY PARTICIPATE)  Once        Question:  .  Answer:  Yes    06/06/25 0148                    History:  Energy Intake: Fair 50-75 %  Food and Nutrient History: 2 meals a day as his norm; Previously on enteral feeds: Isosource 1.5, 55ml/hr; nocturnal  "feeds: isosource 1.5 80ml/hr 8p-8a    Anthropometrics:  Height: 175.3 cm (5' 9\")  Weight: 72.6 kg (160 lb)  BMI (Calculated): 23.62    Wt Readings from Last 10 Encounters:   06/05/25 72.6 kg (160 lb)   05/21/25 72.9 kg (160 lb 11.5 oz)   05/15/25 81.2 kg (179 lb)   01/15/25 81.3 kg (179 lb 3.2 oz)   11/27/24 80.7 kg (178 lb)   11/05/24 74.8 kg (165 lb)   10/15/24 80.3 kg (177 lb 1.6 oz)   07/29/24 78.9 kg (174 lb)   06/18/24 79.2 kg (174 lb 8 oz)   05/09/24 79.4 kg (175 lb)         Significant Weight Loss: Yes  Interpretation of Weight Loss: >10% in 6 months    Total Energy Estimated Needs in 24 hours (kCal): 2180 kCal  Energy Estimated Needs per kg Body Weight in 24 hours (kCal/kg): 2540 kCal/kg  Method for Estimating Needs: 30-35    Total Protein Estimated Needs in 24 Hours (g): 70 g  Protein Estimated Needs per kg Body Weight in 24 Hours (g/kg): 110 g/kg  Method for Estimating 24 Hour Protein Needs: 1.0-1.5    Method for Estimating 24 Hour Fluid Needs: 1ml/kcal or per MD    Nutrition Focused Physical Findings:  Orbital Fat Pads: Well nourished (slightly bulging fat pads)  Buccal Fat Pads: Well nourished (full, rounded cheeks)    Temporalis: Well nourished (well-defined muscle)    Edema: none    Skin: Negative  Mouth Findings: Dysphagia     Nutrition Diagnosis   Malnutrition Diagnosis  Patient has Malnutrition Diagnosis: Yes  Diagnosis Status: New  Malnutrition Diagnosis: Severe malnutrition related to chronic disease or condition  Related to: multi factoral  As Evidenced by: > 10% weight loss in 6 months and prolonged poor intake prior to hospital admit of < 75% of estimated energy needs in > 1 month  Additional Assessment Information: suspect continued inadequate intake related to diet texture       Nutrition Interventions/Recommendations   Nutrition Prescription: Nutrition prescription for oral nutrition  Individualized Nutrition Prescription Provided for : Continue oral diet as ordered. Ensure TID.    Food " and/or Nutrient Delivery Interventions  Meals and Snacks: Mineral-modified diet, Texture-modified diet  Goal: > 75% of meals consumed     Medical Food Supplement: Commercial beverage medical food supplement therapy  Goal: Ensure Clear TID for encouraged intake    Collaboration and Referral of Nutrition Care: Collaboration by nutrition professional with other providers  Coordination of Care with Providers: Nursing, Provider, SLP    Education Documentation  N/A      Nutrition Monitoring and Evaluation   Food and Nutrient Related History  Estimated Energy Intake: Energy intake greater or equal to 75% of estimated energy needs    Fluid Intake: Estimated fluid intake    Anthropometrics: Body Composition/Growth/Weight History  Body Weight: Body weight - Maintain stable weight    Biochemical Data, Medical Tests and Procedures  Electrolyte and Renal Panel: Other (Comment)  Criteria: as clinically indicated    Gastrointestinal Profile: Other (Comment)  Criteria: as clinically indicated    Glucose/Endocrine Profile: Other (Comment)  Criteria: as clinically indicated    Nutritional Anemia Profile: Other (Comment)  Criteria: as clinically indicated    Vitamin Profile: Other (Comment)  Criteria: as clinically indicated    Nutrition Focused Physical Findings  Digestive System Finding: Other (Comment)  Criteria: Stool output, Urine volume, Overall appearance    Skin Finding: Promote intact skin - Promote skin integrity    Mouth Finding: Dysphagia    Time Spent (min): 60 minutes  Last Date of Nutrition Visit: 06/06/25  Nutrition Follow-Up Needed?: Dietitian to reassess per policy  Follow up Comment: [facility]       [1]   Past Medical History:  Diagnosis Date    Personal history of other diseases of urinary system     History of chronic kidney disease    Personal history of other specified conditions 01/12/2022    History of urinary retention    Personal history of other specified conditions 08/22/2019    History of urinary  retention   [2] aspirin, 81 mg, oral, Daily  atorvastatin, 40 mg, oral, Nightly  cloNIDine, 0.2 mg, oral, BID  clopidogrel, 75 mg, oral, Daily  enoxaparin, 40 mg, subcutaneous, q24h [facility]  mycophenolate, 750 mg, oral, BID  pantoprazole, 40 mg, oral, Daily before breakfast  polyethylene glycol, 17 g, oral, q12h [facility]  sennosides-docusate sodium, 2 tablet, oral, Nightly  tacrolimus, 2 mg, oral, BID  tamsulosin, 0.4 mg, oral, Nightly  [3]    [4] PRN medications: acetaminophen, ondansetron ODT **OR** ondansetron

## 2025-06-06 NOTE — CARE PLAN
The patient's goals for the shift include remain free from falls and injuries.     The clinical goals for the shift include Remain HDS throughout the shift.

## 2025-06-06 NOTE — PROGRESS NOTES
Occupational Therapy    Evaluation    Patient Name: Davidson Khoury  MRN: [id number]  Department: [place] [address]  Room: [address] [place] [address]  Today's Date: 6/6/2025  Time Calculation  Start Time: 0932  Stop Time: 0952  Time Calculation (min): 20 min        Assessment:  OT Assessment: Pt presents with decrease balance, endurance, strength, LUE ROM/coordination/tone, and safety awareness, impeding ADL performance and functional mobility. Pt would benefit from skilled OT services to address these deficits and facilitate highest level of function.  Prognosis: Good  Barriers to Discharge Home: Caregiver assistance, Physical needs  Caregiver Assistance: Caregiver assistance needed per identified barriers - however, level of patient's required assistance exceeds assistance available at home  Cognition Needs: 24hr supervision for safety awareness needed, Insight of patient limited regarding functional ability/needs  Physical Needs: 24hr mobility assistance needed, 24hr ADL assistance needed, High falls risk due to function or environment  Evaluation/Treatment Tolerance: Patient tolerated treatment well  Medical Staff Made Aware: Yes  End of Session Communication: Bedside nurse  End of Session Patient Position: Up in chair, Alarm on  OT Assessment Results: Decreased ADL status, Decreased upper extremity range of motion, Decreased upper extremity strength, Decreased safe judgment during ADL, Decreased endurance, Decreased sensation, Visual deficit, Decreased fine motor control, Decreased functional mobility, Decreased gross motor control, Decreased IADLs, Decreased trunk control for functional activities  Prognosis: Good  Evaluation/Treatment Tolerance: Patient tolerated treatment well  Medical Staff Made Aware: Yes  Strengths: Ability to acquire knowledge, Housing layout, Premorbid level of function, Support and attitude of living partners  Barriers to Participation: Comorbidities  Plan:  Treatment Interventions: ADL retraining, Visual  perceptual retraining, Functional transfer training, UE strengthening/ROM, Endurance training, Patient/family training, Equipment evaluation/education, Neuromuscular reeducation, Fine motor coordination activities, Compensatory technique education  OT Frequency: 4 times per week  OT Discharge Recommendations: High intensity level of continued care  OT Recommended Transfer Status: Assist of 2  OT - OK to Discharge: Yes (Per POC)  Treatment Interventions: ADL retraining, Visual perceptual retraining, Functional transfer training, UE strengthening/ROM, Endurance training, Patient/family training, Equipment evaluation/education, Neuromuscular reeducation, Fine motor coordination activities, Compensatory technique education    Subjective       OT Visit Info:  OT Received On: 06/06/25  General:  General  Reason for Referral: Pt is a 61 yo male presenting from SNF due to mechanical fall. 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  Referred By: Chiara Stanley MD  Past Medical History Relevant to Rehab: recent CVA (R MCA; 5/19/25), renal transplant, HTN, ESRD  Family/Caregiver Present: No  Co-Treatment: PT  Co-Treatment Reason: for maximal pt safety and participation  Prior to Session Communication: Bedside nurse  Patient Position Received: Bed, 3 rail up, Alarm on  Preferred Learning Style: auditory, verbal, visual  General Comment: Pt pleasant and agreeable to PT/OT evaluations. Pt highly motivated to regain prior level of function. Pt reports frustration with SNF care due to insufficient assistance/therapy provided. Pt limited due to significant L sided hemiplegia and L sided inattention  Precautions:  Medical Precautions: Fall precautions  Precautions Comment: L side flaccid hemiplegia    Pain:  Pain Assessment  Pain Assessment: 0-10  0-10 (Numeric) Pain Score: 0 - No pain    Objective   Cognition:  Overall Cognitive Status:  (mild impairment  noted however easily directed to all therapy activities)  Orientation Level: Oriented X4 (Pt initially stating at [facility] requiring cues to reorient to [facility].)  Following Commands: Follows one step commands without difficulty  Attention:  (tangential)  Memory: Within Funtional Limits  Insight: Mild  Impulsive: Mildly           Home Living:  Type of Home: House  Lives With: Significant other  Home Adaptive Equipment: Cane  Home Layout: One level  Home Access: Stairs to enter with rails  Entrance Stairs-Rails: Both  Entrance Stairs-Number of Steps: 3  Bathroom Shower/Tub: Tub/shower unit  Bathroom Toilet: Standard  Bathroom Equipment: Grab bars in shower, Shower chair with back, Grab bars around toilet  Home Living Comments: Reports fiance is home during the day and able to assist as needed  Prior Function:  Level of [place]: Independent with ADLs and functional transfers, Independent with homemaking with ambulation  ADL Assistance: Independent  Homemaking Assistance: Independent  Ambulatory Assistance: Independent (no device)  Vocational: Unemployed  Hand Dominance: Right  Prior Function Comments: Denies driving, fiance provides transportation. Pt fully independent prior to CVA on 5/19/25. Assist x1-2 of hospital and SNF staff since CVA     ADL:  Grooming Deficit:  (Set-up assist to wash face)  LE Dressing Assistance: Total  LE Dressing Deficit: Don/doff R sock, Don/doff L sock  Activity Tolerance:  Endurance: Tolerates 10 - 20 min exercise with multiple rests  Bed Mobility/Transfers: Bed Mobility  Bed Mobility: Yes  Bed Mobility 1  Bed Mobility 1: Supine to sitting, Scooting  Level of Assistance 1: Moderate assistance, +2  Bed Mobility Comments 1: HOB slightly elevated, assist to manage BLE/trunk toward EOB  Bed Mobility 2  Bed Mobility  2: Sitting to supine  Level of Assistance 2: Maximum assistance, +2  Bed Mobility Comments 2: Assist with managing BLE/trunk into bed, cues for sequencing, HOB slightly  elevated.    Transfers  Transfer: Yes  Transfer 1  Transfer From 1: Bed to  Transfer to 1: Stand  Technique 1: Sit to stand  Transfer Device 1: Gait belt  Transfer Level of Assistance 1: Maximum assistance, +2, Arm in arm assistance  Trials/Comments 1: BUE arm in arm with Max A x2 to complete stand. L knee block to enable and facilitate full stand. Cues for upright positioning. (x2 trials)  Transfers 2  Transfer From 2: Stand to  Transfer to 2: Bed  Technique 2: Stand to sit  Transfer Level of Assistance 2: Maximum assistance, +2, Arm in arm assistance  Trials/Comments 2: BUE arm in arm with Max A x2 to complete stand>sit transfer. Assist with eccentric control upon descent onto bed.      Functional Mobility:  Functional Mobility  Functional Mobility Performed: Yes  Functional Mobility 1  Functional Mobility Support Devices: Gait belt  Assistance 1: Arm in arm assistance, Maximum assistance (x2)  Comments 1: Pt able to take x6 lateral side steps total toward HOB with one seated rest break between. Bilateral arm in arm with Max A x2, L knee block to enable advancement of RLE. Use of lateral weight shifting to assist with advancement of LE.  Sitting Balance:  Static Sitting Balance  Static Sitting-Balance Support: Feet supported, Right upper extremity supported  Static Sitting-Level of Assistance: Contact guard, Moderate assistance  Static Sitting-Comment/Number of Minutes: Fluctuated between CGA-Mod A, depending on RUE support. Lateral L lean noted.  Standing Balance:  Static Standing Balance  Static Standing-Balance Support: Bilateral upper extremity supported  Static Standing-Level of Assistance: Moderate assistance, Maximum assistance (x2)  Static Standing-Comment/Number of Minutes: Arm in arm assist  Dynamic Standing Balance  Dynamic Standing-Balance Support: Bilateral upper extremity supported  Dynamic Standing-Level of Assistance: Maximum assistance (x2)  Dynamic Standing-Comments: Arm in arm assist,  implemented lateral weight shifting to assist with advancement of BLE.   Modalities:     Vision:    and Vision - Complex Assessment  Head Position: Head turned (toward R side; cues to attend to L side)  Tracking:  (With cues able to track with R/L eye)  Sensation:  Light Touch: Severe deficits in the LUE, Severe deficits in the LLE  Sharp/Dull: Severe deficits in the LLE, Severe deficits in the LUE  Proprioception: Severe deficits in the LLE, Severe deficits in the LUE  Sensation Comment: Pt reports able to detect sensation at L side extremities however severely diminished  Strength:  Strength Comments: WFL R shoulder flexion; unable to assess LUE d/t flaccid tone  Perception:  Inattention/Neglect: Cues to attend left visual field, Cues to maintain midline in sitting, Cues to maintain midline in standing, Cues to attend to left side of body  Coordination:  Movements are Fluid and Coordinated: No  Upper Body Coordination: LUE flaccid  Lower Body Coordination: LLE flaccid with some motor activation noted with L hip AAROM  Finger to Nose:  (Unable to assess with LUE)   Hand Function:  Gross Grasp:  (Impaired LUE; RUE WFL)  Coordination:  (R WFL; L unable to assess)  Extremities: RUE   RUE : Within Functional Limits  RUE Strength  RUE Overall Strength: Greater than or equal to 3/5 as evidenced by functional mobility and LUE   LUE: Exceptions to WFL  LUE Strength  LUE Overall Strength: Deficits  LUE Tone  LUE Tone: Flaccid    Outcome Measures:[facility] Daily Activity  Putting on and taking off regular lower body clothing: A lot  Bathing (including washing, rinsing, drying): A lot  Putting on and taking off regular upper body clothing: A lot  Toileting, which includes using toilet, bedpan or urinal: Total  Taking care of personal grooming such as brushing teeth: A lot  Eating Meals: A little  Daily Activity - Total Score: 12        Education Documentation  Body Mechanics, taught by Anne Heller OT at 6/6/2025 11:04  AM.  Learner: Patient  Readiness: Acceptance  Method: Explanation  Response: Needs Reinforcement    Precautions, taught by Anne Heller OT at 6/6/2025 11:04 AM.  Learner: Patient  Readiness: Acceptance  Method: Explanation  Response: Needs Reinforcement    ADL Training, taught by Anne Heller OT at 6/6/2025 11:04 AM.  Learner: Patient  Readiness: Acceptance  Method: Explanation  Response: Needs Reinforcement    Education Comments  No comments found.        OP EDUCATION:       Goals:  Encounter Problems       Encounter Problems (Active)       ADLs       Patient will perform UB and LB sponge bathing with moderate assist level of assistance while seated and use of adaptive techniques/equipment.       Start:  06/06/25    Expected End:  06/20/25            Patient with complete upper body dressing with contact guard assist level of assistance donning and doffing all UE clothes with PRN adaptive equipment while seated.       Start:  06/06/25    Expected End:  06/20/25            Patient with complete lower body dressing with moderate assist level of assistance donning and doffing all LE clothes  with PRN adaptive equipment while seated.       Start:  06/06/25    Expected End:  06/20/25            Patient will complete daily grooming tasks with stand by assist level of assistance and PRN adaptive equipment while supported or edge of bed seated position.       Start:  06/06/25    Expected End:  06/20/25            Patient will complete toileting including hygiene clothing management/hygiene with moderate assist level of assistance and bedside commode.       Start:  06/06/25    Expected End:  06/20/25               MOBILITY       Patient will perform Functional mobility x Household distances/Community Distances with moderate assist level of assistance and least restrictive device in order to improve safety and functional mobility.       Start:  06/06/25    Expected End:  06/20/25               TRANSFERS       Patient  will perform bed mobility minimal assist  level of assistance and bed rails in order to improve safety and independence with mobility       Start:  06/06/25    Expected End:  06/20/25            Patient will complete sit to stand transfer with minimal assist  level of assistance and least restrictive device in order to improve safety and prepare for out of bed mobility.       Start:  06/06/25    Expected End:  06/20/25               VISION       Patient will visually attend to Left side of Tray, Room, and Body using compensatory strategies and supervision level of assistance and minimal tactile and verbal.        Start:  06/06/25    Expected End:  06/20/25

## 2025-06-06 NOTE — H&P
History Of Present Illness  Davidson Khoury is a 60 y.o. male with past medical history of recent acute ischemic stroke, renal transplant, and hypertension presenting after a fall. Patient reports that he was reaching for something on his bedside table and slipped out of bed. Denies any loss of consciousness or hitting his head. Of note, patient recently discharged on 6/4/2025 [facility] after being admitted for an acute ischemic stroke. Patient reports persistent left-sided weakness after recent acute ischemic stroke but no new weakness or numbness of upper or lower extremities.      In the ED: /106 on presentation. CT head with no acute intracranial hemorrhage, expected appearance of evolving changes from right MCA ischemic infarct.  Initial potassium of 6.2 but markedly hemolyzed, repeat was normal 4.0. Patient and wife adamantly refused to return to current rehab facility citing concerns regarding patient not receiving medications and infrequent care. Patient admitted to observation.      Past Medical History  He has a past medical history of Personal history of other diseases of urinary system, Personal history of other specified conditions (01/12/2022), and Personal history of other specified conditions (08/22/2019).    Surgical History  He has a past surgical history that includes Other surgical history (06/06/2016); Other surgical history (06/06/2016); and Other surgical history (08/23/2019).     Social History  He reports that he has quit smoking. His smoking use included cigarettes. He has never used smokeless tobacco. He reports that he does not drink alcohol and does not use drugs.    Family History  Family History[1]     Allergies  Amoxicillin and Ace inhibitors    Review of Systems   Neurological:  Positive for facial asymmetry and weakness.        Physical Exam     Last Recorded Vitals  /90 (BP Location: Right arm, Patient Position: Lying)   Pulse 71   Temp 36.3 °C (97.3 °F)  (Temporal)   Resp 17   Wt 72.6 kg (160 lb)   SpO2 100%     Relevant Results        Scheduled medications  Scheduled Medications[2]  Continuous medications  Continuous Medications[3]  PRN medications  PRN Medications[4]  Results for orders placed or performed during the hospital encounter of 06/05/25 (from the past 24 hours)   Electrocardiogram, 12-lead PRN ACS symptoms   Result Value Ref Range    Ventricular Rate 86 BPM    Atrial Rate 86 BPM    IN Interval 178 ms    QRS Duration 94 ms    QT Interval 362 ms    QTC Calculation(Bazett) 433 ms    P Axis 80 degrees    R Axis -60 degrees    T Axis 16 degrees    QRS Count 14 beats    Q Onset 211 ms    P Onset 122 ms    P Offset 183 ms    T Offset 392 ms    QTC Fredericia 408 ms   CBC and Auto Differential   Result Value Ref Range    WBC 5.9 4.4 - 11.3 x10*3/uL    nRBC 0.0 0.0 - 0.0 /100 WBCs    RBC 5.04 4.50 - 5.90 x10*6/uL    Hemoglobin 15.3 13.5 - 17.5 g/dL    Hematocrit 46.4 41.0 - 52.0 %    MCV 92 80 - 100 fL    MCH 30.4 26.0 - 34.0 pg    MCHC 33.0 32.0 - 36.0 g/dL    RDW 13.5 11.5 - 14.5 %    Platelets 151 150 - 450 x10*3/uL    Neutrophils % 68.9 40.0 - 80.0 %    Immature Granulocytes %, Automated 0.2 0.0 - 0.9 %    Lymphocytes % 21.0 13.0 - 44.0 %    Monocytes % 9.1 2.0 - 10.0 %    Eosinophils % 0.5 0.0 - 6.0 %    Basophils % 0.3 0.0 - 2.0 %    Neutrophils Absolute 4.07 1.20 - 7.70 x10*3/uL    Immature Granulocytes Absolute, Automated 0.01 0.00 - 0.70 x10*3/uL    Lymphocytes Absolute 1.24 1.20 - 4.80 x10*3/uL    Monocytes Absolute 0.54 0.10 - 1.00 x10*3/uL    Eosinophils Absolute 0.03 0.00 - 0.70 x10*3/uL    Basophils Absolute 0.02 0.00 - 0.10 x10*3/uL   Comprehensive metabolic panel   Result Value Ref Range    Glucose      Sodium 135 (L) 136 - 145 mmol/L    Potassium 6.2 (HH) 3.5 - 5.3 mmol/L    Chloride 107 98 - 107 mmol/L    Bicarbonate 19 (L) 21 - 32 mmol/L    Anion Gap 15 10 - 20 mmol/L    Urea Nitrogen 22 6 - 23 mg/dL    Creatinine 1.62 (H) 0.50 - 1.30 mg/dL     eGFR 49 (L) >60 mL/min/1.73m*2    Calcium 9.3 8.6 - 10.3 mg/dL    Albumin 4.0 3.4 - 5.0 g/dL    Alkaline Phosphatase 126 (H) 33 - 120 U/L    Total Protein 7.1 6.4 - 8.2 g/dL    AST 29 9 - 39 U/L    Bilirubin, Total 0.9 0.0 - 1.2 mg/dL    ALT 20 10 - 52 U/L   Troponin I, High Sensitivity   Result Value Ref Range    Troponin I, High Sensitivity 14 0 - 20 ng/L   Potassium   Result Value Ref Range    Potassium 4.0 3.5 - 5.3 mmol/L     *Note: Due to a large number of results and/or encounters for the requested time period, some results have not been displayed. A complete set of results can be found in Results Review.     Electrocardiogram, 12-lead PRN ACS symptoms  Result Date: 6/6/2025  Normal sinus rhythm Left axis deviation Anteroseptal infarct , possibly acute ** ** ACUTE MI / STEMI ** ** Abnormal ECG When compared with ECG of 24-MAY-2025 03:15, (unconfirmed) Anteroseptal infarct is now Present Criteria for Inferior infarct are no longer Present ST elevation now present in Anterior leads    CT head wo IV contrast  Result Date: 6/5/2025  Interpreted By:  Kasey Aldridge, STUDY: CT HEAD WO IV CONTRAST; CT CERVICAL SPINE WO IV CONTRAST;  6/5/2025 7:02 pm   INDICATION: Signs/Symptoms:reported fall out of bed, aspirin plavix, just discharged after ischemic stroke.   COMPARISON: CT, MRI 05/20/2025 CTA neck 05/19/2025   ACCESSION NUMBER(S): [id number]; [id number]   ORDERING CLINICIAN: JACK RAMIREZ   TECHNIQUE: Noncontrast CT images of head. Axial noncontrast CT images of the cervical spine with coronal and sagittal reconstructed images.   FINDINGS: BRAIN PARENCHYMA: Interval evolving changes from a prior acute/subacute ischemic infarct in the right MCA territory with interval development of encephalomalacia and probable gliosis. Redemonstrated foci of low attenuation within the bilateral head of the caudate, left thalamus and bilateral basal ganglia. Generalized parenchymal volume loss. No mass effect or midline  shift. Atherosclerotic calcifications of the carotid siphons. Additional moderate nonspecific white matter changes.   HEMORRHAGE: No acute intracranial hemorrhage. VENTRICLES and EXTRA-AXIAL SPACES: The ventricles, sulci and basal cisterns enlarged, concordant with parenchymal volume loss. EXTRACRANIAL SOFT TISSUES: Within normal limits. PARANASAL SINUSES/MASTOIDS: Partial opacification of the visualized mastoid air cells. Deformity of the right lamina papyracea, similar to prior imaging. Right frontoethmoidal osteoma, unchanged. CALVARIUM: No depressed skull fracture. No destructive osseous lesion.   OTHER FINDINGS: None.   CERVICAL SPINE:   ALIGNMENT: Reversal of the cervical curvature similar to prior imaging. Minimal retrolisthesis of C3 on C4 and C5 on C6. VERTEBRAE: Chronic appearing osseous irregularity with prominent anterior osteophytes. A vertically oriented lucency through the right anterolateral C5 vertebral body is similar to prior imaging. No definite acute fracture identified. SPINAL CANAL: Degenerative changes facet and uncinate arthropathy, as well as disc space narrowing and end plate hypertrophy. Moderate left foraminal narrowing at C3-C4 moderate to severe right foraminal narrowing at the C5-C6. No critical spinal canal stenosis. PREVERTEBRAL SOFT TISSUES: No prevertebral soft tissue swelling. LUNG APICES: Imaged portion of the lung apices are within normal limits.   OTHER FINDINGS: None.       No acute intracranial hemorrhage or mass effect.   Expected appearance from evolving changes from a right MCA ischemic infarct which was acute/subacute on prior imaging.   No acute fracture or traumatic subluxation of the cervical spine.   Degenerative and chronic changes of the cervical spine similar to prior imaging.   MACRO: None.   Signed by: Kasey Aldridge 6/5/2025 7:43 PM Dictation workstation:   [id number]    CT cervical spine wo IV contrast  Result Date: 6/5/2025  Interpreted By:  Oly  Kasey, STUDY: CT HEAD WO IV CONTRAST; CT CERVICAL SPINE WO IV CONTRAST;  6/5/2025 7:02 pm   INDICATION: Signs/Symptoms:reported fall out of bed, aspirin plavix, just discharged after ischemic stroke.   COMPARISON: CT, MRI 05/20/2025 CTA neck 05/19/2025   ACCESSION NUMBER(S): [id number]; [id number]   ORDERING CLINICIAN: JACK RAMIREZ   TECHNIQUE: Noncontrast CT images of head. Axial noncontrast CT images of the cervical spine with coronal and sagittal reconstructed images.   FINDINGS: BRAIN PARENCHYMA: Interval evolving changes from a prior acute/subacute ischemic infarct in the right MCA territory with interval development of encephalomalacia and probable gliosis. Redemonstrated foci of low attenuation within the bilateral head of the caudate, left thalamus and bilateral basal ganglia. Generalized parenchymal volume loss. No mass effect or midline shift. Atherosclerotic calcifications of the carotid siphons. Additional moderate nonspecific white matter changes.   HEMORRHAGE: No acute intracranial hemorrhage. VENTRICLES and EXTRA-AXIAL SPACES: The ventricles, sulci and basal cisterns enlarged, concordant with parenchymal volume loss. EXTRACRANIAL SOFT TISSUES: Within normal limits. PARANASAL SINUSES/MASTOIDS: Partial opacification of the visualized mastoid air cells. Deformity of the right lamina papyracea, similar to prior imaging. Right frontoethmoidal osteoma, unchanged. CALVARIUM: No depressed skull fracture. No destructive osseous lesion.   OTHER FINDINGS: None.   CERVICAL SPINE:   ALIGNMENT: Reversal of the cervical curvature similar to prior imaging. Minimal retrolisthesis of C3 on C4 and C5 on C6. VERTEBRAE: Chronic appearing osseous irregularity with prominent anterior osteophytes. A vertically oriented lucency through the right anterolateral C5 vertebral body is similar to prior imaging. No definite acute fracture identified. SPINAL CANAL: Degenerative changes facet and uncinate arthropathy, as well as  disc space narrowing and end plate hypertrophy. Moderate left foraminal narrowing at C3-C4 moderate to severe right foraminal narrowing at the C5-C6. No critical spinal canal stenosis. PREVERTEBRAL SOFT TISSUES: No prevertebral soft tissue swelling. LUNG APICES: Imaged portion of the lung apices are within normal limits.   OTHER FINDINGS: None.       No acute intracranial hemorrhage or mass effect.   Expected appearance from evolving changes from a right MCA ischemic infarct which was acute/subacute on prior imaging.   No acute fracture or traumatic subluxation of the cervical spine.   Degenerative and chronic changes of the cervical spine similar to prior imaging.   MACRO: None.   Signed by: Kasey Aldridge 6/5/2025 7:43 PM Dictation workstation:   [id number]    FL modified barium swallow study  Result Date: 5/29/2025  Interpreted By:  Neftaly Griffin and Chapin Megan STUDY: FL MODIFIED BARIUM SWALLOW STUDY;; 5/28/2025 1:45 pm   INDICATION: Signs/Symptoms:swallow eval.     COMPARISON: None.   ACCESSION NUMBER(S): [id number]   ORDERING CLINICIAN: YOKASTA VIVAS   TECHNIQUE: MBSS completed. Informed verbal consent obtained prior to completion of exam. Trials of thin, nectar thick, honey thick, puree, soft-solids, and regular solids given. Fluoroscopy time :  2.6 minutes.   SLP: Kamla Esteves MA, LINA/SLP Phone/Pager: Epic Secure Chat   SPEECH FINDINGS: Modified Barium Swallow Study completed. Informed verbal consent obtained prior to completion of exam. The study was completed per protocol with various liquid barium consistencies, pudding, solids. A 1.9 cm or .75 inch (outer diameter) ring was placed on the chin in the lateral view and on the lateral, in order to complete objective measurements during swallowing. The anatomic structures and function of the oropharynx, larynx, hypopharynx and cervical esophagus were evaluated.   SLP: Kamla Esteves MA, LINA/SLP Inpatient Speech-Language Pathologist Epic Secure  Chat Preferred   Reason for Referral: Further assessment of oropharyngeal swallow and to guide diet recommendations Patient Hx: Davidson Khoury is a 60 Y.o. male with a history notable for renal transplant (currently on Cellcept) and hypertension presenting to the ED initially with a chief complaint of altered mental status, headache, and hypertension. Initially evaluated by general neurology team who noted subtle left sided weakness that worsened on follow up exam. BAT activated for concern of stroke. Found to have proximal R superior M2 occlusion. Not a candidate for TNK as LKN >4.5 hours. Taken for MT, TICI 2b. Exam notable for L HH, L UMN facial paralysis, LUE 0/5, LLE 3/5. right at least antigravity, left hemisensory loss and neglect. Respiratory Status: Room air Current diet: Mildly Thick Liquids/ Liquids Only   Pain: Pain Scale: 0-10 Rating: Denied pain   Subjective: Pt alert, oriented x4, agreeable to evaluation     DIET RECOMMENDATIONS: - Pureed (IDDSI Level 4) - Thin liquids (IDDSI Level 0)     STRATEGIES: - Small bites - Small, single sips - Alternate consistencies - Upright for all PO intake - Complete oral care frequently throughout the day - Full supervision with meals; One to one assist with meals     SLP PLAN: Inpatient/Swing Bed or Outpatient: Inpatient Treatment/Interventions: Respiratory muscle strength training, Bolus trials, Assess diet tolerance, Diet recommendations, Complete MBSS SLP Plan: Skilled SLP SLP Frequency: 2x per week Duration: 2 weeks SLP Discharge Recommendations: Continue skilled SLP services at the next level of care Diet Recommendations: Solid, Liquid Solid Consistency: NPO Liquid Consistency: Ice chips after oral care, NPO Discussed POC: Patient Discussed Risks/Benefits: Yes, Patient Patient/Caregiver Agreeable: Yes SLP - OK to Discharge: Yes   Discussed POC: Patient Discussed Risks/Benefits: Yes Patient/Caregiver Agreeable: Yes   Goals: Encounter Problems     Encounter  Problems (Active)     Swallowing     LTG - Patient will tolerate the least restrictive diet without overt difficulty by time of discharge.     Start:  05/20/25    Expected End:  06/10/25     The patient/caregiver/family will demonstrate adequate return of knowledge of all compensatory strategy/instruction w/ 100% acc. to effectively assist the patient in immediate safety with oral intake and swallowing.     Start:  05/20/25    Expected End:  06/10/25     STG  - Pt will demonstrate adequate oral motor skills and cognitive function to participate in a Modified Barium Swallow Study within 1 week of SLP recommendation to fully assess swallow function and determine further treatment needs.     Start:  05/20/25    Expected End:  05/27/25     Education Provided: Results and recommendations per MBSS, with video review; recommendations and POC at this time. Verbal understanding and agreement given on all accounts.   Treatment Provided Today: ST provided extensive education and training to pt/pt family regarding anatomy/physiology of swallow function, risk factors of aspiration/aspiration pna & how to mitigate factors, diet modifications, and the use of compensatory swallow strategies to promote pt safety upon PO intake including small bites, small sips, lingual sweep. In addition, ST provided instruction/training on oropharyngeal exercises (re: effortful swallow).   Additional Medical Consults Suggested: - No new disciplines indicated   Repeat Study:  Per treating SLP/MD   Mechanics of the Swallow Summary: ORAL PHASE: Lip Closure - Escape progressing to mid-chin Tongue Control During Bolus Hold - Escape to lateral buccal cavity and/or floor of mouth Bolus prep/mastication - Minimal mastication noted with majority of bolus left unchewed Bolus transport/lingual motion - Slowed Tongue Motion for A-P movement of the bolus Oral residue - Residue collection on oral structure   PHARYNGEAL PHASE: Initiation of pharyngeal swallow -  Bolus head at posterior laryngeal surface of epiglottis Soft palate elevation - No bolus between soft palate/pharyngeal wall Laryngeal elevation - Complete superior movement of thyroid cartilage with contact of arytenoids to epiglottic petiole Anterior hyoid excursion - Complete anterior movement Epiglottic movement - Complete inversion Laryngeal vestibule closure - Complete - no air/contrast in laryngeal vestibule Pharyngeal stripping wave - Present, however, diminished Pharyngeal contraction (A/P view) - Not tested Pharyngoesophageal segment opening - Partial distension/partial duration with partial obstruction of flow of bolus Tongue base retraction - Trace column of contrast or air between tongue base and pharyngeal wall Pharyngeal residue - Collection of residue within or on the pharyngeal structures   ESOPHAGEAL PHASE: Esophageal clearance - Esophageal retention   Strategies attempted- Liquid wash resulted in improved clearance of solids   SLP Impressions with Severity Rating: Pt presents with mild-moderate oropharyngeal dysphagia. Swallow safety is primarily intact detailed by no observed aspiration nor penetration w/ all trials. Swallow efficiency is impaired detailed by significant oral residue and impaired mastication of solids requiring cues to clear and mild pharyngeal residue requiring liquid wash to clear. Entirety of  swallowing physiology is detailed above. Given results of this study pt is at a decreased risk of aspiration. Additionally 2/2 dysphagia and diet modifications pt is at increased risk of malnutrition/dehydration with only PO intake.. Overall pt is appropriate for initiation of an oral diet with diet modifications to decrease increase efficiency and continue intensive dysphagia therapy to target deficits stated above.     OUTCOME MEASURES: Dynamic Imaging Grade of Swallowing Toxicity - DIGEST SCORE Safety Grade: Grade 0: No penetration/aspiration or flash penetration above the vocal folds  **Safety Grade is MAX PAS over all bolus trials. Rated based on liquid, solid, and pudding. Not rated on swallows in which strategies were applied.   Efficiency Grade: Grade 1: 10-49% of bolus residue, Less than half residue. Any bolus type. **Efficiency Grade is Max % of bolus in pharynx across all bolus trials. Rated based on liquid, solid and pudding. Rate based on initial swallow attempt of each bolus. Not rated based on oral residue. Not rated for swallows which strategies were applied.   DIGEST SCORE Common Terminology Criteria for Adverse Events; Dysphagia 1 - Mild 1 - Symptomatic and able to eat a regular diet   Functional Oral Intake Scale Functional Oral Intake Scale: Level 5        total oral diet with multiple consistencies, but requires special preparations and compensations   Rosenbek's Penetration Aspiration Scale Thin Liquids:  1. NO ASPIRATION & NO PENETRATION - no aspiration, contrast does not enter airway Jamul Thick Liquids: 1. NO ASPIRATION & NO PENETRATION - no aspiration, contrast does not enter airway Puree: 1. NO ASPIRATION & NO PENETRATION - no aspiration, contrast does not enter airway Solids: 1. NO ASPIRATION & NO PENETRATION - no aspiration, contrast does not enter airway     Speech Therapy section of this report signed by Kamla Esteves MA CCC/SLP on 5/28/2025 at 4:05 pm.   RADIOLOGY FINDINGS: Nasopharyngeal enteric tube is present and tracts below level of thoracic inlet. Moderate endplate degenerative changes of the included lateral cervical spine with osteophytosis most significant at the levels of C4-C5. Loss of the intervertebral disc height from C3-C4, C4-C5, and C5-C6. Prevertebral soft tissues appear appropriate.   Radiology section of this report signed by Neftaly Griffin MD.       1. Please refer to dedicated speech language pathology report as above. 2. Moderate spondylosis of the include lateral cervical spine with osteophytosis most significant lead noted at the levels  of C4-C5. 3. Medical devices as above.   I personally reviewed the images/study and I agree with the findings as stated by Resident Dr. Rylee Andre MD. This study was interpreted at [facility], [place], [place].   MACRO: None   Signed by: Neftaly Ángelsharla Griffin 5/29/2025 12:29 PM Dictation workstation:   [id number]    IR intervention NEURO thrombectomy  Result Date: 5/28/2025  Interpreted By:  Keny Bernal and Elder Theresa STUDY: IR INTERVENTION NEURO THROMBECTOMY;  5/19/2025 5:04 pm   INDICATION: Signs/Symptoms:Stroke.     COMPARISON: CTA head and neck from 05/19/2025.   ACCESSION NUMBER(S): [id number]   ORDERING CLINICIAN: YVONNE CASSIDY   TECHNIQUE: Risks including groin site injury, groin hematoma, pseudoaneurysm, retroperitoneal hematoma, vessel dissection, stroke, paralysis, contrast induced nephropathy, and death were explained to the patient's family. After discussion of risks, benefits, and alternatives, the patient's family agreed to proceed with cerebral angiogram and informed consent was obtained.   The patient was monitored throughout for EKG, blood pressure, and pulse oximetry.   Moderate sedation services (supervision of administration, induction, and maintenance) were provided by the physician performing the procedure with intravenous fentanyl and versed for 30 minutes. The physician was assisted by an independent trained observer in the continuous monitoring of patient level of consciousness and physiologic status. There were no apparent sedation complications.   Total fluoroscopy time was 4.8 minutes. Approximately 30 ML Optiray 320 contrast was employed.   Using Seldinger technique and a right common femoral approach a 8 Turkish sheath was placed. Next, a [email or web address] Neuron 088 base catheter was navigated into the distal cervical segment of the right internal carotid artery over a 6 Turkish Penumbra Berenstein Select catheter over Glidewire.   A selective  injection of the right internal carotid artery was then performed.   Following confirmation of large vessel occlusion of the right M2 posterior division, the Select catheter was removed and a Penumbra  reperfusion catheter was navigated to the thrombus in the right M2 over a Penumbra Velocity microcatheter over a Fathom 016 microwire.   The microcatheter and microwire were removed and pump aspiration was directly applied to the reperfusion catheter for approximately 3 minutes.   The reperfusion catheter was then carefully withdrawn through the base catheter under continuous pump aspiration through the reperfusion catheter and manual aspiration through the base catheter.   The base catheter was allowed to back bleed and a repeat injection of the right internal carotid artery was performed.   The base catheter was then withdrawn into the right common femoral artery. A final selective injection of the right common femoral artery was then performed.   A flat panel rotational XperCT of the head was performed and reconstructed using an independent workstation.   The catheter and sheath were removed and the groin was closed with a Mynx closure device.   The patient was transferred to [facility] hospital bed for routine postoperative care. There were no apparent complications.     FINDINGS: RIGHT INTERNAL CAROTID ARTERY INJECTIONS: DSA runs were obtained over the head in PA and lateral views. The distal cervical and intracranial right internal carotid artery opacify well and appear unremarkable. There is flash filling of the right posterior communicating artery. The right internal carotid artery terminus bifurcates into a widely patent A1 and M1 segments. There is contrast tapering followed by abrupt cutoff of contrast into the right middle cerebral artery posterior division consistent with complete occlusion, with associated parenchymal filling defect. There is distal collateral flow to the middle cerebral artery  posterior division territory seen by the anterior cerebral artery and patent branch of the middle cerebral artery. There is filling of the left anterior cerebral artery via a patent anterior communicating artery.   RIGHT INTERNAL CAROTID ARTERY INJECTION STATUS POST THROMBECTOMY: DSA runs were obtained over the head in PA and lateral views. There has been interval mechanical thrombectomy of the right middle cerebral artery posterior division with partial revascularization, with focal segment of subocclusive thrombus present and mildly delayed filling in the distal territory, consistent with TICI 2B revascularization. Otherwise, the distal cervical and intracranial right internal carotid artery opacify well and appear unremarkable. There is filling of the left anterior cerebral artery via a patent anterior communicating artery. There is no significant early venous shunting present that would be indicative of large completed infarct.   RIGHT COMMON FEMORAL ARTERY INJECTIONS: DSA runs were obtained over the right hip in Slovenian view. The arteriotomy site is in the desired location over the femoral head above the femoral artery bifurcation with vessel caliber amenable to closure device. The femoral artery, femoral artery bifurcation and distal vasculature opacify well and appears unremarkable.   X PER CT HEAD: A flat panel rotational X per CT head was obtained and reconstructed using an independent workstation. There is a small round hyperdensity in the right parietal cortical area consistent with contrast staining versus less likely acute hemorrhage, without associated mass effect or midline shift. There is stable chronic infarct of the bilateral basal ganglia regions.       1. 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 2. Status post mechanical thrombectomy of the right middle cerebral artery with residual subocclusive thrombus and delayed filling of the distal right middle  cerebral artery posterior division territory, consistent with TICI 2B revascularization. 3. Xper CTH with small round hyperdensity in the right parietal cortical region consistent with contrast staining versus less likely acute hemorrhage, without associated mass effect or midline shift. There is stable chronic infarct of the bilateral basal ganglia regions.   I was present for and/or performed the critical portions of the procedure and immediately available throughout the entire procedure. I personally reviewed the image(s)/study and interpretation. I agree with the findings as stated. Performed and dictated at [facility].   MACRO: None   Signed by: Keny Bernal 5/28/2025 6:37 PM Dictation workstation:   [id number]    Electrocardiogram, 12-lead PRN ACS symptoms  Result Date: 5/27/2025  Sinus tachycardia Left axis deviation Inferior infarct , age undetermined Abnormal ECG When compared with ECG of 19-MAY-2025 10:34, Vent. rate has increased BY  43 BPM Criteria for Septal infarct are no longer Present Inferior infarct is now Present ST no longer elevated in Anterior leads Nonspecific T wave abnormality has replaced inverted T waves in Inferior leads    FL modified barium swallow study  Result Date: 5/23/2025  Interpreted By:  Neftaly Griffin and Riley Laura STUDY: FL MODIFIED BARIUM SWALLOW STUDY;; 5/22/2025 10:46 am   INDICATION: Signs/Symptoms:swallow evaluation.     COMPARISON: Cardiac Catheterization Lab Procedures 2/5/2019   ACCESSION NUMBER(S): [id number]   ORDERING CLINICIAN: JACK SOLANO   TECHNIQUE: Modified Barium Swallow Study completed. Informed verbal consent obtained prior to completion of exam. The study was completed per protocol with various liquid barium consistencies, pudding, and solids. A 1.9 cm or .75 inch (outer diameter) ring was placed on the chin in the lateral and oblique views in order to complete objective measurements during  swallowing. The anatomic structures and function of the oropharynx, larynx, hypopharynx and cervical esophagus were evaluated.  Veered from protocol given need to trial compensatory strategies and inability to tolerate positioning for AP view.   SLP: ANTOLIN Smith Contact info: Haiku secure chat; phone: [phone]   SPEECH FINDINGS:   Reason for Referral: Further assessment of oropharyngeal swallow and to guide diet recommendations Patient Hx: Davidson Khoury is a 60 y.o. male with a history notable for renal transplant (currently on Cellcept) and hypertension presenting to the ED initially with a chief complaint of altered mental status, headache, and hypertension. Initially evaluated by general neurology team who noted subtle left sided weakness that worsened on follow up exam. BAT activated for concern of stroke. Found to have proximal R superior M2 occlusion. Not a candidate for TNK as LKN >4.5 hours. Taken for MT, TICI 2b. Exam notable for L HH, L UMN facial paralysis, LUE 0/5, LLE 3/5. right at least antigravity, left hemisensory loss and neglect. Respiratory Status: Room air Current diet: NPO following clinical swallow evaluation   Pain: Pain Scale: 0-10 Rating: Denied pain     RECOMMENDATIONS: -FULL LIQUID DIET; NECTAR/MILDLY THICK LIQUIDS -Consider continued use of DHT to supplement nutrition and hydration needs Medications: Crushed via DHT as cleared by physician   Safe Swallow/Compensatory Strategies: Upright positioning Slow rate/small boluses Alternate solids and liquids Assistance w/ PO intake to assure adherence to safe swallow strategies/oral clearance   -Diligent oral care 2x/day to improve oral infection control     SLP PLAN: Skilled SLP Services: Skilled SLP intervention for dysphagia is warranted. SLP Frequency: 2x per week Duration: 3 weeks   Discussed POC: Patient Discussed Risks/Benefits: Yes Patient/Caregiver Agreeable: Yes     Short term goals established 05/22/25: - Patient will  tolerate current diet without noted pulmonary compromise or evidence of pulmonary sequela as noted in patient chart and/or reported by patient/family. - Pt will independently implement safe swallow strategies to aide in oral clearance as measured by SLP assessment in 90% of therapeutic trials with SLP. - Patient will independently implement safe swallowing strategies to reduce risk of aspiration (head turn L) with use of compensatory strategies during 90% of therapeutic trials. - Pt will complete effortful swallows 30 reps, 2 x a day for 7 days a week; to strengthen pharyngeal muscles for improve bolus clearance through the pharynx. - Patient/family will indicate understanding of dysphagia education: risk for aspiration, recommendations, and POC with > 80% accuracy via teach back method.     Long term goals 05/22/25: Patient will resume/maintain oral intake in order to promote optimal oropharyngeal swallow function and reduce risk for dehydration, malnutrition and weight loss.   Education Provided: SLP provided extensive education re: results and recommendations following MBSS using video screen for visual aid. Discussed anatomy/physiology of swallow function, risk factors for dysphagia/aspiration PNA, diet modifications, and the use of compensatory swallow strategies to promote pt safety upon PO intake. Pt indicated understanding via teach back method with > 70% accuracy.     Mechanics of the Swallow Summary: Oral Motor Assessment: Oral Hygiene: WFL Dentition: Present Oral Motor: Impaired labial, lingual, buccal movement Facial Symmetry: L asymmetry Vocal Quality (Perceptually): Impaired   ORAL PHASE: Lip Closure - Profuse escape through open lips w/ thin liquids via tsp; improved w/ other consistencies/delivery methods Tongue Control During Bolus Hold - Posterior escape of less than half of the bolus Bolus prep/mastication - Inefficient, disorganized mastication with chewable solids Bolus transport/lingual motion  - Repetitive/disorganized tongue motion (tongue pumping) Oral residue - Majority of bolus remaining w/ chewable solids and puree solids at times; unable to follow SLP commands to clear, SLP retrieved via toothette   PHARYNGEAL PHASE: Initiation of pharyngeal swallow - Collection of bolus at the level of the pyriform sinus Soft palate elevation - No bolus between soft palate/pharyngeal wall Laryngeal elevation - Partial superior movement of thyroid cartilage and/or partial approximation of arytenoids to epiglottic petiole Anterior hyoid excursion - Partial anterior movement Epiglottic movement - Complete inversion Laryngeal vestibule closure - Incomplete - narrow column of air/contrast in laryngeal vestibule Pharyngeal stripping wave - Present, however, diminished Pharyngeal contraction (A/P view) - Not tested Pharyngoesophageal segment opening - Complete distension and complete duration/no obstruction of flow of bolus Tongue base retraction - No bolus between tongue base and posterior pharyngeal wall Pharyngeal residue - Trace residue within or on the pharyngeal structures   ESOPHAGEAL PHASE: Esophageal clearance - Complete clearance     SLP Impressions with Severity Rating: Pt presents with moderate/severe oral dysphagia and moderate pharyngeal dysphagia upon completion of modified barium swallow study this date. Swallowing physiology is detailed above. Oral phase severely impaired; poor bolus formation and transit.  Significant oral residue w/ chewable solids and occasionally w/ puree solids. Unable to follow SLP commands to clear; SLP retrieved via toothette. Intermittent trace/mild aspiration w/ thin liquids during the swallow.  Pt not sensate; did not clear w/ cued cough.  Chin tuck did not provide additional airway protection.  Head turn L improved swallow safety w/ no airway invasion observed.  No penetration or aspiration with any other consistency assessed.  Mild residue at the valleculae and piriforms with  solids.  Cleared via subsequent swallows.   *Of note: The A-P bolus follow-through is not intended to be utilized as a diagnostic assessment of the esophagus, rather a tool to observe the biomechanical aspects of the swallow continuum, and to inform the need for further evaluation by medical specialists, as applicable.     OUTCOME MEASURES: Functional Oral Intake Scale Functional Oral Intake Scale: Level 4        total oral diet of a single consistency     Rosenbek's Penetration Aspiration Scale Thin Liquids: 8. SILENT ASPIRATION - contrast passes glottis, visible residue, NO pt response Nectar Thick Liquids: 1. NO ASPIRATION & NO PENETRATION - no aspiration, contrast does not enter airway Puree: 1. NO ASPIRATION & NO PENETRATION - no aspiration, contrast does not enter airway Soft Solids: 1. NO ASPIRATION & NO PENETRATION - no aspiration, contrast does not enter airway   RADIOLOGY FINDINGS: No acute osseous abnormality. Severe degenerative changes of the cervical spine, most pronounced at C3-C4 and C4-C5. Enteric tube is partially visualized.   Radiology section of this report signed by Neftaly Griffin MD.       1. Please refer to detailed swallow study evaluation by speech pathologist.   2. No radiographic evidence of acute osseous abnormalities within limits of the current examination.   I personally reviewed the image(s)/study and interpretation by Montez Maldonado MD (resident).   MACRO: None   Signed by: Neftaly Griffin 5/23/2025 4:42 PM Dictation workstation:   [id number]    XR abdomen 1 view  Result Date: 5/21/2025  Interpreted By:  Riky Lopez, STUDY: XR ABDOMEN 1 VIEW;  5/20/2025 1:36 pm   INDICATION: Signs/Symptoms:NG placement.     COMPARISON: Supine portable abdomen 08/16/2019   ACCESSION NUMBER(S): [id number]   ORDERING CLINICIAN: JACK SOLANO   TECHNIQUE: Supine portable abdomen and 1 image submitted   FINDINGS: The Dobbhoff feeding tube is coiled within the gastric fundus the  distal tip is overlying the antrum of the stomach directed inferolaterally there is a nonspecific intestinal gas pattern where visualized..       1.  The feeding tube is in adequate position.   MACRO: None   Signed by: Riky Lopez 5/21/2025 2:15 PM Dictation workstation:   [id number]    Transthoracic Echo Complete  Result Date: 5/21/2025   [facility], [address] [place] [address]                Tel [phone] and Fax [phone] TRANSTHORACIC ECHOCARDIOGRAM REPORT  Patient Name:       GIBRAN Auguste Physician:    [id number] Marina Torres MD Study Date:         5/20/2025           Ordering Provider:    [id number] YVONNE CASSIDY MRN/PID:            [id number]            Fellow: Accession#:         [id number]        Nurse: Date of Birth/Age:  1965 / 60 years Sonographer:          Lou STEPHENS Gender assigned at  M                   Additional Staff: Birth: Height:             175.00 cm           Admit Date:           5/19/2025 Weight:             71.67 kg            Admission Status: BSA / BMI:          1.87 m2 / 23.40     Encounter#:           [id number]                     kg/m2 Blood Pressure:     160/80 mmHg         Department Location:  [facility] Study Type:    TRANSTHORACIC ECHO (TTE) COMPLETE Diagnosis/ICD: Cerebral infarction due to unspecified occlusion or stenosis of                right middle cerebral artery-I63.511 Indication:    Acute Ischemic Right MCA Stroke CPT Code:      Echo Complete w Full Doppler-[id number] Patient History: Valve Disorders:   Tricuspid Regurgitation. Pertinent History: CVA. CKD, Liver mass, Rhabdomyloysis, Thrombocytopenia,                    ASCVD. Study Detail: The following Echo studies were performed: 2D, M-Mode, Doppler and               color flow. Technically challenging study due to prominent lung                artifact and Patient Moving During Test. Definity used as a               contrast agent for endocardial border definition and agitated               saline used as a contrast agent for intraseptal flow evaluation.               Total contrast used for this procedure was 2.0 mL via IV push.  PHYSICIAN INTERPRETATION: Left Ventricle: Left ventricular ejection fraction is hyperdynamic, by visual estimate at 70-75%. There is moderate concentric left ventricular hypertrophy. There are no regional left ventricular wall motion abnormalities. The left ventricular cavity size is normal. There is normal septal and moderately increased posterior left ventricular wall thickness. Left Ventricular Global Longitudinal Strain - -20.7 %. Spectral Doppler shows an abnormal pattern of left ventricular diastolic filling. Strain values are normal, which imply normal myocardial function. There is no definite left ventricular thrombus visualized. Proximal septal bulge up to 1.7cm. Heavily trabeculatd LV apex. Left Atrium: The left atrial size is normal. A bubble study using agitated saline was performed. Bubble study is negative. Agitated saline contrast study was negative for intracardiac shunt. Right Ventricle: The right ventricle is normal in size. There is normal right ventricular global systolic function. Right Atrium: The right atrium is normal in size. Aortic Valve: The aortic valve is trileaflet. There is mild aortic valve cusp calcification. There is no evidence of aortic valve regurgitation. The peak instantaneous gradient of the aortic valve is 12 mmHg. The mean gradient of the aortic valve is 8 mmHg. Trileaflet AV with focal calcification vs thickening of of the NCC( clip 23) which was seen on the prior exam from 2018. No significant AS and no AI. Cannot exclude possible fibroelastoma on the NCC. Consider GABY to further assess if clnically indicated. Mitral Valve: The mitral valve is mildly thickened. There is mild mitral  annular calcification. There is trace mitral valve regurgitation. There is systolic anterior motion of redundant subvalvular apparatus/chordae. The anterior mitral leaflet is mildly thickened with possible nonobstrictive FANNY of the anterior leaflet tip as well as subvalvular apparatus. Note there is no signifciant LVOT obstruction or MR. Tricuspid Valve: The tricuspid valve is structurally normal. There is trace tricuspid regurgitation. The right ventricular systolic pressure is unable to be estimated. Pulmonic Valve: The pulmonic valve is structurally normal. There is physiologic pulmonic valve regurgitation. Pericardium: Trivial pericardial effusion. Aorta: The aortic root is normal. The Ao Sinus is 3.50 cm. The Asc Ao is 3.40 cm. In comparison to the previous echocardiogram(s): Compared with the prior exam from 9/20/2018, the basal septal hypertrophy appears to have increased and the LV appears to be more dynamic today( now hyperdynamic).  CONCLUSIONS:  1. Left ventricular ejection fraction is hyperdynamic, by visual estimate at 70-75%.  2. Spectral Doppler shows an abnormal pattern of left ventricular diastolic filling.  3. No left ventricular thrombus visualized.  4. There is moderately increased posterior left ventricular wall thickness.  5. There is moderate concentric left ventricular hypertrophy.  6. There is normal right ventricular global systolic function.  7. Agitated saline contrast study was negative for intracardiac shunt.  8. Trileaflet AV with focal calcification vs thickening of of the NCC( clip 23) which was seen on the prior exam from 2018. No significant AS and no AI. Cannot exclude possible fibroelastoma on the NCC. Consider GABY to further assess if clnically indicated.  9. Compared with the prior exam from 9/20/2018, the basal septal hypertrophy appears to have increased and the LV appears to be more dynamic today( now hyperdynamic). 10. Left Ventricular Global Longitudinal Strain - -20.7 %.  RECOMMENDATIONS: Utilizing an FDA cleared automated machine learning algorithm (EchoGo Heart Failure by [employer]), the analysis of the apical 4-chamber echocardiogram suggests the presence of heart failure with preserved ejection fraction (HFpEF)*. Clinical correlation looking for additional heart failure signs and symptoms is recommended, as a definite diagnosis of heart failure cannot be made by imaging alone. *Per ACC/AHA/HFSA universal diagnosis of heart failure, HFpEF is defined as 1) signs and symptoms leading to clinical diagnosis of heart failure, 2) an ejection fraction of at least 50%, and 3) evidence of elevated intra-cardiac filling pressures by echocardiography, BNP elevation, or catheterization.  QUANTITATIVE DATA SUMMARY:  2D MEASUREMENTS:          Normal Ranges: Ao Root d:       3.50 cm  (2.0-3.7cm) LAs:             3.75 cm  (2.7-4.0cm) IVSd:            1.00 cm  (0.6-1.1cm) LVPWd:           1.50 cm  (0.6-1.1cm) LVIDd:           5.10 cm  (3.9-5.9cm) LVIDs:           2.90 cm LV Mass Index:   137 g/m2 LVEDV Index:     44 ml/m2 LV % FS          43.1 %  LEFT ATRIUM:                  Normal Ranges: LA Vol A4C:        62.4 ml    (22+/-6mL/m2) LA Vol A2C:        55.8 ml LA Vol BP:         59.8 ml LA Vol Index A4C:  33.4ml/m2 LA Vol Index A2C:  29.9 ml/m2 LA Vol Index BP:   32.0 ml/m2 LA Area A4C:       19.4 cm2 LA Area A2C:       18.6 cm2 LA Major Axis A4C: 5.1 cm LA Major Axis A2C: 5.3 cm LA Volume Index:   32.0 ml/m2 LA Vol A4C:        59.3 ml LA Vol A2C:        54.0 ml LA Vol Index BSA:  30.3 ml/m2  RIGHT ATRIUM:                 Normal Ranges: RA Vol A4C:        21.7 ml    (8.3-19.5ml) RA Vol Index A4C:  11.6 ml/m2 RA Area A4C:       11.5 cm2 RA Major Axis A4C: 5.2 cm  AORTA MEASUREMENTS:         Normal Ranges: Ao Sinus, d:        3.50 cm (2.1-3.5cm) Asc Ao, d:          3.40 cm (2.1-3.4cm)  LV SYSTOLIC FUNCTION:                                         Normal Ranges: EF-A4C View:                      66 %   (>=55%) EF-A2C View:                      70 % EF-Biplane:                       67 % EF-Visual:                        73 % LV EF Reported:                   73 % Global Longitudinal Strain (GLS): -21 %  LV DIASTOLIC FUNCTION:             Normal Ranges: MV Peak E:             0.94 m/s    (0.7-1.2 m/s) MV Peak A:             0.91 m/s    (0.42-0.7 m/s) E/A Ratio:             1.03        (1.0-2.2) MV e'                  0.064 m/s   (>8.0) MV lateral e'          0.08 m/s MV medial e'           0.05 m/s MV A Dur:              155.00 msec E/e' Ratio:            14.71       (<8.0) PulmV Sys Cesar:         73.50 cm/s PulmV Trotter Cesar:        43.60 cm/s PulmV S/D Cesar:         1.60 PulmV A Revs Cesar:      28.90 cm/s PulmV A Revs Dur:      120.00 msec  MITRAL VALVE:          Normal Ranges: MV DT:        262 msec (150-240msec)  AORTIC VALVE:            Normal Ranges: AoV Vmax:      1.72 m/s  (<=1.7m/s) AoV Peak P.8 mmHg (<20mmHg) AoV Mean P.0 mmHg  (1.7-11.5mmHg) AoV VTI:       33.90 cm  (18-25cm) LVOT Diameter: 2.30 cm   (1.8-2.4cm)  RIGHT VENTRICLE: RV Basal 2.70 cm RV Mid   2.70 cm RV Major 7.7 cm TAPSE:   33.0 mm RV s'    0.18 m/s  PULMONIC VALVE:          Normal Ranges: PV Accel Time:  135 msec (>120ms) PV Max Cesar:     0.9 m/s  (0.6-0.9m/s) PV Max PG:      3.4 mmHg  PULMONARY VEINS: PulmV A Revs Dur: 120.00 msec PulmV A Revs Cesar: 28.90 cm/s PulmV Trotter Cesar:   43.60 cm/s PulmV S/D Cesar:    1.60 PulmV Sys Cesar:    73.50 cm/s  [id number] Marina Torres MD Electronically signed on 2025 at 3:24:13 PM  ** Final **     CT head wo IV contrast  Result Date: 2025  Interpreted By:  Dave Dias and Hanreck James STUDY: CT HEAD WO IV CONTRAST;  2025 7:05 pm   INDICATION: Signs/Symptoms:Projectile vomiting s/p mechanical thrombectomy.     COMPARISON: Same day CT head and MRI brain   ACCESSION NUMBER(S): [id number]   ORDERING CLINICIAN: JACK SOLANO   TECHNIQUE: Noncontrast axial CT scan of head was performed.  Angled reformats in brain and bone windows were generated. The images were reviewed in bone, brain, blood and soft tissue windows.   FINDINGS:   Increased conspicuity of gray-white differentiation in the right MCA territory including the right parietal and temporal lobes as well as the right frontal lobe predominantly in the opercular region and the insular cortex. Associated vasogenic edema and sulcal effacement is similar. There is no midline shift. Right-greater-than-left bilateral basal ganglia and left thalamus hypodensities are compatible with remote lacunar infarcts versus prominent perivascular spaces. Similar nonspecific white matter hypodensities likely reflect small vessel ischemic changes. There is no new intracranial hemorrhage.   The ventricles are nondilated. The basal cisterns are patent. There is no extra-axial fluid collection.   The calvarium is unremarkable.   Visualized paranasal sinuses and mastoids are clear. 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   Remote right lamina papyracea fracture is again noted. Nasogastric tube is partially visualized.       No significant interval change from same day CT head and MRI brain. Redemonstration of right MCA territory infarct with associated vasogenic edema and sulcal effacement.   Additional chronic findings as above.   I personally reviewed the images/study and I agree with the resident Omar Lawrence's findings as stated. This study was interpreted at [place], [place].   MACRO: None   Signed by: Dave Dias 5/21/2025 7:09 AM Dictation workstation:   [id number]    MR brain wo IV contrast  Result Date: 5/20/2025  Interpreted By:  Brian Packer and Hanreck James STUDY: MR BRAIN WO IV CONTRAST;  5/20/2025 5:00 pm   INDICATION: Signs/Symptoms:AMS,.     COMPARISON: Same day CT head   ACCESSION NUMBER(S): [id number]   ORDERING CLINICIAN: YVONNE CASSIDY   TECHNIQUE: Axial T2, FLAIR, DWI, gradient echo  T2 and T1 weighted images of brain were acquired.   FINDINGS: The overall exam is mildly limited by motion artifact, although the diffusion weighted sequence notably is not significantly degraded.   There is abnormally restricted diffusion corresponding with the right MCA vascular territory. This involves both the inferior and the superior division vascular territories, including the right temporal and parietal lobes, the right insular cortex and the right frontal lobe predominantly in the opercular region. This spares the right basal ganglia. Finding consistent with acute infarct which was seen on 05/19/2025 angiogram for thrombectomy. This is in a largely gyriform shape although there is some involvement of right cerebral deep and peripheral white matter is well. There is associated T2 FLAIR moderately hyperintense vasogenic edema and sulcal effacement. There is no evidence of hemorrhagic transformation. Some mild regional sulcal effacement but no midline shift or significant ventricular narrowing.   Nonspecific periventricular and subcortical white matter T2 FLAIR hyperintensities likely reflect small vessel ischemic changes. I note that there are numerous tiny visible normal perivascular spaces throughout the supratentorial brain, a common normal variation.. Tiny old lacunar infarct in the lateral left cerebellum. There is a stain of hemosiderin surrounding a small focus of encephalomalacia in the right basal ganglia. This includes the cephalad aspect of the right putamen, the adjacent anterior limb of right internal capsule and this extends into the right caudate nucleus. Tiny old lacunar infarct suspected in the left caudate head nucleus, this is less likely a prominent perivascular space. Some detail of this limited by motion degradation. Small old lacunar infarct in the lateral left thalamus has some associated mild wallerian degeneration, as expected.   The ventricles are nondilated. The basal cisterns are  patent.   Visualized paranasal sinuses are essentially clear. Trace bilateral mastoid effusions are noted.       1. Acute nonhemorrhagic right MCA territory infarct as above. There is associated vasogenic edema and sulcal effacement. No midline shift. No evidence of hemorrhagic transformation. 2. Old lacunar infarcts in left cerebellum, left thalamus and bilateral basal ganglia. Moderate degree of chronic microvascular ischemic disease in the cerebral white matter. 3. Evidence for old hemorrhage in the right basal ganglia, with encephalomalacia. This could be sequela of an old hypertensive bleed, or sequela of an old hemorrhagic infarct.   I personally reviewed the images/study and I agree with the resident Omar Lawrence's findings as stated. This study was interpreted at [facility], [place], [place].   MACRO: Critical Finding:  See findings. Notification was initiated on 5/20/2025 at 5:23 pm by  Omar Lawrence.  (**-OCF-**)   Signed by: Brian Packer 5/20/2025 5:53 PM Dictation workstation:   [id number]    CT head wo IV contrast  Result Date: 5/20/2025  Interpreted By:  Dave Dias, STUDY: CT HEAD WO IV CONTRAST   INDICATION: Signs/Symptoms:Follow up R parietal contrast staining vs hemorrhage after thrombectomy   COMPARISON: Head CT of 1925   ACCESSION NUMBER(S): [id number]   ORDERING CLINICIAN: YVONNE CASSIDY   TECHNIQUE: CT of the head was performed without the administration of intravenous contrast.   FINDINGS: BRAIN PARENCHYMA: Redemonstration of evolving infarct within the right parietal lobe. Faint hyperdensity within this region predominantly centered in the cortex without associated mass effect or parenchymal edema. Multifocal chronic lacunar infarcts in bilateral basal ganglia.   MIDLINE SHIFT OR HERNIATION: No midline shift or herniation.   VENTRICLES: No hydrocephalus.   DURAL VENOUS SINUSES: No hyperdensity to suggest acute thrombus.   EXTRA-AXIAL SPACES  (epidural, subdural, subarachnoid spaces and basal cisterns): No collections.   VISUALIZED ORBITS: Normal.   VISUALIZED PARANASAL SINUSES AND MASTOID AIR CELLS: Paranasal sinuses are clear. Chronic defect of the right lamina Propecia. Osteoma in the right frontoethmoidal recess. Tympanomastoid cavities are aerated.   SOFT TISSUES: Normal.   OSSEOUS STRUCTURES: Normal.       Redemonstration of evolving infarct within the right parietal lobe. Faint hyperdensity in this region prominently sensory in the cortex without associated mass effect or parenchymal edema most likely represents contrast staining.   Chronic lacunar infarcts in bilateral basal ganglia.   Signed by: Dave Dias 5/20/2025 10:22 AM Dictation workstation:   [id number]    CT head wo IV contrast  Result Date: 5/20/2025  Interpreted By:  Blayne Peguero, STUDY: CT HEAD WO IV CONTRAST;  5/20/2025 7:56 am   INDICATION: Signs/Symptoms:Acute worsening of left sided weakness and sensory deficit.     COMPARISON: CT head dated 05/19/2025. CTA head and neck and CTA brain perfusion performed 05/19/2025.   ACCESSION NUMBER(S): [id number]   ORDERING CLINICIAN: JACK SOLANO   TECHNIQUE: Noncontrast axial CT scan of head was performed with coronal and sagittal reformats provided.   FINDINGS: Parenchyma: No acute intraparenchymal hematoma. Subtle hyperdensity that could reflect petechial hemorrhage versus possible element of contrast staining or beam hardening artifact within the right parietal lobe. As compared with recent CT head examination there is a new small region of loss of gray-white matter differentiation within the right frontal opercular region (series 204, image 56, series 206, image 32, for example). There is also concern for subtle loss of gray-white matter differentiation within the right frontoparietal region (series 204, image 44). There is similar focal hypodensity within the cortex of the right occipital lobe. There is additional chronic  small vessel ischemic disease and unchanged remote appearing bilateral basal ganglia lacunar infarcts. There is no significant intracranial mass effect. No herniation.   CSF Spaces: Stable in size and configuration.   Extra-Axial Fluid: There is no extraaxial fluid collection.   Calvarium: The calvarium is unremarkable.   Paranasal sinuses: Large osteoma within the right frontoethmoidal region. Visualized paranasal sinuses are well aerated.   Mastoids: Small amount of dependent fluid within the mastoids bilaterally.   Orbits: Remote right lamina papyracea fracture.   Soft tissues: Unremarkable.       There is development of small acute infarct component within the right frontal opercular region. There is also suspected subtle loss of gray-white matter differentiation within the right frontal and parietal lobes corresponding to regions of ischemia/infarct on recent CTA perfusion examination. This could be better characterized with MRI as clinically warranted. No acute intraparenchymal hematoma. Subtle petechial hemorrhage versus contrast staining or potential beam hardening artifact within the right parietal lobe. No significant intracranial mass effect.   MACRO: None   Signed by: Blayne Peguero 5/20/2025 8:15 AM Dictation workstation:   [id number]    ECG 12 lead  Result Date: 5/20/2025  Normal sinus rhythm Septal infarct (cited on or before 15-MAY-2025) Abnormal ECG When compared with ECG of 15-MAY-2025 17:53, Vent. rate has decreased BY  33 BPM See ED provider note for full interpretation and clinical correlation Confirmed by Martha Carrasco ([id number]) on 5/20/2025 2:35:14 AM    CT brain attack perfusion w IV contrast  Result Date: 5/19/2025  Interpreted By:  Brian Packer, STUDY: CT BRAIN ATTACK PERFUSION W IV CONTRAST performed 5/19/2025   INDICATION: Signs/Symptoms:R MCA syndrome, R M2 occlusion, unclear LKN     COMPARISON: None.   ACCESSION NUMBER(S): [id number]   ORDERING CLINICIAN: YVONNE CASSIDY   TECHNIQUE:  CT Perfusion of the brain was performed after the administration of intravenous contrast. CT Perfusion analysis was performed under physician supervision on a separate workstation and reviewed. Rapid software used for generation of CBF, CBV, and T-max color maps and volume calculations. Threshold value for core infarcts CBF < 30%. Threshold value for penumbra/hypoperfusion T-max > 6 seconds. Contrast: 35 ML Omnipaque 350 intravenous contrast.   FINDINGS: Rapid software used for generation of CBF, CBV, and T-max color maps and volume calculations. Threshold value for core infarcts CBF < 30%. Threshold value for penumbra/hypoperfusion T-max > 6 seconds.   CBF < 30% volume (core infarct): 11 mL Tmax > 6s volume (penumbra/hypoperfusion): 138 mL Mismatch volume: 127 mL Mismatch ratio: 12.5       1. Hypoperfusion in the right MCA territory, with a central ischemic core of 11 mL and potential tissue at risk (potential penumbra) of 127 mL. 2. This correlates well with the abnormal CT angiogram study. Findings discussed with Dr. Butt as detailed below. Informs me that the patient has already been seen by the neuro interventional service.   MACRO: Brian Packer discussed the significance and urgency of this critical finding by telephone with  Dr. Butt on 5/19/2025 at 5:02 pm.  (**-RCF-**) Findings:  See findings.   Signed by: Brian Packer 5/19/2025 5:03 PM Dictation workstation:   [id number]    CT brain attack angio head and neck W and WO IV contrast  Result Date: 5/19/2025  Interpreted By:  Brian Packer, STUDY: CT BRAIN ATTACK ANGIO HEAD AND NECK W AND WO IV CONTRAST; 5/19/2025 4:20 pm   INDICATION: Signs/Symptoms:R MCA syndrome.     COMPARISON: None.   ACCESSION NUMBER(S): [id number]   ORDERING CLINICIAN: YVONNE CASSIDY   TECHNIQUE: CT angiogram of head and neck arteries. 75 ML of Omnipaque 350 was administered intravenously and axial images of the head and neck were acquired.  Coronal, sagittal, and 3-D  reconstructions were provided for review.   FINDINGS: CTA NECK FINDINGS:   Imaged aortic arch: There is a typical branching pattern. Atherosclerotic changes are present without significant flow-limiting stenosis.   Right common and cervical internal carotid arteries: There are atherosclerotic changes with less than 50% narrowing. No evidence for dissection or pseudoaneurysm.   Left common and cervical internal carotid arteries: There are atherosclerotic changes with less than 50% narrowing. No evidence for dissection or pseudoaneurysm.   Right cervical vertebral artery: There is no significant flow-limiting stenosis. No evidence for dissection or pseudoaneurysm.   Left cervical vertebral artery:There is no significant flow-limiting stenosis. No evidence for dissection or pseudoaneurysm.   Non-arterial findings: No neck masses are noted. There are no destructive osseous lesions. Lung apices are clear.   CTA HEAD FINDINGS:   Vascular malformations: There is no evidence of aneurysm formation or arteriovenous malformation.   Cavernous segments: Atherosclerotic changes are present without significant flow-limiting stenosis.   Right anterior circulation: Although the proximal right middle cerebral artery M1 segment is patent, this artery tapers often becomes occluded at the distal M1 segment, at the level of the bifurcation. The anterior temporal branch appears to be patent. Distal to this, some of the right middle cerebral artery superior division branches appear to be patent in the anterior aspect of the right sylvian fissure but I suspect there is occlusion of at least the inferior division branch. In the posterior and cephalad aspect of the right sylvian fissure a few small branch vessels opacify, presumably from collaterals that I do not directly identify although the degree of collateral circulation would appear to be poor. No significant stenosis is seen involving the anterior cerebral artery.   Left anterior  circulation: There is no significant flow-limiting stenosis, aneurysm, or other vascular abnormality. Atherosclerotic changes are present without significant flow-limiting stenosis.   Posterior circulation:  There is multifocal stenosis of the basilar artery of mild degree and along the distal aspect there is a proximally 50% short-segment luminal narrowing though I do not see a filling defect. No significant stenosis of the intracranial vertebral arteries is seen. They are moderately tortuous. Cerebellar branches are patent. Moderate stenosis of proximal cisternal right posterior cerebral artery and mild stenosis of right PCA P2 segment .   Anterior and Posterior communicating arteries: The anterior and both posterior communicating arteries are visualized.       1. CTA of Neck arteries demonstrates no significant flow-limiting stenosis or dissection. 2. CTA of Intracranial arteries demonstrates occlusion of the left middle cerebral artery, distal M1 segment, and occlusion of the proximal inferior division M2 branch. This correlates well with the head CT study. 3. Multifocal stenosis in the posterior circulation from atherosclerosis, including 50% stenosis of basilar artery and moderate stenosis of right PCA.   MACRO: None   Signed by: Brian Packer 5/19/2025 4:31 PM Dictation workstation:   [id number]    CT brain attack head wo IV contrast  Result Date: 5/19/2025  Interpreted By:  Brian Packer,  and Shruthi Laboy STUDY: CT BRAIN ATTACK HEAD WO IV CONTRAST;  5/19/2025 3:55 pm   INDICATION: Signs/Symptoms:Stroke Evaluation.     COMPARISON: CT HEAD WO IV CONTRAST 5/19/2025, CT HEAD WO IV CONTRAST 5/15/2025   ACCESSION NUMBER(S): [id number]   ORDERING CLINICIAN: YVONNE CASSIDY   TECHNIQUE: Noncontrast axial CT scan of head was performed. Angled reformats in brain and bone windows were generated. The images were reviewed in bone, brain, blood and soft tissue windows.   FINDINGS: VENTRICLES and EXTRA-AXIAL  SPACES: There is suggestion of sulcal effacement involving posterior right temporal lobe and adjacent right parietal lobe. This extends to the right parietal-occipital junction. There is an old encephalomalacia defect at the latter location suspected as well, contiguous with the area of suspected recent acute ischemia. No hydrocephalus.No abnormal extra-axial fluid collection. Basal cisterns are patent.   BRAIN PARENCHYMA:No acute intracranial hemorrhage. Evidence for loss of gray-white matter differentiation at the right temporal-parietal junction, likely superimposed on areas of old encephalomalacia. I suspect an infarct of moderate size. Similar appearance of likely chronic infarct within the right parietal/occipital lobes as well as lacunar infarct involving the bilateral basal ganglia, right greater than left. No acute territorial infarct or hemorrhage. No mass or mass effect. No new areas of ischemia/infarction.   PARANASAL SINUSES/MASTOIDS: Visualized paranasal sinuses are clear. Osteoma present in the right frontal ethmoid air cells. Chronic appearing right lamina papyracea defect with partial herniation of the medial rectus muscle. Under pneumatization/partial ossification of the bilateral mastoid air cells.   CALVARIUM: No depressed skull fractures or destructive bone lesions.         1. No acute hemorrhage, but I suspect an acute nonhemorrhagic infarct in the right MCA vascular territory, superimposed on chronic. This is in the inferior division at the right temporal-parietal junction.. 2. Similar appearance of remote infarcts within the right parietal/occipital lobes as well as bilateral basal ganglia lacunar infarcts. 3. Other chronic findings as above.   I personally reviewed the images/study and I agree with the findings as stated by Narda Hawkins MD (resident).   MACRO: Brian Packer discussed the significance and urgency of this critical finding by telephone with Dr. Irvin on 5/19/2025 at  4:22 pm.  (**-RCF-**) Findings:  See findings.     Signed by: Brian Packer 5/19/2025 4:22 PM Dictation workstation:   [id number]    CT head wo IV contrast  Result Date: 5/19/2025  Interpreted By:  Blayne Peguero, STUDY: CT HEAD WO IV CONTRAST;  5/19/2025 11:53 am   INDICATION: Signs/Symptoms:headache, syncope.     COMPARISON: CT head dated 05/15/2025.   ACCESSION NUMBER(S): [id number]   ORDERING CLINICIAN: YVONNE CASSIDY   TECHNIQUE: Noncontrast axial CT scan of head was performed with coronal and sagittal reformats provided.   FINDINGS: Parenchyma: There is no acute intracranial hemorrhage. There is no mass effect or midline shift. Similar appearance of likely remote cortical infarcts within the right parietal and occipital lobes with beam hardening artifact on previous examination partially obscuring evaluation. There is additional patchy white matter hypodensity throughout the bilateral cerebral hemispheres likely representing chronic small vessel ischemic disease. Unchanged remote appearing bilateral basal ganglia infarcts to include involvement of the left caudate head, right caudate head and anterior lentiform nuclei, and left thalamus. No interval CT apparent acute infarct.   CSF Spaces: Unchanged.   Extra-Axial Fluid: There is no extraaxial fluid collection.   Calvarium: The calvarium is unremarkable.   Paranasal sinuses: Osteoma within the right frontoethmoidal region. Paranasal sinuses are otherwise clear.   Mastoids: Under pneumatized bilaterally. Small amount of dependent fluid.   Orbits: Remote right lamina papyracea fracture.   Soft tissues: Unremarkable.       No acute intracranial hemorrhage or mass effect.   Similar appearance of suspected remote infarcts within the cortex of the right parietal and occipital lobes with beam hardening artifact on previous examination obscuring evaluation. Consider MRI for further evaluation based on clinical concern. There are also unchanged remote appearing  bilateral basal ganglia lacunar infarcts and moderate chronic small vessel ischemic change.   MACRO: None   Signed by: Blayne Peguero 5/19/2025 12:10 PM Dictation workstation:   [id number]    ECG 12 lead  Result Date: 5/15/2025  Normal sinus rhythm Septal infarct (cited on or before 15-MAY-2025) Abnormal ECG When compared with ECG of 19-OCT-2020 21:02, Previous ECG has undetermined rhythm, needs review T wave inversion less evident in Inferior leads T wave inversion no longer evident in Lateral leads See ED provider note for full interpretation and clinical correlation Confirmed by Marina Obando ([id number]) on 5/15/2025 11:36:11 PM    CT head wo IV contrast  Result Date: 5/15/2025  Interpreted By:  Jered Lara, STUDY: CT HEAD WO IV CONTRAST;  5/15/2025 8:10 pm   INDICATION: Signs/Symptoms:dizziness, hypotension, left arm numbness.     COMPARISON: None.   ACCESSION NUMBER(S): [id number]   ORDERING CLINICIAN: ROMERO JOHN   TECHNIQUE: Noncontrast axial CT scan of head was performed. Angled reformats in brain and bone windows were generated. The images were reviewed in bone, brain, blood and soft tissue windows.   FINDINGS: Evaluation is limited by artifact from the cranial edge of the table.   CSF Spaces: The ventricles, sulci and basal cisterns are within normal limits. There is no extraaxial fluid collection.   Parenchyma: Moderate volume loss.  There is periventricular and subcortical white matter hypoattenuation, most in keeping with chronic microvascular ischemic change. Chronic lacunar infarcts in the anterior aspect of the right corpus striatum, left caudate head, left thalamus. The grey-white differentiation is intact. There is no mass effect or midline shift.  There is no intracranial hemorrhage.   Calvarium: The calvarium is unremarkable.   Paranasal sinuses and mastoids: Visualized paranasal sinuses and mastoids are clear. Chronic fracture deformity of the right diameter papyracea       No evidence of  acute cortical infarct or intracranial hemorrhage.       MACRO: None   Signed by: Jered Lara 5/15/2025 8:36 PM Dictation workstation:   [id number]     Assessment/Plan   Assessment & Plan  Fall from bed, initial encounter    Davidson Khoury is a 60 y.o. male with past medical history of recent acute ischemic stroke, renal transplant, and hypertension presenting after a fall. Patient reports that he was reaching for something on his bedside table and slipped out of bed. Denies any loss of consciousness or hitting his head. Of note, patient recently discharged on 6/4/2025 from [facility] to SNF after being admitted for an acute ischemic stroke. Patient reports persistent left-sided weakness after recent acute ischemic stroke but no new weakness or numbness of upper or lower extremities.      In the ED: /106 on presentation. CT head with no acute intracranial hemorrhage, expected appearance of evolving changes from right MCA ischemic infarct.  Initial potassium of 6.2 but markedly hemolyzed, repeat was normal 4.0. Patient and wife adamantly refused to return to current rehab facility citing concerns regarding patient not receiving medications and infrequent care. Patient admitted to observation.    Fall from bed  - CT head with no acute changes  - CT Cspine with no acute fracture  - PT/OT evals pending  - Requesting new SNF at discharge    Recent stroke  - continue asa, plavix, and crestor   - SLP eval  - PT/OT evals    HTN  - BP stable  - continue clonidine     S/p renal transplant  - Continue cellcept    Other comorbidities as above  - Continue medications as ordered and adjust based on clinical course     DVT/GI prophylaxis  - Lovenox  - Miralax    Discharge planning  - PT/OT evals pending    Interdisciplinary team rounding completed with hospitalist, nurse, TCC  Discussed plan and lab/testing results with Dr. Angel Velez PA-C  6/6/2025  11:26 AM         [1]   Family History  Problem Relation Name  Age of Onset    Hypertension Mother     [2] aspirin, 81 mg, oral, Daily  cloNIDine, 0.2 mg, oral, BID  clopidogrel, 75 mg, oral, Daily  enoxaparin, 40 mg, subcutaneous, q24h CHRIST  mycophenolate, 750 mg, oral, BID  pantoprazole, 40 mg, oral, Daily before breakfast  polyethylene glycol, 17 g, oral, q12h CHRIST  rosuvastatin, 20 mg, oral, Nightly  sennosides-docusate sodium, 2 tablet, oral, Nightly  tacrolimus, 2 mg, oral, BID  tamsulosin, 0.4 mg, oral, Nightly  [3]    [4] PRN medications: acetaminophen, ondansetron ODT **OR** ondansetron

## 2025-06-07 PROCEDURE — 97535 SELF CARE MNGMENT TRAINING: CPT | Mod: GO

## 2025-06-07 PROCEDURE — 97110 THERAPEUTIC EXERCISES: CPT | Mod: CQ,GP

## 2025-06-07 PROCEDURE — 2500000004 HC RX 250 GENERAL PHARMACY W/ HCPCS (ALT 636 FOR OP/ED): Performed by: HOSPITALIST

## 2025-06-07 PROCEDURE — G0378 HOSPITAL OBSERVATION PER HR: HCPCS

## 2025-06-07 PROCEDURE — 2500000001 HC RX 250 WO HCPCS SELF ADMINISTERED DRUGS (ALT 637 FOR MEDICARE OP): Performed by: INTERNAL MEDICINE

## 2025-06-07 PROCEDURE — 99232 SBSQ HOSP IP/OBS MODERATE 35: CPT

## 2025-06-07 PROCEDURE — 2500000001 HC RX 250 WO HCPCS SELF ADMINISTERED DRUGS (ALT 637 FOR MEDICARE OP)

## 2025-06-07 PROCEDURE — 2500000001 HC RX 250 WO HCPCS SELF ADMINISTERED DRUGS (ALT 637 FOR MEDICARE OP): Performed by: HOSPITALIST

## 2025-06-07 PROCEDURE — 97112 NEUROMUSCULAR REEDUCATION: CPT | Mod: CQ,GP

## 2025-06-07 PROCEDURE — 96372 THER/PROPH/DIAG INJ SC/IM: CPT | Performed by: HOSPITALIST

## 2025-06-07 PROCEDURE — 2500000002 HC RX 250 W HCPCS SELF ADMINISTERED DRUGS (ALT 637 FOR MEDICARE OP, ALT 636 FOR OP/ED): Performed by: HOSPITALIST

## 2025-06-07 RX ORDER — SENNOSIDES 8.8 MG/5ML
5 LIQUID ORAL NIGHTLY
Status: DISPENSED | OUTPATIENT
Start: 2025-06-07

## 2025-06-07 RX ORDER — DOCUSATE SODIUM 50 MG/5ML
100 LIQUID ORAL NIGHTLY
Status: ACTIVE | OUTPATIENT
Start: 2025-06-07

## 2025-06-07 RX ADMIN — ENOXAPARIN SODIUM 40 MG: 40 INJECTION SUBCUTANEOUS at 08:46

## 2025-06-07 RX ADMIN — CLOPIDOGREL 75 MG: 75 TABLET ORAL at 08:46

## 2025-06-07 RX ADMIN — CLONIDINE HYDROCHLORIDE 0.2 MG: 0.2 TABLET ORAL at 20:57

## 2025-06-07 RX ADMIN — MYCOPHENOLATE MOFETIL 750 MG: 250 CAPSULE ORAL at 08:46

## 2025-06-07 RX ADMIN — TACROLIMUS 2 MG: 1 CAPSULE ORAL at 20:57

## 2025-06-07 RX ADMIN — MYCOPHENOLATE MOFETIL 750 MG: 250 CAPSULE ORAL at 20:57

## 2025-06-07 RX ADMIN — SENNOSIDES AND DOCUSATE SODIUM 2 TABLET: 50; 8.6 TABLET ORAL at 20:57

## 2025-06-07 RX ADMIN — ASPIRIN 81 MG: 81 TABLET, COATED ORAL at 08:46

## 2025-06-07 RX ADMIN — POLYETHYLENE GLYCOL 3350 17 G: 17 POWDER, FOR SOLUTION ORAL at 08:46

## 2025-06-07 RX ADMIN — PANTOPRAZOLE SODIUM 40 MG: 40 TABLET, DELAYED RELEASE ORAL at 08:46

## 2025-06-07 RX ADMIN — POLYETHYLENE GLYCOL 3350 17 G: 17 POWDER, FOR SOLUTION ORAL at 20:57

## 2025-06-07 RX ADMIN — CLONIDINE HYDROCHLORIDE 0.2 MG: 0.2 TABLET ORAL at 08:46

## 2025-06-07 RX ADMIN — TAMSULOSIN HYDROCHLORIDE 0.4 MG: 0.4 CAPSULE ORAL at 20:57

## 2025-06-07 RX ADMIN — ATORVASTATIN CALCIUM 40 MG: 40 TABLET, FILM COATED ORAL at 20:57

## 2025-06-07 RX ADMIN — TACROLIMUS 2 MG: 1 CAPSULE ORAL at 08:46

## 2025-06-07 ASSESSMENT — PAIN - FUNCTIONAL ASSESSMENT
PAIN_FUNCTIONAL_ASSESSMENT: 0-10
PAIN_FUNCTIONAL_ASSESSMENT: 0-10

## 2025-06-07 ASSESSMENT — COGNITIVE AND FUNCTIONAL STATUS - GENERAL
WALKING IN HOSPITAL ROOM: TOTAL
PERSONAL GROOMING: A LOT
EATING MEALS: A LITTLE
DRESSING REGULAR UPPER BODY CLOTHING: A LOT
TOILETING: TOTAL
DRESSING REGULAR LOWER BODY CLOTHING: A LOT
MOVING FROM LYING ON BACK TO SITTING ON SIDE OF FLAT BED WITH BEDRAILS: A LOT
MOBILITY SCORE: 8
STANDING UP FROM CHAIR USING ARMS: TOTAL
CLIMB 3 TO 5 STEPS WITH RAILING: TOTAL
HELP NEEDED FOR BATHING: A LOT
TURNING FROM BACK TO SIDE WHILE IN FLAT BAD: A LOT
MOVING TO AND FROM BED TO CHAIR: TOTAL
DAILY ACTIVITIY SCORE: 12

## 2025-06-07 ASSESSMENT — PAIN SCALES - GENERAL
PAINLEVEL_OUTOF10: 0 - NO PAIN

## 2025-06-07 ASSESSMENT — ACTIVITIES OF DAILY LIVING (ADL): HOME_MANAGEMENT_TIME_ENTRY: 28

## 2025-06-07 NOTE — PROGRESS NOTES
Medicine PA-C follow up note    Subjective:  No complaints or concerns this morning. Pt is waiting for auth from new facility     Additional information:      Vitals (Last 24 Hours):  Heart Rate:  [70-89]   Temp:  [36.2 °C (97.2 °F)-36.8 °C (98.2 °F)]   Resp:  [16-17]   BP: (125-149)/()   SpO2:  [97 %-100 %]       I have reviewed all imaging reports and labs pertinent to this visit / presenting problem    PHYSICAL EXAM:  Constitutional: NAD, alert and cooperative  Eyes: no icterus  ENMT: mucous membranes moist, no lesions  Head/Neck: supple  Respiratory/Thorax: CTA bilaterally, non-labored breathing, no cough, on RA  Cardiovascular: RRR, no murmurs heard  Gastrointestinal: ND/S/NT  : no Wood, no SP/flank discomfort  Musculoskeletal: no joint swelling, ROM intact  Extremities: no edema  Skin: warm and dry  Psych: calm, stable mood     MEDS:  Scheduled meds  Scheduled Medications[1]    Continuous meds  Continuous Medications[2]    PRN meds  PRN Medications[3]      ASSESSMENT/PLAN:  Davidson Khoury is a 60 y.o. male with past medical history of recent acute ischemic stroke, renal transplant, and hypertension presenting after a fall. Patient reports that he was reaching for something on his bedside table and slipped out of bed. Denies any loss of consciousness or hitting his head. Of note, patient recently discharged on 6/4/2025 from [facility] to SNF after being admitted for an acute ischemic stroke. Patient reports persistent left-sided weakness after recent acute ischemic stroke but no new weakness or numbness of upper or lower extremities     Fall from bed  - CT head with no acute changes  - CT Cspine with no acute fracture  - PT/OT rec high  - Requesting new SNF at discharge, per TCC will likely not hear back from insurance over the weekend     Recent stroke  - continue asa, plavix, and crestor   - SLP, PT, and OT to continue at facility      HTN  - BP variable   - continue clonidine and monitor      S/p renal  transplant  - Continue cellcept    Other comorbidities as above  - Continue medications as ordered and adjust based on clinical course     VTE / GI prophylaxis   - Subcutaneous Lovenox, PPI, bowel regimen in place     Discharge planning  - Med ready, awaiting auth for SNF     Discussed with Dr. Her and the interdisciplinary team     Autumn Kramer PA-C            [1] aspirin, 81 mg, oral, Daily  atorvastatin, 40 mg, oral, Nightly  cloNIDine, 0.2 mg, oral, BID  clopidogrel, 75 mg, oral, Daily  enoxaparin, 40 mg, subcutaneous, q24h CHRIST  mycophenolate, 750 mg, oral, BID  pantoprazole, 40 mg, oral, Daily before breakfast  polyethylene glycol, 17 g, oral, q12h CHRIST  sennosides-docusate sodium, 2 tablet, oral, Nightly  tacrolimus, 2 mg, oral, BID  tamsulosin, 0.4 mg, oral, Nightly    [2]    [3] PRN medications: acetaminophen, ondansetron ODT **OR** ondansetron

## 2025-06-07 NOTE — CARE PLAN
The patient's goals for the shift include      The clinical goals for the shift include keep pt safe and free from falls this shift      Problem: Pain - Adult  Goal: Verbalizes/displays adequate comfort level or baseline comfort level  Outcome: Progressing     Problem: Safety - Adult  Goal: Free from fall injury  Outcome: Progressing     Problem: Discharge Planning  Goal: Discharge to home or other facility with appropriate resources  Outcome: Progressing     Problem: Chronic Conditions and Co-morbidities  Goal: Patient's chronic conditions and co-morbidity symptoms are monitored and maintained or improved  Outcome: Progressing     Problem: Nutrition  Goal: Nutrient intake appropriate for maintaining nutritional needs  Outcome: Progressing     Problem: Skin  Goal: Decreased wound size/increased tissue granulation at next dressing change  Outcome: Progressing  Goal: Participates in plan/prevention/treatment measures  Outcome: Progressing  Goal: Prevent/manage excess moisture  Outcome: Progressing  Goal: Prevent/minimize sheer/friction injuries  Outcome: Progressing  Goal: Promote/optimize nutrition  Outcome: Progressing  Goal: Promote skin healing  Outcome: Progressing

## 2025-06-07 NOTE — PROGRESS NOTES
Physical Therapy    Physical Therapy Treatment    Patient Name: Davidson Khoury  MRN: [id number]  Department: [place] [address]  Room: [address] [place] [address]  Today's Date: 6/7/2025  Time Calculation  Start Time: 1230  Stop Time: 1300  Time Calculation (min): 30 min         Assessment/Plan   PT Assessment  PT Assessment Results:  (Decreased strength endurance,mobility; Impaired balance and impaired sensation)  Barriers to Discharge Home: Physical needs  Caregiver Assistance: Caregiver assistance needed per identified barriers - however, level of patient's required assistance exceeds assistance available at home  Physical Needs:  (High fall risk requiring assitance with all functional tasks for max safety)     PT Plan  Treatment/Interventions: Bed mobility, Transfer training, Gait training, Stair training, Balance training, Neuromuscular re-education, Strengthening, Endurance training, Therapeutic exercise, Therapeutic activity, Home exercise program  PT Plan: Ongoing PT  PT Frequency: 4 times per week  PT Discharge Recommendations:  (High Intensity level of continued care)  Equipment Recommended upon Discharge:  (TBD)  PT Recommended Transfer Status:  (Assist x 2 people)  PT - OK to Discharge: Yes (PT POC initiated this date)    PT Visit Info:  PT Received On: 06/07/25     General Visit Information:   General  Reason for Referral: Pt presented to ED with AMS, HA, and HTN (/113). Pt developed subtle L FD and ataxia that progressed to worsening L sided weakness. LKW 5/19 0400AM. 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 Patient OOW for TNK, s/p TICI 2b MT.  Prior to Session Communication: Bedside nurse  Patient Position Received: Bed, 4 rail up  Preferred Learning Style: verbal, visual  General Comment: Pt supine bed agreed to therapy    Subjective   Precautions:               Objective   Pain:  Pain Assessment  0-10 (Numeric) Pain Score: 0 - No pain  Cognition:  Cognition  Orientation Level: Oriented X4  Insight:  Mild  Task Initiation:  (Delayed initiation)  Coordination:     Postural Control:  Postural Control  Postural Control: Impaired  Head Control: Pt with L retro lean Seated EOB  Righting Reactions: Pt with L retro lean seated EOB (CGA and S UE support bed rail  for improved righting reaction and position)  Static Sitting Balance  Static Sitting-Balance Support: Right upper extremity supported (Feet flat on floor to promote increased proprioception and w/b)  Static Sitting-Level of Assistance: Minimum assistance  Static Sitting-Comment/Number of Minutes: 10 minutes CGA at L retro lean  Extremity/Trunk Assessments:     Activity Tolerance:  Activity Tolerance  Endurance: Tolerates 10 - 20 min exercise with multiple rests  Treatments:  Therapeutic Exercise  Therapeutic Exercise Performed: Yes  Therapeutic Exercise Activity 1: AROM 1x10 reps each: AP, QS, GS, HS, hip abd/add.  PROM L LE hip and knee flexion/extension, hip IRER, hip abd/add. (Vc's for full ROM and proper technique to promote increased mm strength and decreased contracture.)    Balance/Neuromuscular Re-Education  Balance/Neuromuscular Re-Education Activity Performed: Yes  Balance/Neuromuscular Re-Education Activity 1: Static sitting EOB, feet flat on floor surface.  R UE support on bed rail.  CGA for improved righting reaction to center. L UE supported with pillow to facilitate imporved posture.    Bed Mobility 1  Bed Mobility Comments 1: Pt required MaxA x1. Supine to sit EOB with immediate CGA at static sitting. (Vc's for R hand placement on bed rail)    Transfer 1  Transfer to 1:  (STS from EOB <> bed side commode.  Vc's for posture, hand  placement blocking of pt B knees and feet.  Pt stood approx 20 seconds prior to SPT)  Transfer Device 1: Gait belt (MaxA x 2 people)  Transfer Level of Assistance 1: Arm in arm assistance  Trials/Comments 1: One staff member trained on proper transfer technique (Demo and vc's provided for proper body mechanics with  functional transfer L CVA pt.  Nsg notified aswell.)    Outcome Measures:  [facility] Basic Mobility  Turning from your back to your side while in a flat bed without using bedrails: A lot  Moving from lying on your back to sitting on the side of a flat bed without using bedrails: A lot  Moving to and from bed to chair (including a wheelchair): Total  Standing up from a chair using your arms (e.g. wheelchair or bedside chair): Total  To walk in hospital room: Total  Climbing 3-5 steps with railing: Total  Basic Mobility - Total Score: 8    Education Documentation  Precautions, taught by Thong Rodriguez PTA at 6/7/2025  3:02 PM.  Learner: Other, Patient  Readiness: Acceptance  Method: Explanation, Demonstration  Response: Verbalizes Understanding, Demonstrated Understanding  Comment: Pt and staff member trained in proper transfer technique with good carry over.    Body Mechanics, taught by Thong Rodriguez PTA at 6/7/2025  3:02 PM.  Learner: Other, Patient  Readiness: Acceptance  Method: Explanation, Demonstration  Response: Verbalizes Understanding, Demonstrated Understanding  Comment: Pt and staff member trained in proper transfer technique with good carry over.    Home Exercise Program, taught by Thong Rodriguez PTA at 6/7/2025  3:02 PM.  Learner: Other, Patient  Readiness: Acceptance  Method: Explanation, Demonstration  Response: Verbalizes Understanding, Demonstrated Understanding  Comment: Pt and staff member trained in proper transfer technique with good carry over.    Mobility Training, taught by Thong Rodriguez PTA at 6/7/2025  3:02 PM.  Learner: Other, Patient  Readiness: Acceptance  Method: Explanation, Demonstration  Response: Verbalizes Understanding, Demonstrated Understanding  Comment: Pt and staff member trained in proper transfer technique with good carry over.    Education Comments  No comments found.        OP EDUCATION:       Encounter Problems       Encounter Problems (Active)       Balance        STG - Maintains dynamic sitting balance with upper extremity support (Progressing)       Start:  06/06/25    Expected End:  06/20/25       >10 min with SUP and midline orientation noted            Mobility       STG - Patient will ambulate (Not Progressing)       Start:  06/06/25    Expected End:  06/20/25       Mod A x2  >5ft LRAD            PT Transfers       STG - Patient will perform bed mobility (Progressing)       Start:  06/06/25    Expected End:  06/20/25       Mod A         STG - Patient will transfer sit to and from stand (Progressing)       Start:  06/06/25    Expected End:  06/20/25       Mod A            Pain - Adult

## 2025-06-07 NOTE — PROGRESS NOTES
Occupational Therapy    OT Treatment    Patient Name: Davidson Khoury  MRN: [id number]  Department: [place] [address]  Room: [address] [place] [address]  Today's Date: 6/7/2025  Time Calculation  Start Time: 1300  Stop Time: 1328  Time Calculation (min): 28 min        Assessment:        Plan:  Treatment Interventions: ADL retraining, Visual perceptual retraining, Functional transfer training, UE strengthening/ROM, Endurance training, Patient/family training, Equipment evaluation/education, Neuromuscular reeducation, Fine motor coordination activities, Compensatory technique education  OT Frequency: 4 times per week  OT Discharge Recommendations: High intensity level of continued care  OT Recommended Transfer Status: Assist of 2  OT - OK to Discharge: Yes (Per POC)  Treatment Interventions: ADL retraining, Visual perceptual retraining, Functional transfer training, UE strengthening/ROM, Endurance training, Patient/family training, Equipment evaluation/education, Neuromuscular reeducation, Fine motor coordination activities, Compensatory technique education    Subjective   OT Visit Info:     General Visit Info:  General  Reason for Referral: Pt presented to ED with AMS, HA, and HTN (/113). Pt developed subtle L FD and ataxia that progressed to worsening L sided weakness. LKW 5/19 0400AM. 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 Patient OOW for TNK, s/p TICI 2b MT.  Referred By: Chiara Stanley MD  Past Medical History Relevant to Rehab: recent CVA (R MCA; 5/19/25), renal transplant, HTN, ESRD  Family/Caregiver Present: No  Precautions:  Hearing/Visual Limitations: Hearing WFL. Able to gaze past midline to L but does not achieve terminal L gaze.  Medical Precautions: Fall precautions  Precautions Comment: L side flaccid hemiplegia     Date/Time Vitals Session Patient Position Pulse Resp SpO2 BP MAP (mmHg)    06/07/25 1622 --  --  66  17  99 %  130/97  105                 Pain:  Pain Assessment  Pain Assessment: 0-10  0-10 (Numeric)  Pain Score: 0 - No pain    Objective    Cognition:  Cognition  Arousal/Alertness: Appropriate responses to stimuli  Orientation Level: Disoriented to place  Following Commands: Follows multistep commands with repetition  Safety Judgment: Decreased awareness of need for assistance  Attention: Exceptions to WFL  Alternating Attention: Impaired (Patient is self distracting with tangental thoughts and complaints about previous facility ; home situation jobs, and people in general)  Divided Attention: Impaired  Coordination:     Activities of Daily Living: Feeding  Feeding Level of Assistance: Close supervision  Feeding Where Assessed: Bed level  Feeding Comments:  (Patient requires cues to attend to left side of plate tray and body.   Patient requires cues to continue eating and to attend to plate Independent with utensil and staw)  Functional Standing Tolerance:     Bed Mobility/Transfers: Patient required mod assist with repositioning to midline in sitting in bed. Patient required cues to attend that he was leaning or pushing toward the left                    Outcome Measures:[facility] Daily Activity  Putting on and taking off regular lower body clothing: A lot  Bathing (including washing, rinsing, drying): A lot  Putting on and taking off regular upper body clothing: A lot  Toileting, which includes using toilet, bedpan or urinal: Total  Taking care of personal grooming such as brushing teeth: A lot  Eating Meals: A little  Daily Activity - Total Score: 12        Education Documentation  No documentation found.  Education Comments  No comments found.        OP EDUCATION:       Goals:  Encounter Problems       Encounter Problems (Active)       ADLs       Patient will perform UB and LB sponge bathing with moderate assist level of assistance while seated and use of adaptive techniques/equipment.       Start:  06/06/25    Expected End:  06/20/25            Patient with complete upper body dressing with contact guard assist  level of assistance donning and doffing all UE clothes with PRN adaptive equipment while seated.       Start:  06/06/25    Expected End:  06/20/25            Patient with complete lower body dressing with moderate assist level of assistance donning and doffing all LE clothes  with PRN adaptive equipment while seated.       Start:  06/06/25    Expected End:  06/20/25            Patient will complete daily grooming tasks with stand by assist level of assistance and PRN adaptive equipment while supported or edge of bed seated position.       Start:  06/06/25    Expected End:  06/20/25            Patient will complete toileting including hygiene clothing management/hygiene with moderate assist level of assistance and bedside commode.       Start:  06/06/25    Expected End:  06/20/25               MOBILITY       Patient will perform Functional mobility x Household distances/Community Distances with moderate assist level of assistance and least restrictive device in order to improve safety and functional mobility.       Start:  06/06/25    Expected End:  06/20/25               TRANSFERS       Patient will perform bed mobility minimal assist  level of assistance and bed rails in order to improve safety and independence with mobility       Start:  06/06/25    Expected End:  06/20/25            Patient will complete sit to stand transfer with minimal assist  level of assistance and least restrictive device in order to improve safety and prepare for out of bed mobility.       Start:  06/06/25    Expected End:  06/20/25               VISION       Patient will visually attend to Left side of Tray, Room, and Body using compensatory strategies and supervision level of assistance and minimal tactile and verbal.        Start:  06/06/25    Expected End:  06/20/25

## 2025-06-07 NOTE — PROGRESS NOTES
Care Coordinator Note:    Plan: Patient in with fall at SNF. Recent R MCA CVA. CT head and spine negative. Cleared for dc to new SNF . Auth started 6/6    Status: OBS  Payor: [place]  Disposition:NEW SNF. [facility]. Auth started 6/6. PTOT updates scheduled for 6/8. Per DSC no weekend dc as will not get auth back from [place] over the weekend.   Barrier: AUTH  ADOD: MONDAY    Odalis Saucedo [facility]      06/07/25 0936   Discharge Planning   Expected Discharge Disposition SNF  ([facility]- auth pending)   Intensity of Service   Intensity of Service 0-30 min

## 2025-06-07 NOTE — CARE PLAN
The patient's goals for the shift include      The clinical goals for the shift include Remain HDS throughout the shift

## 2025-06-08 VITALS
RESPIRATION RATE: 18 BRPM | BODY MASS INDEX: 23.7 KG/M2 | OXYGEN SATURATION: 100 % | TEMPERATURE: 97.5 F | DIASTOLIC BLOOD PRESSURE: 98 MMHG | SYSTOLIC BLOOD PRESSURE: 146 MMHG | WEIGHT: 160 LBS | HEIGHT: 69 IN | HEART RATE: 63 BPM

## 2025-06-08 LAB
ALBUMIN SERPL BCP-MCNC: 3.7 G/DL (ref 3.4–5)
ALBUMIN SERPL BCP-MCNC: 4 G/DL (ref 3.4–5)
ALP SERPL-CCNC: 127 U/L (ref 33–120)
ALP SERPL-CCNC: 128 U/L (ref 33–120)
ALT SERPL W P-5'-P-CCNC: 14 U/L (ref 10–52)
ALT SERPL W P-5'-P-CCNC: 18 U/L (ref 10–52)
ANION GAP SERPL CALC-SCNC: 12 MMOL/L (ref 10–20)
ANION GAP SERPL CALC-SCNC: 22 MMOL/L (ref 10–20)
AST SERPL W P-5'-P-CCNC: 15 U/L (ref 9–39)
AST SERPL W P-5'-P-CCNC: 21 U/L (ref 9–39)
BILIRUB SERPL-MCNC: 0.8 MG/DL (ref 0–1.2)
BILIRUB SERPL-MCNC: 0.9 MG/DL (ref 0–1.2)
BUN SERPL-MCNC: 21 MG/DL (ref 6–23)
BUN SERPL-MCNC: 22 MG/DL (ref 6–23)
CALCIUM SERPL-MCNC: 9.3 MG/DL (ref 8.6–10.3)
CALCIUM SERPL-MCNC: 9.7 MG/DL (ref 8.6–10.3)
CHLORIDE SERPL-SCNC: 107 MMOL/L (ref 98–107)
CHLORIDE SERPL-SCNC: 108 MMOL/L (ref 98–107)
CO2 SERPL-SCNC: 14 MMOL/L (ref 21–32)
CO2 SERPL-SCNC: 22 MMOL/L (ref 21–32)
CREAT SERPL-MCNC: 1.74 MG/DL (ref 0.5–1.3)
CREAT SERPL-MCNC: 1.79 MG/DL (ref 0.5–1.3)
EGFRCR SERPLBLD CKD-EPI 2021: 43 ML/MIN/1.73M*2
EGFRCR SERPLBLD CKD-EPI 2021: 45 ML/MIN/1.73M*2
GLUCOSE SERPL-MCNC: 110 MG/DL (ref 74–99)
GLUCOSE SERPL-MCNC: 82 MG/DL (ref 74–99)
LACTATE SERPL-SCNC: 0.8 MMOL/L (ref 0.4–2)
MAGNESIUM SERPL-MCNC: 1.69 MG/DL (ref 1.6–2.4)
MAGNESIUM SERPL-MCNC: 1.89 MG/DL (ref 1.6–2.4)
POTASSIUM SERPL-SCNC: 3.6 MMOL/L (ref 3.5–5.3)
POTASSIUM SERPL-SCNC: 4.4 MMOL/L (ref 3.5–5.3)
PROT SERPL-MCNC: 6.3 G/DL (ref 6.4–8.2)
PROT SERPL-MCNC: 6.9 G/DL (ref 6.4–8.2)
SODIUM SERPL-SCNC: 137 MMOL/L (ref 136–145)
SODIUM SERPL-SCNC: 140 MMOL/L (ref 136–145)

## 2025-06-08 PROCEDURE — G0378 HOSPITAL OBSERVATION PER HR: HCPCS

## 2025-06-08 PROCEDURE — 2500000002 HC RX 250 W HCPCS SELF ADMINISTERED DRUGS (ALT 637 FOR MEDICARE OP, ALT 636 FOR OP/ED): Performed by: HOSPITALIST

## 2025-06-08 PROCEDURE — 36415 COLL VENOUS BLD VENIPUNCTURE: CPT | Performed by: INTERNAL MEDICINE

## 2025-06-08 PROCEDURE — 2500000001 HC RX 250 WO HCPCS SELF ADMINISTERED DRUGS (ALT 637 FOR MEDICARE OP)

## 2025-06-08 PROCEDURE — 2500000004 HC RX 250 GENERAL PHARMACY W/ HCPCS (ALT 636 FOR OP/ED): Performed by: INTERNAL MEDICINE

## 2025-06-08 PROCEDURE — 2500000001 HC RX 250 WO HCPCS SELF ADMINISTERED DRUGS (ALT 637 FOR MEDICARE OP): Performed by: INTERNAL MEDICINE

## 2025-06-08 PROCEDURE — 80053 COMPREHEN METABOLIC PANEL: CPT | Performed by: INTERNAL MEDICINE

## 2025-06-08 PROCEDURE — 83605 ASSAY OF LACTIC ACID: CPT | Performed by: INTERNAL MEDICINE

## 2025-06-08 PROCEDURE — 97112 NEUROMUSCULAR REEDUCATION: CPT | Mod: GP,CQ

## 2025-06-08 PROCEDURE — 2500000004 HC RX 250 GENERAL PHARMACY W/ HCPCS (ALT 636 FOR OP/ED): Performed by: HOSPITALIST

## 2025-06-08 PROCEDURE — 84443 ASSAY THYROID STIM HORMONE: CPT | Performed by: INTERNAL MEDICINE

## 2025-06-08 PROCEDURE — 2500000001 HC RX 250 WO HCPCS SELF ADMINISTERED DRUGS (ALT 637 FOR MEDICARE OP): Performed by: HOSPITALIST

## 2025-06-08 PROCEDURE — 99232 SBSQ HOSP IP/OBS MODERATE 35: CPT

## 2025-06-08 PROCEDURE — 97530 THERAPEUTIC ACTIVITIES: CPT | Mod: GP,CQ

## 2025-06-08 PROCEDURE — 96372 THER/PROPH/DIAG INJ SC/IM: CPT | Performed by: HOSPITALIST

## 2025-06-08 PROCEDURE — 83735 ASSAY OF MAGNESIUM: CPT | Performed by: INTERNAL MEDICINE

## 2025-06-08 RX ORDER — LOSARTAN POTASSIUM 25 MG/1
25 TABLET ORAL 2 TIMES DAILY
Status: DISPENSED | OUTPATIENT
Start: 2025-06-08

## 2025-06-08 RX ORDER — LANOLIN ALCOHOL/MO/W.PET/CERES
400 CREAM (GRAM) TOPICAL ONCE
Status: COMPLETED | OUTPATIENT
Start: 2025-06-08 | End: 2025-06-08

## 2025-06-08 RX ORDER — LORAZEPAM 0.5 MG/1
0.5 TABLET ORAL ONCE
Status: COMPLETED | OUTPATIENT
Start: 2025-06-08 | End: 2025-06-08

## 2025-06-08 RX ORDER — LORAZEPAM 0.5 MG/1
0.5 TABLET ORAL 2 TIMES DAILY PRN
Status: ACTIVE | OUTPATIENT
Start: 2025-06-08

## 2025-06-08 RX ADMIN — TAMSULOSIN HYDROCHLORIDE 0.4 MG: 0.4 CAPSULE ORAL at 21:01

## 2025-06-08 RX ADMIN — TACROLIMUS 2 MG: 1 CAPSULE ORAL at 20:55

## 2025-06-08 RX ADMIN — CLONIDINE HYDROCHLORIDE 0.2 MG: 0.2 TABLET ORAL at 21:01

## 2025-06-08 RX ADMIN — TACROLIMUS 2 MG: 1 CAPSULE ORAL at 09:33

## 2025-06-08 RX ADMIN — POLYETHYLENE GLYCOL 3350 17 G: 17 POWDER, FOR SOLUTION ORAL at 09:29

## 2025-06-08 RX ADMIN — PANTOPRAZOLE SODIUM 40 MG: 40 TABLET, DELAYED RELEASE ORAL at 06:22

## 2025-06-08 RX ADMIN — ENOXAPARIN SODIUM 40 MG: 40 INJECTION SUBCUTANEOUS at 09:29

## 2025-06-08 RX ADMIN — LORAZEPAM 0.5 MG: 0.5 TABLET ORAL at 09:29

## 2025-06-08 RX ADMIN — ATORVASTATIN CALCIUM 40 MG: 40 TABLET, FILM COATED ORAL at 21:01

## 2025-06-08 RX ADMIN — MYCOPHENOLATE MOFETIL 750 MG: 250 CAPSULE ORAL at 09:29

## 2025-06-08 RX ADMIN — ASPIRIN 81 MG: 81 TABLET, COATED ORAL at 09:29

## 2025-06-08 RX ADMIN — LOSARTAN POTASSIUM 25 MG: 25 TABLET, FILM COATED ORAL at 21:01

## 2025-06-08 RX ADMIN — LOSARTAN POTASSIUM 25 MG: 25 TABLET, FILM COATED ORAL at 13:12

## 2025-06-08 RX ADMIN — MYCOPHENOLATE MOFETIL 750 MG: 250 CAPSULE ORAL at 21:01

## 2025-06-08 RX ADMIN — Medication 1 TABLET: at 13:12

## 2025-06-08 RX ADMIN — CLOPIDOGREL 75 MG: 75 TABLET ORAL at 09:29

## 2025-06-08 RX ADMIN — CLONIDINE HYDROCHLORIDE 0.2 MG: 0.2 TABLET ORAL at 09:29

## 2025-06-08 ASSESSMENT — COGNITIVE AND FUNCTIONAL STATUS - GENERAL
MOVING TO AND FROM BED TO CHAIR: TOTAL
MOVING FROM LYING ON BACK TO SITTING ON SIDE OF FLAT BED WITH BEDRAILS: A LOT
CLIMB 3 TO 5 STEPS WITH RAILING: TOTAL
TURNING FROM BACK TO SIDE WHILE IN FLAT BAD: A LOT
MOBILITY SCORE: 8
WALKING IN HOSPITAL ROOM: TOTAL
STANDING UP FROM CHAIR USING ARMS: TOTAL

## 2025-06-08 ASSESSMENT — PAIN - FUNCTIONAL ASSESSMENT
PAIN_FUNCTIONAL_ASSESSMENT: 0-10
PAIN_FUNCTIONAL_ASSESSMENT: 0-10

## 2025-06-08 ASSESSMENT — PAIN SCALES - GENERAL
PAINLEVEL_OUTOF10: 0 - NO PAIN

## 2025-06-08 NOTE — PROGRESS NOTES
Physical Therapy    Physical Therapy Treatment    Patient Name: Davidson Khoury  MRN: [id number]  Department: [place] [address]  Room: [address] [place] [address]  Today's Date: 6/8/2025  Time Calculation  Start Time: 1551  Stop Time: 1617  Time Calculation (min): 26 min         Assessment/Plan   PT Assessment  Barriers to Participation: Access to adaptive/assistive products  End of Session Communication: Bedside nurse  Assessment Comment: Pt session focused on LE ther exs and functional trasnfer sequencing. Pt contineus to req mod-max A +2 for safety.  End of Session Patient Position: Bed, 4 rail up, Alarm on  PT Plan  Inpatient/Swing Bed or Outpatient: Inpatient  PT Plan  Treatment/Interventions: Bed mobility, Transfer training, Gait training, Balance training, Neuromuscular re-education, Therapeutic exercise, Therapeutic activity  PT Plan: Ongoing PT  PT Frequency: 4 times per week  PT Discharge Recommendations: High intensity level of continued care  Equipment Recommended upon Discharge:  (TBD)  PT Recommended Transfer Status:  (Assist x 2 people)  PT - OK to Discharge: Yes (Per PT POC.)    PT Visit Info:  PT Received On: 06/08/25     General Visit Information:   General  Reason for Referral: Pt presented to ED with AMS, HA, and HTN (/113). Pt developed subtle L FD and ataxia that progressed to worsening L sided weakness. LKW 5/19 0400AM. 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 Patient OOW for TNK, s/p TICI 2b MT.  Referred By: Chiara Stanley MD  Past Medical History Relevant to Rehab: recent CVA (R MCA; 5/19/25), renal transplant, HTN, ESRD  Prior to Session Communication: Bedside nurse  Patient Position Received: Bed, 4 rail up  Preferred Learning Style: verbal, visual  General Comment: Pt supine in bed upon arrival, agreeable to PT session.    Subjective   Precautions:        Date/Time Vitals Session Patient Position Pulse Resp SpO2 BP MAP (mmHg)    06/08/25 1632 --  --  66  18  100 %  129/86  101                  Objective   Pain:  Pain Assessment  Pain Assessment: 0-10  0-10 (Numeric) Pain Score: 0 - No pain  Cognition:  Cognition  Orientation Level: Disoriented to place  Coordination:     Postural Control:  Static Sitting Balance  Static Sitting-Balance Support: Right upper extremity supported  Static Sitting-Level of Assistance: Minimum assistance, Contact guard  Static Sitting-Comment/Number of Minutes: x5min (mod seated postural sway in attempt to maintain seated balance. Tends to grab for bed rail for stability.)     Treatments:  Therapeutic Exercise  Therapeutic Exercise Performed: Yes  Therapeutic Exercise Activity 1: Pt performed supine ther exs x10 reps: (AROM R LE and  PROM/AAROM R) Ankle DF/PF, QS, GS, and HS.    Therapeutic Activity  Therapeutic Activity Performed: Yes  Therapeutic Activity 1: Standing weight shift through L/ R LE's. MaxA x2 to maintain stability.         Bed Mobility  Bed Mobility: Yes  Bed Mobility 1  Bed Mobility 1: Supine to sitting  Level of Assistance 1: Maximum assistance  Bed Mobility Comments 1: HOB elevated 25 deg. VC's for sequencing. Pt receptive to hooking L LE with R to assist moving BLE over EOB. R UE on R bed rail. MaxA at trunk.       Transfers  Transfer: Yes  Transfer 1  Transfer From 1: Bed to  Transfer to 1: Stand  Technique 1: Sit to stand, Stand to sit  Transfer Device 1: Gait belt  Transfer Level of Assistance 1: Arm in arm assistance  Trials/Comments 1: 1 Trial from EOB. Completed with mod-max x2. VC's for hand placement and trunk control. L knee block and L UE support provided.    Outcome Measures:  [facility] Basic Mobility  Turning from your back to your side while in a flat bed without using bedrails: A lot  Moving from lying on your back to sitting on the side of a flat bed without using bedrails: A lot  Moving to and from bed to chair (including a wheelchair): Total  Standing up from a chair using your arms (e.g. wheelchair or bedside chair): Total  To walk in  hospital room: Total  Climbing 3-5 steps with railing: Total  Basic Mobility - Total Score: 8    Education Documentation  Body Mechanics, taught by Avani Santacruz PTA at 6/8/2025  4:36 PM.  Learner: Patient  Readiness: Acceptance  Method: Explanation  Response: Verbalizes Understanding    Home Exercise Program, taught by Avani Santacruz PTA at 6/8/2025  4:36 PM.  Learner: Patient  Readiness: Acceptance  Method: Explanation  Response: Verbalizes Understanding    Mobility Training, taught by Avani Santacruz PTA at 6/8/2025  4:36 PM.  Learner: Patient  Readiness: Acceptance  Method: Explanation  Response: Verbalizes Understanding    Education Comments  No comments found.        OP EDUCATION:       Encounter Problems       Encounter Problems (Active)       Balance       STG - Maintains dynamic sitting balance with upper extremity support (Progressing)       Start:  06/06/25    Expected End:  06/20/25       >10 min with SUP and midline orientation noted            Mobility       STG - Patient will ambulate (Not Progressing)       Start:  06/06/25    Expected End:  06/20/25       Mod A x2  >5ft LRAD            PT Transfers       STG - Patient will perform bed mobility (Progressing)       Start:  06/06/25    Expected End:  06/20/25       Mod A         STG - Patient will transfer sit to and from stand (Progressing)       Start:  06/06/25    Expected End:  06/20/25       Mod A            Pain - Adult

## 2025-06-08 NOTE — CARE PLAN
The patient's goals for the shift include  increase strength.     The clinical goals for the shift include maintain skin integrity, turn Q2 hrs

## 2025-06-08 NOTE — PROGRESS NOTES
Medicine PAMADISYN follow up note    Subjective:  Still awaiting auth from new facility. Care coordination team is hopeful for tomorrow.     Pt has been intermittently frustrated with his situation. He is tired of laying in bed all day and not being able to do things for himself which is understandable. He is anxious to get therapy started at rehab.     Additional information:      Vitals (Last 24 Hours):  Heart Rate:  [59-66]   Temp:  [36 °C (96.8 °F)-36.5 °C (97.7 °F)]   Resp:  [16-18]   BP: (127-157)/()   SpO2:  [95 %-100 %]       I have reviewed all imaging reports and labs pertinent to this visit / presenting problem    PHYSICAL EXAM:  Constitutional: NAD, alert and cooperative  Eyes: no icterus  ENMT: mucous membranes moist, no lesions  Head/Neck: supple  Respiratory/Thorax: CTA bilaterally, non-labored breathing, no cough, on RA  Cardiovascular: RRR, no murmurs heard  Gastrointestinal: ND/S/NT  : no Wood, no SP/flank discomfort  Musculoskeletal: no joint swelling, ROM intact  Extremities: no edema  Neurological: Flaccid left side   Skin: warm and dry  Psych: calm, stable mood     MEDS:  Scheduled meds  Scheduled Medications[1]    Continuous meds  Continuous Medications[2]    PRN meds  PRN Medications[3]      ASSESSMENT/PLAN:  Davidson Khoury is a 60 y.o. male with past medical history of recent acute ischemic stroke, renal transplant, and hypertension presenting after a fall. Patient reports that he was reaching for something on his bedside table and slipped out of bed. Denies any loss of consciousness or hitting his head. Of note, patient recently discharged on 6/4/2025 from [facility] to SNF after being admitted for an acute ischemic stroke. Patient reports persistent left-sided weakness after recent acute ischemic stroke but no new weakness or numbness of upper or lower extremities      Fall from bed  - CT head with no acute changes  - CT Cspine with no acute fracture  - PT/OT rec high  - Requesting new SNF  at discharge, per TCC will likely not hear back from insurance over the weekend     Recent stroke  - continue asa, plavix, and crestor   - SLP, PT, and OT to continue at facility      HTN  - BP has been consistently elevated   - continue clonidine   - Previously on losartan 25mg BID, will add back and monitor    Intermittent agitation  - 0.5mg PO Ativan BID ordered      S/p renal transplant  - Continue cellcept    Other comorbidities as above  - Continue medications as ordered and adjust based on clinical course     VTE / GI prophylaxis   - Subcutaneous Lovenox, PPI, bowel regimen in place     Discharge planning  - Med ready, awaiting auth for SNF     Discussed with Dr. Ortiz and the interdisciplinary team     Autumn Kramer PA-C            [1] aspirin, 81 mg, oral, Daily  atorvastatin, 40 mg, oral, Nightly  cloNIDine, 0.2 mg, oral, BID  clopidogrel, 75 mg, oral, Daily  docusate sodium, 100 mg, oral, Nightly  enoxaparin, 40 mg, subcutaneous, q24h CHRIST  mycophenolate, 750 mg, oral, BID  pantoprazole, 40 mg, oral, Daily before breakfast  polyethylene glycol, 17 g, oral, q12h CH[facility]ST  senna, 5 mL, oral, Nightly  tacrolimus, 2 mg, oral, BID  tamsulosin, 0.4 mg, oral, Nightly    [2]    [3] PRN medications: acetaminophen, ondansetron ODT **OR** ondansetron

## 2025-06-08 NOTE — CARE PLAN
The patient's goals for the shift include      The clinical goals for the shift include maintain HDS and safety this shift    Problem: Pain - Adult  Goal: Verbalizes/displays adequate comfort level or baseline comfort level  Outcome: Progressing     Problem: Safety - Adult  Goal: Free from fall injury  Outcome: Progressing     Problem: Discharge Planning  Goal: Discharge to home or other facility with appropriate resources  Outcome: Progressing     Problem: Chronic Conditions and Co-morbidities  Goal: Patient's chronic conditions and co-morbidity symptoms are monitored and maintained or improved  Outcome: Progressing     Problem: Nutrition  Goal: Nutrient intake appropriate for maintaining nutritional needs  Outcome: Progressing     Problem: Skin  Goal: Decreased wound size/increased tissue granulation at next dressing change  Outcome: Progressing  Goal: Participates in plan/prevention/treatment measures  Outcome: Progressing  Goal: Prevent/manage excess moisture  Outcome: Progressing  Goal: Prevent/minimize sheer/friction injuries  Outcome: Progressing  Goal: Promote/optimize nutrition  Outcome: Progressing  Goal: Promote skin healing  Outcome: Progressing

## 2025-06-09 VITALS
BODY MASS INDEX: 23.7 KG/M2 | RESPIRATION RATE: 18 BRPM | DIASTOLIC BLOOD PRESSURE: 92 MMHG | WEIGHT: 160 LBS | OXYGEN SATURATION: 100 % | TEMPERATURE: 97.2 F | SYSTOLIC BLOOD PRESSURE: 132 MMHG | HEIGHT: 69 IN | HEART RATE: 62 BPM

## 2025-06-09 LAB — TSH SERPL-ACNC: 1.61 MIU/L (ref 0.44–3.98)

## 2025-06-09 PROCEDURE — 2500000004 HC RX 250 GENERAL PHARMACY W/ HCPCS (ALT 636 FOR OP/ED): Performed by: INTERNAL MEDICINE

## 2025-06-09 PROCEDURE — G0378 HOSPITAL OBSERVATION PER HR: HCPCS

## 2025-06-09 PROCEDURE — 97535 SELF CARE MNGMENT TRAINING: CPT | Mod: GO,CO

## 2025-06-09 PROCEDURE — 2500000001 HC RX 250 WO HCPCS SELF ADMINISTERED DRUGS (ALT 637 FOR MEDICARE OP): Performed by: HOSPITALIST

## 2025-06-09 PROCEDURE — 92507 TX SP LANG VOICE COMM INDIV: CPT | Mod: GN

## 2025-06-09 PROCEDURE — 96372 THER/PROPH/DIAG INJ SC/IM: CPT | Performed by: HOSPITALIST

## 2025-06-09 PROCEDURE — 2500000001 HC RX 250 WO HCPCS SELF ADMINISTERED DRUGS (ALT 637 FOR MEDICARE OP): Performed by: INTERNAL MEDICINE

## 2025-06-09 PROCEDURE — 97116 GAIT TRAINING THERAPY: CPT | Mod: GP

## 2025-06-09 PROCEDURE — 92526 ORAL FUNCTION THERAPY: CPT | Mod: GN

## 2025-06-09 PROCEDURE — 96360 HYDRATION IV INFUSION INIT: CPT | Mod: 59

## 2025-06-09 PROCEDURE — 2500000001 HC RX 250 WO HCPCS SELF ADMINISTERED DRUGS (ALT 637 FOR MEDICARE OP)

## 2025-06-09 PROCEDURE — 2500000004 HC RX 250 GENERAL PHARMACY W/ HCPCS (ALT 636 FOR OP/ED): Performed by: HOSPITALIST

## 2025-06-09 PROCEDURE — 99239 HOSP IP/OBS DSCHRG MGMT >30: CPT

## 2025-06-09 RX ORDER — PANTOPRAZOLE SODIUM 40 MG/1
40 TABLET, DELAYED RELEASE ORAL
Start: 2025-06-10 | End: 2025-07-10

## 2025-06-09 RX ORDER — POTASSIUM CHLORIDE 20 MEQ/1
20 TABLET, EXTENDED RELEASE ORAL DAILY PRN
Start: 2025-06-09

## 2025-06-09 RX ADMIN — TACROLIMUS 2 MG: 1 CAPSULE ORAL at 08:51

## 2025-06-09 RX ADMIN — LOSARTAN POTASSIUM 25 MG: 25 TABLET, FILM COATED ORAL at 08:51

## 2025-06-09 RX ADMIN — CLOPIDOGREL 75 MG: 75 TABLET ORAL at 08:51

## 2025-06-09 RX ADMIN — PANTOPRAZOLE SODIUM 40 MG: 40 TABLET, DELAYED RELEASE ORAL at 06:02

## 2025-06-09 RX ADMIN — ENOXAPARIN SODIUM 40 MG: 40 INJECTION SUBCUTANEOUS at 08:52

## 2025-06-09 RX ADMIN — CLONIDINE HYDROCHLORIDE 0.2 MG: 0.2 TABLET ORAL at 08:51

## 2025-06-09 RX ADMIN — SODIUM CHLORIDE 1000 ML: 0.9 INJECTION, SOLUTION INTRAVENOUS at 12:33

## 2025-06-09 RX ADMIN — MYCOPHENOLATE MOFETIL 750 MG: 250 CAPSULE ORAL at 08:51

## 2025-06-09 RX ADMIN — ASPIRIN 81 MG: 81 TABLET, COATED ORAL at 08:51

## 2025-06-09 RX ADMIN — POLYETHYLENE GLYCOL 3350 17 G: 17 POWDER, FOR SOLUTION ORAL at 08:52

## 2025-06-09 ASSESSMENT — COGNITIVE AND FUNCTIONAL STATUS - GENERAL
TOILETING: A LITTLE
MOBILITY SCORE: 18
TURNING FROM BACK TO SIDE WHILE IN FLAT BAD: A LOT
DRESSING REGULAR UPPER BODY CLOTHING: A LOT
MOVING FROM LYING ON BACK TO SITTING ON SIDE OF FLAT BED WITH BEDRAILS: A LOT
DRESSING REGULAR LOWER BODY CLOTHING: A LOT
MOVING TO AND FROM BED TO CHAIR: A LITTLE
STANDING UP FROM CHAIR USING ARMS: A LITTLE
TOILETING: TOTAL
MOVING FROM LYING ON BACK TO SITTING ON SIDE OF FLAT BED WITH BEDRAILS: A LITTLE
CLIMB 3 TO 5 STEPS WITH RAILING: TOTAL
HELP NEEDED FOR BATHING: A LOT
MOBILITY SCORE: 8
DAILY ACTIVITIY SCORE: 13
DRESSING REGULAR UPPER BODY CLOTHING: A LITTLE
PERSONAL GROOMING: A LITTLE
CLIMB 3 TO 5 STEPS WITH RAILING: A LITTLE
PERSONAL GROOMING: A LITTLE
TURNING FROM BACK TO SIDE WHILE IN FLAT BAD: A LITTLE
WALKING IN HOSPITAL ROOM: TOTAL
STANDING UP FROM CHAIR USING ARMS: TOTAL
WALKING IN HOSPITAL ROOM: A LITTLE
HELP NEEDED FOR BATHING: A LITTLE
EATING MEALS: A LITTLE
DAILY ACTIVITIY SCORE: 19
MOVING TO AND FROM BED TO CHAIR: TOTAL
DRESSING REGULAR LOWER BODY CLOTHING: A LITTLE

## 2025-06-09 ASSESSMENT — PAIN SCALES - GENERAL
PAINLEVEL_OUTOF10: 0 - NO PAIN

## 2025-06-09 ASSESSMENT — PAIN - FUNCTIONAL ASSESSMENT
PAIN_FUNCTIONAL_ASSESSMENT: 0-10
PAIN_FUNCTIONAL_ASSESSMENT: 0-10

## 2025-06-09 ASSESSMENT — ACTIVITIES OF DAILY LIVING (ADL): HOME_MANAGEMENT_TIME_ENTRY: 8

## 2025-06-09 NOTE — DISCHARGE SUMMARY
Discharge Diagnosis  Fall from bed, initial encounter    Issues Requiring Follow-Up  PCP    Discharge Meds     Medication List      START taking these medications     pantoprazole 40 mg EC tablet; Commonly known as: ProtoNix; Take 1 tablet   (40 mg) by mouth once daily in the morning. Take before meals. Do not   crush, chew, or split.; Start taking on: Kaylee 10, 2025     CHANGE how you take these medications     potassium chloride CR 20 mEq ER tablet; Commonly known as: Klor-Con M20;   Take 1 tablet (20 mEq) by mouth once daily as needed (with lasix). Do not   crush or chew; What changed: when to take this, reasons to take this     CONTINUE taking these medications     aspirin 81 mg EC tablet; Take 1 tablet (81 mg) by mouth once daily.   atorvastatin 40 mg tablet; Commonly known as: Lipitor   cetirizine 10 mg tablet; Commonly known as: ZyrTEC   cloNIDine 0.2 mg tablet; Commonly known as: Catapres; TAKE 1 TABLET   TWICE A DAY   clopidogrel 75 mg tablet; Commonly known as: Plavix; Take 1 tablet (75   mg) by mouth once daily. Take 1 tablet daily for 90 days then stop.   furosemide 20 mg tablet; Commonly known as: Lasix; Take 1 tablet (20 mg)   by mouth once daily as needed (for swelling).   losartan 25 mg tablet; Commonly known as: Cozaar; TAKE 1 TABLET BY MOUTH   TWICE A DAY   mycophenolate 250 mg capsule; Commonly known as: Cellcept; Take three   (3) capsules by mouth twice daily   polyethylene glycol 17 gram packet; Commonly known as: Glycolax,   Miralax; Take 17 g by mouth every 12 hours.   sennosides-docusate sodium 8.6-50 mg tablet; Commonly known as:   Alda-Colace; Take 2 tablets by mouth once daily at bedtime.   tacrolimus 1 mg capsule; Commonly known as: Prograf; TAKE 2 CAPSULES (2   MG) BY MOUTH 2 TIMES A DAY.   tamsulosin 0.4 mg 24 hr capsule; Commonly known as: Flomax; TAKE ONE (1)   CAPSULE BY MOUTH EVERYDAY AT BEDTIME.     STOP taking these medications     rosuvastatin 20 mg tablet; Commonly known as:  Crestor       Test Results Pending At Discharge  Pending Labs       Order Current Status    TSH with reflex to Free T4 if abnormal In process            Hospital Course  Davidson Khoury is a 60 y.o. male with past medical history of recent acute ischemic stroke, renal transplant, and hypertension presenting after a fall. Patient reports that he was reaching for something on his bedside table and slipped out of bed. Denies any loss of consciousness or hitting his head. Of note, patient recently discharged on 6/4/2025 from [facility] to [facility] after being admitted for an acute ischemic stroke. Patient reports persistent left-sided weakness after recent acute ischemic stroke but no new weakness or numbness of upper or lower extremities      Fall from bed  - CT head with no acute changes  - CT Cspine with no acute fracture  - PT/OT rec high  - Requesting new SNF at discharge  - Auth obtained, patient discharged to new SNF  - Follow up with PCP once discharged from SNF     Recent stroke  - continue asa, plavix, and crestor   - SLP, PT, and OT to continue at facility      Disposition  - Patient discharged to new SNF in stable condition. To continue SLP, PT, and OT at SNF. Follow up with PCP once discharged from SNF.    Pertinent Physical Exam At Time of Discharge  Physical Exam  Vitals reviewed.   Constitutional:       Comments: Chronically ill appearing. Left sided facial droop   Cardiovascular:      Rate and Rhythm: Normal rate and regular rhythm.   Pulmonary:      Effort: Pulmonary effort is normal. No respiratory distress.      Breath sounds: Normal breath sounds. No wheezing.   Abdominal:      General: Abdomen is flat. There is no distension.      Palpations: Abdomen is soft.      Tenderness: There is no abdominal tenderness.   Musculoskeletal:      Right lower leg: No edema.      Left lower leg: No edema.   Neurological:      Mental Status: He is oriented to person, place, and time.   Psychiatric:         Mood and Affect:  Mood normal.     Outpatient Follow-Up  Future Appointments   Date Time Provider Department Center   7/15/2025  9:30 AM [facility] KIDNEY PHYSICIAN [id number]   8/25/2025 12:30 PM Autumn Rosario MD [id number] [place]     Interdisciplinary team rounding completed with hospitalist, nurse, TCC  Discussed plan and lab/testing results with Dr. Angel Velez PAMADISYN  6/9/2025  2:35 PM

## 2025-06-09 NOTE — NURSING NOTE
Report called into [facility] report given to nurse Carlos. P/U time for Medical transportation company is scheduled for 4p via cot. IV line and tele monitor removed. Pt is in bed awaiting pickup.

## 2025-06-09 NOTE — PROGRESS NOTES
Physical Therapy    Physical Therapy Treatment    Patient Name: Davidson Khoury  MRN: [id number]  Department: [place] [address]  Room: [address] [place] [address]  Today's Date: 6/9/2025  Time Calculation  Start Time: 0927  Stop Time: 0958  Time Calculation (min): 31 min         Assessment/Plan   PT Assessment  Rehab Prognosis: Good  Barriers to Discharge Home: Physical needs  Caregiver Assistance: Caregiver assistance needed per identified barriers - however, level of patient's required assistance exceeds assistance available at home  Cognition Needs: Cognition-related high falls risk  Physical Needs: Stair navigation into home limited by function/safety, In-home setup navigation limited by function/safety, Ambulating household distances limited by function/safety, 24hr mobility assistance needed, 24hr ADL assistance needed  Evaluation/Treatment Tolerance: Patient tolerated treatment well  Medical Staff Made Aware: Yes  Strengths: Ability to acquire knowledge, Housing layout, Premorbid level of function, Support and attitude of living partners  Barriers to Participation: Comorbidities  End of Session Communication: Bedside nurse  Assessment Comment: PT treatment session focused on bed mobility, transfers and gait training using RUE support on elevated bed bedrail with L knee block and DF assist wrap. Pt demonstrating progress with gait activities. PT recommending HIGH intensity therapy at DC  End of Session Patient Position: Up in chair, Alarm on  PT Plan  Inpatient/Swing Bed or Outpatient: Inpatient  PT Plan  Treatment/Interventions: Bed mobility, Transfer training, Gait training, Balance training, Neuromuscular re-education, Therapeutic exercise, Therapeutic activity  PT Plan: Ongoing PT  PT Frequency: 4 times per week  PT Discharge Recommendations: High intensity level of continued care  Equipment Recommended upon Discharge:  (TBD)  PT Recommended Transfer Status: Assist x2  PT - OK to Discharge: Yes (Per POC)    PT Visit Info:  PT Received  On: 06/09/25     General Visit Information:   General  Reason for Referral: Pt is a 59 yo male presenting from SNF due to mechanical fall. Pt with relevant PMHx of R MCA CVA with resultant L sided hemiparesis on 5/19 requiring prolonged hospital stay at [facility].  Referred By: Chiara Stanley MD  Past Medical History Relevant to Rehab: recent CVA (R MCA; 5/19/25), renal transplant, HTN, ESRD  Co-Treatment: OT  Co-Treatment Reason: for maximal pt safety and participation  Prior to Session Communication: Bedside nurse  Patient Position Received: Bed, 3 rail up, Alarm on  General Comment: Pt initially sleeping however easily awoken and highly motivated to participate with PT/OT treatment sessions    Subjective   Precautions:  Precautions  Medical Precautions: Fall precautions  Precautions Comment: L side flaccid hemiplegia     Date/Time Vitals Session Patient Position Pulse Resp SpO2 BP MAP (mmHg)    06/09/25 0958 During OT  --  74  --  --  --  --                 Objective   Pain:  Pain Assessment  Pain Assessment: 0-10  0-10 (Numeric) Pain Score: 0 - No pain  Cognition:  Cognition  Overall Cognitive Status:  (mild impairment noted however easily directed to all therapy activities)  Orientation Level: Oriented X4  Following Commands: Follows multistep commands with repetition  Insight: Mild  Impulsive: Mildly  Coordination:  Movements are Fluid and Coordinated: No  Upper Body Coordination: LUE flaccid  Lower Body Coordination: LLE flaccid with some motor activation noted with L hip AAROM  Postural Control:  Postural Control  Postural Control: Impaired  Head Control: Difficulty maintaining midline due to visual perceptual deficits/impaired midline perception  Trunk Control: L lateral LOB without bedrail to pull trunk towards the R with RUE  Righting Reactions: Pt able to pull trunk into midline using RUE  Posture Comment: Tendency for L lateral LOB  Static Sitting Balance  Static Sitting-Balance Support: Right upper extremity  supported  Static Sitting-Level of Assistance: Moderate assistance, Contact guard (initially mod A due to L lateral trunk lean (as well as retro) however able to progress to intermittent CGA with RUE support on bedrail as well as on lap however frequent verbal/tactile cues required to maintain due to tendency to return to L lat lean)  Static Sitting-Comment/Number of Minutes: x4 min  Static Standing Balance  Static Standing-Balance Support: Right upper extremity supported (LUE supported by therapist due to flaccidity)  Static Standing-Level of Assistance: Moderate assistance (L lateral lean and flexed trunk posture. Assist for balance and blocking L knee)  Static Standing-Comment/Number of Minutes: x4 trials completed ~1-2 min each trial  Extremity/Trunk Assessments:  LUE Tone  LUE Tone: Flaccid  Tone LLE  LLE Tone: Flaccid  Activity Tolerance:  Activity Tolerance  Endurance: Tolerates 30 min exercise with multiple rests  Treatments:  Bed Mobility  Bed Mobility: Yes  Bed Mobility 1  Bed Mobility 1: Supine to sitting  Level of Assistance 1: Moderate assistance, +2  Bed Mobility Comments 1: Assist partially at LEs (however pt able to utilize RLE to assist LLE to EOB) however primarily to assist with trunk and scooting anteriorly to EOB to establish SRAVANI    Ambulation/Gait Training  Ambulation/Gait Training Performed: Yes  Ambulation/Gait Training 1  Surface 1: Level tile  Device 1:  (Utilized RUE support on bedrail (bed elevated to match pt's height and simulate parallel bars))  Gait Support Devices: Gait belt (L ankle DF assist wrap)  Assistance 1: Maximum assistance (with chair follow)  Quality of Gait 1:  (Pt demonstrates tendency for excessive trunk flexion, L lateral lean and L knee flexion/buckling. Assist for balance, R lateral lean, L knee block during R swing phase, LLE advancement/placement and LUE support. VCs for sequencing)  Comments/Distance (ft) 1: 6ft 3x (Limited by length of bedrail. Pt impulsivity  noted with continued tendency to initial R step prior to cues provided or despite cues provided to hold step until L knee block established)  Transfers  Transfer: Yes  Transfer 1  Transfer From 1: Bed to  Transfer to 1: Chair with drop arm  Technique 1: Stand pivot  Transfer Device 1: Gait belt  Transfer Level of Assistance 1: Maximum assistance, +2  Trials/Comments 1: Completed stand pivot transition. Assist to elevate into standing and block L knee. Then assist to progress LLE towards target seat and then again block L knee during R step. Assist to control descent into chair surface. VCs for hand placement/sequencing  Transfers 2  Transfer From 2: Sit to  Transfer to 2: Stand  Technique 2: Sit to stand, Stand to sit  Transfer Device 2: Gait belt  Transfer Level of Assistance 2: Maximum assistance (x1)  Trials/Comments 2: VCs for hand placement/sequencing as well as trunk extension upon standing and R lateral lean to establish balance. Manual assist to block L knee, support LUE and assist to elevate into standing    Outcome Measures:  [facility] Basic Mobility  Turning from your back to your side while in a flat bed without using bedrails: A lot  Moving from lying on your back to sitting on the side of a flat bed without using bedrails: A lot  Moving to and from bed to chair (including a wheelchair): Total  Standing up from a chair using your arms (e.g. wheelchair or bedside chair): Total  To walk in hospital room: Total  Climbing 3-5 steps with railing: Total  Basic Mobility - Total Score: 8    Education Documentation  Precautions, taught by Mac Goodwin, PT at 6/9/2025 10:39 AM.  Learner: Patient  Readiness: Acceptance  Method: Explanation  Response: Verbalizes Understanding  Comment: see above    Body Mechanics, taught by Mac Goodwin, PT at 6/9/2025 10:39 AM.  Learner: Patient  Readiness: Acceptance  Method: Explanation  Response: Verbalizes Understanding  Comment: see above    Home Exercise Program,  taught by Mac Goodwin, PT at 6/9/2025 10:39 AM.  Learner: Patient  Readiness: Acceptance  Method: Explanation  Response: Verbalizes Understanding  Comment: see above    Mobility Training, taught by Mac oGodwin, PT at 6/9/2025 10:39 AM.  Learner: Patient  Readiness: Acceptance  Method: Explanation  Response: Verbalizes Understanding  Comment: see above    Education Comments  No comments found.        OP EDUCATION:       Encounter Problems       Encounter Problems (Active)       Balance       STG - Maintains dynamic sitting balance with upper extremity support (Progressing)       Start:  06/06/25    Expected End:  06/20/25       >10 min with SUP and midline orientation noted            Mobility       STG - Patient will ambulate (Progressing)       Start:  06/06/25    Expected End:  06/20/25       Mod A x2  >5ft LRAD            PT Transfers       STG - Patient will perform bed mobility (Progressing)       Start:  06/06/25    Expected End:  06/20/25       Mod A         STG - Patient will transfer sit to and from stand (Progressing)       Start:  06/06/25    Expected End:  06/20/25       Mod A            Pain - Adult

## 2025-06-09 NOTE — CARE PLAN
The patient's goals for the shift include  to get stronger and go home    The clinical goals for the shift include remain pt safety at all times

## 2025-06-09 NOTE — PROGRESS NOTES
Occupational Therapy    Occupational Therapy Treatment    Name: Davidson Khoury  MRN: [id number]  Department: [place] [address]  Room: [address] [place] [address]  Date: 06/09/25  Time Calculation  Start Time: 0928  Stop Time: 0958  Time Calculation (min): 30 min    Assessment:  OT Assessment: Pt presents decificts in ADLs, IADLs, functional mobility, bed mobility, activity tolerance, sensation, vision and UE strength.  Prognosis: Good  Barriers to Discharge Home: Caregiver assistance  Caregiver Assistance: Caregiver assistance needed per identified barriers - however, level of patient's required assistance exceeds assistance available at home  Cognition Needs: 24hr supervision for safety awareness needed  Physical Needs: 24hr mobility assistance needed, 24hr ADL assistance needed, High falls risk due to function or environment  Evaluation/Treatment Tolerance: Patient tolerated treatment well  Medical Staff Made Aware: Yes  End of Session Communication: Bedside nurse  End of Session Patient Position: Up in chair, Alarm on  Plan:  Treatment Interventions: ADL retraining, UE strengthening/ROM, Functional transfer training  OT Frequency: 4 times per week  OT Discharge Recommendations: High intensity level of continued care  OT Recommended Transfer Status: Assist of 2  OT - OK to Discharge: Yes (per POC)    Subjective     OT Visit Info:  OT Received On: 06/09/25  General:  General  Reason for Referral: Pt presented to ED with AMS, HA, and HTN (/113). Pt developed subtle L FD and ataxia that progressed to worsening L sided weakness. LKW 5/19 0400AM. 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 Patient OOW for TNK, s/p TICI 2b MT.  Co-Treatment: PT  Co-Treatment Reason: for maximal pt safety and participation  Prior to Session Communication: Bedside nurse  Patient Position Received: Bed, 3 rail up, Alarm on  Preferred Learning Style: verbal, visual, kinesthetic  General Comment: Pt cooperative and compliant with session,  motivated.  Precautions:  Medical Precautions: Fall precautions     Date/Time Vitals Session Patient Position Pulse Resp SpO2 BP MAP (mmHg)    06/09/25 0958 During OT  --  74  --  --  --  --            Pain Assessment:  Pain Assessment  Pain Assessment: 0-10  0-10 (Numeric) Pain Score: 0 - No pain    Objective   Cognition:  Orientation Level: Oriented X4  Following Commands: Follows one step commands with repetition  Safety Judgment: Decreased awareness of need for assistance  Problem Solving: Assistance required to identify errors made  Insight: Mild  Impulsive: Mildly  Activities of Daily Living: Grooming  Grooming Level of Assistance: Moderate assistance, Moderate verbal cues, Visual aid cues  Grooming Where Assessed: Chair  Grooming Comments: Assist for ROMMEL hand washing, close S with setup for face washing. VC provided to attend to L side.    LE Dressing  Sock Level of Assistance: Moderate assistance, Moderate verbal cues, Tactile cues, Visual aid cues  LE Dressing Where Assessed: Bed level  LE Dressing Comments: min assist for R sock. Mod assist for L sock with figure four dressing technique    Functional Standing Tolerance:     Bed Mobility/Transfers: Bed Mobility  Bed Mobility: Yes  Bed Mobility 1  Bed Mobility 1: Supine to sitting  Level of Assistance 1: Moderate assistance, +2, Moderate verbal cues, Minimal tactile cues  Bed Mobility Comments 1: HOB elevated, VC for sequencing. Pt able to hook LLE with assist of RLE. Assist for trunk control.    Transfers  Transfer: Yes  Transfer 1  Transfer From 1: Bed to  Transfer to 1: Chair with drop arm  Technique 1: Sit to stand, Stand to sit  Transfer Device 1: Gait belt  Transfer Level of Assistance 1: Maximum assistance, +2, Moderate verbal cues, Minimal tactile cues  Trials/Comments 1: Assist to stand pivot transfer to chair with LLE knee block  Transfers 2  Transfer From 2: Chair with drop arm to  Transfer to 2: Stand  Technique 2: Sit to stand, Stand to  sit  Transfer Device 2: Gait belt  Transfer Level of Assistance 2: Maximum assistance, Moderate verbal cues, Minimal tactile cues, +2  Trials/Comments 2: Pt completed x 3 this day with VC/TC for RUE placement and controlled descent.    Functional Mobility:  Functional Mobility  Functional Mobility Performed: Yes (Max assist x 1 with 1 to chair follow and use of bed rail for RUE support. See PT note.)  Sitting Balance:  Static Sitting Balance  Static Sitting-Balance Support: Feet supported, Right upper extremity supported  Static Sitting-Level of Assistance: Contact guard, Moderate assistance  Static Sitting-Comment/Number of Minutes: sitting EOB with assist ranging from CGA/mod assist d/t L lateral lean and retropulsion.     Therapy/Activity: Therapeutic Exercise  Therapeutic Exercise Performed: Yes  Therapeutic Exercise Activity 1: Pt completed AAROM shoulder flexion/extension/shrugs exercises x 10 reps with mod suppot at L elbow. (VC provided to attend to L side)     Vision:  Vision  Vision Comments: VC provided to attend to L side throughout session    Outcome Measures:  [facility] Daily Activity  Putting on and taking off regular lower body clothing: A lot  Bathing (including washing, rinsing, drying): A lot  Putting on and taking off regular upper body clothing: A lot  Toileting, which includes using toilet, bedpan or urinal: Total  Taking care of personal grooming such as brushing teeth: A little  Eating Meals: A little  Daily Activity - Total Score: 13    Education Documentation  Body Mechanics, taught by AIDAN Mendez at 6/9/2025 10:31 AM.  Learner: Patient  Readiness: Acceptance  Method: Explanation, Demonstration  Response: Needs Reinforcement    Precautions, taught by AIDAN Mendez at 6/9/2025 10:31 AM.  Learner: Patient  Readiness: Acceptance  Method: Explanation, Demonstration  Response: Needs Reinforcement    ADL Training, taught by AIDAN Mendez at 6/9/2025 10:31 AM.  Learner:  Patient  Readiness: Acceptance  Method: Explanation, Demonstration  Response: Needs Reinforcement    Education Comments  No comments found.      Goals:  Encounter Problems       Encounter Problems (Active)       ADLs       Patient will perform UB and LB sponge bathing with moderate assist level of assistance while seated and use of adaptive techniques/equipment. (Not Progressing)       Start:  06/06/25    Expected End:  06/20/25            Patient with complete upper body dressing with contact guard assist level of assistance donning and doffing all UE clothes with PRN adaptive equipment while seated. (Not Progressing)       Start:  06/06/25    Expected End:  06/20/25            Patient with complete lower body dressing with moderate assist level of assistance donning and doffing all LE clothes  with PRN adaptive equipment while seated. (Progressing)       Start:  06/06/25    Expected End:  06/20/25            Patient will complete daily grooming tasks with stand by assist level of assistance and PRN adaptive equipment while supported or edge of bed seated position. (Progressing)       Start:  06/06/25    Expected End:  06/20/25            Patient will complete toileting including hygiene clothing management/hygiene with moderate assist level of assistance and bedside commode. (Not Progressing)       Start:  06/06/25    Expected End:  06/20/25               MOBILITY       Patient will perform Functional mobility x Household distances/Community Distances with moderate assist level of assistance and least restrictive device in order to improve safety and functional mobility. (Progressing)       Start:  06/06/25    Expected End:  06/20/25               TRANSFERS       Patient will perform bed mobility minimal assist  level of assistance and bed rails in order to improve safety and independence with mobility (Progressing)       Start:  06/06/25    Expected End:  06/20/25            Patient will complete sit to stand  transfer with minimal assist  level of assistance and least restrictive device in order to improve safety and prepare for out of bed mobility. (Progressing)       Start:  06/06/25    Expected End:  06/20/25               VISION       Patient will visually attend to Left side of Tray, Room, and Body using compensatory strategies and supervision level of assistance and minimal tactile and verbal.  (Progressing)       Start:  06/06/25    Expected End:  06/20/25

## 2025-06-09 NOTE — PROGRESS NOTES
Speech-Language Pathology    SLP Adult Inpatient  Speech-Language Pathology Treatment     Patient Name: Davidson Khoury  MRN: [id number]  Today's Date: 6/9/2025  Time Calculation  Start Time: 1012  Stop Time: 1026  Time Calculation (min): 14 min       Recommendations:   Puree diet  Cont aspiration precautions  Meds crushed in puree    Long-term Dysphagia Goal(s): Established at [facility] 6/6 (see below for details re: progress toward goals)   Patient will tolerate recommended diet without clinical signs of aspiration in 95% observed opportunities, and/or e/o worsening respiratory functioning      Short-term Dysphagia Goal(s): Established at [facility] 6/6 (see below for details re: progress toward goals)   - Pt will demo independent use of safe swallow strategies with 95% acc  - Pt will trial advancing PO consistency trials with SLP ONLY  - Pt/family will demo comprehension of education        Long-term Speech-Language Goal(s): Established at [facility] 6/6 (see below for details re: progress toward goals)   Pt to communicate effectively for ADLs independently       Short-term Speech-Language Goal(s): Established at [facility] 6/6 (see below for details re: progress toward goals)   Pt will:  - participate in speech exercises to improve communication for ADLs with % intelligibility   - demo independence with compensatory strategies with 80% acc  - demo/report comprehension of all education provided       SLP Assessment:  SLP TX Intervention Outcome: Making Progress Towards Goals  Treatment Tolerance: Patient tolerated treatment well  Education Provided: Yes       Plan:  Inpatient/Swing Bed or Outpatient: Inpatient  SLP TX Plan: Continue Plan of Care  SLP Plan: Skilled SLP  SLP Frequency: 3x per week  Duration: 2 weeks  SLP Discharge Recommendations: Continue skilled SLP services at the next level of care, Continue home program upon D/C  Discussed POC: Patient  Discussed Risks/Benefits: Yes  Patient/Caregiver Agreeable: Yes  SLP - OK to  "Discharge: Yes      Subjective   General Visit Information:  Pt seen bedside for dysphagia and speech tx.       Baseline Assessment:  Respiratory Status: Room air  Behavior/Cognition: Alert, Cooperative, Pleasant mood, Requires cueing  Patient Positioning: Upright in Chair       Pain Assessment:  Pain Assessment  0-10 (Numeric) Pain Score: 0 - No pain      Objective     Therapeutic Swallow:  Therapeutic Swallow Intervention : Compensatory Strategies, PO Trials  Solid Diet Recommendations: Pureed/extremely thick  (IDDSI Level 4)  Liquid Diet Recommendations: Thin (IDDSI Level 0)    Pt already completed breakfast by time of this session; Pt reports he enjoyed meal and reported no difficulties.  No residue noted in oral cavity from meal.  Pt trialed sips of thin liquids by straw throughout session, with approx 6-8 sips total taken.   100% of trials without overt s/s airway aspiration noted      Motor Speech:  Motor Speech Intervention : Compensatory Speech Strategies, Word Repetitions, Phrase Repetitions, Sentence Repetitions    Introduced compensatory strategies, which Pt reports as being unfamiliar, with main focus being on being (appropriately) \"loud\" and exaggerating speech to improve speech intelligibility.   Initial speech intelligibility, prior to \"loud\" focus, was approx 50% to this listener. After cues for \"loud\", speech noted at % intelligibility throughout remainder of session, with min-no cues needed.    Speech trials included: cued repetition and conversation level tasks  1 word repetition: 4/4 (100%)  1 word with cued repeated repetition (4+ times): 1/3x independently, up to 2/3x after min-mod cues  2-3 word repetition: 3/3 (100%)  4+ word repetition: 0/2x (0%) - Pt imitated final two words of phrase/sentence only    Trialed circumlocution strategy with use of \"I Spy\" game, with 3/3x guessing correctly, 3/3x giving clues appropriately (100% overall independently) with use of 2 clues and sought item " "within same room.    Dysarthria of speech persists, with continued Left facial droop noted, however speech intelligibility significantly improved with use of \"loud\" strategy.      Patient Education:  Pt reports comprehension of all information discussed, including aspiration risks/precautions (including concern for pneumonia), speech deficits, and communication strategies.        "

## 2025-06-09 NOTE — CARE PLAN
The clinical goals for the shift include remain pt safety at all times    Problem: Pain - Adult  Goal: Verbalizes/displays adequate comfort level or baseline comfort level  Outcome: Progressing     Problem: Safety - Adult  Goal: Free from fall injury  Outcome: Progressing     Problem: Chronic Conditions and Co-morbidities  Goal: Patient's chronic conditions and co-morbidity symptoms are monitored and maintained or improved  Outcome: Progressing     Problem: Skin  Goal: Participates in plan/prevention/treatment measures  Outcome: Progressing  Flowsheets (Taken 6/9/2025 0453)  Participates in plan/prevention/treatment measures: Elevate heels  Goal: Prevent/manage excess moisture  Outcome: Progressing  Goal: Prevent/minimize sheer/friction injuries  Outcome: Progressing  Goal: Promote/optimize nutrition  Outcome: Progressing  Flowsheets (Taken 6/9/2025 0976)  Promote/optimize nutrition: Offer water/supplements/favorite foods

## 2025-06-09 NOTE — PROGRESS NOTES
06/09/25 1554   Discharge Planning   Home or Post Acute Services Post acute facilities (Rehab/SNF/etc)   Type of Post Acute Facility Services Skilled nursing   Expected Discharge Disposition SNF  ([facility])   Does the patient need discharge transport arranged? Yes   RoundTrip coordination needed? Yes   Has discharge transport been arranged? Yes   What day is the transport expected? 06/09/25   What time is the transport expected? 1600  (report: [phone]. Facility, care team and significant other aware. DSC to send dc papework to facility.)

## 2025-06-09 NOTE — NURSING NOTE
Patient has been discharged to [facility]. Patient report given to transporters from [employer]. Patient left with belongings. IV removed, vitals stable.

## 2025-06-11 ENCOUNTER — NURSING HOME VISIT (OUTPATIENT)
Dept: POST ACUTE CARE | Facility: EXTERNAL LOCATION | Age: 60
End: 2025-06-11
Payer: COMMERCIAL

## 2025-06-11 DIAGNOSIS — D84.9 IMMUNOSUPPRESSION: ICD-10-CM

## 2025-06-11 DIAGNOSIS — I12.9 HYPERTENSION WITH RENAL DISEASE: ICD-10-CM

## 2025-06-11 DIAGNOSIS — Z74.09 IMPAIRED FUNCTIONAL MOBILITY, BALANCE, GAIT, AND ENDURANCE: ICD-10-CM

## 2025-06-11 DIAGNOSIS — Z94.0 KIDNEY TRANSPLANT RECIPIENT (HHS-HCC): ICD-10-CM

## 2025-06-11 DIAGNOSIS — I63.411 ACUTE CEREBROVASCULAR ACCIDENT (CVA) DUE TO EMBOLISM OF RIGHT MIDDLE CEREBRAL ARTERY (MULTI): Primary | ICD-10-CM

## 2025-06-11 PROCEDURE — 99310 SBSQ NF CARE HIGH MDM 45: CPT | Performed by: PHYSICIAN ASSISTANT

## 2025-06-11 NOTE — LETTER
Patient: Davidson Khoury  : 1965    Encounter Date: 2025  Name: Davidson Khoury  YOB: 1965    Chief complaint: R MCA stroke.    HPI: This is a 60 year old  male who has a medical history remarkable for ESRD secondary to hypertension, renal transplant 2019 on Cellcept, R MCA stroke, and hyperlipidemia. Patient presented to the ER on 5/15/2025 with complaint of altered mental status, headache, left hand numbness, and blurry vision. He was worked up at that time and discharged with concern for potential TIA  He returned to the ER on 2025 complaint of altered mental status , bradycardia.  He was hypertensive with blood pressure of 235/130 and bradycardic with HR in the 30's. He was given atropine with improvement in heart rate. He was started on Cardene drip for hypertension. Patient mentation improved. Later that day, patient was noted to be  neglecting the left, with right gaze deviation, left upper and lower extremity drift. Neurology order CTA and found R MCA occlusion. Patient was not a candidate for TNK. Patient had mechanical  thrombectomy performed by Dr. Keny Bernal on 2025. He was started on DAPT. Modified barium swallow revealed dysphagia and was placed on pureed diet and thicken liquids. He was discharged to rehab facility. Patient returned the next day after a fall. Patient reports that he was reaching for something on his bedside table and slipped out of bed. CT head demonstrated no acute intracranial hemorrhage. Expected appearance of evolving changes from right MCA ischemic infarct. Degenerative chronic changes of the cervical spine similar to previous imaging. He was discharged to [facility] for rehab per patient request.    Cerebrovascular Accident  The current episode started 1 to 4 weeks ago. The problem has been gradually improving. Pertinent negatives include no abdominal pain, anorexia, arthralgias, change in bowel  "habit, chest pain, chills, coughing, fever, headaches, nausea, urinary symptoms or visual change. Nothing aggravates the symptoms. He has tried relaxation and rest for the symptoms. The treatment provided moderate relief.     Review of systems:   ROS negative except were noted in HPI.    Code Status: full code    /78   Pulse 90   Temp 36.5 °C (97.7 °F)   Resp 16   Ht 1.727 m (5' 8\")   Wt 55.3 kg (122 lb)   SpO2 98%   BMI 18.55 kg/m²      Physical Exam  Constitutional:       General: He is not in acute distress.  HENT:      Head: Normocephalic.      Nose: Nose normal.      Mouth/Throat:      Mouth: Mucous membranes are moist.   Eyes:      Pupils: Pupils are equal, round, and reactive to light.      Comments: Poor vision L eye.   Cardiovascular:      Rate and Rhythm: Normal rate and regular rhythm.      Pulses: Normal pulses.   Pulmonary:      Effort: Pulmonary effort is normal.      Breath sounds: Normal breath sounds.   Abdominal:      General: Bowel sounds are normal. There is no distension.      Palpations: Abdomen is soft.      Tenderness: There is no abdominal tenderness.   Genitourinary:     Comments: Voiding. Renal transplant  Musculoskeletal:      Comments: L hemiparesis.   Skin:     General: Skin is warm and dry.      Capillary Refill: Capillary refill takes less than 2 seconds.   Neurological:      Mental Status: He is alert and oriented to person, place, and time.      Comments: L sided weakness.   Psychiatric:         Mood and Affect: Mood normal.         Behavior: Behavior normal.        Medications reviewed during visit at facility.  Mycophenolate Mofetil Oral Capsule 250 MG 3 capsules bid  Losartan 25 mg po daily  Aspirin 81 mg po daily  Miralax 17 grams po daily  Clonidine 0.2 mg po bid  Potassium chloride 20 meq po daily  Senna Plus two tablets po q hs  Tamsulosin 0.8 mg po daily  Tacrolimus 2 mg po bid  Atorvastatin 40 mg po daily  Cetrizine 10 mg po daily  Plavix 75 mg po " daily  Pantoprazole 40 mg po daily  Lasix 20 mg po daily  Tylenol 650 mg po q 4 hours prn    Labs reviewed at facility:   Contains abnormal data Comprehensive metabolic panel  Order: [id number]   Status: Final result    Test Result Released: No (inaccessible in [facility])    0 Result Notes            Component  Ref Range & Units 6 d ago  (6/8/25) 9 d ago  (6/5/25) 9 d ago  (6/5/25) 9 d ago  (6/5/25) 11 d ago  (6/3/25) 13 d ago  (6/1/25) 2 wk ago  (5/26/25)   Glucose  74 - 99 mg/dL 110 High  82  R, CM 72 Low  105 High  117 High    Sodium  136 - 145 mmol/L 137 140  135 Low  141 140 141   Potassium  3.5 - 5.3 mmol/L 3.6 4.4 4.0 6.2 High Panic  CM 4.2 4.0 4.6   Chloride  98 - 107 mmol/L 107 108 High   107 107 106 108 High    Bicarbonate  21 - 32 mmol/L 22 14 Low   19 Low  24 25 26   Anion Gap  10 - 20 mmol/L 12 22 High   15 14 13 12   Urea Nitrogen  6 - 23 mg/dL 21 22  22 26 High  28 High  35 High    Creatinine  0.50 - 1.30 mg/dL 1.74 High  1.79 High   1.62 High  1.79 High  1.78 High  1.82 High    eGFR  >60 mL/min/1.73m*2 45 Low  43 Low  CM  49 Low  CM 43 Low  CM 43 Low  CM 42 Low  CM   Comment: Calculations of estimated GFR are performed using the 2021 CKD-EPI Study Refit equation without the race variable for the IDMS-Traceable creatinine methods.  https://jasn.asnjournals.org/content/early/2021/09/22/ASN.0682328074   Calcium  8.6 - 10.3 mg/dL 9.3 9.7  9.3 9.5 R 9.1 R 9.1 R   Albumin  3.4 - 5.0 g/dL 3.7 4.0  4.0 3.7 3.8 3.6   Alkaline Phosphatase  33 - 120 U/L 127 High  128 High   126 High       Total Protein  6.4 - 8.2 g/dL 6.3 Low  6.9  7.1      AST  9 - 39 U/L 15 21  29 CM      Bilirubin, Total  0.0 - 1.2 mg/dL 0.8 0.9  0.9      ALT  10 - 52 U/L 14 18 CM  20 CM      Comment: Patients treated with Sulfasalazine may generate falsely decreased results for ALT.   Resulting [facility]             Specimen Collected: 06/08/25 11:47 Last Resulted: 06/08/25 12:27   CBC and Auto  Differential  Order: [id number]   Status: Final result    Test Result Released: No (inaccessible in [facility])    0 Result Notes            Component  Ref Range & Units 9 d ago  (6/5/25) 11 d ago  (6/3/25) 13 d ago  (6/1/25) 2 wk ago  (5/26/25) 2 wk ago  (5/25/25) 3 wk ago  (5/24/25) 3 wk ago  (5/23/25)   WBC  4.4 - 11.3 x10*3/uL 5.9 6.7 5.4 5.3 4.4 6.3 8.4   nRBC  0.0 - 0.0 /100 WBCs 0.0 0.0 0.0 0.0 0.0 0.0 0.0   RBC  4.50 - 5.90 x10*6/uL 5.04 4.56 4.48 Low  4.75 4.77 4.87 5.17   Hemoglobin  13.5 - 17.5 g/dL 15.3 13.8 13.7 13.9 14.2 14.7 15.2   Hematocrit  41.0 - 52.0 % 46.4 41.5 41.6 44.4 44.3 45.3 44.9   MCV  80 - 100 fL 92 91 93 94 93 93 87   MCH  26.0 - 34.0 pg 30.4 30.3 30.6 29.3 29.8 30.2 29.4   MCHC  32.0 - 36.0 g/dL 33.0 33.3 32.9 31.3 Low  32.1 32.5 33.9   RDW  11.5 - 14.5 % 13.5 13.2 13.6 14.6 High  14.6 High  14.7 High  14.7 High    Platelets  150 - 450 x10*3/uL 151 155 136 Low  124 Low  116 Low  118 Low  135 Low    Neutrophils %  40.0 - 80.0 % 68.9 70.1 71.3 73.7 68.7  77.1   Immature Granulocytes %, Automated  0.0 - 0.9 % 0.2 0.3 CM 0.2 CM 0.6 CM 0.2 CM 0.3 CM 0.4 CM   Comment: Immature Granulocyte Count (IG) includes promyelocytes, myelocytes and metamyelocytes but does not include bands. Percent differential counts (%) should be interpreted in the context of the absolute cell counts (cells/UL).   Lymphocytes %  13.0 - 44.0 % 21.0 19.5 16.2 13.5 17.6  15.2   Monocytes %  2.0 - 10.0 % 9.1 9.3 11.5 11.1 12.6  6.5   Eosinophils %  0.0 - 6.0 % 0.5 0.6 0.6 0.9 0.7  0.7   Basophils %  0.0 - 2.0 % 0.3 0.2 0.2 0.2 0.2  0.1   Neutrophils Absolute  1.20 - 7.70 x10*3/uL 4.07 4.66 CM 3.83 CM 3.93 CM 3.00 CM  6.47 CM   Comment: Percent differential counts (%) should be interpreted in the context of the absolute cell counts (cells/uL).   Immature Granulocytes Absolute, Automated  0.00 - 0.70 x10*3/uL 0.01 0.02 0.01 0.03 0.01 0.02 0.03   Lymphocytes Absolute  1.20 - 4.80 x10*3/uL 1.24 1.30 0.87 Low  0.72 Low  0.77  Low   1.28   Monocytes Absolute  0.10 - 1.00 x10*3/uL 0.54 0.62 0.62 0.59 0.55  0.55   Eosinophils Absolute  0.00 - 0.70 x10*3/uL 0.03 0.04 0.03 0.05 0.03  0.06   Basophils Absolute  0.00 - 0.10 x10*3/uL 0.02 0.01 0.01 0.01 0.01  0.01   Resulting Agency [facility]             Specimen Collected: 06/05/25 18:19 Last Resulted: 06/05/25 18:39       Assessment/Plan   Problem List Items Addressed This Visit       Immunosuppression    Renal transplant team to adjust medications. Patient is follow q month.         Kidney transplant recipient ([facility])    Tacrolimus 2 mg po bid, and Mycophenolate Mofetil Oral Capsule 250 MG 3 capsules bid         Acute cerebrovascular accident (CVA) due to embolism of right middle cerebral artery (Multi) - Primary    Schedule follow up appointment with neurology. Continue with Plavix, Aspirin and Statin.          Hypertension with renal disease    BMP CBC with diff q Tuesdays. Review BP readings. Losartan 25 mg po daily, and Clonidine 0.2 mg po bid         Impaired functional mobility, balance, gait, and endurance    PT and OT to assess and treat.            Time:I spent 45 minutes or greater with the patient. Greater than 50% of this time was spent in counseling and or coordination of care. The time includes prep time of reviewing vital signs, report from direct nursing staff and or therapists, hospital documentation, reviewing labs, radiographs, diagnostic tests and or consultations, time directly spent with the patient interviewing, examining, and education regarding diagnosis, treatments, and medications, as well as documentation in the electronic medical record, and reviewing the plan of care and any new orders with the patient, nursing staff and other staff directly related to the patients care.      Phuc Clark PA-C     Electronically Signed By: Phuc Clark PA-C   6/14/25  5:33 PM

## 2025-06-12 ENCOUNTER — NURSING HOME VISIT (OUTPATIENT)
Dept: POST ACUTE CARE | Facility: EXTERNAL LOCATION | Age: 60
End: 2025-06-12
Payer: COMMERCIAL

## 2025-06-12 DIAGNOSIS — I12.9 HYPERTENSION WITH RENAL DISEASE: Primary | ICD-10-CM

## 2025-06-12 DIAGNOSIS — I63.411 ACUTE CEREBROVASCULAR ACCIDENT (CVA) DUE TO EMBOLISM OF RIGHT MIDDLE CEREBRAL ARTERY (MULTI): ICD-10-CM

## 2025-06-12 DIAGNOSIS — I12.0 UNSPECIFIED HYPERTENSIVE KIDNEY DISEASE WITH CHRONIC KIDNEY DISEASE STAGE V OR END STAGE RENAL DISEASE(403.91) (MULTI): ICD-10-CM

## 2025-06-12 PROCEDURE — 99306 1ST NF CARE HIGH MDM 50: CPT | Performed by: INTERNAL MEDICINE

## 2025-06-12 NOTE — LETTER
Patient: Davidson Khoury  : 1965    Encounter Date: 2025    Subjective  Patient ID: Davidson Khoury is a 60 y.o. male who presents for No chief complaint on file..  HPI  Past medical history CVA renal transplant hypertension  Recent hospital admission reported fall left-sided weakness CT head negative    Patient states he currently feels okay no active symptoms resting              Health Maintenance:      Colonoscopy:      Mammogram:      Pelvic/Pap:      Low dose chest CT:      Aorta duplex:      Optho:      Podiatry:        Vaccines:      Refer to Epic Vaccination Log        ROS:      General: denies fever/chills/weight loss      Head: denies HA/trauma/masses/dizziness      Eyes: denies vision change/loss of vision/blurry vision/diplopia/eye pain      Ears: denies hearing loss/tinnitus/otalgia/otorrhea      Nose: denies nasal drainage/anosmia      Throat: denies dysphagia/odynophagia      Lymphatics: denies lymph node swelling      Breast: denies masses/discharge/dimpling/skin changes      Cardiac: denies CP/palpitations/orthopnea/PND      Pulmonary: denies dyspnea/cough/wheezing      GI: denies abd pain/n/v/diarrhea/melena/hematochezia/hematemesis      : denies dysuria/hematuria/change frequency      Genital: denies genital discharge/lesions      Skin: denies rashes/lesions/masses      MSK: Left-sided weakness since the stroke denies weakness/swelling/edema/gait imbalance/pain      Neuro: denies paresthesias/seizures/dysarthria      Psych: denies depression/anxiety/suicidal or homicidal ideations            Objective  There were no vitals taken for this visit.     Physical Exam:     General: AO3, NAD     Head: atraumatic/NC     Eyes: EOMI/PERRLA. Negative APD     Ears: TM pearly gray, EAC clear. No lesions or erythema     Nose: symmetric nares, no discharge     Throat: trachea midline, uvula midline pink mucosa. No thyromegaly     Lymphatics: no cervical/supraclavicular/ant or posterior cervical  "adenopathy/axillary/inguinal adenopathy     Breast: not examined     Chest: no deformity or tenderness to palpation     Pulm: CTA b/l, no wheeze/rhonchi/rales. nonlabored     Cardiac: RRR +s1s2, no m/r/g.      GI: soft, NT/ND. Normoactive Bsx4. No rebound/guarding.     Rectal: not examined     Genital: not examined     MSK: Left AV fistula site 1 out of 5 strength left sided otherwise right-sided 5/5 strength UE LE. No edema/clubbing/cyanosis     Skin: no rashes/lesions     Vascular: 2+ palp DP PT radials b/l. Negative carotid bruit     Neuro: CNII-XII intact. No focal deficits. Reflexes 2/4 brachioradialis bicep tricep patellar achilles. Finger to nose intact.     Psych: appropriate mood/affect                    No results found for: \"BMPR1A\", \"CBCDIF\"      Assessment/Plan  Diagnoses and all orders for this visit:  Hypertension with renal disease  Unspecified hypertensive kidney disease with chronic kidney disease stage V or end stage renal disease(403.91) (Multi)  Acute cerebrovascular accident (CVA) due to embolism of right middle cerebral artery (Multi)    PT OT SLP    Neurology referral and follow-up     transplant referral and follow-up    Every Tuesday CBC BMP    Antolin Valera DO, FACOI       Antolin Valera DO    Electronically Signed By: Antolin Valera DO   6/17/25  6:04 PM  "

## 2025-06-14 VITALS
HEART RATE: 90 BPM | BODY MASS INDEX: 18.49 KG/M2 | SYSTOLIC BLOOD PRESSURE: 148 MMHG | RESPIRATION RATE: 16 BRPM | OXYGEN SATURATION: 98 % | HEIGHT: 68 IN | TEMPERATURE: 97.7 F | WEIGHT: 122 LBS | DIASTOLIC BLOOD PRESSURE: 78 MMHG

## 2025-06-14 PROBLEM — I12.9 HYPERTENSION WITH RENAL DISEASE: Status: ACTIVE | Noted: 2025-06-14

## 2025-06-14 PROBLEM — Z74.09 IMPAIRED FUNCTIONAL MOBILITY, BALANCE, GAIT, AND ENDURANCE: Status: ACTIVE | Noted: 2025-06-14

## 2025-06-14 ASSESSMENT — ENCOUNTER SYMPTOMS
ARTHRALGIAS: 0
VISUAL CHANGE: 0
FEVER: 0
CHILLS: 0
ABDOMINAL PAIN: 0
COUGH: 0
CHANGE IN BOWEL HABIT: 0
HEADACHES: 0
ANOREXIA: 0
NAUSEA: 0

## 2025-06-14 NOTE — PROGRESS NOTES
6/11/2025  Name: Davidson Khoury  YOB: 1965    Chief complaint: R MCA stroke.    HPI: This is a 60 year old  male who has a medical history remarkable for ESRD secondary to hypertension, renal transplant 2019 on Cellcept, R MCA stroke, and hyperlipidemia. Patient presented to the ER on 5/15/2025 with complaint of altered mental status, headache, left hand numbness, and blurry vision. He was worked up at that time and discharged with concern for potential TIA  He returned to the ER on 5/19/2025 complaint of altered mental status , bradycardia.  He was hypertensive with blood pressure of 235/130 and bradycardic with HR in the 30's. He was given atropine with improvement in heart rate. He was started on Cardene drip for hypertension. Patient mentation improved. Later that day, patient was noted to be  neglecting the left, with right gaze deviation, left upper and lower extremity drift. Neurology order CTA and found R MCA occlusion. Patient was not a candidate for TNK. Patient had mechanical  thrombectomy performed by Dr. Keny Bernal on 5/19/2025. He was started on DAPT. Modified barium swallow revealed dysphagia and was placed on pureed diet and thicken liquids. He was discharged to rehab facility. Patient returned the next day after a fall. Patient reports that he was reaching for something on his bedside table and slipped out of bed. CT head demonstrated no acute intracranial hemorrhage. Expected appearance of evolving changes from right MCA ischemic infarct. Degenerative chronic changes of the cervical spine similar to previous imaging. He was discharged to [facility] for rehab per patient request.    Cerebrovascular Accident  The current episode started 1 to 4 weeks ago. The problem has been gradually improving. Pertinent negatives include no abdominal pain, anorexia, arthralgias, change in bowel habit, chest pain, chills, coughing, fever, headaches, nausea, urinary  "symptoms or visual change. Nothing aggravates the symptoms. He has tried relaxation and rest for the symptoms. The treatment provided moderate relief.     Review of systems:   ROS negative except were noted in HPI.    Code Status: full code    /78   Pulse 90   Temp 36.5 °C (97.7 °F)   Resp 16   Ht 1.727 m (5' 8\")   Wt 55.3 kg (122 lb)   SpO2 98%   BMI 18.55 kg/m²      Physical Exam  Constitutional:       General: He is not in acute distress.  HENT:      Head: Normocephalic.      Nose: Nose normal.      Mouth/Throat:      Mouth: Mucous membranes are moist.   Eyes:      Pupils: Pupils are equal, round, and reactive to light.      Comments: Poor vision L eye.   Cardiovascular:      Rate and Rhythm: Normal rate and regular rhythm.      Pulses: Normal pulses.   Pulmonary:      Effort: Pulmonary effort is normal.      Breath sounds: Normal breath sounds.   Abdominal:      General: Bowel sounds are normal. There is no distension.      Palpations: Abdomen is soft.      Tenderness: There is no abdominal tenderness.   Genitourinary:     Comments: Voiding. Renal transplant  Musculoskeletal:      Comments: L hemiparesis.   Skin:     General: Skin is warm and dry.      Capillary Refill: Capillary refill takes less than 2 seconds.   Neurological:      Mental Status: He is alert and oriented to person, place, and time.      Comments: L sided weakness.   Psychiatric:         Mood and Affect: Mood normal.         Behavior: Behavior normal.        Medications reviewed during visit at facility.  Mycophenolate Mofetil Oral Capsule 250 MG 3 capsules bid  Losartan 25 mg po daily  Aspirin 81 mg po daily  Miralax 17 grams po daily  Clonidine 0.2 mg po bid  Potassium chloride 20 meq po daily  Senna Plus two tablets po q hs  Tamsulosin 0.8 mg po daily  Tacrolimus 2 mg po bid  Atorvastatin 40 mg po daily  Cetrizine 10 mg po daily  Plavix 75 mg po daily  Pantoprazole 40 mg po daily  Lasix 20 mg po daily  Tylenol 650 mg po q 4 " hours prn    Labs reviewed at facility:   Contains abnormal data Comprehensive metabolic panel  Order: [id number]   Status: Final result    Test Result Released: No (inaccessible in [facility])    0 Result Notes            Component  Ref Range & Units 6 d ago  (6/8/25) 9 d ago  (6/5/25) 9 d ago  (6/5/25) 9 d ago  (6/5/25) 11 d ago  (6/3/25) 13 d ago  (6/1/25) 2 wk ago  (5/26/25)   Glucose  74 - 99 mg/dL 110 High  82  R, CM 72 Low  105 High  117 High    Sodium  136 - 145 mmol/L 137 140  135 Low  141 140 141   Potassium  3.5 - 5.3 mmol/L 3.6 4.4 4.0 6.2 High Panic  CM 4.2 4.0 4.6   Chloride  98 - 107 mmol/L 107 108 High   107 107 106 108 High    Bicarbonate  21 - 32 mmol/L 22 14 Low   19 Low  24 25 26   Anion Gap  10 - 20 mmol/L 12 22 High   15 14 13 12   Urea Nitrogen  6 - 23 mg/dL 21 22  22 26 High  28 High  35 High    Creatinine  0.50 - 1.30 mg/dL 1.74 High  1.79 High   1.62 High  1.79 High  1.78 High  1.82 High    eGFR  >60 mL/min/1.73m*2 45 Low  43 Low  CM  49 Low  CM 43 Low  CM 43 Low  CM 42 Low  CM   Comment: Calculations of estimated GFR are performed using the 2021 CKD-EPI Study Refit equation without the race variable for the IDMS-Traceable creatinine methods.  https://jasn.asnjournals.org/content/early/2021/09/22/ASN.2824436002   Calcium  8.6 - 10.3 mg/dL 9.3 9.7  9.3 9.5 R 9.1 R 9.1 R   Albumin  3.4 - 5.0 g/dL 3.7 4.0  4.0 3.7 3.8 3.6   Alkaline Phosphatase  33 - 120 U/L 127 High  128 High   126 High       Total Protein  6.4 - 8.2 g/dL 6.3 Low  6.9  7.1      AST  9 - 39 U/L 15 21  29 CM      Bilirubin, Total  0.0 - 1.2 mg/dL 0.8 0.9  0.9      ALT  10 - 52 U/L 14 18 CM  20 CM      Comment: Patients treated with Sulfasalazine may generate falsely decreased results for ALT.   Resulting [facility]             Specimen Collected: 06/08/25 11:47 Last Resulted: 06/08/25 12:27   CBC and Auto Differential  Order: [id number]   Status: Final result    Test Result Released: No (inaccessible  in  MyChart)    0 Result Notes            Component  Ref Range & Units 9 d ago  (6/5/25) 11 d ago  (6/3/25) 13 d ago  (6/1/25) 2 wk ago  (5/26/25) 2 wk ago  (5/25/25) 3 wk ago  (5/24/25) 3 wk ago  (5/23/25)   WBC  4.4 - 11.3 x10*3/uL 5.9 6.7 5.4 5.3 4.4 6.3 8.4   nRBC  0.0 - 0.0 /100 WBCs 0.0 0.0 0.0 0.0 0.0 0.0 0.0   RBC  4.50 - 5.90 x10*6/uL 5.04 4.56 4.48 Low  4.75 4.77 4.87 5.17   Hemoglobin  13.5 - 17.5 g/dL 15.3 13.8 13.7 13.9 14.2 14.7 15.2   Hematocrit  41.0 - 52.0 % 46.4 41.5 41.6 44.4 44.3 45.3 44.9   MCV  80 - 100 fL 92 91 93 94 93 93 87   MCH  26.0 - 34.0 pg 30.4 30.3 30.6 29.3 29.8 30.2 29.4   MCHC  32.0 - 36.0 g/dL 33.0 33.3 32.9 31.3 Low  32.1 32.5 33.9   RDW  11.5 - 14.5 % 13.5 13.2 13.6 14.6 High  14.6 High  14.7 High  14.7 High    Platelets  150 - 450 x10*3/uL 151 155 136 Low  124 Low  116 Low  118 Low  135 Low    Neutrophils %  40.0 - 80.0 % 68.9 70.1 71.3 73.7 68.7  77.1   Immature Granulocytes %, Automated  0.0 - 0.9 % 0.2 0.3 CM 0.2 CM 0.6 CM 0.2 CM 0.3 CM 0.4 CM   Comment: Immature Granulocyte Count (IG) includes promyelocytes, myelocytes and metamyelocytes but does not include bands. Percent differential counts (%) should be interpreted in the context of the absolute cell counts (cells/UL).   Lymphocytes %  13.0 - 44.0 % 21.0 19.5 16.2 13.5 17.6  15.2   Monocytes %  2.0 - 10.0 % 9.1 9.3 11.5 11.1 12.6  6.5   Eosinophils %  0.0 - 6.0 % 0.5 0.6 0.6 0.9 0.7  0.7   Basophils %  0.0 - 2.0 % 0.3 0.2 0.2 0.2 0.2  0.1   Neutrophils Absolute  1.20 - 7.70 x10*3/uL 4.07 4.66 CM 3.83 CM 3.93 CM 3.00 CM  6.47 CM   Comment: Percent differential counts (%) should be interpreted in the context of the absolute cell counts (cells/uL).   Immature Granulocytes Absolute, Automated  0.00 - 0.70 x10*3/uL 0.01 0.02 0.01 0.03 0.01 0.02 0.03   Lymphocytes Absolute  1.20 - 4.80 x10*3/uL 1.24 1.30 0.87 Low  0.72 Low  0.77 Low   1.28   Monocytes Absolute  0.10 - 1.00 x10*3/uL 0.54 0.62 0.62 0.59 0.55  0.55    Eosinophils Absolute  0.00 - 0.70 x10*3/uL 0.03 0.04 0.03 0.05 0.03  0.06   Basophils Absolute  0.00 - 0.10 x10*3/uL 0.02 0.01 0.01 0.01 0.01  0.01   Resulting Agency [facility]             Specimen Collected: 06/05/25 18:19 Last Resulted: 06/05/25 18:39       Assessment/Plan    Problem List Items Addressed This Visit       Immunosuppression    Renal transplant team to adjust medications. Patient is follow q month.         Kidney transplant recipient ([facility])    Tacrolimus 2 mg po bid, and Mycophenolate Mofetil Oral Capsule 250 MG 3 capsules bid         Acute cerebrovascular accident (CVA) due to embolism of right middle cerebral artery (Multi) - Primary    Schedule follow up appointment with neurology. Continue with Plavix, Aspirin and Statin.          Hypertension with renal disease    BMP CBC with diff q Tuesdays. Review BP readings. Losartan 25 mg po daily, and Clonidine 0.2 mg po bid         Impaired functional mobility, balance, gait, and endurance    PT and OT to assess and treat.            Time:I spent 45 minutes or greater with the patient. Greater than 50% of this time was spent in counseling and or coordination of care. The time includes prep time of reviewing vital signs, report from direct nursing staff and or therapists, hospital documentation, reviewing labs, radiographs, diagnostic tests and or consultations, time directly spent with the patient interviewing, examining, and education regarding diagnosis, treatments, and medications, as well as documentation in the electronic medical record, and reviewing the plan of care and any new orders with the patient, nursing staff and other staff directly related to the patients care.      Phuc Clark PA-C

## 2025-06-14 NOTE — ASSESSMENT & PLAN NOTE
BMP CBC with diff q Tuesdays. Review BP readings. Losartan 25 mg po daily, and Clonidine 0.2 mg po bid

## 2025-06-17 NOTE — PROGRESS NOTES
Subjective   Patient ID: Davidson Khoury is a 60 y.o. male who presents for No chief complaint on file..  HPI  Past medical history CVA renal transplant hypertension  Recent hospital admission reported fall left-sided weakness CT head negative    Patient states he currently feels okay no active symptoms resting              Health Maintenance:      Colonoscopy:      Mammogram:      Pelvic/Pap:      Low dose chest CT:      Aorta duplex:      Optho:      Podiatry:        Vaccines:      Refer to Epic Vaccination Log        ROS:      General: denies fever/chills/weight loss      Head: denies HA/trauma/masses/dizziness      Eyes: denies vision change/loss of vision/blurry vision/diplopia/eye pain      Ears: denies hearing loss/tinnitus/otalgia/otorrhea      Nose: denies nasal drainage/anosmia      Throat: denies dysphagia/odynophagia      Lymphatics: denies lymph node swelling      Breast: denies masses/discharge/dimpling/skin changes      Cardiac: denies CP/palpitations/orthopnea/PND      Pulmonary: denies dyspnea/cough/wheezing      GI: denies abd pain/n/v/diarrhea/melena/hematochezia/hematemesis      : denies dysuria/hematuria/change frequency      Genital: denies genital discharge/lesions      Skin: denies rashes/lesions/masses      MSK: Left-sided weakness since the stroke denies weakness/swelling/edema/gait imbalance/pain      Neuro: denies paresthesias/seizures/dysarthria      Psych: denies depression/anxiety/suicidal or homicidal ideations            Objective   There were no vitals taken for this visit.     Physical Exam:     General: AO3, NAD     Head: atraumatic/NC     Eyes: EOMI/PERRLA. Negative APD     Ears: TM pearly gray, EAC clear. No lesions or erythema     Nose: symmetric nares, no discharge     Throat: trachea midline, uvula midline pink mucosa. No thyromegaly     Lymphatics: no cervical/supraclavicular/ant or posterior cervical adenopathy/axillary/inguinal adenopathy     Breast: not examined      "Chest: no deformity or tenderness to palpation     Pulm: CTA b/l, no wheeze/rhonchi/rales. nonlabored     Cardiac: RRR +s1s2, no m/r/g.      GI: soft, NT/ND. Normoactive Bsx4. No rebound/guarding.     Rectal: not examined     Genital: not examined     MSK: Left AV fistula site 1 out of 5 strength left sided otherwise right-sided 5/5 strength UE LE. No edema/clubbing/cyanosis     Skin: no rashes/lesions     Vascular: 2+ palp DP PT radials b/l. Negative carotid bruit     Neuro: CNII-XII intact. No focal deficits. Reflexes 2/4 brachioradialis bicep tricep patellar achilles. Finger to nose intact.     Psych: appropriate mood/affect                    No results found for: \"BMPR1A\", \"CBCDIF\"      Assessment/Plan   Diagnoses and all orders for this visit:  Hypertension with renal disease  Unspecified hypertensive kidney disease with chronic kidney disease stage V or end stage renal disease(403.91) (Multi)  Acute cerebrovascular accident (CVA) due to embolism of right middle cerebral artery (Multi)    PT OT SLP    Neurology referral and follow-up     transplant referral and follow-up    Every Tuesday CBC BMP    Antolin Valera DO, FACOI       Antolin Valera DO  "

## 2025-06-18 ENCOUNTER — NURSING HOME VISIT (OUTPATIENT)
Dept: POST ACUTE CARE | Facility: EXTERNAL LOCATION | Age: 60
End: 2025-06-18
Payer: COMMERCIAL

## 2025-06-18 DIAGNOSIS — I63.411 ACUTE CEREBROVASCULAR ACCIDENT (CVA) DUE TO EMBOLISM OF RIGHT MIDDLE CEREBRAL ARTERY (MULTI): ICD-10-CM

## 2025-06-18 DIAGNOSIS — Z94.0 KIDNEY TRANSPLANT RECIPIENT (HHS-HCC): Primary | ICD-10-CM

## 2025-06-18 DIAGNOSIS — Z74.09 IMPAIRED FUNCTIONAL MOBILITY, BALANCE, GAIT, AND ENDURANCE: ICD-10-CM

## 2025-06-18 DIAGNOSIS — I12.9 HYPERTENSION WITH RENAL DISEASE: ICD-10-CM

## 2025-06-18 PROCEDURE — 99309 SBSQ NF CARE MODERATE MDM 30: CPT | Performed by: PHYSICIAN ASSISTANT

## 2025-06-18 NOTE — LETTER
"Patient: Davidson Khoury  : 1965    Encounter Date: 2025  Name: Davidson Khoury  YOB: 1965    Chief complaint: Follow up kidney transplant    HPI: Patient has medical history significant for renal transplant in 2019. He has been prescribed Tacrolimus and Mycophenolate. Patient is followed by  transplant team. He is requesting blood draw for Tacrolimus level. Recent level 8.9 on 2025. Denies fever, chills, sob, chest pain, palpitation, nausea, vomiting or dysuria.    Review of systems:   ROS negative except were noted in HPI.    Code Status: full code    /86   Pulse 58   Temp 36.6 °C (97.9 °F)   Resp 16   Ht 1.727 m (5' 8\")   Wt 55.3 kg (122 lb)   SpO2 98%   BMI 18.55 kg/m²      Physical Exam  Constitutional:       General: He is not in acute distress.  HENT:      Head: Normocephalic.      Nose: Nose normal.      Mouth/Throat:      Mouth: Mucous membranes are moist.   Eyes:      Pupils: Pupils are equal, round, and reactive to light.      Comments: Poor vision L eye.   Cardiovascular:      Rate and Rhythm: Normal rate and regular rhythm.      Pulses: Normal pulses.   Pulmonary:      Effort: Pulmonary effort is normal.      Breath sounds: Normal breath sounds.   Abdominal:      General: Bowel sounds are normal. There is no distension.      Palpations: Abdomen is soft.      Tenderness: There is no abdominal tenderness.   Genitourinary:     Comments: Voiding. Renal transplant  Musculoskeletal:      Comments: L hemiparesis.   Skin:     General: Skin is warm and dry.      Capillary Refill: Capillary refill takes less than 2 seconds.   Neurological:      Mental Status: He is alert and oriented to person, place, and time.      Comments: L sided weakness.   Psychiatric:         Mood and Affect: Mood normal.         Behavior: Behavior normal.     Medications reviewed during visit at facility.  Mycophenolate Mofetil Oral Capsule 250 MG 3 capsules bid  Losartan 25 mg po " daily  Aspirin 81 mg po daily  Miralax 17 grams po daily  Clonidine 0.2 mg po bid  Potassium chloride 20 meq po daily  Senna Plus two tablets po q hs  Tamsulosin 0.4 mg po daily  Tacrolimus 2 mg po bid  Atorvastatin 40 mg po daily  Cetrizine 10 mg po daily  Plavix 75 mg po daily  Pantoprazole 40 mg po daily  Lasix 20 mg po daily  Tylenol 650 mg po q 4 hours prn  Labs reviewed at facility:    Laboratory: 2025 08:15 CBC and Differential / Basic Metabolic Panl  Latest Version (more available)  Reviewed by ulysses on 2025 11:39  Resident Information  Resident: Davidson Boone ()  Admit Date: 2025  Admitting Provider:   Attending Provider:   Copy to List:   Report Information  Collection Date: 2025 04:50  Received Date: 2025 04:50  Reported Date: 2025 08:15  Ord. Provider: Antolin Valera  Source Finney: [id number]  Lab Information  Status: Completed  Flag:    Reporting Lab:   [facility]  Order #:   [id number]  Vendor Order #:   Category:   Chemistry, Hematology, Unknown Category  Order Notes  Result for:  DAVIDSON BOONE ( 1965, M)        Results   Show All Details Result Units Ref. Range Flag Status   CBC and Differential       White Blood Cell Count  4.10 k/uL 3.70-11.00  Final           RBC  4.23 m/uL 4.20-6.00  Final           Hemoglobin  12.7 g/dL 13.0-17.0 L Final           Hematocrit  38.3 % 39.0-51.0 L Final           MCV  90.5 fL 80.0-100.0  Final           MCH  30.0 pg 26.0-34.0  Final           MCHC  33.2 g/dL 30.5-36.0  Final           RDW-CV  13.8 % 11.5-15.0  Final           Platelet Count  98 k/uL 150-400 L Final      No clot detected.       MPV  12.4 fL 9.0-12.7  Final           Neut%  53.9 %   Final           Abs Neut  2.21 k/uL 1.45-7.50  Final           Lymph%  29.3 %   Final           Abs Lymph  1.20 k/uL 1.00-4.00  Final           Mono%  14.4 %   Final           Abs Mono  0.59 k/uL <0.87  Final            Eosin%  1.7 %   Final           Abs Eosin  0.07 k/uL <0.46  Final           Baso%  0.2 %   Final           Abs Baso  <0.03 k/uL <0.11  Final           Immature Gran %%  0.5 %   Final           Abs Immature Gran  <0.03 k/uL <0.10  Final           NRBC  0.0 /100 WBC   Final           Absolute nRBC  <0.01 k/uL <0.01  Final           Diff Type  Auto    Final       Basic Metabolic Panl       Glucose  78 mg/dL 74-99  Final   BUN  16 mg/dL 9-24  Final           Creatinine  1.73 mg/dL 0.73-1.22 H Final           Sodium  140 mmol/L 136-144  Final           Potassium  3.7 mmol/L 3.7-5.1  Final           Chloride  105 mmol/L   Final           CO2  23 mmol/L 22-30  Final           Anion Gap  12 mmol/L 8-15  Final           Calcium, Total  9.2 mg/dL 8.5-10.2  Final           Estimated Glomerular Filtration Rate  45 mL/min/1.73 meters squared >=60 L Final    Assessment/Plan   Problem List Items Addressed This Visit       Kidney transplant recipient ([facility]) - Primary    Schedule follow up with  transplant service. Continue with Mycophenolate Mofetil Oral Capsule 250 MG 3 capsules bid, and Tacrolimus 2 mg po bid         Acute cerebrovascular accident (CVA) due to embolism of right middle cerebral artery (Multi)    Schedule appointment with Dr. Rosario Neurology  on 8/25/2025.         Hypertension with renal disease    Review BP readings. Losartan 25 mg po daily, and Clonidine 0.2 mg po bid          Impaired functional mobility, balance, gait, and endurance    Review PT and OT therapy notes. Moderate to max assistance for sit to stand and pivot transfers.            Time:    Phuc Clark PA-C     Electronically Signed By: Phuc Clark PA-C   6/22/25 11:14 AM

## 2025-06-19 NOTE — DOCUMENTATION CLARIFICATION NOTE
"    PATIENT:               GIBRAN BOONE  ACCT #:                  [id number]  MRN:                       [id number]  :                       1965  ADMIT DATE:       2025 11:28 AM  DISCH DATE:        2025 2:54 PM  RESPONDING PROVIDER #:        [id number]          PROVIDER RESPONSE TEXT:    I agree with dietician diagnosis of  Moderate malnutrition on 25    CDI QUERY TEXT:    Clarification    Instruction:    Based on your assessment of the patient and the clinical information, please provide the requested documentation by clicking on the appropriate radio button and enter any additional information if prompted.    Question: Please further clarify this patient nutritional status as    When answering this query, please exercise your independent professional judgment. The fact that a question is being asked, does not imply that any particular answer is desired or expected.    The patient's clinical indicators include:  Clinical Information:  () H&P Note: \" 60y old male presenting with AMS, HTN, and HA. On initial exam, he had subtle left sided weakness that worsened on follow up exam. BAT activated for concern of stroke. Found to have proximal R superior M2 occlusion. Not a candidate for TNK as LKN >4.5 hours. Taken for MT, TICI 2b. \"    Clinical Indicators:  () Nutrition Consult:  Nutrition Diagnosis  Malnutrition Diagnosis  Patient has Malnutrition Diagnosis: Yes  Diagnosis Status: New  Malnutrition Diagnosis: Moderate malnutrition related to chronic disease or condition  Related to: unknown etiology  As Evidenced by: NFPE with moderate muscle wasting and fat loss, intake meeting <75% of needs for >/= 1 mo    Anthropometrics:  Height: 175.3 cm (5' 9\")  Weight: 72.9 kg (160 lb 11.5 oz)  BMI (Calculated): 23.72  IBW/kg (Dietitian Calculated): 72.7 kg  Percent of IBW: 100 %    Subcutaneous Fat Loss:  Orbital Fat Pads: Mild-Moderate (slight dark circles and slight hollowing)  Buccal Fat Pads: " Mild-Moderate (flat cheeks, minimal bounce)  Triceps: Mild-Moderate (less than ample fat tissue)  Ribs: Defer    Muscle Wasting:  Temporalis: Mild-Moderate (slight depression)  Pectoralis (Clavicular Region): Mild-Moderate (some protrusion of clavicle)  Deltoid/Trapezius: Mild-Moderate (slight protrusion of acromion process)  Interosseous: Mild-Moderate (slightly depressed area between thumb and forefinger)  Trapezius/Infraspinatus/Supraspinatus (Scapular Region): Defer  Quadriceps: Mild-Moderate (mild depression on inner and outer thigh)  Gastrocnemius: Defer      Treatment:  Start Isosource 1.5 @ 15ml/hr, increase by 10mls every 8-10hrs to reach goal of 55ml/hr.  Additional water flushes per team  TF provides   1L free H2O.  Recommend 100mg thiamine daily  Management of delivery rate of enteral nutrition  TF provides 1980kcals and 90g pro  Thiamin supplement therapy  Prevent refeeding    Risk Factors:  5/20: Failed SLP eval; Cortrak placed (waiting KUB read)  PMH: renal transplant (currently on Cellcept) and hypertension  Options provided:  -- I agree with dietician diagnosis of  Moderate malnutrition on 5/21/25  -- Other - I will add my own diagnosis  -- Refer to Clinical Documentation Reviewer    Query created by: Rashard Gillespie on 6/19/2025 9:22 AM      Electronically signed by:  AMELIA SHETH MD 6/19/2025 9:29 AM

## 2025-06-19 NOTE — DOCUMENTATION CLARIFICATION NOTE
"    PATIENT:               GIBRAN BOONE  ACCT #:                  [id number]  MRN:                       [id number]  :                       1965  ADMIT DATE:       2025 11:28 AM  DISCH DATE:        2025 2:54 PM  RESPONDING PROVIDER #:        [id number]          PROVIDER RESPONSE TEXT:    Encephalopathy due to CVA    CDI QUERY TEXT:    Clarification    Instruction:    Based on your assessment of the patient and the clinical information, please provide the requested documentation by clicking on the appropriate radio button and enter any additional information if prompted.    Question: Please further clarify the type of Encephalopathy as    When answering this query, please exercise your independent professional judgment. The fact that a question is being asked, does not imply that any particular answer is desired or expected.    The patient's clinical indicators include:  Clinical Information:  () H&P Note: \" 60y old male with a history notable for renal transplant (currently on Cellcept) and hypertension presenting to the ED initially with a chief complaint of altered mental status, headache, and hypertension. Initially evaluated by general neurology team who noted subtle left sided weakness that worsened on follow up exam. BAT activated for concern of stroke. \"    Clinical Indicators:  CTA with proximal R superior M2 occlusion. CTP with 11ml core infarct, 127ml penumbra, ratio 12.5.    () ED Provider Note: Medical Decision Making;  \" At this time we are worried that he has intracranial process including intracranial hemorrhage press metabolic encephalopathy tacrolimus toxicity infection in setting of immunocompromise state such as encephalitis/meningitis. Given severe hypertension and encephalopathy patient was started on Cardene drip. \"    ED Course: : Head CT does not show anything abnormal but the patient continues to be altered even with blood pressure control, hypertensive encephalopathy " versus posterior stroke are high in my differential. :    Treatment:  - NSU  - Neuro Checks: Q1H  - Repeat CT Head 6 hours after MT  - MRI for stroke burden  - Stroke work up  - A1c, LDL  - TTE with bubble study  - EKG, troponin, BNP  - SBP goals post-thrombectomy SBP <180mmHg  - PT/OT/SLP    Risk Factors:  Acute cerebrovascular accident (CVA) due to embolism of right middle cerebral artery  On immunosuppression for DDKT  Options provided:  -- Metabolic Encephalopathy  -- Toxic Encephalopathy 2/2 tacrolimus toxicity infection  -- Encephalopathy due to CVA  -- Other - I will add my own diagnosis  -- Refer to Clinical Documentation Reviewer    Query created by: Rashard Gillespie on 6/19/2025 9:09 AM      Electronically signed by:  YOKASTA MURCIA MD 6/19/2025 9:54 AM

## 2025-06-20 ENCOUNTER — TELEPHONE (OUTPATIENT)
Facility: HOSPITAL | Age: 60
End: 2025-06-20
Payer: COMMERCIAL

## 2025-06-20 NOTE — TELEPHONE ENCOUNTER
Patient wife / girlfriend called patient has been admitted to [facility]  he has been there for a few weeks he had a stroke and high blood pressure. Wants to know they are drawing the correct labs. She would like a call back from the coordinator.  [phone]

## 2025-06-22 VITALS
OXYGEN SATURATION: 98 % | HEIGHT: 68 IN | RESPIRATION RATE: 16 BRPM | HEART RATE: 58 BPM | TEMPERATURE: 97.9 F | SYSTOLIC BLOOD PRESSURE: 142 MMHG | DIASTOLIC BLOOD PRESSURE: 86 MMHG | WEIGHT: 122 LBS | BODY MASS INDEX: 18.49 KG/M2

## 2025-06-22 NOTE — PROGRESS NOTES
"6/18/2025  Name: Davidson Khoury  YOB: 1965    Chief complaint: Follow up kidney transplant    HPI: Patient has medical history significant for renal transplant in 2019. He has been prescribed Tacrolimus and Mycophenolate. Patient is followed by  transplant team. He is requesting blood draw for Tacrolimus level. Recent level 8.9 on 6/4/2025. Denies fever, chills, sob, chest pain, palpitation, nausea, vomiting or dysuria.    Review of systems:   ROS negative except were noted in HPI.    Code Status: full code    /86   Pulse 58   Temp 36.6 °C (97.9 °F)   Resp 16   Ht 1.727 m (5' 8\")   Wt 55.3 kg (122 lb)   SpO2 98%   BMI 18.55 kg/m²      Physical Exam  Constitutional:       General: He is not in acute distress.  HENT:      Head: Normocephalic.      Nose: Nose normal.      Mouth/Throat:      Mouth: Mucous membranes are moist.   Eyes:      Pupils: Pupils are equal, round, and reactive to light.      Comments: Poor vision L eye.   Cardiovascular:      Rate and Rhythm: Normal rate and regular rhythm.      Pulses: Normal pulses.   Pulmonary:      Effort: Pulmonary effort is normal.      Breath sounds: Normal breath sounds.   Abdominal:      General: Bowel sounds are normal. There is no distension.      Palpations: Abdomen is soft.      Tenderness: There is no abdominal tenderness.   Genitourinary:     Comments: Voiding. Renal transplant  Musculoskeletal:      Comments: L hemiparesis.   Skin:     General: Skin is warm and dry.      Capillary Refill: Capillary refill takes less than 2 seconds.   Neurological:      Mental Status: He is alert and oriented to person, place, and time.      Comments: L sided weakness.   Psychiatric:         Mood and Affect: Mood normal.         Behavior: Behavior normal.     Medications reviewed during visit at facility.  Mycophenolate Mofetil Oral Capsule 250 MG 3 capsules bid  Losartan 25 mg po daily  Aspirin 81 mg po daily  Miralax 17 grams po daily  Clonidine 0.2 " mg po bid  Potassium chloride 20 meq po daily  Senna Plus two tablets po q hs  Tamsulosin 0.4 mg po daily  Tacrolimus 2 mg po bid  Atorvastatin 40 mg po daily  Cetrizine 10 mg po daily  Plavix 75 mg po daily  Pantoprazole 40 mg po daily  Lasix 20 mg po daily  Tylenol 650 mg po q 4 hours prn  Labs reviewed at facility:    Laboratory: 2025 08:15 CBC and Differential / Basic Metabolic Panl  Latest Version (more available)  Reviewed by ulysses on 2025 11:39  Resident Information  Resident: Davidson Boone ()  Admit Date: 2025  Admitting Provider:   Attending Provider:   Copy to List:   Report Information  Collection Date: 2025 04:50  Received Date: 2025 04:50  Reported Date: 2025 08:15  Ord. Provider: Antolin Valera  Source Finney: [id number]  Lab Information  Status: Completed  Flag:    Reporting Lab:   [facility]  Order #:   [id number]  Vendor Order #:   Category:   Chemistry, Hematology, Unknown Category  Order Notes  Result for:  DAVIDSON BOONE ( 1965, M)        Results   Show All Details Result Units Ref. Range Flag Status   CBC and Differential       White Blood Cell Count  4.10 k/uL 3.70-11.00  Final           RBC  4.23 m/uL 4.20-6.00  Final           Hemoglobin  12.7 g/dL 13.0-17.0 L Final           Hematocrit  38.3 % 39.0-51.0 L Final           MCV  90.5 fL 80.0-100.0  Final           MCH  30.0 pg 26.0-34.0  Final           MCHC  33.2 g/dL 30.5-36.0  Final           RDW-CV  13.8 % 11.5-15.0  Final           Platelet Count  98 k/uL 150-400 L Final      No clot detected.       MPV  12.4 fL 9.0-12.7  Final           Neut%  53.9 %   Final           Abs Neut  2.21 k/uL 1.45-7.50  Final           Lymph%  29.3 %   Final           Abs Lymph  1.20 k/uL 1.00-4.00  Final           Mono%  14.4 %   Final           Abs Mono  0.59 k/uL <0.87  Final           Eosin%  1.7 %   Final           Abs Eosin  0.07 k/uL <0.46  Final            Baso%  0.2 %   Final           Abs Baso  <0.03 k/uL <0.11  Final           Immature Gran %%  0.5 %   Final           Abs Immature Gran  <0.03 k/uL <0.10  Final           NRBC  0.0 /100 WBC   Final           Absolute nRBC  <0.01 k/uL <0.01  Final           Diff Type  Auto    Final       Basic Metabolic Panl       Glucose  78 mg/dL 74-99  Final   BUN  16 mg/dL 9-24  Final           Creatinine  1.73 mg/dL 0.73-1.22 H Final           Sodium  140 mmol/L 136-144  Final           Potassium  3.7 mmol/L 3.7-5.1  Final           Chloride  105 mmol/L   Final           CO2  23 mmol/L 22-30  Final           Anion Gap  12 mmol/L 8-15  Final           Calcium, Total  9.2 mg/dL 8.5-10.2  Final           Estimated Glomerular Filtration Rate  45 mL/min/1.73 meters squared >=60 L Final    Assessment/Plan    Problem List Items Addressed This Visit       Kidney transplant recipient ([facility]) - Primary    Schedule follow up with  transplant service. Continue with Mycophenolate Mofetil Oral Capsule 250 MG 3 capsules bid, and Tacrolimus 2 mg po bid         Acute cerebrovascular accident (CVA) due to embolism of right middle cerebral artery (Multi)    Schedule appointment with Dr. Rosario Neurology  on 8/25/2025.         Hypertension with renal disease    Review BP readings. Losartan 25 mg po daily, and Clonidine 0.2 mg po bid          Impaired functional mobility, balance, gait, and endurance    Review PT and OT therapy notes. Moderate to max assistance for sit to stand and pivot transfers.            Time:    Phuc Clark PA-C

## 2025-06-22 NOTE — ASSESSMENT & PLAN NOTE
Schedule follow up with  transplant service. Continue with Mycophenolate Mofetil Oral Capsule 250 MG 3 capsules bid, and Tacrolimus 2 mg po bid

## 2025-06-23 NOTE — TELEPHONE ENCOUNTER
Returned call to patient jovon, patient currently in rehab at [phone]  [facility]   Concerned about patient not getting labs drawn for tacrolimus.   Call placed to [facility] and spoke with nurse Ashley   Asked for lab orders faxed to [phone] and requested results be sent to [facility] fax, provided instructions on when to get tac lab draw on fax sheet per nurses request.     Called gypsyalonzo back to give update.   Will watch for lab results, jovon asked to call for update as well.

## 2025-06-25 ENCOUNTER — NURSING HOME VISIT (OUTPATIENT)
Dept: POST ACUTE CARE | Facility: EXTERNAL LOCATION | Age: 60
End: 2025-06-25
Payer: COMMERCIAL

## 2025-06-25 DIAGNOSIS — Z94.0 KIDNEY TRANSPLANT RECIPIENT (HHS-HCC): Primary | ICD-10-CM

## 2025-06-25 DIAGNOSIS — I63.411 ACUTE CEREBROVASCULAR ACCIDENT (CVA) DUE TO EMBOLISM OF RIGHT MIDDLE CEREBRAL ARTERY (MULTI): ICD-10-CM

## 2025-06-25 DIAGNOSIS — Z74.09 IMPAIRED FUNCTIONAL MOBILITY, BALANCE, GAIT, AND ENDURANCE: ICD-10-CM

## 2025-06-25 DIAGNOSIS — I12.9 HYPERTENSION WITH RENAL DISEASE: ICD-10-CM

## 2025-06-25 LAB
ATRIAL RATE: 105 BPM
P AXIS: 59 DEGREES
P OFFSET: 196 MS
P ONSET: 141 MS
PR INTERVAL: 146 MS
Q ONSET: 214 MS
QRS COUNT: 17 BEATS
QRS DURATION: 82 MS
QT INTERVAL: 348 MS
QTC CALCULATION(BAZETT): 459 MS
QTC FREDERICIA: 419 MS
R AXIS: -34 DEGREES
T AXIS: 14 DEGREES
T OFFSET: 388 MS
VENTRICULAR RATE: 105 BPM

## 2025-06-25 PROCEDURE — 99309 SBSQ NF CARE MODERATE MDM 30: CPT | Performed by: PHYSICIAN ASSISTANT

## 2025-06-25 NOTE — LETTER
"Patient: Davidson Khoury  : 1965    Encounter Date: 2025  Name: Davidson Khoury  YOB: 1965    Chief complaint: Follow up for renal transplant    HPI: Patient has history of renal transplant in 2019. He is currently prescribed Tacrolimus and Mycophenolate.  level is 3. He is currently prescribed Tacrolimus 2 mg po bid. Lab work will be faxed to  transplant team for dose adjustment, Patient denies fever, chills, sob, chest pain, cough, dysuria, or diarrhea.    Review of systems:   ROS negative except were noted in HPI.    Code Status: full code    BP (!) 141/91   Pulse 67   Temp 36.1 °C (96.9 °F)   Resp 14   Ht 1.727 m (5' 8\")   Wt 66.7 kg (147 lb)   SpO2 96%   BMI 22.35 kg/m²      Physical Exam  Constitutional:       General: He is not in acute distress.  HENT:      Head: Normocephalic.      Nose: Nose normal.      Mouth/Throat:      Mouth: Mucous membranes are moist.   Eyes:      Pupils: Pupils are equal, round, and reactive to light.      Comments: Poor vision L eye.   Cardiovascular:      Rate and Rhythm: Normal rate and regular rhythm.      Pulses: Normal pulses.   Pulmonary:      Effort: Pulmonary effort is normal.      Breath sounds: Normal breath sounds.   Abdominal:      General: Bowel sounds are normal. There is no distension.      Palpations: Abdomen is soft.      Tenderness: There is no abdominal tenderness.   Genitourinary:     Comments: Voiding. Renal transplant  Musculoskeletal:      Comments: L hemiparesis.   Skin:     General: Skin is warm and dry.      Capillary Refill: Capillary refill takes less than 2 seconds.   Neurological:      Mental Status: He is alert and oriented to person, place, and time.      Comments: L sided weakness.   Psychiatric:         Mood and Affect: Mood normal.         Behavior: Behavior normal.     Medications reviewed during visit at facility.  Mycophenolate Mofetil Oral Capsule 250 MG 3 capsules bid  Losartan 25 mg po " daily  Aspirin 81 mg po daily  Miralax 17 grams po daily  Clonidine 0.2 mg po bid  Potassium chloride 20 meq po daily  Senna Plus two tablets po q hs  Tamsulosin 0.4 mg po daily  Tacrolimus 2 mg po bid  Atorvastatin 40 mg po daily  Cetrizine 10 mg po daily  Plavix 75 mg po daily  Pantoprazole 40 mg po daily  Lasix 20 mg po daily  Tylenol 650 mg po q 4 hours prn  Labs reviewed at facility:    Laboratory: 2025 21:33 CBC and Differential / Albumin / Basic Metabolic Panl / Phosphorus / Tacrolimus/  Latest Version (more available)  Reviewed by ulysses on 2025 09:28  Resident Information  Resident: Davidson Boone ()  Admit Date: 2025  Admitting Provider:   Attending Provider:   Copy to List:   Report Information  Collection Date: 2025 07:42  Received Date: 2025 07:42  Reported Date: 2025 21:33  Ord. Provider: Antolin Valera  Source Key: [id number]  Lab Information  Status: Completed  Flag:    Reporting Lab:   [facility]  Order #:   [id number]  Vendor Order #:   Category:   Chemistry, Hematology, Unknown Category  Order Notes  Result for:  DAVIDSON BOONE ( 1965, M)      Results   Show All Details Result Units Ref. Range Flag Status   CBC and Differential       White Blood Cell Count  4.10 k/uL 3.70-11.00  Final           RBC  4.15 m/uL 4.20-6.00 L Final           Hemoglobin  12.4 g/dL 13.0-17.0 L Final           Hematocrit  38.3 % 39.0-51.0 L Final           MCV  92.3 fL 80.0-100.0  Final           MCH  29.9 pg 26.0-34.0  Final           MCHC  32.4 g/dL 30.5-36.0  Final           RDW-CV  14.3 % 11.5-15.0  Final           Platelet Count  98 k/uL 150-400 L Final      No clot detected.       MPV  12.6 fL 9.0-12.7  Final           Neut%  68.4 %   Final           Abs Neut  2.80 k/uL 1.45-7.50  Final           Lymph%  18.0 %   Final           Abs Lymph  0.74 k/uL 1.00-4.00 L Final           Mono%  12.7 %   Final           Abs  Mono  0.52 k/uL <0.87  Final           Eosin%  0.5 %   Final           Abs Eosin  <0.03 k/uL <0.46  Final           Baso%  0.2 %   Final           Abs Baso  <0.03 k/uL <0.11  Final           Immature Gran %%  0.2 %   Final           Abs Immature Gran  <0.03 k/uL <0.10  Final           NRBC  0.0 /100 WBC   Final           Absolute nRBC  <0.01 k/uL <0.01  Final           Diff Type  Auto    Final       Albumin  3.5 g/dL 3.9-4.9 L Final       Basic Metabolic Panl       Glucose  86 mg/dL 74-99  Final   BUN  11 mg/dL 9-24  Final           Creatinine  1.78 mg/dL 0.73-1.22 H Final           Sodium  137 mmol/L 136-144  Final           Potassium  4.1 mmol/L 3.7-5.1  Final           Chloride  103 mmol/L   Final           CO2  23 mmol/L 22-30  Final           Anion Gap  11 mmol/L 8-15  Final           Calcium, Total  9.0 mg/dL 8.5-10.2  Final           Estimated Glomerular Filtration Rate  43 mL/min/1.73 meters squared >=60 L Final  Phosphorus  2.4 mg/dL 2.7-4.8 L Final       Tacrolimus /   3.0 ng/mL 5.0-20.0 L Final  Assessment/Plan   Problem List Items Addressed This Visit       Kidney transplant recipient ([facility]) - Primary    Faxed lab work to  transplant team. Current  level is 3. Awaiting recommendation for dose adjustment.          Acute cerebrovascular accident (CVA) due to embolism of right middle cerebral artery (Multi)    Schedule appointment with Dr. Rosario Neurology  on 8/25/2025. Continue with Plavix and Aspirin.         Hypertension with renal disease    BMP CBC with diff q Tuesdays. Review BP readings. Losartan 25 mg po daily, and Clonidine 0.2 mg po bid            Impaired functional mobility, balance, gait, and endurance    Review physical and occupational therapy notes.             Time:    Phuc Clark PA-C     Electronically Signed By: Phuc Clark PA-C   6/29/25  6:03 PM

## 2025-06-26 ENCOUNTER — TELEPHONE (OUTPATIENT)
Facility: HOSPITAL | Age: 60
End: 2025-06-26
Payer: COMMERCIAL

## 2025-06-26 DIAGNOSIS — Z94.0 KIDNEY REPLACED BY TRANSPLANT (HHS-HCC): ICD-10-CM

## 2025-06-26 RX ORDER — TACROLIMUS 1 MG/1
2 CAPSULE ORAL 2 TIMES DAILY
Qty: 360 CAPSULE | Refills: 3 | Status: SHIPPED | OUTPATIENT
Start: 2025-06-26 | End: 2025-06-27 | Stop reason: DRUGHIGH

## 2025-06-26 NOTE — TELEPHONE ENCOUNTER
Patient called requesting to speak with coordinator about She says Davidson's doctor wants to speak with you.  Patient call back number is [phone]

## 2025-06-27 NOTE — TELEPHONE ENCOUNTER
Call returned to patient's jovon Aragon, states the nurse had questions about patients tacrolimus level. No labs received on my end.   Call placed to [phone] [facility] and spoke with nurse Shelby.   Tac and RFP completed not faxed over, nurse will fax over. Gave verbal results, Tac 3.0 (true level and confirmed dose of 2 mg BID) and RFP 1.7 (stable for patient. )  Will review with nephrologist and call nurse back with changes.     Reviewed lab with Dr. Guardado  Plan;  Increase dose to   3 mg in the am and stay 2 mg in the pm     Call placed to [facility] to speak with nurse Shelby with new plan in dose.   Patient getting labs with tac checked every Tuesday with instructions to fax us results.     Called jovon also with update.

## 2025-06-28 RX ORDER — TACROLIMUS 1 MG/1
CAPSULE ORAL
Qty: 450 CAPSULE | Refills: 3 | Status: SHIPPED | OUTPATIENT
Start: 2025-06-28 | End: 2026-06-28

## 2025-06-29 VITALS
OXYGEN SATURATION: 96 % | RESPIRATION RATE: 14 BRPM | WEIGHT: 147 LBS | BODY MASS INDEX: 22.28 KG/M2 | SYSTOLIC BLOOD PRESSURE: 141 MMHG | TEMPERATURE: 96.9 F | HEIGHT: 68 IN | HEART RATE: 67 BPM | DIASTOLIC BLOOD PRESSURE: 91 MMHG

## 2025-06-29 NOTE — ASSESSMENT & PLAN NOTE
Faxed lab work to  transplant team. Current  level is 3. Awaiting recommendation for dose adjustment.

## 2025-06-29 NOTE — PROGRESS NOTES
"6/25/2025  Name: Davidson Khoury  YOB: 1965    Chief complaint: Follow up for renal transplant    HPI: Patient has history of renal transplant in 2019. He is currently prescribed Tacrolimus and Mycophenolate.  level is 3. He is currently prescribed Tacrolimus 2 mg po bid. Lab work will be faxed to  transplant team for dose adjustment, Patient denies fever, chills, sob, chest pain, cough, dysuria, or diarrhea.    Review of systems:   ROS negative except were noted in HPI.    Code Status: full code    BP (!) 141/91   Pulse 67   Temp 36.1 °C (96.9 °F)   Resp 14   Ht 1.727 m (5' 8\")   Wt 66.7 kg (147 lb)   SpO2 96%   BMI 22.35 kg/m²      Physical Exam  Constitutional:       General: He is not in acute distress.  HENT:      Head: Normocephalic.      Nose: Nose normal.      Mouth/Throat:      Mouth: Mucous membranes are moist.   Eyes:      Pupils: Pupils are equal, round, and reactive to light.      Comments: Poor vision L eye.   Cardiovascular:      Rate and Rhythm: Normal rate and regular rhythm.      Pulses: Normal pulses.   Pulmonary:      Effort: Pulmonary effort is normal.      Breath sounds: Normal breath sounds.   Abdominal:      General: Bowel sounds are normal. There is no distension.      Palpations: Abdomen is soft.      Tenderness: There is no abdominal tenderness.   Genitourinary:     Comments: Voiding. Renal transplant  Musculoskeletal:      Comments: L hemiparesis.   Skin:     General: Skin is warm and dry.      Capillary Refill: Capillary refill takes less than 2 seconds.   Neurological:      Mental Status: He is alert and oriented to person, place, and time.      Comments: L sided weakness.   Psychiatric:         Mood and Affect: Mood normal.         Behavior: Behavior normal.     Medications reviewed during visit at facility.  Mycophenolate Mofetil Oral Capsule 250 MG 3 capsules bid  Losartan 25 mg po daily  Aspirin 81 mg po daily  Miralax 17 grams po daily  Clonidine 0.2 mg " po bid  Potassium chloride 20 meq po daily  Senna Plus two tablets po q hs  Tamsulosin 0.4 mg po daily  Tacrolimus 2 mg po bid  Atorvastatin 40 mg po daily  Cetrizine 10 mg po daily  Plavix 75 mg po daily  Pantoprazole 40 mg po daily  Lasix 20 mg po daily  Tylenol 650 mg po q 4 hours prn  Labs reviewed at facility:    Laboratory: 2025 21:33 CBC and Differential / Albumin / Basic Metabolic Panl / Phosphorus / Tacrolimus/  Latest Version (more available)  Reviewed by ulysses on 2025 09:28  Resident Information  Resident: Davidson Boone ()  Admit Date: 2025  Admitting Provider:   Attending Provider:   Copy to List:   Report Information  Collection Date: 2025 07:42  Received Date: 2025 07:42  Reported Date: 2025 21:33  Ord. Provider: Antolin Valera  Source Key: [id number]  Lab Information  Status: Completed  Flag:    Reporting Lab:   [facility]  Order #:   [id number]  Vendor Order #:   Category:   Chemistry, Hematology, Unknown Category  Order Notes  Result for:  DAVIDSON BOONE ( 1965, M)      Results   Show All Details Result Units Ref. Range Flag Status   CBC and Differential       White Blood Cell Count  4.10 k/uL 3.70-11.00  Final           RBC  4.15 m/uL 4.20-6.00 L Final           Hemoglobin  12.4 g/dL 13.0-17.0 L Final           Hematocrit  38.3 % 39.0-51.0 L Final           MCV  92.3 fL 80.0-100.0  Final           MCH  29.9 pg 26.0-34.0  Final           MCHC  32.4 g/dL 30.5-36.0  Final           RDW-CV  14.3 % 11.5-15.0  Final           Platelet Count  98 k/uL 150-400 L Final      No clot detected.       MPV  12.6 fL 9.0-12.7  Final           Neut%  68.4 %   Final           Abs Neut  2.80 k/uL 1.45-7.50  Final           Lymph%  18.0 %   Final           Abs Lymph  0.74 k/uL 1.00-4.00 L Final           Mono%  12.7 %   Final           Abs Mono  0.52 k/uL <0.87  Final           Eosin%  0.5 %   Final           Abs  Eosin  <0.03 k/uL <0.46  Final           Baso%  0.2 %   Final           Abs Baso  <0.03 k/uL <0.11  Final           Immature Gran %%  0.2 %   Final           Abs Immature Gran  <0.03 k/uL <0.10  Final           NRBC  0.0 /100 WBC   Final           Absolute nRBC  <0.01 k/uL <0.01  Final           Diff Type  Auto    Final       Albumin  3.5 g/dL 3.9-4.9 L Final       Basic Metabolic Panl       Glucose  86 mg/dL 74-99  Final   BUN  11 mg/dL 9-24  Final           Creatinine  1.78 mg/dL 0.73-1.22 H Final           Sodium  137 mmol/L 136-144  Final           Potassium  4.1 mmol/L 3.7-5.1  Final           Chloride  103 mmol/L   Final           CO2  23 mmol/L 22-30  Final           Anion Gap  11 mmol/L 8-15  Final           Calcium, Total  9.0 mg/dL 8.5-10.2  Final           Estimated Glomerular Filtration Rate  43 mL/min/1.73 meters squared >=60 L Final  Phosphorus  2.4 mg/dL 2.7-4.8 L Final       Tacrolimus /   3.0 ng/mL 5.0-20.0 L Final  Assessment/Plan    Problem List Items Addressed This Visit       Kidney transplant recipient ([facility]) - Primary    Faxed lab work to  transplant team. Current  level is 3. Awaiting recommendation for dose adjustment.          Acute cerebrovascular accident (CVA) due to embolism of right middle cerebral artery (Multi)    Schedule appointment with Dr. Rosario Neurology  on 8/25/2025. Continue with Plavix and Aspirin.         Hypertension with renal disease    BMP CBC with diff q Tuesdays. Review BP readings. Losartan 25 mg po daily, and Clonidine 0.2 mg po bid            Impaired functional mobility, balance, gait, and endurance    Review physical and occupational therapy notes.             Time:    Phuc Clark PA-C

## 2025-07-02 ENCOUNTER — NURSING HOME VISIT (OUTPATIENT)
Dept: POST ACUTE CARE | Facility: EXTERNAL LOCATION | Age: 60
End: 2025-07-02
Payer: COMMERCIAL

## 2025-07-02 VITALS
DIASTOLIC BLOOD PRESSURE: 93 MMHG | BODY MASS INDEX: 22.28 KG/M2 | TEMPERATURE: 98.1 F | RESPIRATION RATE: 14 BRPM | OXYGEN SATURATION: 96 % | WEIGHT: 147 LBS | HEART RATE: 55 BPM | HEIGHT: 68 IN | SYSTOLIC BLOOD PRESSURE: 159 MMHG

## 2025-07-02 DIAGNOSIS — Z74.09 IMPAIRED FUNCTIONAL MOBILITY, BALANCE, GAIT, AND ENDURANCE: ICD-10-CM

## 2025-07-02 DIAGNOSIS — Z94.0 KIDNEY TRANSPLANT RECIPIENT (HHS-HCC): ICD-10-CM

## 2025-07-02 DIAGNOSIS — Z79.621 LONG-TERM CURRENT USE OF TACROLIMUS: Primary | ICD-10-CM

## 2025-07-02 DIAGNOSIS — I63.411 ACUTE CEREBROVASCULAR ACCIDENT (CVA) DUE TO EMBOLISM OF RIGHT MIDDLE CEREBRAL ARTERY (MULTI): ICD-10-CM

## 2025-07-02 PROCEDURE — 99308 SBSQ NF CARE LOW MDM 20: CPT | Performed by: PHYSICIAN ASSISTANT

## 2025-07-02 NOTE — LETTER
"Patient: Davidson Khoury  : 1965    Encounter Date: 2025  Name: Davidson Khoury  YOB: 1965    Chief complaint: Follow up for Tacrolimus therapy.    HPI: Patient's lab work has been faxed to transplant team for evaluation of current dose regimen. Currently on 3 mg of Tacrolimus in the morning and 2 mg in the evening. Previous level 3.0 and today 2.8 Patient has medical history of  Patient denies fever, chills, sob, chest pain, palpitation, or dysuria.    Review of systems:   ROS negative except were noted in HPI.    Code Status: full code    BP (!) 159/93   Pulse 55   Temp 36.7 °C (98.1 °F)   Resp 14   Ht 1.727 m (5' 8\")   Wt 66.7 kg (147 lb)   SpO2 96%   BMI 22.35 kg/m²         Physical Exam  Constitutional:       General: He is not in acute distress.  HENT:      Head: Normocephalic.      Nose: Nose normal.      Mouth/Throat:      Mouth: Mucous membranes are moist.   Eyes:      Pupils: Pupils are equal, round, and reactive to light.      Comments: Poor vision L eye.   Cardiovascular:      Rate and Rhythm: Normal rate and regular rhythm.      Pulses: Normal pulses.   Pulmonary:      Effort: Pulmonary effort is normal.      Breath sounds: Normal breath sounds.   Abdominal:      General: Bowel sounds are normal. There is no distension.      Palpations: Abdomen is soft.      Tenderness: There is no abdominal tenderness.   Genitourinary:     Comments: Voiding. Renal transplant  Musculoskeletal:      Comments: L hemiparesis.   Skin:     General: Skin is warm and dry.      Capillary Refill: Capillary refill takes less than 2 seconds.   Neurological:      Mental Status: He is alert and oriented to person, place, and time.      Comments: L sided weakness.   Psychiatric:         Mood and Affect: Mood normal.         Behavior: Behavior normal.     Medications reviewed during visit at facility.  Mycophenolate Mofetil Oral Capsule 250 MG 3 capsules bid  Losartan 25 mg po " daily  Aspirin 81 mg po daily  Miralax 17 grams po daily  Clonidine 0.2 mg po bid  Potassium chloride 20 meq po daily  Senna Plus two tablets po q hs  Tamsulosin 0.4 mg po daily  Tacrolimus 2 mg po In evening  Tacrolimus 3 mg po in mornng  Atorvastatin 40 mg po daily  Cetrizine 10 mg po daily  Plavix 75 mg po daily  Pantoprazole 40 mg po daily  Lasix 20 mg po daily  Tylenol 650 mg po q 4 hours prn  Labs reviewed at facility:    Laboratory: 2025 19:13 Renal Function Panel / Tacrolimus/  Latest Version (more available)  Reviewed by [email or web address] on 2025 15:27  Resident Information  Resident: Davidson Boone ()  Admit Date: 2025  Admitting Provider:   Attending Provider:   Copy to List:   Report Information  Collection Date: 2025 05:15  Received Date: 2025 05:15  Reported Date: 2025 19:13  Ord. Provider: Antolin Valera  Source Finney: [id number]  Lab Information  Status: Completed  Flag:    Reporting Lab:   [facility]  Order #:   [id number]  Vendor Order #:   Category:   Chemistry, Unknown Category  Order Notes  Result for:  DAVIDSON BOONE ( 1965, M)      Results   Show All Details Result Units Ref. Range Flag Status   Renal Function Panel       Albumin  3.1 g/dL 3.9-4.9 L Final           Calcium, Total  8.6 mg/dL 8.5-10.2  Final           Phosphorus  2.1 mg/dL 2.7-4.8 L Final           Glucose  75 mg/dL 74-99  Final      The American Diabetes Association (ADA) provides guidance for cutoff values for fasting glucose and random glucose. The ADA defines fasting as no caloric intake for at least 8 hours. Fasting plasma glucose results between 100 to 125 mg/dL indicate increased risk for diabetes (prediabetes).  Fasting plasma glucose results greater than or equal to 126 mg/dL meet the criteria for diagnosis of diabetes. In the absence of unequivocal hyperglycemia, results should be confirmed by repeat testing. In a patient with classic  symptoms of hyperglycemia or hyperglycemic crisis, random plasma glucose results greater than or equal to 200 mg/dL meet the criteria for diagnosis of diabetes.  Reference: Standards of Medical Care in Diabetes 2016, American Diabetes Association. Diabetes Care. 2016.39(Suppl 1).       BUN  16 mg/dL 9-24  Final           Creatinine  1.70 mg/dL 0.73-1.22 H Final           Sodium  140 mmol/L 136-144  Final           Potassium  3.9 mmol/L 3.7-5.1  Final           Chloride  105 mmol/L   Final           CO2  22 mmol/L 22-30  Final           Anion Gap  13 mmol/L 8-15  Final           Estimated Glomerular Filtration Rate  46 mL/min/1.73 meters squared >=60 L Final      Estimated Glomerular Filtration Rate (eGFR) is calculated using the 2021 CKD-EPI creatinine equation. This equation utilizes serum creatinine, sex, and age as parameters. The creatinine assay has traceable calibration to isotope dilution-mass spectrometry. Refer to KDIGO guidelines for clinical interpretation. In patients with unstable renal function, e.g. those with acute kidney injury, the eGFR may not accurately reflect actual GFR.   Tacrolimus /   2.8 ng/mL 5.0-20.0 L Final  Assessment/Plan   Problem List Items Addressed This Visit       Kidney transplant recipient ([facility])    Faxed lab work to  transplant team. Current  level is 2.8. Awaiting recommendation for dose adjustment.             Acute cerebrovascular accident (CVA) due to embolism of right middle cerebral artery (Multi)    L hemiparesis. Continue with Plavix. Monitor blood pressure.         Impaired functional mobility, balance, gait, and endurance    Discuss progress with physical and occupational therapy.         Long-term current use of tacrolimus - Primary    Fax Tacrolimus results to  transplant team . Current dose 3 mg po in morning and 2 mg po in the evening.            Time:    Phuc Clark PA-C     Electronically Signed By: Phuc Clark PA-C    7/2/25  5:01 PM

## 2025-07-02 NOTE — PROGRESS NOTES
"07/02/25  Name: Davidson Khoury  YOB: 1965    Chief complaint: Follow up for Tacrolimus therapy.    HPI: Patient's lab work has been faxed to transplant team for evaluation of current dose regimen. Currently on 3 mg of Tacrolimus in the morning and 2 mg in the evening. Previous level 3.0 and today 2.8 Patient has medical history of  Patient denies fever, chills, sob, chest pain, palpitation, or dysuria.    Review of systems:   ROS negative except were noted in HPI.    Code Status: full code    BP (!) 159/93   Pulse 55   Temp 36.7 °C (98.1 °F)   Resp 14   Ht 1.727 m (5' 8\")   Wt 66.7 kg (147 lb)   SpO2 96%   BMI 22.35 kg/m²         Physical Exam  Constitutional:       General: He is not in acute distress.  HENT:      Head: Normocephalic.      Nose: Nose normal.      Mouth/Throat:      Mouth: Mucous membranes are moist.   Eyes:      Pupils: Pupils are equal, round, and reactive to light.      Comments: Poor vision L eye.   Cardiovascular:      Rate and Rhythm: Normal rate and regular rhythm.      Pulses: Normal pulses.   Pulmonary:      Effort: Pulmonary effort is normal.      Breath sounds: Normal breath sounds.   Abdominal:      General: Bowel sounds are normal. There is no distension.      Palpations: Abdomen is soft.      Tenderness: There is no abdominal tenderness.   Genitourinary:     Comments: Voiding. Renal transplant  Musculoskeletal:      Comments: L hemiparesis.   Skin:     General: Skin is warm and dry.      Capillary Refill: Capillary refill takes less than 2 seconds.   Neurological:      Mental Status: He is alert and oriented to person, place, and time.      Comments: L sided weakness.   Psychiatric:         Mood and Affect: Mood normal.         Behavior: Behavior normal.     Medications reviewed during visit at facility.  Mycophenolate Mofetil Oral Capsule 250 MG 3 capsules bid  Losartan 25 mg po daily  Aspirin 81 mg po daily  Miralax 17 grams po daily  Clonidine 0.2 mg po " bid  Potassium chloride 20 meq po daily  Senna Plus two tablets po q hs  Tamsulosin 0.4 mg po daily  Tacrolimus 2 mg po In evening  Tacrolimus 3 mg po in mornng  Atorvastatin 40 mg po daily  Cetrizine 10 mg po daily  Plavix 75 mg po daily  Pantoprazole 40 mg po daily  Lasix 20 mg po daily  Tylenol 650 mg po q 4 hours prn  Labs reviewed at facility:    Laboratory: 2025 19:13 Renal Function Panel / Tacrolimus/  Latest Version (more available)  Reviewed by [email or web address] on 2025 15:27  Resident Information  Resident: Davidson Boone ()  Admit Date: 2025  Admitting Provider:   Attending Provider:   Copy to List:   Report Information  Collection Date: 2025 05:15  Received Date: 2025 05:15  Reported Date: 2025 19:13  Ord. Provider: Antolin Valera  Source Finney: [id number]  Lab Information  Status: Completed  Flag:    Reporting Lab:   [facility]  Order #:   [id number]  Vendor Order #:   Category:   Chemistry, Unknown Category  Order Notes  Result for:  DAVIDSON BOONE ( 1965, M)      Results   Show All Details Result Units Ref. Range Flag Status   Renal Function Panel       Albumin  3.1 g/dL 3.9-4.9 L Final           Calcium, Total  8.6 mg/dL 8.5-10.2  Final           Phosphorus  2.1 mg/dL 2.7-4.8 L Final           Glucose  75 mg/dL 74-99  Final      The American Diabetes Association (ADA) provides guidance for cutoff values for fasting glucose and random glucose. The ADA defines fasting as no caloric intake for at least 8 hours. Fasting plasma glucose results between 100 to 125 mg/dL indicate increased risk for diabetes (prediabetes).  Fasting plasma glucose results greater than or equal to 126 mg/dL meet the criteria for diagnosis of diabetes. In the absence of unequivocal hyperglycemia, results should be confirmed by repeat testing. In a patient with classic symptoms of hyperglycemia or hyperglycemic crisis, random plasma glucose results  greater than or equal to 200 mg/dL meet the criteria for diagnosis of diabetes.  Reference: Standards of Medical Care in Diabetes 2016, American Diabetes Association. Diabetes Care. 2016.39(Suppl 1).       BUN  16 mg/dL 9-24  Final           Creatinine  1.70 mg/dL 0.73-1.22 H Final           Sodium  140 mmol/L 136-144  Final           Potassium  3.9 mmol/L 3.7-5.1  Final           Chloride  105 mmol/L   Final           CO2  22 mmol/L 22-30  Final           Anion Gap  13 mmol/L 8-15  Final           Estimated Glomerular Filtration Rate  46 mL/min/1.73 meters squared >=60 L Final      Estimated Glomerular Filtration Rate (eGFR) is calculated using the 2021 CKD-EPI creatinine equation. This equation utilizes serum creatinine, sex, and age as parameters. The creatinine assay has traceable calibration to isotope dilution-mass spectrometry. Refer to KDIGO guidelines for clinical interpretation. In patients with unstable renal function, e.g. those with acute kidney injury, the eGFR may not accurately reflect actual GFR.   Tacrolimus /   2.8 ng/mL 5.0-20.0 L Final  Assessment/Plan    Problem List Items Addressed This Visit       Kidney transplant recipient ([facility])    Faxed lab work to  transplant team. Current  level is 2.8. Awaiting recommendation for dose adjustment.             Acute cerebrovascular accident (CVA) due to embolism of right middle cerebral artery (Multi)    L hemiparesis. Continue with Plavix. Monitor blood pressure.         Impaired functional mobility, balance, gait, and endurance    Discuss progress with physical and occupational therapy.         Long-term current use of tacrolimus - Primary    Fax Tacrolimus results to  transplant team . Current dose 3 mg po in morning and 2 mg po in the evening.            Time:    Phuc Clark PA-C

## 2025-07-02 NOTE — ASSESSMENT & PLAN NOTE
Faxed lab work to  transplant team. Current  level is 2.8. Awaiting recommendation for dose adjustment.

## 2025-07-02 NOTE — ASSESSMENT & PLAN NOTE
Fax Tacrolimus results to  transplant team . Current dose 3 mg po in morning and 2 mg po in the evening.

## 2025-07-09 ENCOUNTER — NURSING HOME VISIT (OUTPATIENT)
Dept: POST ACUTE CARE | Facility: EXTERNAL LOCATION | Age: 60
End: 2025-07-09
Payer: COMMERCIAL

## 2025-07-09 DIAGNOSIS — I10 ESSENTIAL HYPERTENSION, BENIGN: ICD-10-CM

## 2025-07-09 DIAGNOSIS — D84.9 IMMUNOSUPPRESSION: ICD-10-CM

## 2025-07-09 DIAGNOSIS — Z74.09 IMPAIRED FUNCTIONAL MOBILITY, BALANCE, GAIT, AND ENDURANCE: Primary | ICD-10-CM

## 2025-07-09 PROCEDURE — 99309 SBSQ NF CARE MODERATE MDM 30: CPT | Performed by: PHYSICIAN ASSISTANT

## 2025-07-09 NOTE — LETTER
"Patient: Davidson Khoury  : 1965    Encounter Date: 2025  Name: Davidson Khoury  YOB: 1965    Chief complaint: Impaired mobility    HPI: Patient seated in wheelchair at time of exam. He is able to propel wheelchair satisfactorily. Review of chart shows he is  able to stand with assistance and walk 10 feet with parallel bars during supervised ambulation. Recent CVA with new left hemiparesis limiting his mobility. He requires nursing assistance for repositioning, transfers and toileting.    Extremity Weakness  The current episode started 1 to 4 weeks ago. The problem has been gradually improving. Pertinent negatives include no abdominal pain, anorexia, arthralgias, change in bowel habit, chest pain, chills, coughing, fever, headaches, nausea, numbness or urinary symptoms. The symptoms are aggravated by standing. He has tried relaxation, rest and walking for the symptoms. The treatment provided moderate relief.       Review of systems:   ROS negative except were noted in HPI.    Code Status: full code    /80   Pulse 70   Temp 36.8 °C (98.2 °F)   Resp 14   Ht 1.727 m (5' 8\")   Wt 64.9 kg (143 lb)   SpO2 97%   BMI 21.74 kg/m²        Physical Exam  Constitutional:       General: He is not in acute distress.  HENT:      Head: Normocephalic.      Nose: Nose normal.      Mouth/Throat:      Mouth: Mucous membranes are moist.   Eyes:      Pupils: Pupils are equal, round, and reactive to light.      Comments: Poor vision L eye.   Cardiovascular:      Rate and Rhythm: Normal rate and regular rhythm.      Pulses: Normal pulses.   Pulmonary:      Effort: Pulmonary effort is normal.      Breath sounds: Normal breath sounds.   Abdominal:      General: Bowel sounds are normal. There is no distension.      Palpations: Abdomen is soft.      Tenderness: There is no abdominal tenderness.   Genitourinary:     Comments: Voiding. Renal transplant  Musculoskeletal:      Comments: L " hemiparesis.   Skin:     General: Skin is warm and dry.      Capillary Refill: Capillary refill takes less than 2 seconds.   Neurological:      Mental Status: He is alert and oriented to person, place, and time.      Comments: L sided weakness.   Psychiatric:         Mood and Affect: Mood normal.         Behavior: Behavior normal.     Medications reviewed during visit at facility.  Mycophenolate Mofetil Oral Capsule 250 MG 3 capsules bid  Losartan 25 mg po daily  Aspirin 81 mg po daily  Miralax 17 grams po daily  Clonidine 0.2 mg po bid  Potassium chloride 20 meq po daily  Senna Plus two tablets po q hs  Tamsulosin 0.4 mg po daily  Tacrolimus 2 mg po In evening  Tacrolimus 3 mg po in mornng  Atorvastatin 40 mg po daily  Cetrizine 10 mg po daily  Plavix 75 mg po daily  Pantoprazole 40 mg po daily  Lasix 20 mg po daily  Tylenol 650 mg po q 4 hours prn  Labs reviewed at facility:    Laboratory: 2025 21:25 Basic Metabolic Panl / CBC and Differential / Renal Function Panel / Tacrolimus/  Latest Version (more available)  Reviewed by Abdiel on 2025 08:53  Resident Information  Resident: Davidson Boone ()  Admit Date: 2025  Admitting Provider:   Attending Provider:   Copy to List:   Report Information  Collection Date: 2025 04:50  Received Date: 2025 04:50  Reported Date: 2025 21:25  Ord. Provider: Antolin Valera  Source Finney: [id number]  Lab Information  Status: Completed  Flag:    Reporting Lab:   [facility]  Order #:   [id number]  Vendor Order #:   Category:   Chemistry, Hematology, Unknown Category  Order Notes  Result for:  DAVIDSON BOONE ( 1965, M)      Results   Show All Details Result Units Ref. Range Flag Status   BASIC METABOLIC PANEL  Invalid       CBC and Differential       White Blood Cell Count  5.15 k/uL 3.70-11.00  Final           RBC  3.65 m/uL 4.20-6.00 L Final           Hemoglobin  11.0 g/dL 13.0-17.0 L Final            Hematocrit  34.5 % 39.0-51.0 L Final           MCV  94.5 fL 80.0-100.0  Final           MCH  30.1 pg 26.0-34.0  Final           MCHC  31.9 g/dL 30.5-36.0  Final           RDW-CV  14.5 % 11.5-15.0  Final           Platelet Count  178 k/uL 150-400  Final           MPV  11.5 fL 9.0-12.7  Final           Neut%  62.3 %   Final           Abs Neut  3.21 k/uL 1.45-7.50  Final           Lymph%  25.0 %   Final           Abs Lymph  1.29 k/uL 1.00-4.00  Final           Mono%  10.3 %   Final           Abs Mono  0.53 k/uL <0.87  Final           Eosin%  1.0 %   Final           Abs Eosin  0.05 k/uL <0.46  Final           Baso%  0.2 %   Final           Abs Baso  <0.03 k/uL <0.11  Final           Immature Gran %%  1.2 %   Final           Abs Immature Gran  0.06 k/uL <0.10  Final           NRBC  0.0 /100 WBC   Final           Absolute nRBC  <0.01 k/uL <0.01  Final           Diff Type  Auto    Final       Renal Function Panel       Albumin  3.1 g/dL 3.9-4.9 L Final           Calcium, Total  9.4 mg/dL 8.5-10.2  Final           Phosphorus  2.4 mg/dL 2.7-4.8 L Final           Glucose  72 mg/dL 74-99 L Final   BUN  14 mg/dL 9-24  Final           Creatinine  1.65 mg/dL 0.73-1.22 H Final           Sodium  140 mmol/L 136-144  Final           Potassium  4.0 mmol/L 3.7-5.1  Final           Chloride  106 mmol/L   Final           CO2  20 mmol/L 22-30 L Final           Anion Gap  14 mmol/L 8-15  Final           Estimated Glomerular Filtration Rate  48 mL/min/1.73 meters squared >=60 L Final    Assessment/Plan   Problem List Items Addressed This Visit       Essential hypertension, benign    Review BP readings. Continue with Clonidine 0.2 mg po bid, Losartan 25 mg po daily ,and  Lasix 20 mg po daily. BMP CBC with diff q Tuesdays.         Immunosuppression    Fax lab work to transplant team for evaluation of tacrolimus level ( 3.1). currently on 3 mg q am and 2 mg  q pm.         Impaired functional mobility, balance, gait, and endurance -  Primary    Remains dependent on nursing staff for transfers and toileting. He in not able to walk safely. He requires max assistance to stand. Review physical and occupational therapy notes.            Time:    Phuc Clark PA-C     Electronically Signed By: Phuc Clark PA-C   7/11/25 11:15 AM

## 2025-07-11 ENCOUNTER — TELEPHONE (OUTPATIENT)
Facility: HOSPITAL | Age: 60
End: 2025-07-11
Payer: COMMERCIAL

## 2025-07-11 VITALS
HEART RATE: 70 BPM | HEIGHT: 68 IN | OXYGEN SATURATION: 97 % | BODY MASS INDEX: 21.67 KG/M2 | WEIGHT: 143 LBS | DIASTOLIC BLOOD PRESSURE: 80 MMHG | TEMPERATURE: 98.2 F | SYSTOLIC BLOOD PRESSURE: 130 MMHG | RESPIRATION RATE: 14 BRPM

## 2025-07-11 ASSESSMENT — ENCOUNTER SYMPTOMS
NAUSEA: 0
ANOREXIA: 0
COUGH: 0
ARTHRALGIAS: 0
CHILLS: 0
NUMBNESS: 0
CHANGE IN BOWEL HABIT: 0
HEADACHES: 0
EXTREMITY WEAKNESS: 1
ABDOMINAL PAIN: 0
FEVER: 0

## 2025-07-11 NOTE — PROGRESS NOTES
"7/9/2025  Name: Davidson Khoury  YOB: 1965    Chief complaint: Impaired mobility    HPI: Patient seated in wheelchair at time of exam. He is able to propel wheelchair satisfactorily. Review of chart shows he is  able to stand with assistance and walk 10 feet with parallel bars during supervised ambulation. Recent CVA with new left hemiparesis limiting his mobility. He requires nursing assistance for repositioning, transfers and toileting.    Extremity Weakness  The current episode started 1 to 4 weeks ago. The problem has been gradually improving. Pertinent negatives include no abdominal pain, anorexia, arthralgias, change in bowel habit, chest pain, chills, coughing, fever, headaches, nausea, numbness or urinary symptoms. The symptoms are aggravated by standing. He has tried relaxation, rest and walking for the symptoms. The treatment provided moderate relief.       Review of systems:   ROS negative except were noted in HPI.    Code Status: full code    /80   Pulse 70   Temp 36.8 °C (98.2 °F)   Resp 14   Ht 1.727 m (5' 8\")   Wt 64.9 kg (143 lb)   SpO2 97%   BMI 21.74 kg/m²        Physical Exam  Constitutional:       General: He is not in acute distress.  HENT:      Head: Normocephalic.      Nose: Nose normal.      Mouth/Throat:      Mouth: Mucous membranes are moist.   Eyes:      Pupils: Pupils are equal, round, and reactive to light.      Comments: Poor vision L eye.   Cardiovascular:      Rate and Rhythm: Normal rate and regular rhythm.      Pulses: Normal pulses.   Pulmonary:      Effort: Pulmonary effort is normal.      Breath sounds: Normal breath sounds.   Abdominal:      General: Bowel sounds are normal. There is no distension.      Palpations: Abdomen is soft.      Tenderness: There is no abdominal tenderness.   Genitourinary:     Comments: Voiding. Renal transplant  Musculoskeletal:      Comments: L hemiparesis.   Skin:     General: Skin is warm and dry.      Capillary Refill: " Capillary refill takes less than 2 seconds.   Neurological:      Mental Status: He is alert and oriented to person, place, and time.      Comments: L sided weakness.   Psychiatric:         Mood and Affect: Mood normal.         Behavior: Behavior normal.     Medications reviewed during visit at facility.  Mycophenolate Mofetil Oral Capsule 250 MG 3 capsules bid  Losartan 25 mg po daily  Aspirin 81 mg po daily  Miralax 17 grams po daily  Clonidine 0.2 mg po bid  Potassium chloride 20 meq po daily  Senna Plus two tablets po q hs  Tamsulosin 0.4 mg po daily  Tacrolimus 2 mg po In evening  Tacrolimus 3 mg po in mornng  Atorvastatin 40 mg po daily  Cetrizine 10 mg po daily  Plavix 75 mg po daily  Pantoprazole 40 mg po daily  Lasix 20 mg po daily  Tylenol 650 mg po q 4 hours prn  Labs reviewed at facility:    Laboratory: 2025 21:25 Basic Metabolic Panl / CBC and Differential / Renal Function Panel / Tacrolimus/  Latest Version (more available)  Reviewed by [email or web address] on 2025 08:53  Resident Information  Resident: Davidson Boone ()  Admit Date: 2025  Admitting Provider:   Attending Provider:   Copy to List:   Report Information  Collection Date: 2025 04:50  Received Date: 2025 04:50  Reported Date: 2025 21:25  Ord. Provider: Antolin Valera  Source Finney: [id number]  Lab Information  Status: Completed  Flag:    Reporting Lab:   [facility]  Order #:   [id number]  Vendor Order #:   Category:   Chemistry, Hematology, Unknown Category  Order Notes  Result for:  DAVIDSON BOONE ( 1965, M)      Results   Show All Details Result Units Ref. Range Flag Status   BASIC METABOLIC PANEL  Invalid       CBC and Differential       White Blood Cell Count  5.15 k/uL 3.70-11.00  Final           RBC  3.65 m/uL 4.20-6.00 L Final           Hemoglobin  11.0 g/dL 13.0-17.0 L Final           Hematocrit  34.5 % 39.0-51.0 L Final            MCV  94.5 fL 80.0-100.0  Final           MCH  30.1 pg 26.0-34.0  Final           MCHC  31.9 g/dL 30.5-36.0  Final           RDW-CV  14.5 % 11.5-15.0  Final           Platelet Count  178 k/uL 150-400  Final           MPV  11.5 fL 9.0-12.7  Final           Neut%  62.3 %   Final           Abs Neut  3.21 k/uL 1.45-7.50  Final           Lymph%  25.0 %   Final           Abs Lymph  1.29 k/uL 1.00-4.00  Final           Mono%  10.3 %   Final           Abs Mono  0.53 k/uL <0.87  Final           Eosin%  1.0 %   Final           Abs Eosin  0.05 k/uL <0.46  Final           Baso%  0.2 %   Final           Abs Baso  <0.03 k/uL <0.11  Final           Immature Gran %%  1.2 %   Final           Abs Immature Gran  0.06 k/uL <0.10  Final           NRBC  0.0 /100 WBC   Final           Absolute nRBC  <0.01 k/uL <0.01  Final           Diff Type  Auto    Final       Renal Function Panel       Albumin  3.1 g/dL 3.9-4.9 L Final           Calcium, Total  9.4 mg/dL 8.5-10.2  Final           Phosphorus  2.4 mg/dL 2.7-4.8 L Final           Glucose  72 mg/dL 74-99 L Final   BUN  14 mg/dL 9-24  Final           Creatinine  1.65 mg/dL 0.73-1.22 H Final           Sodium  140 mmol/L 136-144  Final           Potassium  4.0 mmol/L 3.7-5.1  Final           Chloride  106 mmol/L   Final           CO2  20 mmol/L 22-30 L Final           Anion Gap  14 mmol/L 8-15  Final           Estimated Glomerular Filtration Rate  48 mL/min/1.73 meters squared >=60 L Final    Assessment/Plan    Problem List Items Addressed This Visit       Essential hypertension, benign    Review BP readings. Continue with Clonidine 0.2 mg po bid, Losartan 25 mg po daily ,and  Lasix 20 mg po daily. BMP CBC with diff q Tuesdays.         Immunosuppression    Fax lab work to transplant team for evaluation of tacrolimus level ( 3.1). currently on 3 mg q am and 2 mg  q pm.         Impaired functional mobility, balance, gait, and endurance - Primary    Remains dependent on nursing staff for  transfers and toileting. He in not able to walk safely. He requires max assistance to stand. Review physical and occupational therapy notes.            Time:    Phuc Clark PA-C

## 2025-07-11 NOTE — ASSESSMENT & PLAN NOTE
Review BP readings. Continue with Clonidine 0.2 mg po bid, Losartan 25 mg po daily ,and  Lasix 20 mg po daily. BMP CBC with diff q Tuesdays.

## 2025-07-11 NOTE — TELEPHONE ENCOUNTER
Reviewed labs with Dr. Guardado (see previous telephone encounter)   Plan:   Increase tac to 4 every morning and 3 every evening   Call placed to Karla at [phone] from [facility]   And gave verbal instructions, Karla confirmed change and will have repeat tac level on Tuesday and call with results.

## 2025-07-11 NOTE — TELEPHONE ENCOUNTER
Karla called [phone] from [facility] with recent tacrolimus level of 2.8 from 3.0 received also via fax.   Confirmed his dose was increased back on 6/27 to 3 am and 2 mg pm.   Takes meds at 9 am and 9 pm lab draw approx 5 am.     Will review with nephrology and call Karla back with plan.

## 2025-07-11 NOTE — ASSESSMENT & PLAN NOTE
Remains dependent on nursing staff for transfers and toileting. He in not able to walk safely. He requires max assistance to stand. Review physical and occupational therapy notes.

## 2025-07-11 NOTE — ASSESSMENT & PLAN NOTE
Fax lab work to transplant team for evaluation of tacrolimus level ( 3.1). currently on 3 mg q am and 2 mg  q pm.

## 2025-07-15 ENCOUNTER — TELEPHONE (OUTPATIENT)
Facility: HOSPITAL | Age: 60
End: 2025-07-15

## 2025-07-15 ENCOUNTER — APPOINTMENT (OUTPATIENT)
Facility: HOSPITAL | Age: 60
End: 2025-07-15
Payer: COMMERCIAL

## 2025-07-16 ENCOUNTER — TELEPHONE (OUTPATIENT)
Facility: HOSPITAL | Age: 60
End: 2025-07-16
Payer: COMMERCIAL

## 2025-07-16 ENCOUNTER — NURSING HOME VISIT (OUTPATIENT)
Dept: POST ACUTE CARE | Facility: EXTERNAL LOCATION | Age: 60
End: 2025-07-16
Payer: COMMERCIAL

## 2025-07-16 DIAGNOSIS — Z79.621 LONG-TERM CURRENT USE OF TACROLIMUS: Primary | ICD-10-CM

## 2025-07-16 DIAGNOSIS — Z74.09 IMPAIRED FUNCTIONAL MOBILITY, BALANCE, GAIT, AND ENDURANCE: ICD-10-CM

## 2025-07-16 DIAGNOSIS — I63.411 ACUTE CEREBROVASCULAR ACCIDENT (CVA) DUE TO EMBOLISM OF RIGHT MIDDLE CEREBRAL ARTERY (MULTI): ICD-10-CM

## 2025-07-16 DIAGNOSIS — I12.9 HYPERTENSION WITH RENAL DISEASE: ICD-10-CM

## 2025-07-16 PROCEDURE — 99308 SBSQ NF CARE LOW MDM 20: CPT | Performed by: PHYSICIAN ASSISTANT

## 2025-07-16 NOTE — TELEPHONE ENCOUNTER
[facility], looking to speak with someone about PT's TAC    [phone]

## 2025-07-16 NOTE — LETTER
"Patient: Davidson Khoury  : 1965    Encounter Date: 2025  Name: Davidson Khoury  YOB: 1965    Chief complaint: Follow up for renal transplant therapy    HPI: Patient has history of renal transplant in 2019 and is receiving Tacrolimus and Mycophenolate Mofetil. Recent  8.3. Lab values reported to transplant team for evaluation and recommendation for dose. Patient reports feeling \" well\". Denies fever, chills, sob, palpitation, chest pain, dysuria, or constipation. He requires assistance from nursing for transfers, and toileting.     Review of systems:   ROS negative except were noted in HPI.    Code Status: full code    BP (!) 159/93   Pulse 57   Temp 36.5 °C (97.7 °F)   Resp 16   Ht 1.727 m (5' 8\")   Wt 63 kg (139 lb)   SpO2 97%   BMI 21.13 kg/m²      Physical Exam  Constitutional:       General: He is not in acute distress.  HENT:      Head: Normocephalic.      Nose: Nose normal.      Mouth/Throat:      Mouth: Mucous membranes are moist.   Eyes:      Pupils: Pupils are equal, round, and reactive to light.      Comments: Poor vision L eye.   Cardiovascular:      Rate and Rhythm: Normal rate and regular rhythm.      Pulses: Normal pulses.   Pulmonary:      Effort: Pulmonary effort is normal.      Breath sounds: Normal breath sounds.   Abdominal:      General: Bowel sounds are normal. There is no distension.      Palpations: Abdomen is soft.      Tenderness: There is no abdominal tenderness.   Genitourinary:     Comments: Voiding. Renal transplant  Musculoskeletal:      Comments: L hemiparesis.   Skin:     General: Skin is warm and dry.      Capillary Refill: Capillary refill takes less than 2 seconds.   Neurological:      Mental Status: He is alert and oriented to person, place, and time.      Comments: L sided weakness.   Psychiatric:         Mood and Affect: Mood normal.         Behavior: Behavior normal.     Medications reviewed during visit at " facility.  Losartan 25 mg po daily  Aspirin 81 mg po daily  Miralax 17 grams po daily  Clonidine 0.2 mg po bid  Potassium chloride 20 meq po daily  Senna Plus two tablets po q hs  Tamsulosin 0.4 mg po daily  Tacrolimus 3 mg po In evening  Tacrolimus 4 mg po in morning  Mycophenolate Mofetil Oral Capsule 250 MG 3 capsules po bid  Atorvastatin 40 mg po daily  Cetrizine 10 mg po daily  Plavix 75 mg po daily  Pantoprazole 40 mg po daily  Lasix 20 mg po daily  Tylenol 650 mg po q 4 hours prn  Labs reviewed at facility:    Laboratory: 7/15/2025 20:37 Basic Metabolic Panl / CBC and Differential / Renal Function Panel / Tacrolimus/  Latest Version (more available)  Reviewed by Abdiel on 2025 08:51  Resident Information  Resident: Davidson Boone ()  Admit Date: 2025  Admitting Provider:   Attending Provider:   Copy to List:   Report Information  Collection Date: 7/15/2025 04:30  Received Date: 7/15/2025 04:30  Reported Date: 7/15/2025 20:37  Ord. Provider: Antolin Valera  Source Finney: [id number]  Lab Information  Status: Completed  Flag:    Reporting Lab:   [facility]  Order #:   [id number]  Vendor Order #:   Category:   Chemistry, Hematology, Unknown Category  Order Notes  Result for:  DAVIDSON BOONE ( 1965, M)      Results   Show All Details Result Units Ref. Range Flag Status   BASIC METABOLIC PANEL  Invalid       CBC and Differential       White Blood Cell Count  4.08 k/uL 3.70-11.00  Final           RBC  3.76 m/uL 4.20-6.00 L Final           Hemoglobin  11.2 g/dL 13.0-17.0 L Final           Hematocrit  36.2 % 39.0-51.0 L Final           MCV  96.3 fL 80.0-100.0  Final           MCH  29.8 pg 26.0-34.0  Final           MCHC  30.9 g/dL 30.5-36.0  Final           RDW-CV  14.5 % 11.5-15.0  Final           Platelet Count  129 k/uL 150-400 L Final           MPV  12.4 fL 9.0-12.7  Final           Neut%  60.2 %   Final           Abs  Neut  2.45 k/uL 1.45-7.50  Final           Lymph%  28.4 %   Final           Abs Lymph  1.16 k/uL 1.00-4.00  Final           Mono%  10.3 %   Final           Abs Mono  0.42 k/uL <0.87  Final           Eosin%  0.7 %   Final           Abs Eosin  0.03 k/uL <0.46  Final           Baso%  0.2 %   Final           Abs Baso  <0.03 k/uL <0.11  Final           Immature Gran %%  0.2 %   Final           Abs Immature Gran  <0.03 k/uL <0.10  Final           NRBC  0.0 /100 WBC   Final           Absolute nRBC  <0.01 k/uL <0.01  Final           Diff Type  Auto    Final       Renal Function Panel       Albumin  3.3 g/dL 3.9-4.9 L Final           Calcium, Total  9.1 mg/dL 8.5-10.2  Final           Phosphorus  2.5 mg/dL 2.7-4.8 L Final           Glucose  66 mg/dL 74-99 L Final  BUN  14 mg/dL 9-24  Final           Creatinine  1.52 mg/dL 0.73-1.22 H Final           Sodium  140 mmol/L 136-144  Final           Potassium  4.0 mmol/L 3.7-5.1  Final           Chloride  106 mmol/L   Final           CO2  22 mmol/L 22-30  Final           Anion Gap  12 mmol/L 8-15  Final           Estimated Glomerular Filtration Rate  52 mL/min/1.73 meters squared >=60 L Final  Tacrolimus /   8.3 ng/mL 5.0-20.0  Final  Assessment/Plan   Problem List Items Addressed This Visit       Acute cerebrovascular accident (CVA) due to embolism of right middle cerebral artery (Multi)    Maintain Plavix. Schedule appointment with Dr. Rosario Neurology  on 8/25/2025          Hypertension with renal disease    Continue with Losartan 25 mg po daily and Clonidine 0.2 mg po bid. Review labs.         Impaired functional mobility, balance, gait, and endurance    Discuss progress with physical and occupational therapy.          Long-term current use of tacrolimus - Primary    Lab value of 8.3 reported to transplant team. Awaiting call back for dose. Current dose 4 mg in am , and 3 mg in pm.            Time:    Phuc Clark PA-C     Electronically Signed By: Phuc OLVERA  SANTANA Clark   7/19/25 10:18 PM

## 2025-07-17 NOTE — TELEPHONE ENCOUNTER
Call placed to Karla at [phone] from [facility]     Tac 8.3, first draw after dose change from last week, will continue to monitor it weekly and Karla will call next week with results.

## 2025-07-18 ENCOUNTER — TELEPHONE (OUTPATIENT)
Facility: HOSPITAL | Age: 60
End: 2025-07-18
Payer: COMMERCIAL

## 2025-07-18 NOTE — TELEPHONE ENCOUNTER
Margo called requesting to speak with coordinator about lasik and all the other medication he is on,She is not sure if he is getting all his medication  at the Nursing Home .  Patient call back number is [phone].

## 2025-07-19 VITALS
HEIGHT: 68 IN | BODY MASS INDEX: 21.07 KG/M2 | HEART RATE: 57 BPM | TEMPERATURE: 97.7 F | DIASTOLIC BLOOD PRESSURE: 93 MMHG | OXYGEN SATURATION: 97 % | SYSTOLIC BLOOD PRESSURE: 159 MMHG | RESPIRATION RATE: 16 BRPM | WEIGHT: 139 LBS

## 2025-07-20 NOTE — ASSESSMENT & PLAN NOTE
Lab value of 8.3 reported to transplant team. Awaiting call back for dose. Current dose 4 mg in am , and 3 mg in pm.

## 2025-07-20 NOTE — PROGRESS NOTES
"7/16/2025  Name: Davidson Khoury  YOB: 1965    Chief complaint: Follow up for renal transplant therapy    HPI: Patient has history of renal transplant in 2019 and is receiving Tacrolimus and Mycophenolate Mofetil. Recent  8.3. Lab values reported to transplant team for evaluation and recommendation for dose. Patient reports feeling \" well\". Denies fever, chills, sob, palpitation, chest pain, dysuria, or constipation. He requires assistance from nursing for transfers, and toileting.     Review of systems:   ROS negative except were noted in HPI.    Code Status: full code    BP (!) 159/93   Pulse 57   Temp 36.5 °C (97.7 °F)   Resp 16   Ht 1.727 m (5' 8\")   Wt 63 kg (139 lb)   SpO2 97%   BMI 21.13 kg/m²      Physical Exam  Constitutional:       General: He is not in acute distress.  HENT:      Head: Normocephalic.      Nose: Nose normal.      Mouth/Throat:      Mouth: Mucous membranes are moist.   Eyes:      Pupils: Pupils are equal, round, and reactive to light.      Comments: Poor vision L eye.   Cardiovascular:      Rate and Rhythm: Normal rate and regular rhythm.      Pulses: Normal pulses.   Pulmonary:      Effort: Pulmonary effort is normal.      Breath sounds: Normal breath sounds.   Abdominal:      General: Bowel sounds are normal. There is no distension.      Palpations: Abdomen is soft.      Tenderness: There is no abdominal tenderness.   Genitourinary:     Comments: Voiding. Renal transplant  Musculoskeletal:      Comments: L hemiparesis.   Skin:     General: Skin is warm and dry.      Capillary Refill: Capillary refill takes less than 2 seconds.   Neurological:      Mental Status: He is alert and oriented to person, place, and time.      Comments: L sided weakness.   Psychiatric:         Mood and Affect: Mood normal.         Behavior: Behavior normal.     Medications reviewed during visit at facility.  Losartan 25 mg po daily  Aspirin 81 mg po daily  Miralax 17 grams po " daily  Clonidine 0.2 mg po bid  Potassium chloride 20 meq po daily  Senna Plus two tablets po q hs  Tamsulosin 0.4 mg po daily  Tacrolimus 3 mg po In evening  Tacrolimus 4 mg po in morning  Mycophenolate Mofetil Oral Capsule 250 MG 3 capsules po bid  Atorvastatin 40 mg po daily  Cetrizine 10 mg po daily  Plavix 75 mg po daily  Pantoprazole 40 mg po daily  Lasix 20 mg po daily  Tylenol 650 mg po q 4 hours prn  Labs reviewed at facility:    Laboratory: 7/15/2025 20:37 Basic Metabolic Panl / CBC and Differential / Renal Function Panel / Tacrolimus/  Latest Version (more available)  Reviewed by Abdiel on 2025 08:51  Resident Information  Resident: Davidson Boone ()  Admit Date: 2025  Admitting Provider:   Attending Provider:   Copy to List:   Report Information  Collection Date: 7/15/2025 04:30  Received Date: 7/15/2025 04:30  Reported Date: 7/15/2025 20:37  Ord. Provider: Antolin Valera  Source Finney: [id number]  Lab Information  Status: Completed  Flag:    Reporting Lab:   [facility]  Order #:   [id number]  Vendor Order #:   Category:   Chemistry, Hematology, Unknown Category  Order Notes  Result for:  DAVIDSON BOONE ( 1965, M)      Results   Show All Details Result Units Ref. Range Flag Status   BASIC METABOLIC PANEL  Invalid       CBC and Differential       White Blood Cell Count  4.08 k/uL 3.70-11.00  Final           RBC  3.76 m/uL 4.20-6.00 L Final           Hemoglobin  11.2 g/dL 13.0-17.0 L Final           Hematocrit  36.2 % 39.0-51.0 L Final           MCV  96.3 fL 80.0-100.0  Final           MCH  29.8 pg 26.0-34.0  Final           MCHC  30.9 g/dL 30.5-36.0  Final           RDW-CV  14.5 % 11.5-15.0  Final           Platelet Count  129 k/uL 150-400 L Final           MPV  12.4 fL 9.0-12.7  Final           Neut%  60.2 %   Final           Abs Neut  2.45 k/uL 1.45-7.50  Final           Lymph%  28.4 %   Final           Abs  Lymph  1.16 k/uL 1.00-4.00  Final           Mono%  10.3 %   Final           Abs Mono  0.42 k/uL <0.87  Final           Eosin%  0.7 %   Final           Abs Eosin  0.03 k/uL <0.46  Final           Baso%  0.2 %   Final           Abs Baso  <0.03 k/uL <0.11  Final           Immature Gran %%  0.2 %   Final           Abs Immature Gran  <0.03 k/uL <0.10  Final           NRBC  0.0 /100 WBC   Final           Absolute nRBC  <0.01 k/uL <0.01  Final           Diff Type  Auto    Final       Renal Function Panel       Albumin  3.3 g/dL 3.9-4.9 L Final           Calcium, Total  9.1 mg/dL 8.5-10.2  Final           Phosphorus  2.5 mg/dL 2.7-4.8 L Final           Glucose  66 mg/dL 74-99 L Final  BUN  14 mg/dL 9-24  Final           Creatinine  1.52 mg/dL 0.73-1.22 H Final           Sodium  140 mmol/L 136-144  Final           Potassium  4.0 mmol/L 3.7-5.1  Final           Chloride  106 mmol/L   Final           CO2  22 mmol/L 22-30  Final           Anion Gap  12 mmol/L 8-15  Final           Estimated Glomerular Filtration Rate  52 mL/min/1.73 meters squared >=60 L Final  Tacrolimus /   8.3 ng/mL 5.0-20.0  Final  Assessment/Plan    Problem List Items Addressed This Visit       Acute cerebrovascular accident (CVA) due to embolism of right middle cerebral artery (Multi)    Maintain Plavix. Schedule appointment with Dr. Rosario Neurology  on 8/25/2025          Hypertension with renal disease    Continue with Losartan 25 mg po daily and Clonidine 0.2 mg po bid. Review labs.         Impaired functional mobility, balance, gait, and endurance    Discuss progress with physical and occupational therapy.          Long-term current use of tacrolimus - Primary    Lab value of 8.3 reported to transplant team. Awaiting call back for dose. Current dose 4 mg in am , and 3 mg in pm.            Time:    Phuc Clark PA-C

## 2025-07-21 ENCOUNTER — NURSING HOME VISIT (OUTPATIENT)
Dept: POST ACUTE CARE | Facility: EXTERNAL LOCATION | Age: 60
End: 2025-07-21
Payer: COMMERCIAL

## 2025-07-21 DIAGNOSIS — I63.411 ACUTE CEREBROVASCULAR ACCIDENT (CVA) DUE TO EMBOLISM OF RIGHT MIDDLE CEREBRAL ARTERY (MULTI): Primary | ICD-10-CM

## 2025-07-21 PROCEDURE — 99310 SBSQ NF CARE HIGH MDM 45: CPT | Performed by: INTERNAL MEDICINE

## 2025-07-21 NOTE — LETTER
Patient: Davidson Khoury  : 1965    Encounter Date: 2025    Subjective  Patient ID: Davidson Khoury is a 60 y.o. male who presents for No chief complaint on file..  HPI  Past medical history CVA renal transplant hypertension  Recent hospital admission reported fall left-sided weakness CT head negative    Patient seen while at therapy he is doing very well doing right upper extremity bicep curls during therapy  Therapist Franklin states the patient has developed some left hip mobility and movement that he did not have before otherwise the patient feels well  Has chronic left-sided weakness since the stroke overall somewhat improving under the direction of therapy              Health Maintenance:      Colonoscopy:      Mammogram:      Pelvic/Pap:      Low dose chest CT:      Aorta duplex:      Optho:      Podiatry:        Vaccines:      Refer to Epic Vaccination Log        ROS:      General: denies fever/chills/weight loss      Head: denies HA/trauma/masses/dizziness      Eyes: denies vision change/loss of vision/blurry vision/diplopia/eye pain      Ears: denies hearing loss/tinnitus/otalgia/otorrhea      Nose: denies nasal drainage/anosmia      Throat: denies dysphagia/odynophagia      Lymphatics: denies lymph node swelling      Breast: denies masses/discharge/dimpling/skin changes      Cardiac: denies CP/palpitations/orthopnea/PND      Pulmonary: denies dyspnea/cough/wheezing      GI: denies abd pain/n/v/diarrhea/melena/hematochezia/hematemesis      : denies dysuria/hematuria/change frequency      Genital: denies genital discharge/lesions      Skin: denies rashes/lesions/masses      MSK: Left-sided weakness since the stroke now gaining some left hip mobility denies weakness/swelling/edema/gait imbalance/pain      Neuro: denies paresthesias/seizures/dysarthria      Psych: denies depression/anxiety/suicidal or homicidal ideations            Objective  There were no vitals taken for this visit.     Physical  "Exam:     General: AO3, NAD     Head: atraumatic/NC     Eyes: EOMI/PERRLA. Negative APD     Ears: TM pearly gray, EAC clear. No lesions or erythema     Nose: symmetric nares, no discharge     Throat: trachea midline, uvula midline pink mucosa. No thyromegaly     Lymphatics: no cervical/supraclavicular/ant or posterior cervical adenopathy/axillary/inguinal adenopathy     Breast: not examined     Chest: no deformity or tenderness to palpation     Pulm: CTA b/l, no wheeze/rhonchi/rales. nonlabored     Cardiac: RRR +s1s2, no m/r/g.      GI: soft, NT/ND. Normoactive Bsx4. No rebound/guarding.     Rectal: not examined     Genital: not examined     MSK: Left AV fistula site; 3 out of 5 strength left hip flexion unable to assess extension he is in the wheelchair otherwise 1 out of 5 strength left sided otherwise right-sided 5/5 strength UE LE. No edema/clubbing/cyanosis     Skin: no rashes/lesions     Vascular: 2+ palp DP PT radials b/l. Negative carotid bruit     Neuro: CNII-XII intact. No focal deficits. Reflexes 2/4 brachioradialis bicep tricep patellar achilles. Finger to nose intact.     Psych: appropriate mood/affect                    No results found for: \"BMPR1A\", \"CBCDIF\"      Assessment/Plan  Diagnoses and all orders for this visit:  Acute cerebrovascular accident (CVA) due to embolism of right middle cerebral artery (Multi)    PT OT SLP  Continue the excellent job with your dedication and hard work at therapy sessions    Neurology referral and follow-up as planned August 25     transplant referral and follow-up    Every Tuesday CBC BMP    Antolin Valera DO, FACOI       Antolin Valera DO    Electronically Signed By: Antolin Valera DO   7/21/25 11:53 AM  "

## 2025-07-21 NOTE — PROGRESS NOTES
Subjective   Patient ID: Davidson Khoury is a 60 y.o. male who presents for No chief complaint on file..  HPI  Past medical history CVA renal transplant hypertension  Recent hospital admission reported fall left-sided weakness CT head negative    Patient seen while at therapy he is doing very well doing right upper extremity bicep curls during therapy  Therapist Franklin states the patient has developed some left hip mobility and movement that he did not have before otherwise the patient feels well  Has chronic left-sided weakness since the stroke overall somewhat improving under the direction of therapy              Health Maintenance:      Colonoscopy:      Mammogram:      Pelvic/Pap:      Low dose chest CT:      Aorta duplex:      Optho:      Podiatry:        Vaccines:      Refer to Epic Vaccination Log        ROS:      General: denies fever/chills/weight loss      Head: denies HA/trauma/masses/dizziness      Eyes: denies vision change/loss of vision/blurry vision/diplopia/eye pain      Ears: denies hearing loss/tinnitus/otalgia/otorrhea      Nose: denies nasal drainage/anosmia      Throat: denies dysphagia/odynophagia      Lymphatics: denies lymph node swelling      Breast: denies masses/discharge/dimpling/skin changes      Cardiac: denies CP/palpitations/orthopnea/PND      Pulmonary: denies dyspnea/cough/wheezing      GI: denies abd pain/n/v/diarrhea/melena/hematochezia/hematemesis      : denies dysuria/hematuria/change frequency      Genital: denies genital discharge/lesions      Skin: denies rashes/lesions/masses      MSK: Left-sided weakness since the stroke now gaining some left hip mobility denies weakness/swelling/edema/gait imbalance/pain      Neuro: denies paresthesias/seizures/dysarthria      Psych: denies depression/anxiety/suicidal or homicidal ideations            Objective   There were no vitals taken for this visit.     Physical Exam:     General: AO3, NAD     Head: atraumatic/NC     Eyes:  "EOMI/PERRLA. Negative APD     Ears: TM pearly gray, EAC clear. No lesions or erythema     Nose: symmetric nares, no discharge     Throat: trachea midline, uvula midline pink mucosa. No thyromegaly     Lymphatics: no cervical/supraclavicular/ant or posterior cervical adenopathy/axillary/inguinal adenopathy     Breast: not examined     Chest: no deformity or tenderness to palpation     Pulm: CTA b/l, no wheeze/rhonchi/rales. nonlabored     Cardiac: RRR +s1s2, no m/r/g.      GI: soft, NT/ND. Normoactive Bsx4. No rebound/guarding.     Rectal: not examined     Genital: not examined     MSK: Left AV fistula site; 3 out of 5 strength left hip flexion unable to assess extension he is in the wheelchair otherwise 1 out of 5 strength left sided otherwise right-sided 5/5 strength UE LE. No edema/clubbing/cyanosis     Skin: no rashes/lesions     Vascular: 2+ palp DP PT radials b/l. Negative carotid bruit     Neuro: CNII-XII intact. No focal deficits. Reflexes 2/4 brachioradialis bicep tricep patellar achilles. Finger to nose intact.     Psych: appropriate mood/affect                    No results found for: \"BMPR1A\", \"CBCDIF\"      Assessment/Plan   Diagnoses and all orders for this visit:  Acute cerebrovascular accident (CVA) due to embolism of right middle cerebral artery (Multi)    PT OT SLP  Continue the excellent job with your dedication and hard work at therapy sessions    Neurology referral and follow-up as planned August 25     transplant referral and follow-up    Every Tuesday CBC YAO Valera DO, FACOI       Antolin Valera DO  "

## 2025-07-23 ENCOUNTER — NURSING HOME VISIT (OUTPATIENT)
Dept: POST ACUTE CARE | Facility: EXTERNAL LOCATION | Age: 60
End: 2025-07-23
Payer: COMMERCIAL

## 2025-07-23 VITALS
WEIGHT: 150 LBS | HEIGHT: 68 IN | TEMPERATURE: 97.7 F | HEART RATE: 76 BPM | OXYGEN SATURATION: 98 % | BODY MASS INDEX: 22.73 KG/M2 | RESPIRATION RATE: 18 BRPM | SYSTOLIC BLOOD PRESSURE: 121 MMHG | DIASTOLIC BLOOD PRESSURE: 72 MMHG

## 2025-07-23 DIAGNOSIS — Z74.09 IMPAIRED FUNCTIONAL MOBILITY, BALANCE, GAIT, AND ENDURANCE: ICD-10-CM

## 2025-07-23 DIAGNOSIS — I10 ESSENTIAL HYPERTENSION, BENIGN: ICD-10-CM

## 2025-07-23 DIAGNOSIS — D84.9 IMMUNOSUPPRESSION: Primary | ICD-10-CM

## 2025-07-23 PROCEDURE — 99308 SBSQ NF CARE LOW MDM 20: CPT | Performed by: PHYSICIAN ASSISTANT

## 2025-07-23 NOTE — PROGRESS NOTES
"07/23/25  Name: Davidson Khoury  YOB: 1965    Chief complaint: Follow up for long-term use of   Tacrolimus.    HPI: Patient seated in wheelchair at time of exam. He is alert and offers no new complaints. Review of chart show is making steady progress with physical and occupational therapy. Tacrolimus level 6.5. Lab results have been faxed to transplant team. Currently receiving Tacrolimus 4 mg po daily q am and 3 mg po daily q pm. Patient denies fever, chills, sob, chest pain, nausea, dysuria or flank pain.    Review of systems:   ROS negative except were noted in HPI.    Code Status: full code    /72   Pulse 76   Temp 36.5 °C (97.7 °F)   Resp 18   Ht 1.727 m (5' 8\")   Wt 68 kg (150 lb)   SpO2 98%   BMI 22.81 kg/m²      Physical Exam  Constitutional:       General: He is not in acute distress.  HENT:      Head: Normocephalic.      Nose: Nose normal.      Mouth/Throat:      Mouth: Mucous membranes are moist.   Eyes:      Pupils: Pupils are equal, round, and reactive to light.      Comments: Poor vision L eye.   Cardiovascular:      Rate and Rhythm: Normal rate and regular rhythm.      Pulses: Normal pulses.   Pulmonary:      Effort: Pulmonary effort is normal.      Breath sounds: Normal breath sounds.   Abdominal:      General: Bowel sounds are normal. There is no distension.      Palpations: Abdomen is soft.      Tenderness: There is no abdominal tenderness.   Genitourinary:     Comments: Voiding. Renal transplant  Musculoskeletal:      Comments: L hemiparesis.   Skin:     General: Skin is warm and dry.      Capillary Refill: Capillary refill takes less than 2 seconds.   Neurological:      Mental Status: He is alert and oriented to person, place, and time.      Comments: L sided weakness.   Psychiatric:         Mood and Affect: Mood normal.         Behavior: Behavior normal.    Medications reviewed during visit at facility.  Losartan 25 mg po daily  Aspirin 81 mg po daily  Miralax 17 grams " po daily  Clonidine 0.2 mg po bid  Potassium chloride 20 meq po daily  Senna Plus two tablets po q hs  Tamsulosin 0.4 mg po daily  Tacrolimus 3 mg po In evening  Tacrolimus 4 mg po in morning  Mycophenolate Mofetil Oral Capsule 250 MG 3 capsules po bid  Atorvastatin 40 mg po daily  Cetrizine 10 mg po daily  Plavix 75 mg po daily  Pantoprazole 40 mg po daily  Lasix 20 mg po daily  Tylenol 650 mg po q 4 hours prn  Labs reviewed at facility:    Laboratory: 2025 13:26 Renal Function Panel  Latest Version  Resident Information  Resident: Davidson Boone ()  Admit Date: 2025  Admitting Provider:   Attending Provider:   Copy to List:   Report Information  Collection Date: 2025 07:46  Received Date: 2025 07:46  Reported Date: 2025 13:26  Ord. Provider: Antolin Valera  Source Finney: [id number]  Lab Information  Status: Completed  Flag:    Reporting Lab:   [facility]  Order #:   [id number]  Vendor Order #:   Category:   Chemistry, Unknown Category  Order Notes  Result for:  DAVIDSON BOONE ( 1965, M)      Results   Show All Details Result Units Ref. Range Flag Status   Renal Function Panel       Albumin  3.4 g/dL 3.9-4.9 L Final           Calcium, Total  9.1 mg/dL 8.5-10.2  Final           Phosphorus  2.9 mg/dL 2.7-4.8  Final           Glucose  70 mg/dL 74-99 L Final  BUN  18 mg/dL 9-24  Final           Creatinine  1.59 mg/dL 0.73-1.22 H Final           Sodium  142 mmol/L 136-144  Final           Potassium  4.0 mmol/L 3.7-5.1  Final           Chloride  107 mmol/L   Final           CO2  20 mmol/L 22-30 L Final           Anion Gap  15 mmol/L 8-15  Final           Estimated Glomerular Filtration Rate  50 mL/min/1.73 meters squared >=60 L Final   Tacrolimus /   6.5 ng/mL 5.0-20.0  Final    Laboratory: 2025 12:56 Basic Metabolic Panl / CBC and Differential / Renal Function Panel / Tacrolimus/  Latest Version (more  available)  Resident Information  Resident: Davidson Boone ()  Admit Date: 2025  Admitting Provider:   Attending Provider:   Copy to List:   Report Information  Collection Date: 2025 07:43  Received Date: 2025 07:43  Reported Date: 2025 12:56  Ord. Provider: Antolin Valera  Source Finney: [id number]  Lab Information  Status: Completed  Flag:    Reporting Lab:   [facility]  Order #:   [id number]  Vendor Order #:   Category:   Chemistry, Hematology, Unknown Category  Order Notes  Result for:  DAVIDSON BOONE ( 1965, M)      Results   Show All Details Result Units Ref. Range Flag Status   BASIC METABOLIC PANEL  Invalid       CBC and Differential       White Blood Cell Count  3.99 k/uL 3.70-11.00  Final           RBC  3.89 m/uL 4.20-6.00 L Final           Hemoglobin  11.6 g/dL 13.0-17.0 L Final           Hematocrit  36.4 % 39.0-51.0 L Final           MCV  93.6 fL 80.0-100.0  Final           MCH  29.8 pg 26.0-34.0  Final           MCHC  31.9 g/dL 30.5-36.0  Final           RDW-CV  14.8 % 11.5-15.0  Final           Platelet Count  116 k/uL 150-400 L Final           MPV  12.5 fL 9.0-12.7  Final           Neut%  64.5 %   Final           Abs Neut  2.58 k/uL 1.45-7.50  Final           Lymph%  25.3 %   Final           Abs Lymph  1.01 k/uL 1.00-4.00  Final           Mono%  8.8 %   Final           Abs Mono  0.35 k/uL <0.87  Final           Eosin%  0.8 %   Final           Abs Eosin  0.03 k/uL <0.46  Final           Baso%  0.3 %   Final           Abs Baso  <0.03 k/uL <0.11  Final           Immature Gran %%  0.3 %   Final           Abs Immature Gran  <0.03 k/uL <0.10  Final           NRBC  0.0 /100 WBC   Final           Absolute nRBC  <0.01 k/uL <0.01  Final           Diff Type  Auto    Final  Assessment/Plan    Problem List Items Addressed This Visit       Essential hypertension, benign    Review BP readings. Continue with Clonidine 0.2 mg po bid, Losartan  25 mg po daily ,and  Lasix 20 mg po daily. BMP CBC with diff q Tuesdays.            Immunosuppression - Primary    Tacrolimus level 6.5. Lab results faxed to Kamla on transplant team. Continue Tacrolimus 3 mg po In evening and Tacrolimus 4 mg po in morning. Mycophenolate Mofetil Oral Capsule 250 MG 3          Impaired functional mobility, balance, gait, and endurance    Discuss progress with physical and occupational therapy.            Time:    Phuc Clark PA-C

## 2025-07-23 NOTE — ASSESSMENT & PLAN NOTE
Tacrolimus level 6.5. Lab results faxed to Kamla on transplant team. Continue Tacrolimus 3 mg po In evening and Tacrolimus 4 mg po in morning. Mycophenolate Mofetil Oral Capsule 250 MG 3

## 2025-07-23 NOTE — LETTER
"Patient: Davidson Khoury  : 1965    Encounter Date: 2025  Name: Davidson Khoury  YOB: 1965    Chief complaint: Follow up for long-term use of   Tacrolimus.    HPI: Patient seated in wheelchair at time of exam. He is alert and offers no new complaints. Review of chart show is making steady progress with physical and occupational therapy. Tacrolimus level 6.5. Lab results have been faxed to transplant team. Currently receiving Tacrolimus 4 mg po daily q am and 3 mg po daily q pm. Patient denies fever, chills, sob, chest pain, nausea, dysuria or flank pain.    Review of systems:   ROS negative except were noted in HPI.    Code Status: full code    /72   Pulse 76   Temp 36.5 °C (97.7 °F)   Resp 18   Ht 1.727 m (5' 8\")   Wt 68 kg (150 lb)   SpO2 98%   BMI 22.81 kg/m²      Physical Exam  Constitutional:       General: He is not in acute distress.  HENT:      Head: Normocephalic.      Nose: Nose normal.      Mouth/Throat:      Mouth: Mucous membranes are moist.   Eyes:      Pupils: Pupils are equal, round, and reactive to light.      Comments: Poor vision L eye.   Cardiovascular:      Rate and Rhythm: Normal rate and regular rhythm.      Pulses: Normal pulses.   Pulmonary:      Effort: Pulmonary effort is normal.      Breath sounds: Normal breath sounds.   Abdominal:      General: Bowel sounds are normal. There is no distension.      Palpations: Abdomen is soft.      Tenderness: There is no abdominal tenderness.   Genitourinary:     Comments: Voiding. Renal transplant  Musculoskeletal:      Comments: L hemiparesis.   Skin:     General: Skin is warm and dry.      Capillary Refill: Capillary refill takes less than 2 seconds.   Neurological:      Mental Status: He is alert and oriented to person, place, and time.      Comments: L sided weakness.   Psychiatric:         Mood and Affect: Mood normal.         Behavior: Behavior normal.    Medications reviewed during visit at " facility.  Losartan 25 mg po daily  Aspirin 81 mg po daily  Miralax 17 grams po daily  Clonidine 0.2 mg po bid  Potassium chloride 20 meq po daily  Senna Plus two tablets po q hs  Tamsulosin 0.4 mg po daily  Tacrolimus 3 mg po In evening  Tacrolimus 4 mg po in morning  Mycophenolate Mofetil Oral Capsule 250 MG 3 capsules po bid  Atorvastatin 40 mg po daily  Cetrizine 10 mg po daily  Plavix 75 mg po daily  Pantoprazole 40 mg po daily  Lasix 20 mg po daily  Tylenol 650 mg po q 4 hours prn  Labs reviewed at facility:    Laboratory: 2025 13:26 Renal Function Panel  Latest Version  Resident Information  Resident: Davidson Boone ()  Admit Date: 2025  Admitting Provider:   Attending Provider:   Copy to List:   Report Information  Collection Date: 2025 07:46  Received Date: 2025 07:46  Reported Date: 2025 13:26  Ord. Provider: Antolin Valera  Source Finney: [id number]  Lab Information  Status: Completed  Flag:    Reporting Lab:   [facility]  Order #:   [id number]  Vendor Order #:   Category:   Chemistry, Unknown Category  Order Notes  Result for:  DAVIDSON BOONE ( 1965, M)      Results   Show All Details Result Units Ref. Range Flag Status   Renal Function Panel       Albumin  3.4 g/dL 3.9-4.9 L Final           Calcium, Total  9.1 mg/dL 8.5-10.2  Final           Phosphorus  2.9 mg/dL 2.7-4.8  Final           Glucose  70 mg/dL 74-99 L Final  BUN  18 mg/dL 9-24  Final           Creatinine  1.59 mg/dL 0.73-1.22 H Final           Sodium  142 mmol/L 136-144  Final           Potassium  4.0 mmol/L 3.7-5.1  Final           Chloride  107 mmol/L   Final           CO2  20 mmol/L 22-30 L Final           Anion Gap  15 mmol/L 8-15  Final           Estimated Glomerular Filtration Rate  50 mL/min/1.73 meters squared >=60 L Final   Tacrolimus /   6.5 ng/mL 5.0-20.0  Final    Laboratory: 2025 12:56 Basic Metabolic Panl / CBC and Differential /  Renal Function Panel / Tacrolimus/  Latest Version (more available)  Resident Information  Resident: Davidson Boone ()  Admit Date: 2025  Admitting Provider:   Attending Provider:   Copy to List:   Report Information  Collection Date: 2025 07:43  Received Date: 2025 07:43  Reported Date: 2025 12:56  Ord. Provider: Antolin Valera  Source Finney: [id number]  Lab Information  Status: Completed  Flag:    Reporting Lab:   [facility]  Order #:   [id number]  Vendor Order #:   Category:   Chemistry, Hematology, Unknown Category  Order Notes  Result for:  DAVIDSON BOONE ( 1965, M)      Results   Show All Details Result Units Ref. Range Flag Status   BASIC METABOLIC PANEL  Invalid       CBC and Differential       White Blood Cell Count  3.99 k/uL 3.70-11.00  Final           RBC  3.89 m/uL 4.20-6.00 L Final           Hemoglobin  11.6 g/dL 13.0-17.0 L Final           Hematocrit  36.4 % 39.0-51.0 L Final           MCV  93.6 fL 80.0-100.0  Final           MCH  29.8 pg 26.0-34.0  Final           MCHC  31.9 g/dL 30.5-36.0  Final           RDW-CV  14.8 % 11.5-15.0  Final           Platelet Count  116 k/uL 150-400 L Final           MPV  12.5 fL 9.0-12.7  Final           Neut%  64.5 %   Final           Abs Neut  2.58 k/uL 1.45-7.50  Final           Lymph%  25.3 %   Final           Abs Lymph  1.01 k/uL 1.00-4.00  Final           Mono%  8.8 %   Final           Abs Mono  0.35 k/uL <0.87  Final           Eosin%  0.8 %   Final           Abs Eosin  0.03 k/uL <0.46  Final           Baso%  0.3 %   Final           Abs Baso  <0.03 k/uL <0.11  Final           Immature Gran %%  0.3 %   Final           Abs Immature Gran  <0.03 k/uL <0.10  Final           NRBC  0.0 /100 WBC   Final           Absolute nRBC  <0.01 k/uL <0.01  Final           Diff Type  Auto    Final  Assessment/Plan   Problem List Items Addressed This Visit       Essential hypertension, benign    Review  BP readings. Continue with Clonidine 0.2 mg po bid, Losartan 25 mg po daily ,and  Lasix 20 mg po daily. BMP CBC with diff q Tuesdays.            Immunosuppression - Primary    Tacrolimus level 6.5. Lab results faxed to Kamla on transplant team. Continue Tacrolimus 3 mg po In evening and Tacrolimus 4 mg po in morning. Mycophenolate Mofetil Oral Capsule 250 MG 3          Impaired functional mobility, balance, gait, and endurance    Discuss progress with physical and occupational therapy.            Time:    Phuc Clark PA-C     Electronically Signed By: Phuc Clark PA-C   7/23/25  4:27 PM

## 2025-07-24 ENCOUNTER — TELEPHONE (OUTPATIENT)
Facility: HOSPITAL | Age: 60
End: 2025-07-24
Payer: COMMERCIAL

## 2025-07-24 NOTE — TELEPHONE ENCOUNTER
Call received from Karla at [phone] from [facility]   Wanted to let team know patient's tac level 6.5.   Thanked her, no changes at this time.

## 2025-07-31 ENCOUNTER — TELEPHONE (OUTPATIENT)
Facility: HOSPITAL | Age: 60
End: 2025-07-31
Payer: COMMERCIAL

## 2025-07-31 DIAGNOSIS — Z94.0 KIDNEY REPLACED BY TRANSPLANT (HHS-HCC): ICD-10-CM

## 2025-08-01 NOTE — TELEPHONE ENCOUNTER
Patient called to request refills of the below medication(s) and dose(s).  Please send prescription for the checked items below to       [facility]/pharmacy #[id number] - [place], [place] - [address] [address] [address]  Phone: [phone] Fax: [phone]           cloNIDine (Catapres) 0.2 mg tablet     PT left on vm its suppose to be 5mg?

## 2025-08-04 ENCOUNTER — NURSE ONLY (OUTPATIENT)
Facility: HOSPITAL | Age: 60
End: 2025-08-04
Payer: COMMERCIAL

## 2025-08-04 DIAGNOSIS — N18.31 STAGE 3A CHRONIC KIDNEY DISEASE (MULTI): ICD-10-CM

## 2025-08-04 DIAGNOSIS — Z94.0 KIDNEY REPLACED BY TRANSPLANT (HHS-HCC): ICD-10-CM

## 2025-08-04 LAB
25(OH)D3 SERPL-MCNC: 43 NG/ML (ref 30–100)
ALBUMIN SERPL BCP-MCNC: 3.9 G/DL (ref 3.4–5)
ANION GAP SERPL CALC-SCNC: 11 MMOL/L (ref 10–20)
BUN SERPL-MCNC: 13 MG/DL (ref 6–23)
CALCIUM SERPL-MCNC: 9.2 MG/DL (ref 8.6–10.6)
CHLORIDE SERPL-SCNC: 106 MMOL/L (ref 98–107)
CO2 SERPL-SCNC: 26 MMOL/L (ref 21–32)
CREAT SERPL-MCNC: 1.67 MG/DL (ref 0.5–1.3)
CREAT UR-MCNC: 68.1 MG/DL (ref 20–370)
EGFRCR SERPLBLD CKD-EPI 2021: 47 ML/MIN/1.73M*2
ERYTHROCYTE [DISTWIDTH] IN BLOOD BY AUTOMATED COUNT: 14.6 % (ref 11.5–14.5)
GLUCOSE SERPL-MCNC: 165 MG/DL (ref 74–99)
HCT VFR BLD AUTO: 40.7 % (ref 41–52)
HGB BLD-MCNC: 12.6 G/DL (ref 13.5–17.5)
MAGNESIUM SERPL-MCNC: 1.55 MG/DL (ref 1.6–2.4)
MCH RBC QN AUTO: 29.6 PG (ref 26–34)
MCHC RBC AUTO-ENTMCNC: 31 G/DL (ref 32–36)
MCV RBC AUTO: 96 FL (ref 80–100)
NRBC BLD-RTO: 0 /100 WBCS (ref 0–0)
PHOSPHATE SERPL-MCNC: 2.1 MG/DL (ref 2.5–4.9)
PLATELET # BLD AUTO: 133 X10*3/UL (ref 150–450)
POTASSIUM SERPL-SCNC: 3.4 MMOL/L (ref 3.5–5.3)
PROT UR-ACNC: 14 MG/DL (ref 5–25)
PROT/CREAT UR: 0.21 MG/MG CREAT (ref 0–0.17)
PTH-INTACT SERPL-MCNC: 85.7 PG/ML (ref 18.5–88)
RBC # BLD AUTO: 4.25 X10*6/UL (ref 4.5–5.9)
SODIUM SERPL-SCNC: 140 MMOL/L (ref 136–145)
TACROLIMUS BLD-MCNC: 11.4 NG/ML
WBC # BLD AUTO: 4.3 X10*3/UL (ref 4.4–11.3)

## 2025-08-04 PROCEDURE — 80069 RENAL FUNCTION PANEL: CPT

## 2025-08-04 PROCEDURE — 83735 ASSAY OF MAGNESIUM: CPT

## 2025-08-04 PROCEDURE — 82306 VITAMIN D 25 HYDROXY: CPT

## 2025-08-04 PROCEDURE — 83970 ASSAY OF PARATHORMONE: CPT

## 2025-08-04 PROCEDURE — 36415 COLL VENOUS BLD VENIPUNCTURE: CPT

## 2025-08-04 PROCEDURE — 80197 ASSAY OF TACROLIMUS: CPT

## 2025-08-04 PROCEDURE — 85027 COMPLETE CBC AUTOMATED: CPT

## 2025-08-04 PROCEDURE — 84156 ASSAY OF PROTEIN URINE: CPT

## 2025-08-04 RX ORDER — CLONIDINE HYDROCHLORIDE 0.2 MG/1
0.2 TABLET ORAL 2 TIMES DAILY
Qty: 180 TABLET | Refills: 3 | Status: SHIPPED | OUTPATIENT
Start: 2025-08-04

## 2025-08-04 NOTE — TELEPHONE ENCOUNTER
Confirmed via chart at nursing home patient taking clonidine 0.2.   Patient needs follow up apt with transplant, task sent to DELVIN Miguel to call and get scheduled.     Patient happened to be here for labs, spoke with patient in person was discharged from rehab. Informed him I can only send to doctor current script, if changes need made will do so in clinic visit. Patient ok with plan.

## 2025-08-12 ENCOUNTER — OFFICE VISIT (OUTPATIENT)
Facility: HOSPITAL | Age: 60
End: 2025-08-12
Payer: COMMERCIAL

## 2025-08-12 ENCOUNTER — HOME HEALTH ADMISSION (OUTPATIENT)
Dept: HOME HEALTH SERVICES | Facility: HOME HEALTH | Age: 60
End: 2025-08-12
Payer: COMMERCIAL

## 2025-08-12 VITALS
BODY MASS INDEX: 21.82 KG/M2 | HEIGHT: 68 IN | WEIGHT: 144 LBS | SYSTOLIC BLOOD PRESSURE: 136 MMHG | HEART RATE: 77 BPM | TEMPERATURE: 98 F | OXYGEN SATURATION: 100 % | DIASTOLIC BLOOD PRESSURE: 91 MMHG

## 2025-08-12 DIAGNOSIS — G81.94 LEFT HEMIPARESIS (MULTI): ICD-10-CM
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
## 2025-08-12 DIAGNOSIS — I63.411 ACUTE CEREBROVASCULAR ACCIDENT (CVA) DUE TO EMBOLISM OF RIGHT MIDDLE CEREBRAL ARTERY (MULTI): Primary | ICD-10-CM

## 2025-08-12 DIAGNOSIS — K21.9 GASTROESOPHAGEAL REFLUX DISEASE WITHOUT ESOPHAGITIS: ICD-10-CM

## 2025-08-12 PROCEDURE — 3075F SYST BP GE 130 - 139MM HG: CPT | Performed by: STUDENT IN AN ORGANIZED HEALTH CARE EDUCATION/TRAINING PROGRAM

## 2025-08-12 PROCEDURE — 3008F BODY MASS INDEX DOCD: CPT | Performed by: STUDENT IN AN ORGANIZED HEALTH CARE EDUCATION/TRAINING PROGRAM

## 2025-08-12 PROCEDURE — 3080F DIAST BP >= 90 MM HG: CPT | Performed by: STUDENT IN AN ORGANIZED HEALTH CARE EDUCATION/TRAINING PROGRAM

## 2025-08-12 PROCEDURE — 99212 OFFICE O/P EST SF 10 MIN: CPT

## 2025-08-12 PROCEDURE — 99396 PREV VISIT EST AGE 40-64: CPT | Performed by: STUDENT IN AN ORGANIZED HEALTH CARE EDUCATION/TRAINING PROGRAM

## 2025-08-12 RX ORDER — PANTOPRAZOLE SODIUM 40 MG/1
40 TABLET, DELAYED RELEASE ORAL
Qty: 30 TABLET | Refills: 0 | Status: SHIPPED | OUTPATIENT
Start: 2025-08-12 | End: 2025-09-11

## 2025-08-12 ASSESSMENT — PAIN SCALES - GENERAL: PAINLEVEL_OUTOF10: 10-WORST PAIN EVER

## 2025-08-13 ENCOUNTER — TELEPHONE (OUTPATIENT)
Dept: HOME HEALTH SERVICES | Facility: HOME HEALTH | Age: 60
End: 2025-08-13
Payer: COMMERCIAL

## 2025-08-14 ENCOUNTER — DOCUMENTATION (OUTPATIENT)
Dept: HOME HEALTH SERVICES | Facility: HOME HEALTH | Age: 60
End: 2025-08-14
Payer: COMMERCIAL

## 2025-08-16 ENCOUNTER — HOME CARE VISIT (OUTPATIENT)
Dept: HOME HEALTH SERVICES | Facility: HOME HEALTH | Age: 60
End: 2025-08-16
Payer: COMMERCIAL

## 2025-08-16 VITALS — HEART RATE: 76 BPM | SYSTOLIC BLOOD PRESSURE: 142 MMHG | DIASTOLIC BLOOD PRESSURE: 78 MMHG

## 2025-08-16 PROCEDURE — G0151 HHCP-SERV OF PT,EA 15 MIN: HCPCS | Mod: HHH

## 2025-08-16 SDOH — HEALTH STABILITY: PHYSICAL HEALTH: PHYSICAL EXERCISE: SITTING

## 2025-08-16 SDOH — HEALTH STABILITY: PHYSICAL HEALTH: EXERCISE TYPE: SEATED HEP

## 2025-08-16 SDOH — ECONOMIC STABILITY: HOUSING INSECURITY: OPEN FLAME PRESENT: 1

## 2025-08-16 SDOH — HEALTH STABILITY: PHYSICAL HEALTH: EXERCISE ACTIVITY: SEATED HEP

## 2025-08-16 SDOH — HEALTH STABILITY: PHYSICAL HEALTH: PHYSICAL EXERCISE: 10

## 2025-08-16 SDOH — HEALTH STABILITY: PHYSICAL HEALTH: EXERCISE COMMENTS: SEATED HEP INCLUDES ANKLE PUMPS, LAQS, ACTIVE ASSISTED HIP FLEXION, AND HIP ABDUCTION ON LEFT LE

## 2025-08-16 SDOH — ECONOMIC STABILITY: HOUSING INSECURITY
HOME SAFETY: THERAPIST DISCUSSED WITH PATIENT AND CAREGIVER IMPORTANCE OF HOME SAFETY, SPECIFICALLY FOR CLUTTER FREE WALKING PATHWAYS AND ADEQUATE LIGHTING

## 2025-08-16 SDOH — HEALTH STABILITY: PHYSICAL HEALTH: EXERCISE ACTIVITIES SETS: 1

## 2025-08-16 ASSESSMENT — BALANCE ASSESSMENTS
LONG VERSION TOTAL SCORE (MAX 56): 10
TURN 360 DEGREES: 0
STANDING UNSUPPORTED: 0 - UNABLE TO STAND 30 SECONDS UNSUPPORTED
STANDING TO SITTING: 2
STANDING UNSUPPORTED WITH FEET TOGETHER: 0 - NEEDS HELP TO ATTAIN POSITION AND UNABLE TO HOLD FOR 15 SECONDS
SITTING TO STANDING: 2 - ABLE TO STAND USING HANDS AFTER SEVERAL TRIES
SITTING TO STANDING: 2
TRANSFERS: 1 - NEEDS ONE PERSON TO ASSIST
STANDING UNSUPPORTED ONE FOOT IN FRONT: 0 - LOSES BALANCE WHILE STEPPING OR STANDING
STANDING UNSUPPORTED WITH EYES CLOSED: 0
STANDING UNSUPPORTED WITH EYES CLOSED: 0 - NEEDS HELP TO KEEP FROM FALLING
STANDING ON ONE LEG: 0
REACHING FORWARD WITH OUTSTRETCHED ARM WHILE STANDING: 0 - LOSES BALANCE WHILE TRYING/REQUIRES EXTERNAL SUPPORT
TRANSFERS: 1
STANDING UNSUPPORTED WITH FEET TOGETHER: 0
STANDING UNSUPPORTED: 0
STANDING TO SITTING: 2 - USES BACK OF LEGS AGAINST CHAIR TO CONTROL DESCENT
STANDING UNSUPPORTED ONE FOOT IN FRONT: 0
PICK UP OBJECT FROM THE FLOOR FROM A STANDING POSITION: 0 - UNABLE TO TRY/NEEDS ASSIST TO KEEP FROM LOSING BALANCE OR FALLING
TURN 360 DEGREES: 0 - NEEDS ASSISTANCE WHILE TURNING
STANDING ON ONE LEG: 0 - UNABLE TO TRY OR NEEDS ASSIST TO PREVENT FALL

## 2025-08-16 ASSESSMENT — ENCOUNTER SYMPTOMS
ARTHRALGIAS: 1
SLEEP QUALITY: ADEQUATE
PAIN LOCATION - RELIEVING FACTORS: REST, MEDS
ANGER WITHIN DEFINED LIMITS: 1
PAIN LOCATION - PAIN SEVERITY: 8/10
PAIN: 1
MUSCLE WEAKNESS: 1
LOWEST PAIN SEVERITY IN PAST 24 HOURS: 5/10
HIGHEST PAIN SEVERITY IN PAST 24 HOURS: 8/10
PAIN LOCATION - PAIN FREQUENCY: FREQUENT
OCCASIONAL FEELINGS OF UNSTEADINESS: 1
FATIGUES EASILY: 1
PAIN SEVERITY GOAL: 3/10
DEPRESSION: 0
AGGRESSION WITHIN DEFINED LIMITS: 1
PERSON REPORTING PAIN: PATIENT
PAIN LOCATION - EXACERBATING FACTORS: ROM
PAIN LOCATION: LEFT SHOULDER
HYPERTENSION: 1
LOSS OF SENSATION IN FEET: 1
SUBJECTIVE PAIN PROGRESSION: WAXING AND WANING
LIMITED RANGE OF MOTION: 1

## 2025-08-16 ASSESSMENT — ACTIVITIES OF DAILY LIVING (ADL)
PHYSICAL TRANSFERS ASSESSED: 1
AMBULATION ASSISTANCE: ONE PERSON
OASIS_M1830: 05
ENTERING_EXITING_HOME: MAXIMUM ASSIST
AMBULATION ASSISTANCE: MODERATE ASSIST
CURRENT_FUNCTION: MINIMUM ASSIST
AMBULATION ASSISTANCE: 1
AMBULATION ASSISTANCE ON FLAT SURFACES: 1
CURRENT_FUNCTION: ONE PERSON

## 2025-08-18 ENCOUNTER — PATIENT OUTREACH (OUTPATIENT)
Dept: CARE COORDINATION | Facility: CLINIC | Age: 60
End: 2025-08-18
Payer: COMMERCIAL

## 2025-08-19 ENCOUNTER — HOME CARE VISIT (OUTPATIENT)
Dept: HOME HEALTH SERVICES | Facility: HOME HEALTH | Age: 60
End: 2025-08-19
Payer: COMMERCIAL

## 2025-08-22 ENCOUNTER — HOME CARE VISIT (OUTPATIENT)
Dept: HOME HEALTH SERVICES | Facility: HOME HEALTH | Age: 60
End: 2025-08-22
Payer: COMMERCIAL

## 2025-08-22 PROCEDURE — G0157 HHC PT ASSISTANT EA 15: HCPCS | Mod: CQ,HHH

## 2025-08-22 SDOH — HEALTH STABILITY: PHYSICAL HEALTH
EXERCISE COMMENTS: ITH HEEL SLIDES ON RLE. PATIENT REQUIRED VERBAL CUES AND VISUAL DEMONSTRATION FOR PROPER TECHNIQUE. PATIENT WAS PROVIDED WITH UPDATED WRITTEN HEP.

## 2025-08-22 SDOH — HEALTH STABILITY: PHYSICAL HEALTH
EXERCISE COMMENTS: PATIENT PARTICIPATED IN LOW REPETITIONS OF SELECT SEATED AND STANDING THERPEUTIC EXERCISES WITH NO ADVERSE RESPONSE. PATIENT HAS LIMITED RIGHT HIP STRENGTH AND NO HAMSTRING STRENGTH THROUGH RIGHT LEG. PATIENT WAS SHOWN HOW TO USE LEFT LEG TO ASSIST W

## 2025-08-22 ASSESSMENT — ENCOUNTER SYMPTOMS
DENIES PAIN: 1
PERSON REPORTING PAIN: PATIENT

## 2025-08-22 ASSESSMENT — ACTIVITIES OF DAILY LIVING (ADL): AMBULATION ASSISTANCE ON FLAT SURFACES: 1

## 2025-08-25 ENCOUNTER — OFFICE VISIT (OUTPATIENT)
Dept: NEUROLOGY | Facility: CLINIC | Age: 60
End: 2025-08-25
Payer: COMMERCIAL

## 2025-08-25 ASSESSMENT — ACTIVITIES OF DAILY LIVING (ADL)
TOTAL_SCORE: 70
FEEDING: NEEDS HELP CUTTING, SPREADING BUTTER, ETC., OR REQUIRES MODIFIED DIET
GROOMING: INDEPENDENT FACE/HAIR/TEETH.SHAVING (IMPLEMENTS PROVIDED)
MOBILITY_LEVEL_SURFACES: WHEELCHAIR INDEPENDENT, INCLUDING CORNERS, > 50 YARDS
BATHING: INDEPENDENT (OR IN SHOWER)
TOILET_USE: NEEDS SOME HELP, BUT CAN DO SOMETHING ALONE
BOWELS: INDEPENDENT (INCLUDING BUTTONS, ZIPS, LACES, ETC.)
BED_TO_CHAIR_AND_BACK: INDEPENDENT
STAIRS: NEEDS HELP (VERBAL, PHYSICAL, CARRYING AID)
BLADDER: CONTINENT
DRESSING: NEEDS HELP BUT CAN DO ABOUT HALF UNAIDED

## 2025-08-25 ASSESSMENT — PATIENT HEALTH QUESTIONNAIRE - PHQ9
1. LITTLE INTEREST OR PLEASURE IN DOING THINGS: NOT AT ALL
1. LITTLE INTEREST OR PLEASURE IN DOING THINGS: NOT AT ALL
SUM OF ALL RESPONSES TO PHQ9 QUESTIONS 1 AND 2: 0
SUM OF ALL RESPONSES TO PHQ9 QUESTIONS 1 AND 2: 0
2. FEELING DOWN, DEPRESSED OR HOPELESS: NOT AT ALL
2. FEELING DOWN, DEPRESSED OR HOPELESS: NOT AT ALL

## 2025-08-25 ASSESSMENT — ENCOUNTER SYMPTOMS
DEPRESSION: 0
LOSS OF SENSATION IN FEET: 0
OCCASIONAL FEELINGS OF UNSTEADINESS: 1

## 2025-08-26 ENCOUNTER — HOME CARE VISIT (OUTPATIENT)
Dept: HOME HEALTH SERVICES | Facility: HOME HEALTH | Age: 60
End: 2025-08-26
Payer: COMMERCIAL

## 2025-08-26 VITALS
RESPIRATION RATE: 16 BRPM | HEART RATE: 86 BPM | OXYGEN SATURATION: 96 % | DIASTOLIC BLOOD PRESSURE: 86 MMHG | SYSTOLIC BLOOD PRESSURE: 140 MMHG

## 2025-08-26 DIAGNOSIS — I10 ESSENTIAL (PRIMARY) HYPERTENSION: ICD-10-CM

## 2025-08-26 PROCEDURE — G0157 HHC PT ASSISTANT EA 15: HCPCS | Mod: CQ,HHH

## 2025-08-26 SDOH — HEALTH STABILITY: PHYSICAL HEALTH
EXERCISE COMMENTS: ASED SAFETY. PATIENT IS MOTIVATED TO PROGRESS, BUT REQUIRES VERBAL CUES TO SLOW DOWN AND MOVE WITH CONTROL.

## 2025-08-26 SDOH — HEALTH STABILITY: PHYSICAL HEALTH: EXERCISE TYPE: STANDING AND SEATED

## 2025-08-26 SDOH — HEALTH STABILITY: PHYSICAL HEALTH
EXERCISE COMMENTS: PATIENT PARTICIPATED IN STANDING AND SEATED THERAPEUTIC EXERCISES WITH NO ADVERSE RESPONSE. PATIENT REQUIRES REPETITIVE VERBAL, VISUAL, AND TACTILE CUES FOR APPROPRIATE TECHNIQUE. PATIENT MOVES THROUGH EXERCISES IMPULSIVELY AND REQUIRES SBA FOR INCRE

## 2025-08-26 ASSESSMENT — ENCOUNTER SYMPTOMS
PAIN: 1
PAIN LOCATION - PAIN SEVERITY: 9/10
PAIN LOCATION: RIGHT LEG
PAIN SEVERITY GOAL: 0/10
HIGHEST PAIN SEVERITY IN PAST 24 HOURS: 9/10
PAIN LOCATION - PAIN QUALITY: SORE
SUBJECTIVE PAIN PROGRESSION: UNCHANGED
PERSON REPORTING PAIN: PATIENT
PAIN LOCATION - PAIN FREQUENCY: INTERMITTENT
LOWEST PAIN SEVERITY IN PAST 24 HOURS: 0/10

## 2025-08-26 ASSESSMENT — ACTIVITIES OF DAILY LIVING (ADL)
AMBULATION ASSISTANCE ON FLAT SURFACES: 1
AMBULATION_DISTANCE/DURATION_TOLERATED: 12 FEET

## 2025-08-27 ENCOUNTER — SPECIALTY PHARMACY (OUTPATIENT)
Dept: PHARMACY | Facility: CLINIC | Age: 60
End: 2025-08-27

## 2025-08-27 PROCEDURE — RXMED WILLOW AMBULATORY MEDICATION CHARGE

## 2025-08-27 RX ORDER — FUROSEMIDE 20 MG/1
20 TABLET ORAL DAILY PRN
Qty: 90 TABLET | Refills: 3 | Status: SHIPPED | OUTPATIENT
Start: 2025-08-27

## 2025-08-28 ENCOUNTER — HOME CARE VISIT (OUTPATIENT)
Dept: HOME HEALTH SERVICES | Facility: HOME HEALTH | Age: 60
End: 2025-08-28
Payer: COMMERCIAL

## 2025-08-28 ENCOUNTER — PHARMACY VISIT (OUTPATIENT)
Dept: PHARMACY | Facility: CLINIC | Age: 60
End: 2025-08-28
Payer: COMMERCIAL

## 2025-08-28 VITALS
HEART RATE: 60 BPM | TEMPERATURE: 97.9 F | DIASTOLIC BLOOD PRESSURE: 82 MMHG | OXYGEN SATURATION: 95 % | SYSTOLIC BLOOD PRESSURE: 134 MMHG

## 2025-08-28 PROCEDURE — G0152 HHCP-SERV OF OT,EA 15 MIN: HCPCS | Mod: HHH

## 2025-08-28 ASSESSMENT — ENCOUNTER SYMPTOMS
SUBJECTIVE PAIN PROGRESSION: WAXING AND WANING
PERSON REPORTING PAIN: PATIENT
LOWEST PAIN SEVERITY IN PAST 24 HOURS: 2/10
PAIN LOCATION - PAIN QUALITY: MILD
PAIN LOCATION - EXACERBATING FACTORS: MOVEMENT OF LEFT ARM
PAIN LOCATION - PAIN FREQUENCY: INTERMITTENT
HIGHEST PAIN SEVERITY IN PAST 24 HOURS: 5/10
PAIN LOCATION: LEFT SHOULDER
PAIN LOCATION - PAIN SEVERITY: 5/10
PAIN: 1

## 2025-08-28 ASSESSMENT — ACTIVITIES OF DAILY LIVING (ADL)
BATHING ASSESSED: 1
AMBULATION ASSISTANCE: STAND BY ASSIST
CURRENT_FUNCTION: STAND BY ASSIST
BATHING EQUIPMENT USED: TUB TRANSFER BENCH
AMBULATION ASSISTANCE: 1
HOUSEKEEPING ASSESSED: 1
LAUNDRY: DEPENDENT
PHYSICAL TRANSFERS ASSESSED: 1
BATHING_CURRENT_FUNCTION: MODERATE ASSIST
SHOPPING ASSESSED: 1
CURRENT_FUNCTION: CONTACT GUARD ASSIST
TOILETING: 1
ORAL_CARE_ASSESSED: 1
DRESSING_UB_CURRENT_FUNCTION: MODERATE ASSIST
FEEDING ASSESSED: 1
GROOMING_CURRENT_FUNCTION: INDEPENDENT
ORAL_CARE_CURRENT_FUNCTION: INDEPENDENT
TOILETING: SUPERVISION
FEEDING: INDEPENDENT
SHOPPING: DEPENDENT
LIGHT HOUSEKEEPING: DEPENDENT
LAUNDRY ASSESSED: 1
GROOMING ASSESSED: 1
PREPARING MEALS: NEEDS ASSISTANCE
DRESSING_LB_CURRENT_FUNCTION: MODERATE ASSIST

## 2025-08-29 ENCOUNTER — HOME CARE VISIT (OUTPATIENT)
Dept: HOME HEALTH SERVICES | Facility: HOME HEALTH | Age: 60
End: 2025-08-29
Payer: COMMERCIAL

## 2025-08-29 PROCEDURE — G0157 HHC PT ASSISTANT EA 15: HCPCS | Mod: CQ,HHH

## 2025-08-31 ENCOUNTER — HOME CARE VISIT (OUTPATIENT)
Dept: HOME HEALTH SERVICES | Facility: HOME HEALTH | Age: 60
End: 2025-08-31
Payer: COMMERCIAL

## 2025-09-02 ENCOUNTER — HOME CARE VISIT (OUTPATIENT)
Dept: HOME HEALTH SERVICES | Facility: HOME HEALTH | Age: 60
End: 2025-09-02
Payer: COMMERCIAL

## 2025-09-02 VITALS
RESPIRATION RATE: 16 BRPM | OXYGEN SATURATION: 99 % | HEART RATE: 69 BPM | DIASTOLIC BLOOD PRESSURE: 76 MMHG | SYSTOLIC BLOOD PRESSURE: 118 MMHG

## 2025-09-02 PROCEDURE — G0157 HHC PT ASSISTANT EA 15: HCPCS | Mod: CQ,HHH

## 2025-09-02 SDOH — HEALTH STABILITY: PHYSICAL HEALTH: EXERCISE TYPE: SEATED AND STANDING

## 2025-09-02 SDOH — HEALTH STABILITY: PHYSICAL HEALTH
EXERCISE COMMENTS: PATIENT PARTICIPATED IN SEATED AND STANDING THERAPEUTIC EXERCISES WITH NO ADVERSE RESPONSE. PATIENT REQUIRED VERBAL CUES AND VISUAL DEMONSTRATION FOR PROPER TECHNIQUE. PATIENT REQUIRES CUES FOR GOING THROUGH FULL ROM WITH EVERY REPETITION.

## 2025-09-02 ASSESSMENT — ACTIVITIES OF DAILY LIVING (ADL)
AMBULATION ASSISTANCE ON FLAT SURFACES: 1
AMBULATION_DISTANCE/DURATION_TOLERATED: 20 FEET

## 2025-09-03 ENCOUNTER — HOME CARE VISIT (OUTPATIENT)
Dept: HOME HEALTH SERVICES | Facility: HOME HEALTH | Age: 60
End: 2025-09-03
Payer: MEDICARE

## 2025-09-03 VITALS
TEMPERATURE: 98.9 F | DIASTOLIC BLOOD PRESSURE: 84 MMHG | SYSTOLIC BLOOD PRESSURE: 126 MMHG | HEART RATE: 72 BPM | OXYGEN SATURATION: 99 %

## 2025-09-03 PROCEDURE — G0152 HHCP-SERV OF OT,EA 15 MIN: HCPCS | Mod: HHH

## 2025-09-03 ASSESSMENT — ENCOUNTER SYMPTOMS
PAIN LOCATION - PAIN QUALITY: SORE
PERSON REPORTING PAIN: PATIENT
PAIN LOCATION - EXACERBATING FACTORS: CERTAIN MOVEMENTS
PAIN LOCATION - PAIN FREQUENCY: INTERMITTENT
PAIN LOCATION: LEFT ARM
PAIN LOCATION - RELIEVING FACTORS: RESTING
HIGHEST PAIN SEVERITY IN PAST 24 HOURS: 2/10
PAIN LOCATION - PAIN SEVERITY: 2/10
LOWEST PAIN SEVERITY IN PAST 24 HOURS: 2/10
PAIN: 1

## 2025-09-03 ASSESSMENT — ACTIVITIES OF DAILY LIVING (ADL)
ENTERING_EXITING_HOME: MODERATE ASSIST
OASIS_M1830: 03
OASIS_M1830: 03
HOME_HEALTH_OASIS: 01

## 2025-09-04 ENCOUNTER — PATIENT OUTREACH (OUTPATIENT)
Dept: CARE COORDINATION | Facility: CLINIC | Age: 60
End: 2025-09-04

## 2025-09-04 ENCOUNTER — TELEPHONE (OUTPATIENT)
Facility: HOSPITAL | Age: 60
End: 2025-09-04

## 2025-09-04 ENCOUNTER — OFFICE VISIT (OUTPATIENT)
Facility: HOSPITAL | Age: 60
End: 2025-09-04
Payer: MEDICARE

## 2025-09-04 VITALS
DIASTOLIC BLOOD PRESSURE: 79 MMHG | HEART RATE: 65 BPM | TEMPERATURE: 97.9 F | OXYGEN SATURATION: 100 % | WEIGHT: 144 LBS | BODY MASS INDEX: 21.27 KG/M2 | SYSTOLIC BLOOD PRESSURE: 135 MMHG

## 2025-09-04 DIAGNOSIS — Z94.0 KIDNEY REPLACED BY TRANSPLANT (HHS-HCC): ICD-10-CM

## 2025-09-04 DIAGNOSIS — Z51.81 THERAPEUTIC DRUG MONITORING: ICD-10-CM

## 2025-09-04 DIAGNOSIS — Z92.25 PERSONAL HISTORY OF IMMUNOSUPRESSION THERAPY: ICD-10-CM

## 2025-09-04 DIAGNOSIS — Z94.0 KIDNEY REPLACED BY TRANSPLANT (HHS-HCC): Primary | ICD-10-CM

## 2025-09-04 ASSESSMENT — ENCOUNTER SYMPTOMS
NAUSEA: 0
CHEST TIGHTNESS: 0
SHORTNESS OF BREATH: 0
FREQUENCY: 0
PALPITATIONS: 0
CONFUSION: 0
NERVOUS/ANXIOUS: 0
HEMATURIA: 0
VOMITING: 0
ABDOMINAL DISTENTION: 0
HEADACHES: 0
COUGH: 0
BACK PAIN: 0
DIARRHEA: 0

## 2025-09-04 ASSESSMENT — PAIN SCALES - GENERAL: PAINLEVEL_OUTOF10: 0-NO PAIN

## 2025-09-05 ENCOUNTER — HOME CARE VISIT (OUTPATIENT)
Dept: HOME HEALTH SERVICES | Facility: HOME HEALTH | Age: 60
End: 2025-09-05
Payer: MEDICARE

## 2025-09-05 VITALS
DIASTOLIC BLOOD PRESSURE: 72 MMHG | OXYGEN SATURATION: 98 % | SYSTOLIC BLOOD PRESSURE: 116 MMHG | HEART RATE: 90 BPM | TEMPERATURE: 97.1 F

## 2025-09-05 PROCEDURE — G0152 HHCP-SERV OF OT,EA 15 MIN: HCPCS | Mod: HHH

## 2025-09-05 ASSESSMENT — ENCOUNTER SYMPTOMS
PAIN LOCATION - RELIEVING FACTORS: RESTING
PAIN LOCATION - EXACERBATING FACTORS: CERTAIN MOVEMENTS
PERSON REPORTING PAIN: PATIENT
PAIN LOCATION - PAIN QUALITY: ACHING
PAIN LOCATION - PAIN FREQUENCY: INTERMITTENT
LOWEST PAIN SEVERITY IN PAST 24 HOURS: 2/10
HIGHEST PAIN SEVERITY IN PAST 24 HOURS: 5/10
PAIN LOCATION: LEFT ARM
PAIN LOCATION - PAIN SEVERITY: 2/10
PAIN: 1

## 2025-09-10 ENCOUNTER — APPOINTMENT (OUTPATIENT)
Dept: SURGERY | Facility: CLINIC | Age: 60
End: 2025-09-10
Payer: MEDICARE